# Patient Record
Sex: MALE | Race: WHITE | NOT HISPANIC OR LATINO | Employment: OTHER | URBAN - METROPOLITAN AREA
[De-identification: names, ages, dates, MRNs, and addresses within clinical notes are randomized per-mention and may not be internally consistent; named-entity substitution may affect disease eponyms.]

---

## 2018-07-17 RX ORDER — WARFARIN SODIUM 3 MG/1
3 TABLET ORAL DAILY
Refills: 5 | COMMUNITY
Start: 2018-05-29 | End: 2018-08-02 | Stop reason: CLARIF

## 2018-07-17 RX ORDER — WARFARIN SODIUM 5 MG/1
5 TABLET ORAL DAILY
Refills: 1 | COMMUNITY
Start: 2018-06-27 | End: 2018-10-24 | Stop reason: SDUPTHER

## 2018-07-19 ENCOUNTER — OFFICE VISIT (OUTPATIENT)
Dept: FAMILY MEDICINE CLINIC | Facility: CLINIC | Age: 68
End: 2018-07-19
Payer: MEDICARE

## 2018-07-19 VITALS
WEIGHT: 191.6 LBS | SYSTOLIC BLOOD PRESSURE: 126 MMHG | TEMPERATURE: 97.3 F | OXYGEN SATURATION: 98 % | HEIGHT: 68 IN | HEART RATE: 76 BPM | DIASTOLIC BLOOD PRESSURE: 82 MMHG | RESPIRATION RATE: 16 BRPM | BODY MASS INDEX: 29.04 KG/M2

## 2018-07-19 DIAGNOSIS — R01.1 CARDIAC MURMUR: ICD-10-CM

## 2018-07-19 DIAGNOSIS — I73.9 PERIPHERAL ARTERY DISEASE (HCC): Primary | ICD-10-CM

## 2018-07-19 DIAGNOSIS — F17.200 TOBACCO DEPENDENCE: ICD-10-CM

## 2018-07-19 DIAGNOSIS — I73.9 INTERMITTENT CLAUDICATION (HCC): ICD-10-CM

## 2018-07-19 PROCEDURE — 99204 OFFICE O/P NEW MOD 45 MIN: CPT | Performed by: FAMILY MEDICINE

## 2018-07-19 RX ORDER — VARENICLINE TARTRATE 25 MG
KIT ORAL
Qty: 53 TABLET | Refills: 0 | Status: SHIPPED | OUTPATIENT
Start: 2018-07-19 | End: 2018-08-02 | Stop reason: CLARIF

## 2018-07-19 NOTE — PROGRESS NOTES
Assessment/Plan:         Diagnoses and all orders for this visit:    Peripheral artery disease ([address] )  Comments:  f/u with vascualr, previously known to Dr Yaya Burdick, obtain labs from Dr Shirley Gill  Orders:  -     Ambulatory referral to Vascular Surgery; Future  -     VAS lower limb arterial duplex, limited/unilateral; Future    Tobacco dependence  Comments:  trial of chantix, stressed importance of cessation for PAD  Orders:  -     varenicline (CHANTIX AMMON) 0 5 MG X 11 & 1 MG X 42 tablet; Take one 0 5mg tab by mouth 1x daily for 3 days, then increase to one 0 5mg tab 2x daily for 3 days, then increase to one 1mg tab 2x daily    Intermittent claudication (HCC)  -     Ambulatory referral to Vascular Surgery; Future  -     VAS lower limb arterial duplex, limited/unilateral; Future    Cardiac murmur  Comments:  check echo  Orders:  -     Echo complete with contrast if indicated; Future    Other orders  -     warfarin (COUMADIN) 5 mg tablet; Take 5 mg by mouth daily  -     warfarin (COUMADIN) 3 mg tablet; Take 3 mg by mouth daily          Subjective:      Patient ID: Yesi Heck is a 80 y o  male  Pt c/o pain in the L leg while mowing the grass 2 weeks ago  Pain improved when he stopped walking  Push mower, 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  H/o DVT and PE  On coumadin currently  H/o fem bypass at [facility] in 2011  Not currently seeing a vascular specialist  Trying to quit smoking, cut down to 2-3 cigs per day  Has been off the cigs while in hospital without the patch or any aids  Interested in [address]  Known to Dr Shirley Gill for the blood clots, he follows the coumadin          The following portions of the patient's history were reviewed and updated as appropriate: allergies, current medications, past family history, past medical history, past social history, past surgical history and problem list     Review of Systems      Objective:      /82 (BP Location: Left arm, Patient Position: Sitting, Cuff Size: Standard)   Pulse 76   Temp (!) 97 3 °F (36 3 °C)   Resp 16   Ht 5' 8" (1 727 m)   Wt 86 9 kg (191 lb 9 6 oz)   SpO2 98%   BMI 29 13 kg/m²          Physical Exam   Constitutional: He is oriented to person, place, and time  He appears well-developed and well-nourished  Cardiovascular: Normal rate and regular rhythm  Murmur (BLAISE 2/6) heard  Pulmonary/Chest: Effort normal and breath sounds normal    Neurological: He is alert and oriented to person, place, and time  Skin: Skin is warm  There is erythema  No pulse appreicated L foot, R pedal pulse intact, chronic color changes in both legs, poor nail growth   Psychiatric: He has a normal mood and affect   His behavior is normal  Judgment and thought content normal

## 2018-07-27 ENCOUNTER — HOSPITAL ENCOUNTER (OUTPATIENT)
Dept: NON INVASIVE DIAGNOSTICS | Facility: CLINIC | Age: 68
Discharge: HOME | End: 2018-07-27
Payer: MEDICARE

## 2018-07-27 ENCOUNTER — TRANSCRIBE ORDERS (OUTPATIENT)
Dept: ADMINISTRATIVE | Facility: HOSPITAL | Age: 68
End: 2018-07-27

## 2018-07-27 ENCOUNTER — APPOINTMENT (OUTPATIENT)
Dept: LAB | Facility: MEDICAL CENTER | Age: 68
End: 2018-07-27
Payer: MEDICARE

## 2018-07-27 DIAGNOSIS — R53.83 OTHER FATIGUE: ICD-10-CM

## 2018-07-27 DIAGNOSIS — T81.72XD: ICD-10-CM

## 2018-07-27 DIAGNOSIS — R63.4 ABNORMAL WEIGHT LOSS: ICD-10-CM

## 2018-07-27 DIAGNOSIS — D68.59 PRIMARY HYPERCOAGULABLE STATE (HCC): Primary | ICD-10-CM

## 2018-07-27 DIAGNOSIS — I73.9 PERIPHERAL ARTERY DISEASE (HCC): ICD-10-CM

## 2018-07-27 DIAGNOSIS — I82.409: ICD-10-CM

## 2018-07-27 DIAGNOSIS — I73.9 INTERMITTENT CLAUDICATION (HCC): ICD-10-CM

## 2018-07-27 DIAGNOSIS — M81.0 AGE RELATED OSTEOPOROSIS, UNSPECIFIED PATHOLOGICAL FRACTURE PRESENCE: ICD-10-CM

## 2018-07-27 PROCEDURE — 93923 UPR/LXTR ART STDY 3+ LVLS: CPT

## 2018-07-27 PROCEDURE — 85610 PROTHROMBIN TIME: CPT | Performed by: INTERNAL MEDICINE

## 2018-07-27 PROCEDURE — 93922 UPR/L XTREMITY ART 2 LEVELS: CPT | Performed by: SURGERY

## 2018-07-27 PROCEDURE — 93925 LOWER EXTREMITY STUDY: CPT

## 2018-07-27 PROCEDURE — 36415 COLL VENOUS BLD VENIPUNCTURE: CPT | Performed by: INTERNAL MEDICINE

## 2018-07-27 PROCEDURE — 93925 LOWER EXTREMITY STUDY: CPT | Performed by: SURGERY

## 2018-07-28 LAB
INR PPP: 2 (ref 0.86–1.17)
PROTHROMBIN TIME: 22.8 SECONDS (ref 11.8–14.2)

## 2018-08-02 ENCOUNTER — OFFICE VISIT (OUTPATIENT)
Dept: VASCULAR SURGERY | Facility: CLINIC | Age: 68
End: 2018-08-02
Payer: MEDICARE

## 2018-08-02 VITALS
SYSTOLIC BLOOD PRESSURE: 156 MMHG | DIASTOLIC BLOOD PRESSURE: 86 MMHG | TEMPERATURE: 97.8 F | BODY MASS INDEX: 29.1 KG/M2 | HEIGHT: 68 IN | WEIGHT: 192 LBS

## 2018-08-02 DIAGNOSIS — I73.9 INTERMITTENT CLAUDICATION (HCC): ICD-10-CM

## 2018-08-02 DIAGNOSIS — F17.200 TOBACCO DEPENDENCE: Primary | ICD-10-CM

## 2018-08-02 DIAGNOSIS — I73.9 PERIPHERAL ARTERY DISEASE (HCC): ICD-10-CM

## 2018-08-02 PROCEDURE — 99204 OFFICE O/P NEW MOD 45 MIN: CPT | Performed by: PHYSICIAN ASSISTANT

## 2018-08-02 RX ORDER — ATORVASTATIN CALCIUM 40 MG/1
40 TABLET, FILM COATED ORAL DAILY
Qty: 30 TABLET | Refills: 0 | Status: SHIPPED | OUTPATIENT
Start: 2018-08-02 | End: 2018-09-20 | Stop reason: SDUPTHER

## 2018-08-02 NOTE — ASSESSMENT & PLAN NOTE
Peripheral arterial disease  Claudication with moderate work load  R fem-distal PT bypass (vein graft) 9/15/2011 ([facility])  Hx PE/multiple, bilat DVT/protein C deficiency  on warfarin  Smoker     Two episodes of LEFT leg claudication after 30-45 minutes of mowing the lawn at an activity of higher work load than usual  No calf pain during activities of daily living, wounds or rest pain  He had a subtherapeutic INR in May  No increased leg swelling  No cords or phlebitis  I had a detailed discussion with patient and wife regarding his complex history, symptoms and plan of care  He discussed findings of recent arterial duplex  At this juncture, his symptoms are not Lifestyle limiting  We would favor medical therapy with vascular follow up and surveillance  We discussed that we should consider angiography should he develop lifestyle limiting symptoms or wounds (since he probably won't heal foot wounds ) We had a long discussion regarding potential concerns such as if he has increased pain, swelling, rest pain or development of wounds  We will add statin but have PCP check lipids/ LFTs  Patient tells me that his "old" cardiologist had him on statin and metoprolol but it got lost over the years  Recommendations:    - progressive walking program - walk every day and increase activity  - continue with aspirin, warfarin  - add statin therapy which he was on in the past  (No obvious contraindication)  - maintain a healthy lifestyle with good low fat, low cholesterol diet  - work on smoking cessation  - must call right away for increasing calf / leg pain or development of wounds on feet    - follow up with PCP for cholesterol panel/management, blood pressure  - we will consider angiogram, if symptoms start to affect life-style or develops wounds  - follow up in 2 months or sooner, if needed  - routine surveillance  - patient and understand instructions

## 2018-08-02 NOTE — PROGRESS NOTES
Assessment/Plan:    Claudication of left lower extremity ([address] )  Peripheral arterial disease  Claudication with moderate work load  R fem-distal PT bypass (vein graft) 9/15/2011 ([facility])  Hx PE/multiple, bilat DVT/protein C deficiency  on warfarin  Smoker     Two episodes of LEFT leg claudication after 30-45 minutes of mowing the lawn at an activity of higher work load than usual  No calf pain during activities of daily living, wounds or rest pain  He had a subtherapeutic INR in May  No increased leg swelling  No cords or phlebitis  I had a detailed discussion with patient and wife regarding his complex history, symptoms and plan of care  He discussed findings of recent arterial duplex  At this juncture, his symptoms are not Lifestyle limiting  We would favor medical therapy with vascular follow up and surveillance  We discussed that we should consider angiography should he develop lifestyle limiting symptoms or wounds (since he probably won't heal foot wounds ) We had a long discussion regarding potential concerns such as if he has increased pain, swelling, rest pain or development of wounds  We will add statin but have PCP check lipids/ LFTs  Patient tells me that his "old" cardiologist had him on statin and metoprolol but it got lost over the years  Recommendations:    - progressive walking program - walk every day and increase activity  - continue with aspirin, warfarin  - add statin therapy which he was on in the past  (No obvious contraindication)  - maintain a healthy lifestyle with good low fat, low cholesterol diet  - work on smoking cessation  - must call right away for increasing calf / leg pain or development of wounds on feet    - follow up with PCP for cholesterol panel/management, blood pressure  - we will consider angiogram, if symptoms start to affect life-style or develops wounds  - follow up in 2 months or sooner, if needed  - routine surveillance  - patient and understand instructions            Patient ID: Marilee Bamberger is a 80 y o  male  Chief complaint: Pt is new to our practice here to discuss PAD and intermitted claudication  Pt had LOYD done 7/27/18  Pt is on coumadin  HPI     Marilee Bamberger 80year old male evaluation of peripheral arterial disease with claudication  David Perla has a Hx CAD, PAD, ongoing tobacco and PE/multiple, bilat DVT/protein C deficiency  on warfarin  He suffered from claudication and wounds for which he underwent R Fem-Pop bypass reverse SVG 06/02/2011 ([facility])  The bypass apparently quickly occluded for which he underwent R fem-distal PT bypass (L SVG) 9/15/2011 ([facility])  He continues to smoke cigarettes  He is currently being worked up for heart murmur on PE  David Perla has an extensive vascular history with arterial and venous disease  He has not been seen by vascular surgery since his bypass in 2011  He suddenly developed episodes of calf pain while mowing the grass  He describes an episode four weeks ago where after 50 minutes of mowing the grass he had to stop due to left calf pain  His wife states that he walked / mod the entire yard when he usually he would just do half  He sat down after 10 minutes the symptoms resolved  2 weeks ago had another similar episode where he had a   after 30 minutes of mowing the grass he had to stop for 10 minutes, the L calf pain resolved and he was able to walk again  He had some transient numbness in the L foot  He describes that the symptoms are  Id he had had no progressive history of claudication  It certainly sounds that this level of activity as much higher than usual   On a daily basis he does not get life-limiting claudication  He is completing his activities daily living without any calf pain  His primary care sent him for an arterial duplex which we reviewed in the office  Fortunately, the right lower extremity bypass looks good    The MARLENY on the left where he has symptoms is low at 0 5  At this juncture, we discussed that his symptoms are not Lifestyle limiting and we would favor medical therapy with vascular follow up and surveillance  We discussed that we should consider angiography should he developed lifestyle limiting symptoms or wounds since he probably won't heal foot wounds  We had a long discussion regarding potential concerns such as if he has increased pain, swelling, rest pain or development of wounds  No has no cords or evidence of phlebitis  He sees a podiatrist on a regular basis to check his feet which have been stable  Unfortunately, he continues smokes cigarettes  He was given Chantix Rx by PCP but could not afford it  He does tell me that he subtherapeutic INR 1 6 in May, but I see no high suspicion to send for venous duplex       Recent LEADS shows patent R LE bypass and good pressures  L  LE MARLENY 0 5 with flat-lined pressures  Fem-pop disease and short segment popliteal occlusion  Patent PT/AT  Medications: warfarin, aspirin  Add atorvastatin 40  The following portions of the patient's history were reviewed and updated as appropriate: allergies, current medications, past family history, past medical history, past social history, past surgical history and problem list       Heart murmur for which she is scheduled for an echo  No chest pain, pressure or shortness of breath  No dizziness lightheadedness  No syncope  No history of heart attack or stroke  No increased lower extremity edema  No problems with bleeding on warfarin  Review of Systems   Constitutional: Negative  HENT: Positive for hearing loss  Eyes: Negative  Respiratory: Negative  Cardiovascular: Negative  Gastrointestinal: Negative  Endocrine: Negative  Genitourinary: Negative  Musculoskeletal: Negative  Leg pain   Leg cramping when walking     Skin: Negative  Neurological: Positive for numbness  Hematological: Negative  Psychiatric/Behavioral: Negative  Objective:    /86 (BP Location: Right arm, Patient Position: Sitting, Cuff Size: Adult)   Temp 97 8 °F (36 6 °C) (Tympanic)   Ht 5' 8" (1 727 m)   Wt 87 1 kg (192 lb)   BMI 29 19 kg/m²        Physical Exam   Constitutional: He is oriented to person, place, and time  He appears well-developed and well-nourished  He is cooperative  HENT:   Head: Normocephalic and atraumatic  Eyes: EOM are normal  Pupils are equal, round, and reactive to light  Neck: Trachea normal  Neck supple  No JVD present  No thyromegaly present  Cardiovascular: Normal rate, regular rhythm, S1 normal and S2 normal   Exam reveals no gallop and no friction rub  Murmur heard  Systolic murmur is present with a grade of 3/6   Pulses:       Carotid pulses are 2+ on the right side, and 2+ on the left side  Radial pulses are 2+ on the right side, and 2+ on the left side  Femoral pulses are 2+ on the right side, and 2+ on the left side  Dorsalis pedis pulses are 2+ on the right side  Posterior tibial pulses are 1+ on the right side  Discolored toes - redness with blanching  The R/L toes are equally discolored and similar in temperature (toes a bit cool c/w foot and leg)    Mild LE edema    Bilateral venous varicosities L > R     R/L calf soft  R Good, biphasic -triphasic AT/DP signals; biphasic PT    L good, pop signal; biphasic AT signal    Decreased sensation R foot (chronic 2011); motor intact  L Grossly motor -sensory intact    Chronic bilateral skin changes     Pulmonary/Chest: Effort normal and breath sounds normal  No accessory muscle usage  No respiratory distress  He has no wheezes  He has no rales  Abdominal: Soft  Bowel sounds are normal  He exhibits no distension  There is no hepatosplenomegaly  There is no tenderness  Musculoskeletal: Normal range of motion  He exhibits no edema or deformity     Neurological: He is alert and oriented to person, place, and time  Grossly normal    Skin: Skin is warm and dry  No lesion and no rash noted  No cyanosis  Nails show no clubbing  Psychiatric: He has a normal mood and affect  Nursing note and vitals reviewed  Vitals:    08/02/18 0947   BP: 156/86   BP Location: Right arm   Patient Position: Sitting   Cuff Size: Adult   Temp: 97 8 °F (36 6 °C)   TempSrc: Tympanic   Weight: 87 1 kg (192 lb)   Height: 5' 8" (1 727 m)       Patient Active Problem List   Diagnosis    Tobacco dependence    Peripheral artery disease (HCC)    Claudication of left lower extremity (HCC)       Past Surgical History:   Procedure Laterality Date    VEIN LIGATION         Family History   Problem Relation Age of Onset    Cancer Mother 50    Heart attack Father 46    Prostate cancer Brother        Social History     Social History    Marital status: /Civil Union     Spouse name: N/A    Number of children: N/A    Years of education: N/A     Occupational History    Not on file  Social History Main Topics    Smoking status: Current Every Day Smoker    Smokeless tobacco: Never Used    Alcohol use No    Drug use: Unknown    Sexual activity: Not on file     Other Topics Concern    Not on file     Social History Narrative    No narrative on file       No Known Allergies      Current Outpatient Prescriptions:     atorvastatin (LIPITOR) 40 mg tablet, Take 1 tablet (40 mg total) by mouth daily, Disp: 30 tablet, Rfl: 0    warfarin (COUMADIN) 5 mg tablet, Take 5 mg by mouth daily, Disp: , Rfl: 1      VAS lower extremity arterial duplex 7/27/2018:  FINDINGS:  Right Waveform PSV EDV   Post  Tibial Triphasic   Ant   Tibial Biphasic   Inflow Anastomosis 87   High Thigh (Graft) 198   Mid Thigh (Graft) 56   Near Knee (Graft) 65   Mid Calf (Graft) 93   High Calf (Graft) 89   Outflow Anastomosis 108   Common Femoral Artery 196 0   Prox Profunda 156 0   Prox Post Tibial 170 0   Dist Post Tibial 40 4   Dist  Ant  Tibial 44 4   Left Impression Waveform PSV EDV   Post  Tibial Monophasic   Ant  Tibial Monophasic   Common Femoral Artery 117 13   Prox Profunda 232 21   Prox SFA 82 9   Mid SFA Diffuse Disease 80 10   Dist SFA 50-75% 177 25   Proximal Pop 36 0   Pop Mid Occluded 0   Distal Pop 15 6   Dist Post Tibial 17 5   Dist  Ant  Tibial 24 7   CONCLUSION:  Impression:  RIGHT LOWER LIMB  Widely patent femoral artery to posterior tibial artery bypass graft  Ankle Pressure: 196 mm Hg , MARLENY: 1 11  Right Metatarsal Pressure: 215 mm Hg  Right Great Toe Pressure 116 mm Hg , within the healing range  PVR/ PPG tracings are normal   LEFT LOWER LIMB  Diffuse disease in the femoral and popliteal arteries with a 50-75% stenosis of  the distal superficial femoral artery and a short segment occlusion of the mid  popliteal artery with reconstitution in the distal popliteal artery  The posterior tibial and anterior tibial arteries are patent  Ankle Pressure 89 mm Hg , MARLENY: 0 5  Pulse volume recordings at the ankle and PPG waveforms at the great toe are  flatline, suggesting significantly diminished digital perfusion

## 2018-08-02 NOTE — PATIENT INSTRUCTIONS
Peripheral arterial disease  Claudication with moderate work load  R fem-distal PT bypass (vein graft) 9/15/2011 ([facility])  Hx PE/multiple, bilat DVT/protein C deficiency  on warfarin  Smoker     Two episodes of LEFT leg claudication after 30-45 minutes of mowing the lawn at an activity of higher work load than usual  No calf pain during activities of daily living, wounds or rest pain  He had a subtherapeutic INR in May  No increased leg swelling  No cords, palpable lesions  No chest pain or shortness of breath  Recommendations:    - progressive walking program - walk every day and increase activity  - continue with aspirin, warfarin  - add statin therapy which he was on in the past  (No obvious contraindication)  - maintain a healthy lifestyle with good low fat, low cholesterol diet  - work on smoking cessation  - must call right away for increasing calf / leg pain or development of wounds on feet  - follow up with PCP for cholesterol panel/management, blood pressure  - we will consider angiogram, if symptoms start to affect life-style or develops wounds  - follow up in 2 months or sooner, if needed  - routine surveillance  - patient and understand instructions        Peripheral Artery Disease   AMBULATORY CARE:   Peripheral artery disease (PAD)  is narrow, weak, or blocked arteries  It may affect any arteries outside of your heart and brain  PAD is usually the result of a buildup of fat and cholesterol, also called plaque, along your artery walls  Inflammation, a blood clot, or abnormal cell growth could also block your arteries  PAD prevents normal blood flow to your legs and arms  You are at risk of an amputation if poor blood flow keeps wounds from healing or causes gangrene (tissue death)  Without treatment, PAD can also cause a heart attack or stroke  Common symptoms include:  Mild PAD usually does not cause symptoms   As the disease worsens over time, you may have the following:  · Pain or cramps in your leg or hip while you walk     · A numb, weak, or heavy feeling in your legs     · Dry, scaly, red, or pale skin on your legs     · Thick or brittle nails, or hair loss on your arms and legs     · Foot sores that will not heal     · Burning or aching in your feet and toes while resting (this may be worse when you lie down)  Call 911 for the following:   · You have any of the following signs of a heart attack:      ¨ Squeezing, pressure, or pain in your chest that lasts longer than 5 minutes or returns    ¨ Discomfort or pain in your back, neck, jaw, stomach, or arm     ¨ Trouble breathing    ¨ Nausea or vomiting    ¨ Lightheadedness or a sudden cold sweat, especially with chest pain or trouble breathing    · You have any of the following signs of a stroke:      ¨ Numbness or drooping on one side of your face     ¨ Weakness in an arm or leg    ¨ Confusion or difficulty speaking    ¨ Dizziness, a severe headache, or vision loss  Seek care immediately if:   · You have sores or wounds that will not heal      · You notice black or discolored skin on your arm or leg  · Your skin is cool to the touch  Contact your healthcare provider if:   · You have leg pain when you walk 1/8 mile (200 meters) or less, even with treatment  · Your legs are red, dry, or pale, even with treatment  · You have questions or concerns about your condition or care  Treatment for PAD  can help reduce your risk of a heart attack, stroke, or amputation  You may need more than one of the following:  · Medicines  may be given to prevent blood clots and reduce the risk of a heart attack or stroke  You may be given medicine to help prevent your PAD from getting worse  · A supervised exercise program  helps you stay active in normal daily activities and may prevent disability  Healthcare providers will help you safely walk or do strength training exercises 3 times a week for 30 to 60 minutes   You will do this for several months, then transition to walking on your own  · Angioplasty  is a procedure to open your artery so blood can flow through normally  A thin tube called a catheter is used to insert a small balloon into your artery  The balloon is inflated to open your blocked artery, and then removed  A tube called a stent may be placed in your artery to hold it open  · Bypass surgery  is used to make a new connection to your artery with a vein from another part of your body, or an artificial graft  The vein or graft is attached to your artery above and below your blockage  This allows blood to flow around the blocked portion of your artery  Manage and prevent PAD:   · Walk for 30 to 60 minutes at least 4 times a week  Your healthcare provider may also refer you to an supervised exercise program  The program helps increase how far you can walk without pain  It also helps you stay active in normal daily activities and may prevent disability caused by PAD  · Do not smoke  Nicotine and other chemicals in cigarettes and cigars can worsen PAD  They can also increase your risk for a heart attack or stroke  Ask your healthcare provider for information if you currently smoke and need help to quit  E-cigarettes or smokeless tobacco still contain nicotine  Talk to your healthcare provider before you use these products  · Manage other health conditions  Take your medicines as directed and follow your healthcare provider's instructions if you have high blood pressure or high cholesterol  Perform foot care and check your blood sugar levels as directed if you have diabetes  · Eat heart healthy foods  Eat whole grains, fruits, and vegetables every day  Limit salt and high-fat foods  Ask your healthcare provider for more information on a heart healthy diet  Ask if you need to lose weight  Your healthcare provider can help you create a healthy weight-loss plan    Follow up with your healthcare provider as directed:  Write down your questions so you remember to ask them during your visits  © 2017 [employer] Information is for End User's use only and may not be sold, redistributed or otherwise used for commercial purposes  All illustrations and images included in CareNotes® are the copyrighted property of [employer]  or [employer]  The above information is an  only  It is not intended as medical advice for individual conditions or treatments  Talk to your doctor, nurse or pharmacist before following any medical regimen to see if it is safe and effective for you

## 2018-08-02 NOTE — LETTER
August 2, 2018     Luz Woodward MD  [address]    Patient: Genet Vega   YOB: 1950   Date of Visit: 8/2/2018     Dear Dr Suleiman Cook      Thank you for kindly referring Josy Reyes to me for evaluation  Below are the relevant portions of my assessment and plan of care  If you have questions, please do not hesitate to call me  I look forward to following Yris Leary along with you  Sincerely,        Floridalma Matias PA-C        CC: No Recipients    Progress Notes:      Peripheral arterial disease  Claudication with moderate work load  R fem-distal PT bypass (vein graft) 9/15/2011 ([facility])  Hx PE/multiple, bilat DVT/protein C deficiency  on warfarin  Smoker     Two episodes of LEFT leg claudication after 30-45 minutes of mowing the lawn at an activity of higher work load than usual  No calf pain during activities of daily living, wounds or rest pain  He had a subtherapeutic INR in May  No increased leg swelling  No cords or phlebitis  I had a detailed discussion with patient and wife regarding his complex history, symptoms and plan of care  He discussed findings of recent arterial duplex  At this juncture, his symptoms are not Lifestyle limiting  We would favor medical therapy with vascular follow up and surveillance  We discussed that we should consider angiography should he develop lifestyle limiting symptoms or wounds (since he probably won't heal foot wounds ) We had a long discussion regarding potential concerns such as if he has increased pain, swelling, rest pain or development of wounds  We will add statin but have PCP check lipids/ LFTs  Patient tells me that his "old" cardiologist had him on statin and metoprolol but it got lost over the years         Recommendations:    - progressive walking program - walk every day and increase activity  - continue with aspirin, warfarin  - add statin therapy which he was on in the past  (No obvious contraindication)  - maintain a healthy lifestyle with good low fat, low cholesterol diet  - work on smoking cessation  - must call right away for increasing calf / leg pain or development of wounds on feet    - follow up with PCP for cholesterol panel/management, blood pressure  - we will consider angiogram, if symptoms start to affect life-style or develops wounds  - follow up in 2 months or sooner, if needed  - routine surveillance  - patient and understand instructions

## 2018-08-03 ENCOUNTER — TELEPHONE (OUTPATIENT)
Dept: FAMILY MEDICINE CLINIC | Facility: CLINIC | Age: 68
End: 2018-08-03

## 2018-08-03 NOTE — TELEPHONE ENCOUNTER
Vascular put him on lipator and now he was to know if he can get blood work to get his chloresterol levels checked and if you want him to come in for a follow up

## 2018-08-05 NOTE — TELEPHONE ENCOUNTER
I will put BW orders in for him  We usually check the lipids 4-6 weeks after starting the medication, I will check other routine labs as well  He can come in after that to review them  I thought he mentioned at his last visit he had BW done recently, or at least if we could get those results to compare with these new ones  Vascular likely put him on lipitor to reduce blockages in the vessels more so than just get the numbers down in the labs

## 2018-08-09 DIAGNOSIS — E78.2 MIXED HYPERLIPIDEMIA: Primary | ICD-10-CM

## 2018-08-30 ENCOUNTER — HOSPITAL ENCOUNTER (OUTPATIENT)
Dept: NON INVASIVE DIAGNOSTICS | Facility: MEDICAL CENTER | Age: 68
Discharge: HOME | End: 2018-08-30
Payer: MEDICARE

## 2018-08-30 DIAGNOSIS — R01.1 CARDIAC MURMUR: ICD-10-CM

## 2018-08-30 PROCEDURE — 93306 TTE W/DOPPLER COMPLETE: CPT

## 2018-08-30 PROCEDURE — 93306 TTE W/DOPPLER COMPLETE: CPT | Performed by: INTERNAL MEDICINE

## 2018-09-13 ENCOUNTER — APPOINTMENT (OUTPATIENT)
Dept: LAB | Facility: MEDICAL CENTER | Age: 68
End: 2018-09-13
Payer: MEDICARE

## 2018-09-13 DIAGNOSIS — E78.2 MIXED HYPERLIPIDEMIA: ICD-10-CM

## 2018-09-13 LAB
ALBUMIN SERPL BCP-MCNC: 3.6 G/DL (ref 3.5–5)
ALP SERPL-CCNC: 151 U/L (ref 46–116)
ALT SERPL W P-5'-P-CCNC: 38 U/L (ref 12–78)
ANION GAP SERPL CALCULATED.3IONS-SCNC: 8 MMOL/L (ref 4–13)
AST SERPL W P-5'-P-CCNC: 30 U/L (ref 5–45)
BASOPHILS # BLD AUTO: 0.05 THOUSANDS/ΜL (ref 0–0.1)
BASOPHILS NFR BLD AUTO: 0 % (ref 0–1)
BILIRUB SERPL-MCNC: 0.53 MG/DL (ref 0.2–1)
BUN SERPL-MCNC: 17 MG/DL (ref 5–25)
CALCIUM SERPL-MCNC: 9.2 MG/DL (ref 8.3–10.1)
CHLORIDE SERPL-SCNC: 105 MMOL/L (ref 100–108)
CHOLEST SERPL-MCNC: 121 MG/DL (ref 50–200)
CO2 SERPL-SCNC: 29 MMOL/L (ref 21–32)
CREAT SERPL-MCNC: 1.48 MG/DL (ref 0.6–1.3)
EOSINOPHIL # BLD AUTO: 0.17 THOUSAND/ΜL (ref 0–0.61)
EOSINOPHIL NFR BLD AUTO: 1 % (ref 0–6)
ERYTHROCYTE [DISTWIDTH] IN BLOOD BY AUTOMATED COUNT: 12.8 % (ref 11.6–15.1)
GFR SERPL CREATININE-BSD FRML MDRD: 48 ML/MIN/1.73SQ M
GLUCOSE P FAST SERPL-MCNC: 95 MG/DL (ref 65–99)
HCT VFR BLD AUTO: 52.2 % (ref 36.5–49.3)
HDLC SERPL-MCNC: 35 MG/DL (ref 40–60)
HGB BLD-MCNC: 17.1 G/DL (ref 12–17)
IMM GRANULOCYTES # BLD AUTO: 0.04 THOUSAND/UL (ref 0–0.2)
IMM GRANULOCYTES NFR BLD AUTO: 0 % (ref 0–2)
LDLC SERPL CALC-MCNC: 54 MG/DL (ref 0–100)
LYMPHOCYTES # BLD AUTO: 2.58 THOUSANDS/ΜL (ref 0.6–4.47)
LYMPHOCYTES NFR BLD AUTO: 21 % (ref 14–44)
MCH RBC QN AUTO: 31.3 PG (ref 26.8–34.3)
MCHC RBC AUTO-ENTMCNC: 32.8 G/DL (ref 31.4–37.4)
MCV RBC AUTO: 96 FL (ref 82–98)
MONOCYTES # BLD AUTO: 1.11 THOUSAND/ΜL (ref 0.17–1.22)
MONOCYTES NFR BLD AUTO: 9 % (ref 4–12)
NEUTROPHILS # BLD AUTO: 8.14 THOUSANDS/ΜL (ref 1.85–7.62)
NEUTS SEG NFR BLD AUTO: 69 % (ref 43–75)
NONHDLC SERPL-MCNC: 86 MG/DL
NRBC BLD AUTO-RTO: 0 /100 WBCS
PLATELET # BLD AUTO: 218 THOUSANDS/UL (ref 149–390)
PMV BLD AUTO: 9.6 FL (ref 8.9–12.7)
POTASSIUM SERPL-SCNC: 4.8 MMOL/L (ref 3.5–5.3)
PROT SERPL-MCNC: 7.1 G/DL (ref 6.4–8.2)
RBC # BLD AUTO: 5.46 MILLION/UL (ref 3.88–5.62)
SODIUM SERPL-SCNC: 142 MMOL/L (ref 136–145)
TRIGL SERPL-MCNC: 159 MG/DL
WBC # BLD AUTO: 12.09 THOUSAND/UL (ref 4.31–10.16)

## 2018-09-13 PROCEDURE — 80053 COMPREHEN METABOLIC PANEL: CPT

## 2018-09-13 PROCEDURE — 36415 COLL VENOUS BLD VENIPUNCTURE: CPT

## 2018-09-13 PROCEDURE — 80061 LIPID PANEL: CPT

## 2018-09-13 PROCEDURE — 85025 COMPLETE CBC W/AUTO DIFF WBC: CPT

## 2018-09-20 ENCOUNTER — TELEPHONE (OUTPATIENT)
Dept: FAMILY MEDICINE CLINIC | Facility: CLINIC | Age: 68
End: 2018-09-20

## 2018-09-20 DIAGNOSIS — I73.9 PERIPHERAL ARTERY DISEASE (HCC): ICD-10-CM

## 2018-09-20 DIAGNOSIS — I73.9 INTERMITTENT CLAUDICATION (HCC): ICD-10-CM

## 2018-09-20 RX ORDER — ATORVASTATIN CALCIUM 40 MG/1
40 TABLET, FILM COATED ORAL DAILY
Qty: 90 TABLET | Refills: 0 | Status: SHIPPED | OUTPATIENT
Start: 2018-09-20 | End: 2018-12-18 | Stop reason: SDUPTHER

## 2018-09-20 RX ORDER — ATORVASTATIN CALCIUM 40 MG/1
40 TABLET, FILM COATED ORAL DAILY
Qty: 30 TABLET | Refills: 0 | Status: CANCELLED | OUTPATIENT
Start: 2018-09-20

## 2018-09-20 NOTE — TELEPHONE ENCOUNTER
Patient looking for results of bloodwork  Patients vascular doctor has him on atorvastatin and is in need of refills and wants you to be in charge of his refills  [facility] office is working on the medical release for his records

## 2018-09-20 NOTE — TELEPHONE ENCOUNTER
The BW looks ok but I am waiting for the BW to compare at Dr Felicitas Ho office   I can renew his lipitor to the pharmacy

## 2018-09-26 ENCOUNTER — OFFICE VISIT (OUTPATIENT)
Dept: VASCULAR SURGERY | Facility: CLINIC | Age: 68
End: 2018-09-26
Payer: MEDICARE

## 2018-09-26 VITALS
HEART RATE: 72 BPM | HEIGHT: 68 IN | SYSTOLIC BLOOD PRESSURE: 126 MMHG | RESPIRATION RATE: 18 BRPM | DIASTOLIC BLOOD PRESSURE: 74 MMHG | BODY MASS INDEX: 29.25 KG/M2 | WEIGHT: 193 LBS

## 2018-09-26 DIAGNOSIS — F17.200 TOBACCO DEPENDENCE: ICD-10-CM

## 2018-09-26 DIAGNOSIS — I73.9 CLAUDICATION OF LEFT LOWER EXTREMITY (HCC): Primary | ICD-10-CM

## 2018-09-26 DIAGNOSIS — Z98.890 S/P FEMORAL-TIBIAL BYPASS: ICD-10-CM

## 2018-09-26 DIAGNOSIS — I73.9 PERIPHERAL ARTERY DISEASE (HCC): ICD-10-CM

## 2018-09-26 PROCEDURE — 99215 OFFICE O/P EST HI 40 MIN: CPT | Performed by: PHYSICIAN ASSISTANT

## 2018-09-26 RX ORDER — ASPIRIN 81 MG/1
81 TABLET ORAL DAILY
COMMUNITY
End: 2019-11-04 | Stop reason: ALTCHOICE

## 2018-09-26 NOTE — PATIENT INSTRUCTIONS
Peripheral Arterial Disease/ claudication  Overall doing well  Still claudication after 30" of exercise but walking a little further      -Keep up walking, everyday! Regular exercise / walking program  -Follow good, heart healthy diet which is low in fat and cholesterol    -Maintain healthy weight   -Stop smoking!!!  -Follow up office visit 3 months    -Routine arterial duplex 6 months  -Call or return sooner for increased pain in your legs; constant numbness, tingling or coldness in the legs; or if you develop wounds on your toes/feet that do not heal      ** Will consider left lower extremity angiogram for diagnostic / therapeutic purposes  Would need to consider renal function, as well as warfarin which he takes for HX DVT's/PE - 1988- 2011  CKD  -Scr 1 48/eGFR 48    Chronic anticoagulation   - Warfarin per INR  - stable    Smoking  - Must stop smoking  - Smoking cessation discussed          Peripheral Artery Disease   AMBULATORY CARE:   Peripheral artery disease (PAD)  is narrow, weak, or blocked arteries  It may affect any arteries outside of your heart and brain  PAD is usually the result of a buildup of fat and cholesterol, also called plaque, along your artery walls  Inflammation, a blood clot, or abnormal cell growth could also block your arteries  PAD prevents normal blood flow to your legs and arms  You are at risk of an amputation if poor blood flow keeps wounds from healing or causes gangrene (tissue death)  Without treatment, PAD can also cause a heart attack or stroke  Common symptoms include:  Mild PAD usually does not cause symptoms   As the disease worsens over time, you may have the following:  · Pain or cramps in your leg or hip while you walk     · A numb, weak, or heavy feeling in your legs     · Dry, scaly, red, or pale skin on your legs     · Thick or brittle nails, or hair loss on your arms and legs     · Foot sores that will not heal     · Burning or aching in your feet and toes while resting (this may be worse when you lie down)  Call 911 for the following:   · You have any of the following signs of a heart attack:      ¨ Squeezing, pressure, or pain in your chest that lasts longer than 5 minutes or returns    ¨ Discomfort or pain in your back, neck, jaw, stomach, or arm     ¨ Trouble breathing    ¨ Nausea or vomiting    ¨ Lightheadedness or a sudden cold sweat, especially with chest pain or trouble breathing    · You have any of the following signs of a stroke:      ¨ Numbness or drooping on one side of your face     ¨ Weakness in an arm or leg    ¨ Confusion or difficulty speaking    ¨ Dizziness, a severe headache, or vision loss  Seek care immediately if:   · You have sores or wounds that will not heal      · You notice black or discolored skin on your arm or leg  · Your skin is cool to the touch  Contact your healthcare provider if:   · You have leg pain when you walk 1/8 mile (200 meters) or less, even with treatment  · Your legs are red, dry, or pale, even with treatment  · You have questions or concerns about your condition or care  Treatment for PAD  can help reduce your risk of a heart attack, stroke, or amputation  You may need more than one of the following:  · Medicines  may be given to prevent blood clots and reduce the risk of a heart attack or stroke  You may be given medicine to help prevent your PAD from getting worse  · A supervised exercise program  helps you stay active in normal daily activities and may prevent disability  Healthcare providers will help you safely walk or do strength training exercises 3 times a week for 30 to 60 minutes  You will do this for several months, then transition to walking on your own  · Angioplasty  is a procedure to open your artery so blood can flow through normally  A thin tube called a catheter is used to insert a small balloon into your artery  The balloon is inflated to open your blocked artery, and then removed  A tube called a stent may be placed in your artery to hold it open  · Bypass surgery  is used to make a new connection to your artery with a vein from another part of your body, or an artificial graft  The vein or graft is attached to your artery above and below your blockage  This allows blood to flow around the blocked portion of your artery  Manage and prevent PAD:   · Walk for 30 to 60 minutes at least 4 times a week  Your healthcare provider may also refer you to an supervised exercise program  The program helps increase how far you can walk without pain  It also helps you stay active in normal daily activities and may prevent disability caused by PAD  · Do not smoke  Nicotine and other chemicals in cigarettes and cigars can worsen PAD  They can also increase your risk for a heart attack or stroke  Ask your healthcare provider for information if you currently smoke and need help to quit  E-cigarettes or smokeless tobacco still contain nicotine  Talk to your healthcare provider before you use these products  · Manage other health conditions  Take your medicines as directed and follow your healthcare provider's instructions if you have high blood pressure or high cholesterol  Perform foot care and check your blood sugar levels as directed if you have diabetes  · Eat heart healthy foods  Eat whole grains, fruits, and vegetables every day  Limit salt and high-fat foods  Ask your healthcare provider for more information on a heart healthy diet  Ask if you need to lose weight  Your healthcare provider can help you create a healthy weight-loss plan  Follow up with your healthcare provider as directed:  Write down your questions so you remember to ask them during your visits  © 2017 [employer] Information is for End User's use only and may not be sold, redistributed or otherwise used for commercial purposes   All illustrations and images included in CareNotes® are the copyrighted property of [employer]  or [employer]  The above information is an  only  It is not intended as medical advice for individual conditions or treatments  Talk to your doctor, nurse or pharmacist before following any medical regimen to see if it is safe and effective for you

## 2018-09-26 NOTE — ASSESSMENT & PLAN NOTE
Peripheral Arterial Disease  with LEFT calf claudication at moderate activity level    Verna Wills 79 y/o M PMH extensive history of mixed arterial venous disease, multiple PE/DVT due to hypercoagulable state, CAD who is followed for PAD/claudication  Unfortunately, he continues smoke cigarettes  He has PAD with calf cramping with moderate activity relieved by rest  Humble David reports that he is doing ok and following regular walking program  I do not feel that he is very forthcoming with symptoms  His wis wife then describes significant change in their activities as Humble David is unable to keep up with walking due to calf pain  It seems he is taking IB for pain and I am concerned for renal insufficiency  Exam:   2+ Fem bilaterally 1-2+ RDP; Doppler signals in both feet  Feet warm; toes on both feet are red and a bit cool; temp of feet is equal bilaterally  No foot or toe wounds  Grossly N/V intact (chronic R toe neuropathy )    + Varicose Veins L> R     Recommendations:  - I offered Humble aDvid a L LE angiogram +/- intervention to assess and potentially treat LEFT leg claudication symptoms based on wife's description sound to be lifestyle limiting  We had a detailed discussion about angiography  However, Humble David tells me that he prefers to continue conservative measures because he has already been through a lot and he is not ready for an angiogram  I explained that as his symptoms are stable, this is a reach reasonable approach  However, if he develops a wound or worsening symptoms we should know about it and we should see him sooner as we may be more compelled to recommend angiogram     -If Humble David decides on an L LE angiogram, we would need to consider his renal function, as well as warfarin which he takes for HX DVT's/PE - 1988- 2011  - We discussed that he needs to stop smoking  I think he will look at starting the patch  -CKD? Noted, Scr 1 48/eGFR 48       We discussed renal function which he should talk to PCP about and hold off on using IB      - Patient Education for PAD / claudication provided  Chapo/ wife know to let us know right away if symptoms worsen, he develops numbness, increased pain or a wound       - Repeat LOYD and F/U in 3-6 months     - As a high risk patient, I want to talk to him about carotid du in the future

## 2018-09-26 NOTE — LETTER
September 26, 2018     Luisa Sprague MD  [address] [facility] [address]    Patient: Hung Villagomez   YOB: 1950   Date of Visit: 9/26/2018     Dear Dr Markus Cervantes      Thank you for referring Gail Gudinodarellsuleman to me for evaluation  Below are the relevant portions of my assessment and plan of care  If you have questions, please do not hesitate to call me  I look forward to following Dimpleedgar Wiseman along with you  Sincerely,        Srinivas Smallwood PA-C        CC: No Recipients    Progress Notes:    Assessment/Plan:    Claudication of left lower extremity ([address] )  Peripheral Arterial Disease  with LEFT calf claudication at moderate activity level    Hung Villagomez 81 y/o M PMH extensive history of mixed arterial venous disease, multiple PE/DVT due to hypercoagulable state, CAD who is followed for PAD/claudication  Unfortunately, he continues smoke cigarettes  He has PAD with calf cramping with moderate activity relieved by rest  Dimple Wiseman reports that he is doing ok and following regular walking program  I do not feel that he is very forthcoming with symptoms  His wis wife then describes significant change in their activities as Dimple Wiseman is unable to keep up with walking due to calf pain  It seems he is taking IB for pain and I am concerned for renal insufficiency  Exam:   2+ Fem bilaterally 1-2+ RDP; Doppler signals in both feet  Feet warm; toes on both feet are red and a bit cool; temp of feet is equal bilaterally  No foot or toe wounds  Grossly N/V intact (chronic R toe neuropathy )    + Varicose Veins L> R     Recommendations:  - I offered Dimple Wiseman a L LE angiogram +/- intervention to assess and potentially treat LEFT leg claudication symptoms based on wife's description sound to be lifestyle limiting  We had a detailed discussion about angiography   However, Dimple Wiseman tells me that he prefers to continue conservative measures because he has already been through a lot and he is not ready for an angiogram  I explained that as his symptoms are stable, this is a reach reasonable approach  However, if he develops a wound or worsening symptoms we should know about it and we should see him sooner as we may be more compelled to recommend angiogram     -If Sis Sung decides on an L LE angiogram, we would need to consider his renal function, as well as warfarin which he takes for HX DVT's/PE - 1988- 2011  - We discussed that he needs to stop smoking  I think he will look at starting the patch  -CKD? Noted, Scr 1 48/eGFR 48  We discussed renal function which he should talk to PCP about and hold off on using IB      - Patient Education for PAD / claudication provided  Chapo/ wife know to let us know right away if symptoms worsen, he develops numbness, increased pain or a wound       - Repeat LOYD and F/U in 3-6 months     - As a high risk patient, I want to talk to him about carotid du in the future    S/P RIGHT Femoral- PT bypass 2011  -LOYD 7/2018: showed patent R LE bypass; R MARLENY 1 11/ 215/ 116  -Routine surveillance  -Patient education on PAD  - Chronic anticoagulation on warfarin therapy

## 2018-09-26 NOTE — PROGRESS NOTES
Assessment/Plan:    Claudication of left lower extremity ([address] )  Peripheral Arterial Disease  with LEFT calf claudication at moderate activity level    Oral Dross 81 y/o M PMH extensive history of mixed arterial venous disease, multiple PE/DVT due to hypercoagulable state, CAD who is followed for PAD/claudication  Unfortunately, he continues smoke cigarettes  He has PAD with calf cramping with moderate activity relieved by rest  Zaire Meza reports that he is doing ok and following regular walking program  I do not feel that he is very forthcoming with symptoms  His wis wife then describes significant change in their activities as Zaire Meza is unable to keep up with walking due to calf pain  It seems he is taking IB for pain and I am concerned for renal insufficiency  Exam:   2+ Fem bilaterally 1-2+ RDP; Doppler signals in both feet  Feet warm; toes on both feet are red and a bit cool; temp of feet is equal bilaterally  No foot or toe wounds  Grossly N/V intact (chronic R toe neuropathy )    + Varicose Veins L> R     Recommendations:  - I offered Zaire Meza a L LE angiogram +/- intervention to assess and potentially treat LEFT leg claudication symptoms based on wife's description sound to be lifestyle limiting  We had a detailed discussion about angiography  However, Zaire Meza tells me that he prefers to continue conservative measures because he has already been through a lot and he is not ready for an angiogram  I explained that as his symptoms are stable, this is a reach reasonable approach  However, if he develops a wound or worsening symptoms we should know about it and we should see him sooner as we may be more compelled to recommend angiogram     -If Zaire Meza decides on an L LE angiogram, we would need to consider his renal function, as well as warfarin which he takes for HX DVT's/PE - 1988- 2011  - We discussed that he needs to stop smoking  I think he will look at starting the patch  -CKD? Noted, Scr 1 48/eGFR 48  We discussed renal function which he should talk to PCP about and hold off on using IB      - Patient Education for PAD / claudication provided  Chapo/ wife know to let us know right away if symptoms worsen, he develops numbness, increased pain or a wound  - Repeat LOYD and F/U in 3-6 months     - As a high risk patient, I want to talk to him about carotid du in the future    S/P RIGHT Femoral- PT bypass 2011  -LOYD 7/2018: showed patent R LE bypass; R MARLENY 1 11/ 215/ 116  -Routine surveillance  -Patient education on PAD  - Chronic anticoagulation on warfarin therapy     Tobacco dependence  Smoking  -Must stop smoking  -Smoking cessation discussed  -He could not afford Chantix  I recommended he try the patch         Subjective:      Patient ID: Janene Sigala is a 80 y o  male  Patient had LOYD 7/27/2018  PAD with claudication after 30" of moderate activity - mowing the lawn  Patient has been trying to walk regularly around complex and with the dog  He feels his distance is improving, but still experiencing leg cramps  No open wounds  He currently takes Coumadin and Lipitor  Still smokes cigarettes daily  RE:  PAD with LEFT leg claudication    HPI     Janene Sigala 79 y/o M PMH s/f extensive history of mixed arterial and venous disease  CAD, PE/multiple, PAD, S/P R Fem-PT bypass, CKD, Moderate AS, bilat DVT/protein C deficiency on warfarin  He continues to smoke cigarettes  Mendel Shackleton comes in for follow up with peripheral arterial disease with claudication  Mendel Shackleton has been walking regularly around the complex where he lives  He is also walking the dog  Mendel Shackleton reports that he is walking daily and can go a bit further before L calf cramping  Mendel Shackleton finds after 30" mowing the grass, he needs to take a break due to L calf pain  He tells me that he doesn't feel the pain is significantly limiting his activities  However, wife states that he has slowed down - particularly in the last year   She tells me that she is avoiding trips because Patience Alvarez can not keep up with the walking  She has even picked up on helping him with activities such as mowing the grass  Wife also states that he is taking  MG at  least a couple times per week and occasionally 2 x per day  Patience Alvarez is not very forthcoming with symptoms and I cannot pain in down on exactly how much ibuprofen he takes  No night cramps? ? (sometimes takes IB at night due to discomfort )  No wounds  Chronic neuropathy R toes since bypass surgery, but no change - had been on gabapentin in past  No R foot or calf pain  Still smoking  No DM  No HTN  VAS LEADS 7/18/2018:   R MARLENY 1 11/ 215/ 116; patent R LE bypass  L  MARLENY 0 5 with flat-lined pressures  Fem-pop disease and short segment popliteal occlusion  Patent PT/AT   Tg 159 H35 L 54;  SCr 1 48, eGFR 48    Current medical therapy: asa 81, warfarin, atorvastatin 40    The following portions of the patient's history were reviewed and updated as appropriate: allergies, current medications, past family history, past medical history, past social history, past surgical history and problem list     - He is S/P R Fem-Pop bypass reverse SVG ([facility] [place] 06/02/2011)  The bypass apparently quickly occluded for which he underwent R fem-distal PT bypass (, 9/15/2011, L SVG graft, Muriel Bee)  - Recent work up for heart murmur with echo shows Mod AS/Mild MI  Patience Alvarez to follow up with cardiology next month    Review of Systems   Constitutional: Positive for unexpected weight change (weight loss)  HENT: Positive for hearing loss  Eyes: Negative  Respiratory: Negative  Cardiovascular: Negative  Gastrointestinal: Negative  Endocrine: Negative  Genitourinary: Negative  Musculoskeletal: Positive for gait problem and myalgias  Leg pain  Leg cramping with exertion   Skin: Negative  Allergic/Immunologic: Negative  Hematological: Negative  Psychiatric/Behavioral: Negative  Objective:    /74 (BP Location: Right arm, Patient Position: Sitting)   Pulse 72   Resp 18   Ht 5' 8" (1 727 m)   Wt 87 5 kg (193 lb)   BMI 29 35 kg/m²      Physical Exam   Constitutional: He is oriented to person, place, and time  He appears well-developed and well-nourished  He is cooperative  HENT:   Head: Normocephalic and atraumatic  Eyes: EOM are normal  Pupils are equal, round, and reactive to light  Neck: Trachea normal  Neck supple  No JVD present  No thyromegaly present  Cardiovascular: Normal rate, regular rhythm, S1 normal and S2 normal   Exam reveals no gallop and no friction rub  Murmur (2/6 martha, preserved 2nd sound) heard  Pulses:       Radial pulses are 2+ on the right side, and 2+ on the left side  Femoral pulses are 2+ on the right side, and 2+ on the left side  Dorsalis pedis pulses are 1+ on the right side  Unable to palp abd ao      2+ Fem bilaterally     1-2+ R DP    Good signals R LE; weak, mono-biphasic L DP signal       + significant VV L> R     He does have neuropathy in the R toes, but overall grossly N/V intact    Feet warm; toes a bit cool with temp the same bilaterally  Pulmonary/Chest: Effort normal and breath sounds normal  No accessory muscle usage  No respiratory distress  He has no wheezes  He has no rales  Abdominal: Soft  Bowel sounds are normal  He exhibits no distension  There is no hepatosplenomegaly  There is no tenderness  Musculoskeletal: Normal range of motion  He exhibits no edema or deformity  Neurological: He is alert and oriented to person, place, and time  Grossly normal    Skin: Skin is warm and dry  No lesion and no rash noted  No cyanosis  Nails show no clubbing  Psychiatric: He has a normal mood and affect  Nursing note and vitals reviewed                      Vitals:    09/26/18 0905   BP: 126/74   BP Location: Right arm   Patient Position: Sitting   Pulse: 72   Resp: 18   Weight: 87 5 kg (193 lb) Height: 5' 8" (1 727 m)       Patient Active Problem List   Diagnosis    Tobacco dependence    Peripheral artery disease (HCC)    Claudication of left lower extremity (HCC)    S/P RIGHT Femoral- PT bypass 2011       Past Surgical History:   Procedure Laterality Date    VEIN LIGATION         Family History   Problem Relation Age of Onset    Cancer Mother 50    Heart attack Father 46    Prostate cancer Brother        Social History     Social History    Marital status: /Civil Union     Spouse name: N/A    Number of children: N/A    Years of education: N/A     Occupational History    Not on file       Social History Main Topics    Smoking status: Current Every Day Smoker    Smokeless tobacco: Never Used    Alcohol use No    Drug use: Unknown    Sexual activity: Not on file     Other Topics Concern    Not on file     Social History Narrative    No narrative on file       No Known Allergies      Current Outpatient Prescriptions:     aspirin (ECOTRIN LOW STRENGTH) 81 mg EC tablet, Take 81 mg by mouth daily, Disp: , Rfl:     atorvastatin (LIPITOR) 40 mg tablet, Take 1 tablet (40 mg total) by mouth daily, Disp: 90 tablet, Rfl: 0    warfarin (COUMADIN) 5 mg tablet, Take 5 mg by mouth daily, Disp: , Rfl: 1

## 2018-09-26 NOTE — ASSESSMENT & PLAN NOTE
-LOYD 7/2018: showed patent R LE bypass; R MARLENY 1 11/ 215/ 116  -Routine surveillance  -Patient education on PAD  - Chronic anticoagulation on warfarin therapy

## 2018-09-26 NOTE — ASSESSMENT & PLAN NOTE
Smoking  -Must stop smoking  -Smoking cessation discussed  -He could not afford Chantix    I recommended he try the patch

## 2018-10-08 ENCOUNTER — TELEPHONE (OUTPATIENT)
Dept: CARDIOLOGY CLINIC | Facility: MEDICAL CENTER | Age: 68
End: 2018-10-08

## 2018-10-12 ENCOUNTER — TELEPHONE (OUTPATIENT)
Dept: FAMILY MEDICINE CLINIC | Facility: CLINIC | Age: 68
End: 2018-10-12

## 2018-10-12 RX ORDER — WARFARIN SODIUM 3 MG/1
3 TABLET ORAL DAILY
Refills: 0 | COMMUNITY
Start: 2018-09-12 | End: 2019-01-23 | Stop reason: DRUGHIGH

## 2018-10-15 ENCOUNTER — TELEPHONE (OUTPATIENT)
Dept: CARDIOLOGY CLINIC | Facility: MEDICAL CENTER | Age: 68
End: 2018-10-15

## 2018-10-15 NOTE — TELEPHONE ENCOUNTER
Called and spoke with behzad he  Came in to the office to sign a medical release for medical records fax it over to [facility] twice

## 2018-10-17 ENCOUNTER — TELEPHONE (OUTPATIENT)
Dept: CARDIOLOGY CLINIC | Facility: MEDICAL CENTER | Age: 68
End: 2018-10-17

## 2018-10-17 ENCOUNTER — TELEPHONE (OUTPATIENT)
Dept: FAMILY MEDICINE CLINIC | Facility: CLINIC | Age: 68
End: 2018-10-17

## 2018-10-17 DIAGNOSIS — I73.9 PERIPHERAL VASCULAR DISEASE WITH CLAUDICATION (HCC): Primary | ICD-10-CM

## 2018-10-19 ENCOUNTER — APPOINTMENT (OUTPATIENT)
Dept: LAB | Facility: MEDICAL CENTER | Age: 68
End: 2018-10-19
Payer: MEDICARE

## 2018-10-19 LAB
INR PPP: 1.88 (ref 0.86–1.17)
PROTHROMBIN TIME: 21.7 SECONDS (ref 11.8–14.2)

## 2018-10-19 PROCEDURE — 36415 COLL VENOUS BLD VENIPUNCTURE: CPT | Performed by: FAMILY MEDICINE

## 2018-10-19 PROCEDURE — 85610 PROTHROMBIN TIME: CPT | Performed by: FAMILY MEDICINE

## 2018-10-23 ENCOUNTER — OFFICE VISIT (OUTPATIENT)
Dept: CARDIOLOGY CLINIC | Facility: MEDICAL CENTER | Age: 68
End: 2018-10-23
Payer: MEDICARE

## 2018-10-23 ENCOUNTER — TELEPHONE (OUTPATIENT)
Dept: FAMILY MEDICINE CLINIC | Facility: CLINIC | Age: 68
End: 2018-10-23

## 2018-10-23 VITALS
HEART RATE: 70 BPM | DIASTOLIC BLOOD PRESSURE: 84 MMHG | WEIGHT: 194.3 LBS | HEIGHT: 68 IN | BODY MASS INDEX: 29.45 KG/M2 | SYSTOLIC BLOOD PRESSURE: 152 MMHG | OXYGEN SATURATION: 96 %

## 2018-10-23 DIAGNOSIS — I35.0 NONRHEUMATIC AORTIC VALVE STENOSIS: ICD-10-CM

## 2018-10-23 DIAGNOSIS — F17.200 TOBACCO DEPENDENCE: ICD-10-CM

## 2018-10-23 DIAGNOSIS — N18.30 CKD (CHRONIC KIDNEY DISEASE), STAGE III (HCC): ICD-10-CM

## 2018-10-23 DIAGNOSIS — I25.10 ASCVD (ARTERIOSCLEROTIC CARDIOVASCULAR DISEASE): ICD-10-CM

## 2018-10-23 DIAGNOSIS — E78.5 DYSLIPIDEMIA: ICD-10-CM

## 2018-10-23 DIAGNOSIS — I10 ESSENTIAL HYPERTENSION: ICD-10-CM

## 2018-10-23 DIAGNOSIS — I73.9 PERIPHERAL ARTERY DISEASE (HCC): Primary | ICD-10-CM

## 2018-10-23 PROCEDURE — 99204 OFFICE O/P NEW MOD 45 MIN: CPT | Performed by: INTERNAL MEDICINE

## 2018-10-23 PROCEDURE — 93000 ELECTROCARDIOGRAM COMPLETE: CPT | Performed by: INTERNAL MEDICINE

## 2018-10-23 RX ORDER — AMLODIPINE BESYLATE 5 MG/1
5 TABLET ORAL DAILY
Qty: 30 TABLET | Refills: 11 | Status: SHIPPED | OUTPATIENT
Start: 2018-10-23 | End: 2019-11-04 | Stop reason: SDUPTHER

## 2018-10-23 RX ORDER — NICOTINE 21 MG/24HR
1 PATCH, TRANSDERMAL 24 HOURS TRANSDERMAL EVERY 24 HOURS
Qty: 28 PATCH | Refills: 11 | Status: SHIPPED | OUTPATIENT
Start: 2018-10-23 | End: 2019-01-23 | Stop reason: ALTCHOICE

## 2018-10-23 NOTE — PROGRESS NOTES
Consultation - Cardiology     Alisson Walton 80 y o  male MRN: [id number]    Assessment/Plan:    1  ASCVD  Nonobstructive CAD by cath 5/11 at [facility]  On aspirin, statin  No current exertional cardiac symptoms  2  HL  Lipid panel 9/18 showed total cholesterol 121, , HDL 35, LDL 54  On Atorvastatin 40      3  CKD III  Creatinine 9/18 was 1 48  No recent prior baseline  4  Tobacco use  Currently smoking 1/2 ppd  Chantix was too expensive for him  He would like to quit  Has never used anything to help him quit  Will try nicotine patches  5  PAD  On aspirin, statin  6  Moderate AS  Echo 2018 showed moderate AS  Not having any current symptoms with exertion  Explained need to monitor for exertional symptoms, and importance of smoking cessation in order to avoid worsening  7  HTN  In the past was taking Metoprolol, but stopped taking it after he stopped following with Dr Cedric Streeter around 2012  BP has been more elevated in last several months  Will try taking Amlodipine 5 mg daily, monitor for LE edema  Could consider ACE I or diuretic if Amlodipine not effective or causes significant side effects  1  Peripheral artery disease ([address] )  POCT ECG   2  Tobacco dependence     3  ASCVD (arteriosclerotic cardiovascular disease)  POCT ECG   4  Dyslipidemia     5  CKD (chronic kidney disease), stage III ([address] )     6  Nonrheumatic aortic valve stenosis         HPI: 80 y o  male with a history of HL, PAD s/p RLE femoral to PT bypass 6/11, nonobstructive CAD, tobacco use, prior DVT/PE in 1998 with protein C deficiency on Coumadin, who is referred by Dr Gennaro Kelly for evaluation of aortic stenosis  Echo 8/18 showed overall LV function, EF 60%, mildly increased LV thickness  Grade I diastolic dysfunction  Moderate AS with mild AI (mean gradient 25 mm Hg, Vmax 3 2 m/s, calculated TACHO 1 1 cm squared)  Mild to moderate MAC with mild MR  Mild TR  Normal RV size and function        He was previously following with Dr Paz Haas of [facility], but stopped seeing him around 2012 when his insurance was no longer accepted by his office  Pharmacologic nuclear stress test 5/11 was reported as having a moderate sized perfusion defect of apex/anteroapical wall with mild reperfusion suggesting of myocardial ischemia  LVEF was preserved at 66%  This was done preoperatively prior to his RLE bypass  Cardiac catheterization 5/31/11 was done for abnormal stress test, and showed 10-20% stenosis of left main, 50-60% mid LAD stenosis, dominant RCA with 40-50% stenosis of mid RCA  Echo 5/11 showed normal LV size and function, EF 60-65%, RVSP 16 mm Hg, no significant valvular heart disease  Echo 3/12 showed normal LV function, possible mild LVH, EF 50-55%, mild MR, aortic sclerosis, mild TR  He is walking for exercise/to help with claudication for about 30 minutes, has to stop due to pain in left leg, and then rests and keeps going  No chest pain, no SOB  No LE edema  No dizziness or lightheadedness  No syncope or presyncope  Review of Systems:    Review of Systems   Constitution: Positive for malaise/fatigue and weight loss  Negative for chills, decreased appetite, diaphoresis, fever, weakness, night sweats and weight gain  HENT: Positive for hearing loss  Negative for ear pain, hoarse voice, nosebleeds, sore throat and tinnitus  Eyes: Negative for blurred vision and pain  Cardiovascular: Negative  Negative for chest pain, claudication, cyanosis, dyspnea on exertion, irregular heartbeat, leg swelling, near-syncope, orthopnea, palpitations, paroxysmal nocturnal dyspnea and syncope  Respiratory: Positive for snoring  Negative for cough, hemoptysis, shortness of breath, sleep disturbances due to breathing, sputum production and wheezing  Hematologic/Lymphatic: Negative for adenopathy and bleeding problem  Does not bruise/bleed easily     Skin: Negative for color change, dry skin, flushing, itching, poor wound healing and rash  Musculoskeletal: Positive for arthritis and back pain  Negative for falls, joint pain, muscle cramps, muscle weakness, myalgias and neck pain  Gastrointestinal: Positive for heartburn  Negative for abdominal pain, constipation, diarrhea, dysphagia, hematemesis, hematochezia, melena, nausea and vomiting  Genitourinary: Positive for nocturia  Negative for dysuria, frequency, hematuria, hesitancy, non-menstrual bleeding and urgency  Neurological: Positive for numbness and paresthesias  Negative for excessive daytime sleepiness, dizziness, focal weakness, headaches, light-headedness, loss of balance, tremors and vertigo  Psychiatric/Behavioral: Negative for altered mental status, depression and memory loss  The patient does not have insomnia and is not nervous/anxious  Allergic/Immunologic: Negative for environmental allergies and persistent infections       Active Problems:     Patient Active Problem List   Diagnosis    Tobacco dependence    Peripheral artery disease [address] [address] )    Claudication of left lower extremity (HCC)    S/P RIGHT Femoral- PT bypass 2011    Dyslipidemia    ASCVD (arteriosclerotic cardiovascular disease)    CKD (chronic kidney disease), stage III (HCC)    Nonrheumatic aortic valve stenosis       Historical Information   Past Medical History:   Diagnosis Date    DVT (deep venous thrombosis) (HCC)     Protein C deficiency (HCC)     Pulmonary embolus (HCC)      Past Surgical History:   Procedure Laterality Date    BYPASS FEMORAL-TIBIAL Right 2011    VEIN LIGATION       History   Alcohol Use No     History   Drug Use No     History   Smoking Status    Current Some Day Smoker   Smokeless Tobacco    Never Used     Comment: 1 pk every 2days        Family History:   Family History   Problem Relation Age of Onset    Cancer Mother 50    Heart attack Father 46    Prostate cancer Brother     Arrhythmia Neg Hx     Clotting disorder Neg Hx     Fainting Neg Hx     Heart disease Neg Hx     Anuerysm Neg Hx     Stroke Neg Hx        No Known Allergies      Current Outpatient Prescriptions:     aspirin (ECOTRIN LOW STRENGTH) 81 mg EC tablet, Take 81 mg by mouth daily, Disp: , Rfl:     atorvastatin (LIPITOR) 40 mg tablet, Take 1 tablet (40 mg total) by mouth daily, Disp: 90 tablet, Rfl: 0    warfarin (COUMADIN) 5 mg tablet, Take 5 mg by mouth daily, Disp: , Rfl: 1    warfarin (COUMADIN) 3 mg tablet, Take 3 mg by mouth daily, Disp: , Rfl: 0    Objective   Vitals:   Vitals:    10/23/18 1359   BP: 152/84   BP Location: Left arm   Patient Position: Sitting   Cuff Size: Adult   Pulse: 70   SpO2: 96%   Weight: 88 1 kg (194 lb 4 8 oz)   Height: 5' 8" (1 727 m)          Physical Exam:     GEN: Alert and oriented x 3, in no acute distress  Well appearing and well nourished  HEENT: Sclera anicteric, conjunctivae pink, mucous membranes moist  Oropharynx clear  NECK: Supple, no carotid bruits, no significant JVD  Trachea midline, no thyromegaly  HEART: Regular rhythm, II-III/VI systolic murmur, no clicks, gallops or rubs  PMI nondisplaced, no thrills  LUNGS: Clear to auscultation bilaterally; no wheezes, rales, or rhonchi  No increased work of breathing or signs of respiratory distress  ABDOMEN: Soft, nontender, nondistended, normoactive bowel sounds  EXTREMITIES: Skin warm and well perfused, no clubbing, cyanosis, or edema  NEURO: No focal findings  Normal gait  Normal speech  Mood and affect normal    SKIN: Normal without suspicious lesions on exposed skin      Lab Results:     No results found for: HGBA1C  No results found for: CHOL  Lab Results   Component Value Date    HDL 35 (L) 09/13/2018     Lab Results   Component Value Date    LDLCALC 54 09/13/2018     Lab Results   Component Value Date    TRIG 159 (H) 09/13/2018     No components found for: CHOLHDL

## 2018-10-23 NOTE — TELEPHONE ENCOUNTER
Needs refill on warfarin, but he said that the other doctor put him on 5mg but wasn't sure what hes suppose to take

## 2018-10-24 DIAGNOSIS — I73.9 PERIPHERAL VASCULAR DISEASE (HCC): Primary | ICD-10-CM

## 2018-10-24 RX ORDER — WARFARIN SODIUM 5 MG/1
5 TABLET ORAL DAILY
Qty: 30 TABLET | Refills: 1 | Status: SHIPPED | OUTPATIENT
Start: 2018-10-24 | End: 2018-12-17 | Stop reason: SDUPTHER

## 2018-10-24 NOTE — TELEPHONE ENCOUNTER
Have him continue the 5mg if that's what he was taking   Have him make a f/u appt with me, much has happened since his last visit

## 2018-10-29 ENCOUNTER — OFFICE VISIT (OUTPATIENT)
Dept: FAMILY MEDICINE CLINIC | Facility: CLINIC | Age: 68
End: 2018-10-29
Payer: MEDICARE

## 2018-10-29 VITALS
OXYGEN SATURATION: 98 % | BODY MASS INDEX: 29.49 KG/M2 | RESPIRATION RATE: 16 BRPM | HEART RATE: 76 BPM | SYSTOLIC BLOOD PRESSURE: 128 MMHG | TEMPERATURE: 97.2 F | HEIGHT: 68 IN | DIASTOLIC BLOOD PRESSURE: 80 MMHG | WEIGHT: 194.6 LBS

## 2018-10-29 DIAGNOSIS — I10 ESSENTIAL HYPERTENSION: ICD-10-CM

## 2018-10-29 DIAGNOSIS — E78.5 DYSLIPIDEMIA: ICD-10-CM

## 2018-10-29 DIAGNOSIS — I82.593 CHRONIC DEEP VEIN THROMBOSIS (DVT) OF OTHER VEIN OF BOTH LOWER EXTREMITIES (HCC): ICD-10-CM

## 2018-10-29 DIAGNOSIS — I35.0 NONRHEUMATIC AORTIC VALVE STENOSIS: ICD-10-CM

## 2018-10-29 DIAGNOSIS — I73.9 CLAUDICATION OF LEFT LOWER EXTREMITY (HCC): ICD-10-CM

## 2018-10-29 DIAGNOSIS — N18.30 CKD (CHRONIC KIDNEY DISEASE), STAGE III (HCC): Primary | ICD-10-CM

## 2018-10-29 DIAGNOSIS — I73.9 PERIPHERAL ARTERY DISEASE (HCC): ICD-10-CM

## 2018-10-29 PROCEDURE — 99214 OFFICE O/P EST MOD 30 MIN: CPT | Performed by: FAMILY MEDICINE

## 2018-10-29 NOTE — PROGRESS NOTES
Assessment/Plan:         Diagnoses and all orders for this visit:    CKD (chronic kidney disease), stage III ([facility] [address] )  Comments:  recheck labs in December to watch creatinine  Orders:  -     Comprehensive metabolic panel; Future    Claudication of left lower extremity (HCC)    Dyslipidemia  Comments:  continue statin, keep as low as possible with ASCVD/PAD    Essential hypertension    Peripheral artery disease (HCC)    Nonrheumatic aortic valve stenosis    Chronic deep vein thrombosis (DVT) of other vein of both lower extremities (HCC)  Comments:  currently on warfarin 5mg          Subjective:      Patient ID: Lizeth Bocanegra is a 80 y o  male  Here for f/u  Some confusion about the coumadin dose from the previous doctor  Last 2 INR around 2  Wife notes his blood seems to be thicker in the heat(lower INR), thinner in the winter (higher INR)  Taking counamdin for chronic DVT  njo recent labs to compare, but elev creatinine and decreased GFR at48  H/o bypass grafts in b/l LE  H/o PE  Chol managed on lipitor 40mg  Has been taking the 5mg since the new rx was called in (last 2 weeks or so)  Sees podiatrist every few months, regular scaling away of callus, but now has a crack in the L heel that is struggling to heal  Using vaseline and bactine  The following portions of the patient's history were reviewed and updated as appropriate: allergies, current medications, past family history, past medical history, past social history, past surgical history and problem list     Review of Systems      Objective:      /80 (BP Location: Right arm, Patient Position: Sitting, Cuff Size: Standard)   Pulse 76   Temp (!) 97 2 °F (36 2 °C)   Resp 16   Ht 5' 8" (1 727 m)   Wt 88 3 kg (194 lb 9 6 oz)   SpO2 98%   BMI 29 59 kg/m²          Physical Exam   Constitutional: He is oriented to person, place, and time  He appears well-developed and well-nourished  Cardiovascular: Normal rate and regular rhythm      Murmur (2/6 BLAISE) heard  Pulmonary/Chest: Effort normal and breath sounds normal    Neurological: He is alert and oriented to person, place, and time  Skin: Skin is warm  Crack on medial edge of L heel, no surrounding induration   Psychiatric: He has a normal mood and affect  His behavior is normal  Judgment and thought content normal    Vitals reviewed

## 2018-11-08 ENCOUNTER — TRANSCRIBE ORDERS (OUTPATIENT)
Dept: ADMINISTRATIVE | Facility: HOSPITAL | Age: 68
End: 2018-11-08

## 2018-11-08 ENCOUNTER — APPOINTMENT (OUTPATIENT)
Dept: LAB | Facility: MEDICAL CENTER | Age: 68
End: 2018-11-08
Payer: MEDICARE

## 2018-11-08 DIAGNOSIS — I82.503 CHRONIC DEEP VEIN THROMBOSIS (DVT) OF BOTH LOWER EXTREMITIES, UNSPECIFIED VEIN (HCC): Primary | ICD-10-CM

## 2018-11-08 DIAGNOSIS — I73.9 PERIPHERAL VASCULAR DISEASE WITH CLAUDICATION (HCC): ICD-10-CM

## 2018-11-08 DIAGNOSIS — I73.9 PERIPHERAL VASCULAR DISEASE WITH CLAUDICATION (HCC): Primary | ICD-10-CM

## 2018-11-08 LAB
INR PPP: 2.66 (ref 0.86–1.17)
PROTHROMBIN TIME: 28.4 SECONDS (ref 11.8–14.2)

## 2018-11-08 PROCEDURE — 85610 PROTHROMBIN TIME: CPT

## 2018-11-08 PROCEDURE — 36415 COLL VENOUS BLD VENIPUNCTURE: CPT

## 2018-11-26 NOTE — PROGRESS NOTES
Office Progress Note - Cardiology     Angelica Waller 80 y o  male MRN: [id number]    Assessment/Plan:    1  ASCVD  Nonobstructive CAD by cath 5/11 at [facility]  On aspirin, statin  No current exertional cardiac symptoms  2  HL  Lipid panel 9/18 showed total cholesterol 121, , HDL 35, LDL 54  On Atorvastatin 40      3  CKD III  Creatinine 9/18 was 1 48  No recent prior baseline  Reports he will be having repeat BMP  4  Tobacco use  Was smoking 1/2 ppd, quit end of October 2018 using nicotine patches  5  PAD  On aspirin, statin  Following with Regi Roberto/Olga Lay  Still has claudication symptoms with walking, but not worsening  6  Moderate AS  Echo 8/2018 showed moderate AS  Not having any current symptoms with exertion  Explained need to monitor for exertional symptoms  If no change in symptoms, recommend repeat echo in 8/20     7  HTN  In the past was taking Metoprolol, but stopped taking it after he stopped following with Dr Spencer Keating around 2012  Started Amlodipine 5 mg daily 10/18 due to elevated BP  BP currently controlled with amlodipine  1  ASCVD (arteriosclerotic cardiovascular disease)     2  Nonrheumatic aortic valve stenosis     3  Essential hypertension     4  CKD (chronic kidney disease), stage III ([address] )     5  Tobacco dependence     6  Dyslipidemia         HPI: 80 y o  male with a history of HL, PAD s/p RLE femoral to PT bypass 6/11, nonobstructive CAD, tobacco use, prior DVT/PE in 1998 with protein C deficiency on Coumadin, who is referred by Dr Samara Dakin for evaluation of aortic stenosis  Echo 8/18 showed overall LV function, EF 60%, mildly increased LV thickness  Grade I diastolic dysfunction  Moderate AS with mild AI (mean gradient 25 mm Hg, Vmax 3 2 m/s, calculated TACHO 1 1 cm squared)  Mild to moderate MAC with mild MR  Mild TR  Normal RV size and function        He was previously following with Dr Spencer Keating of [facility], but stopped seeing him around 2012 when his insurance was no longer accepted by his office  Pharmacologic nuclear stress test 5/11 was reported as having a moderate sized perfusion defect of apex/anteroapical wall with mild reperfusion suggesting of myocardial ischemia  LVEF was preserved at 66%  This was done preoperatively prior to his RLE bypass  Cardiac catheterization 5/31/11 was done for abnormal stress test, and showed 10-20% stenosis of left main, 50-60% mid LAD stenosis, dominant RCA with 40-50% stenosis of mid RCA  Echo 5/11 showed normal LV size and function, EF 60-65%, RVSP 16 mm Hg, no significant valvular heart disease  Echo 3/12 showed normal LV function, possible mild LVH, EF 50-55%, mild MR, aortic sclerosis, mild TR  He is walking for exercise/to help with claudication for 30 minutes, still has pain in left leg with walking, but rests and keeps going, is about same, not worsening  No chest pain, no SOB  No LE edema  No dizziness or lightheadedness  No syncope or presyncope  Review of Systems:    Review of Systems   Constitution: Negative for chills, decreased appetite, diaphoresis, fever, weakness, malaise/fatigue, night sweats, weight gain and weight loss  HENT: Positive for hearing loss  Negative for ear pain, hoarse voice, nosebleeds, sore throat and tinnitus  Eyes: Negative for blurred vision and pain  Cardiovascular: Negative  Negative for chest pain, claudication, cyanosis, dyspnea on exertion, irregular heartbeat, leg swelling, near-syncope, orthopnea, palpitations, paroxysmal nocturnal dyspnea and syncope  Respiratory: Positive for snoring  Negative for cough, hemoptysis, shortness of breath, sleep disturbances due to breathing, sputum production and wheezing  Hematologic/Lymphatic: Negative for adenopathy and bleeding problem  Does not bruise/bleed easily  Skin: Negative for color change, dry skin, flushing, itching, poor wound healing and rash  Musculoskeletal: Positive for arthritis and back pain  Negative for falls, joint pain, muscle cramps, muscle weakness, myalgias and neck pain  Gastrointestinal: Positive for heartburn  Negative for abdominal pain, constipation, diarrhea, dysphagia, hematemesis, hematochezia, melena, nausea and vomiting  Genitourinary: Positive for nocturia  Negative for dysuria, frequency, hematuria, hesitancy, non-menstrual bleeding and urgency  Neurological: Positive for numbness and paresthesias  Negative for excessive daytime sleepiness, dizziness, focal weakness, headaches, light-headedness, loss of balance, tremors and vertigo  Psychiatric/Behavioral: Negative for altered mental status, depression and memory loss  The patient does not have insomnia and is not nervous/anxious  Allergic/Immunologic: Negative for environmental allergies and persistent infections  Active Problems:     Patient Active Problem List   Diagnosis    Tobacco dependence    Peripheral artery disease ([facility] [address] )    Claudication of left lower extremity (HCC)    S/P RIGHT Femoral- PT bypass 2011    Dyslipidemia    ASCVD (arteriosclerotic cardiovascular disease)    CKD (chronic kidney disease), stage III (HCC)    Nonrheumatic aortic valve stenosis    Essential hypertension       Historical Information   Past Medical History:   Diagnosis Date    DVT (deep venous thrombosis) (HCC)     Protein C deficiency (HCC)     Pulmonary embolus (HCC)      Past Surgical History:   Procedure Laterality Date    BACK SURGERY      BYPASS FEMORAL-TIBIAL Right 2011    VEIN LIGATION       History   Alcohol Use No     History   Drug Use No     History   Smoking Status    Current Some Day Smoker   Smokeless Tobacco    Never Used     Comment: 1 pk every 2days , currently on patches to quit          Family History:   Family History   Problem Relation Age of Onset    Cancer Mother 50    Heart attack Father 46    Hypertension Father     Prostate cancer Brother     Arrhythmia Neg Hx     Clotting disorder Neg Hx     Fainting Neg Hx     Heart disease Neg Hx     Anuerysm Neg Hx     Stroke Neg Hx        No Known Allergies      Current Outpatient Prescriptions:     amLODIPine (NORVASC) 5 mg tablet, Take 1 tablet (5 mg total) by mouth daily, Disp: 30 tablet, Rfl: 11    aspirin (ECOTRIN LOW STRENGTH) 81 mg EC tablet, Take 81 mg by mouth daily, Disp: , Rfl:     atorvastatin (LIPITOR) 40 mg tablet, Take 1 tablet (40 mg total) by mouth daily, Disp: 90 tablet, Rfl: 0    nicotine (NICODERM CQ) 21 mg/24 hr TD 24 hr patch, Place 1 patch on the skin every 24 hours, Disp: 28 patch, Rfl: 11    warfarin (COUMADIN) 3 mg tablet, Take 3 mg by mouth daily, Disp: , Rfl: 0    warfarin (COUMADIN) 5 mg tablet, Take 1 tablet (5 mg total) by mouth daily, Disp: 30 tablet, Rfl: 1    Objective   Vitals:   Vitals:    11/27/18 1325   BP: 134/70   BP Location: Left arm   Patient Position: Sitting   Cuff Size: Adult   Pulse: 65   SpO2: 99%   Weight: 91 4 kg (201 lb 9 6 oz)   Height: 5' 7" (1 702 m)          Physical Exam:     GEN: Alert and oriented x 3, in no acute distress  Well appearing and well nourished  HEENT: Sclera anicteric, conjunctivae pink, mucous membranes moist  Oropharynx clear  NECK: Supple, no carotid bruits, no significant JVD  Trachea midline, no thyromegaly  HEART: Regular rhythm, II-III/VI systolic murmur, no clicks, gallops or rubs  PMI nondisplaced, no thrills  LUNGS: Clear to auscultation bilaterally; no wheezes, rales, or rhonchi  No increased work of breathing or signs of respiratory distress  ABDOMEN: Soft, nontender, nondistended, normoactive bowel sounds  EXTREMITIES: Skin warm and well perfused, no clubbing, cyanosis, or edema  NEURO: No focal findings  Normal gait  Normal speech  Mood and affect normal    SKIN: Normal without suspicious lesions on exposed skin      Lab Results:     No results found for: HGBA1C  No results found for: CHOL  Lab Results   Component Value Date    HDL 35 (L) 09/13/2018     Lab Results   Component Value Date    LDLCALC 54 09/13/2018     Lab Results   Component Value Date    TRIG 159 (H) 09/13/2018     No results found for: CHOLHDL

## 2018-11-27 ENCOUNTER — OFFICE VISIT (OUTPATIENT)
Dept: CARDIOLOGY CLINIC | Facility: MEDICAL CENTER | Age: 68
End: 2018-11-27
Payer: MEDICARE

## 2018-11-27 VITALS
DIASTOLIC BLOOD PRESSURE: 70 MMHG | BODY MASS INDEX: 31.64 KG/M2 | SYSTOLIC BLOOD PRESSURE: 134 MMHG | HEIGHT: 67 IN | WEIGHT: 201.6 LBS | HEART RATE: 65 BPM | OXYGEN SATURATION: 99 %

## 2018-11-27 DIAGNOSIS — I35.0 NONRHEUMATIC AORTIC VALVE STENOSIS: ICD-10-CM

## 2018-11-27 DIAGNOSIS — I10 ESSENTIAL HYPERTENSION: ICD-10-CM

## 2018-11-27 DIAGNOSIS — N18.30 CKD (CHRONIC KIDNEY DISEASE), STAGE III (HCC): ICD-10-CM

## 2018-11-27 DIAGNOSIS — I25.10 ASCVD (ARTERIOSCLEROTIC CARDIOVASCULAR DISEASE): Primary | ICD-10-CM

## 2018-11-27 DIAGNOSIS — E78.5 DYSLIPIDEMIA: ICD-10-CM

## 2018-11-27 DIAGNOSIS — F17.200 TOBACCO DEPENDENCE: ICD-10-CM

## 2018-11-27 PROCEDURE — 99214 OFFICE O/P EST MOD 30 MIN: CPT | Performed by: INTERNAL MEDICINE

## 2018-11-29 ENCOUNTER — APPOINTMENT (OUTPATIENT)
Dept: LAB | Facility: MEDICAL CENTER | Age: 68
End: 2018-11-29
Payer: MEDICARE

## 2018-11-29 DIAGNOSIS — N18.30 CKD (CHRONIC KIDNEY DISEASE), STAGE III (HCC): ICD-10-CM

## 2018-11-29 LAB
ALBUMIN SERPL BCP-MCNC: 3.7 G/DL (ref 3.5–5)
ALP SERPL-CCNC: 133 U/L (ref 46–116)
ALT SERPL W P-5'-P-CCNC: 46 U/L (ref 12–78)
ANION GAP SERPL CALCULATED.3IONS-SCNC: 3 MMOL/L (ref 4–13)
AST SERPL W P-5'-P-CCNC: 31 U/L (ref 5–45)
BILIRUB SERPL-MCNC: 0.61 MG/DL (ref 0.2–1)
BUN SERPL-MCNC: 17 MG/DL (ref 5–25)
CALCIUM SERPL-MCNC: 9.1 MG/DL (ref 8.3–10.1)
CHLORIDE SERPL-SCNC: 106 MMOL/L (ref 100–108)
CO2 SERPL-SCNC: 31 MMOL/L (ref 21–32)
CREAT SERPL-MCNC: 1.43 MG/DL (ref 0.6–1.3)
GFR SERPL CREATININE-BSD FRML MDRD: 50 ML/MIN/1.73SQ M
GLUCOSE P FAST SERPL-MCNC: 103 MG/DL (ref 65–99)
INR PPP: 2.86 (ref 0.86–1.17)
POTASSIUM SERPL-SCNC: 4.8 MMOL/L (ref 3.5–5.3)
PROT SERPL-MCNC: 7.3 G/DL (ref 6.4–8.2)
PROTHROMBIN TIME: 30 SECONDS (ref 11.8–14.2)
SODIUM SERPL-SCNC: 140 MMOL/L (ref 136–145)

## 2018-11-29 PROCEDURE — 36415 COLL VENOUS BLD VENIPUNCTURE: CPT

## 2018-11-29 PROCEDURE — 80053 COMPREHEN METABOLIC PANEL: CPT

## 2018-11-29 PROCEDURE — 85610 PROTHROMBIN TIME: CPT | Performed by: FAMILY MEDICINE

## 2018-11-30 ENCOUNTER — TELEPHONE (OUTPATIENT)
Dept: CARDIOLOGY CLINIC | Facility: CLINIC | Age: 68
End: 2018-11-30

## 2018-11-30 DIAGNOSIS — I10 ESSENTIAL HYPERTENSION: Primary | ICD-10-CM

## 2018-11-30 RX ORDER — HYDROCHLOROTHIAZIDE 25 MG/1
25 TABLET ORAL DAILY
Qty: 30 TABLET | Refills: 11 | Status: SHIPPED | OUTPATIENT
Start: 2018-11-30 | End: 2019-11-04 | Stop reason: SDUPTHER

## 2018-11-30 NOTE — TELEPHONE ENCOUNTER
P/C states having ankle and leg edema since starting amlodipine 5mg daily (started 10 days ago)      Please advise

## 2018-11-30 NOTE — TELEPHONE ENCOUNTER
Lets try adding HCTZ (a water pill which is both good for swelling as well as BP) at dose of 25 mg daily in addition to amlodipine  I will send to his pharmacy

## 2018-12-17 DIAGNOSIS — I73.9 PERIPHERAL VASCULAR DISEASE (HCC): ICD-10-CM

## 2018-12-17 RX ORDER — WARFARIN SODIUM 5 MG/1
TABLET ORAL
Qty: 30 TABLET | Refills: 1 | Status: SHIPPED | OUTPATIENT
Start: 2018-12-17 | End: 2019-01-14 | Stop reason: SDUPTHER

## 2018-12-18 DIAGNOSIS — I73.9 INTERMITTENT CLAUDICATION (HCC): ICD-10-CM

## 2018-12-18 DIAGNOSIS — I73.9 PERIPHERAL ARTERY DISEASE (HCC): ICD-10-CM

## 2018-12-19 RX ORDER — ATORVASTATIN CALCIUM 40 MG/1
40 TABLET, FILM COATED ORAL DAILY
Qty: 90 TABLET | Refills: 0 | Status: SHIPPED | OUTPATIENT
Start: 2018-12-19 | End: 2019-01-23 | Stop reason: SDUPTHER

## 2018-12-26 ENCOUNTER — HOSPITAL ENCOUNTER (OUTPATIENT)
Dept: NON INVASIVE DIAGNOSTICS | Facility: CLINIC | Age: 68
Discharge: HOME | End: 2018-12-26
Payer: MEDICARE

## 2018-12-26 DIAGNOSIS — I73.9 CLAUDICATION OF LEFT LOWER EXTREMITY (HCC): ICD-10-CM

## 2018-12-26 DIAGNOSIS — I73.9 PERIPHERAL ARTERY DISEASE (HCC): ICD-10-CM

## 2018-12-26 PROCEDURE — 93923 UPR/LXTR ART STDY 3+ LVLS: CPT

## 2018-12-26 PROCEDURE — 93925 LOWER EXTREMITY STUDY: CPT

## 2018-12-27 PROCEDURE — 93925 LOWER EXTREMITY STUDY: CPT | Performed by: SURGERY

## 2018-12-27 PROCEDURE — 93922 UPR/L XTREMITY ART 2 LEVELS: CPT | Performed by: SURGERY

## 2018-12-31 ENCOUNTER — TELEPHONE (OUTPATIENT)
Dept: ADMINISTRATIVE | Facility: HOSPITAL | Age: 68
End: 2018-12-31

## 2018-12-31 NOTE — TELEPHONE ENCOUNTER
----- Message from Delores Heaton PA-C sent at 12/28/2018  4:39 PM EST -----  Emmy Chapin needs a follow up visit to be seen Feb/March  LOYD to be repeated in June Thanks      -RD      ----- Message -----  From: Roosevelt Ritchie MA  Sent: 12/28/2018   9:49 AM  To: Delores Heaton PA-C    Task study to Delores Heaton

## 2019-01-07 ENCOUNTER — APPOINTMENT (OUTPATIENT)
Dept: LAB | Facility: MEDICAL CENTER | Age: 69
End: 2019-01-07
Payer: MEDICARE

## 2019-01-14 DIAGNOSIS — I73.9 PERIPHERAL VASCULAR DISEASE (HCC): ICD-10-CM

## 2019-01-14 RX ORDER — WARFARIN SODIUM 5 MG/1
TABLET ORAL
Qty: 30 TABLET | Refills: 1 | Status: SHIPPED | OUTPATIENT
Start: 2019-01-14 | End: 2019-04-25 | Stop reason: SDUPTHER

## 2019-01-23 ENCOUNTER — OFFICE VISIT (OUTPATIENT)
Dept: FAMILY MEDICINE CLINIC | Facility: CLINIC | Age: 69
End: 2019-01-23
Payer: MEDICARE

## 2019-01-23 VITALS
OXYGEN SATURATION: 97 % | RESPIRATION RATE: 16 BRPM | WEIGHT: 200.6 LBS | HEIGHT: 67 IN | SYSTOLIC BLOOD PRESSURE: 118 MMHG | TEMPERATURE: 97.1 F | BODY MASS INDEX: 31.48 KG/M2 | HEART RATE: 60 BPM | DIASTOLIC BLOOD PRESSURE: 70 MMHG

## 2019-01-23 DIAGNOSIS — D68.59 PROTEIN C DEFICIENCY (HCC): ICD-10-CM

## 2019-01-23 DIAGNOSIS — I73.9 INTERMITTENT CLAUDICATION (HCC): ICD-10-CM

## 2019-01-23 DIAGNOSIS — N18.30 CKD (CHRONIC KIDNEY DISEASE), STAGE III (HCC): ICD-10-CM

## 2019-01-23 DIAGNOSIS — I10 ESSENTIAL HYPERTENSION: ICD-10-CM

## 2019-01-23 DIAGNOSIS — Z12.5 SCREENING FOR PROSTATE CANCER: ICD-10-CM

## 2019-01-23 DIAGNOSIS — Z00.00 MEDICARE ANNUAL WELLNESS VISIT, SUBSEQUENT: Primary | ICD-10-CM

## 2019-01-23 DIAGNOSIS — I73.9 CLAUDICATION OF LEFT LOWER EXTREMITY (HCC): ICD-10-CM

## 2019-01-23 DIAGNOSIS — I73.9 PERIPHERAL ARTERY DISEASE (HCC): ICD-10-CM

## 2019-01-23 DIAGNOSIS — F17.200 TOBACCO DEPENDENCE: ICD-10-CM

## 2019-01-23 PROCEDURE — G0439 PPPS, SUBSEQ VISIT: HCPCS | Performed by: FAMILY MEDICINE

## 2019-01-23 PROCEDURE — 99214 OFFICE O/P EST MOD 30 MIN: CPT | Performed by: FAMILY MEDICINE

## 2019-01-23 RX ORDER — ATORVASTATIN CALCIUM 40 MG/1
40 TABLET, FILM COATED ORAL DAILY
Qty: 90 TABLET | Refills: 0 | Status: SHIPPED | OUTPATIENT
Start: 2019-01-23 | End: 2019-04-25 | Stop reason: SDUPTHER

## 2019-01-23 NOTE — PROGRESS NOTES
Assessment/Plan:       Diagnosis ICD-10-CM Associated Orders   1  Medicare annual wellness visit, subsequent Z00 00    2  Peripheral artery disease (HCC) I73 9 atorvastatin (LIPITOR) 40 mg tablet     CBC and differential     Comprehensive metabolic panel     TSH, 3rd generation     Lipid panel    check labs next month   3  Intermittent claudication (HCC) I73 9 atorvastatin (LIPITOR) 40 mg tablet     CBC and differential     Comprehensive metabolic panel     TSH, 3rd generation     Lipid panel   4  Screening for prostate cancer Z12 5 PSA, Total Screen   5  Claudication of left lower extremity (HCC) I73 9    6  Protein C deficiency ([facility] [address] ) D68 59    7  CKD (chronic kidney disease), stage III (HCC) N18 3    8  Tobacco dependence F17 200     did the 21mg patch for 2 months, now on nothing, no longer smoking, feeling better   9  Essential hypertension I10 CBC and differential     Comprehensive metabolic panel     TSH, 3rd generation     Lipid panel        Diagnoses and all orders for this visit:    Medicare annual wellness visit, subsequent    Peripheral artery disease ([facility] )  Comments:  check labs next month  Orders:  -     atorvastatin (LIPITOR) 40 mg tablet; Take 1 tablet (40 mg total) by mouth daily  -     CBC and differential; Future  -     Comprehensive metabolic panel; Future  -     TSH, 3rd generation; Future  -     Lipid panel; Future    Intermittent claudication (HCC)  -     atorvastatin (LIPITOR) 40 mg tablet; Take 1 tablet (40 mg total) by mouth daily  -     CBC and differential; Future  -     Comprehensive metabolic panel; Future  -     TSH, 3rd generation; Future  -     Lipid panel;  Future    Screening for prostate cancer  -     PSA, Total Screen    Claudication of left lower extremity (HCC)    Protein C deficiency (HCC)    CKD (chronic kidney disease), stage III (HCC)    Tobacco dependence  Comments:  did the 21mg patch for 2 months, now on nothing, no longer smoking, feeling better    Essential hypertension  -     CBC and differential; Future  -     Comprehensive metabolic panel; Future  -     TSH, 3rd generation; Future  -     Lipid panel; Future          Subjective:      Patient ID: Kiley Howard is a 80 y o  male  Follow up creatinine was similar in November, baseline at 1 4  Seeing Dr Miguel Ley 2/13  Has been following with Vel Cameron PA-C  Pt still having pain in L leg, pain in the back of the leg after walking a couple feet  Quit smoking several months ago  Protein C defic, risk of DVT  BP well controlled  Lipids controlled with statin  The following portions of the patient's history were reviewed and updated as appropriate: allergies, current medications, past family history, past medical history, past social history, past surgical history and problem list     Review of Systems      Objective:      /70 (BP Location: Right arm, Patient Position: Sitting, Cuff Size: Standard)   Pulse 60   Temp (!) 97 1 °F (36 2 °C)   Resp 16   Ht 5' 7" (1 702 m)   Wt 91 kg (200 lb 9 6 oz)   SpO2 97%   BMI 31 42 kg/m²          Physical Exam   Constitutional: He is oriented to person, place, and time  He appears well-developed and well-nourished  Cardiovascular: Normal rate, regular rhythm and normal heart sounds  Pulmonary/Chest: Effort normal and breath sounds normal    Neurological: He is alert and oriented to person, place, and time  Skin: Skin is warm  Psychiatric: He has a normal mood and affect  His behavior is normal  Judgment and thought content normal    Vitals reviewed

## 2019-01-23 NOTE — PROGRESS NOTES
Assessment and Plan:    Problem List Items Addressed This Visit        Cardiovascular and Mediastinum    Peripheral artery disease (HCC)    Relevant Medications    atorvastatin (LIPITOR) 40 mg tablet      Other Visit Diagnoses     Intermittent claudication ([address] )        Relevant Medications    atorvastatin (LIPITOR) 40 mg tablet    Medicare annual wellness visit, subsequent        Screening for prostate cancer        Relevant Orders    PSA, Total Screen        Health Maintenance Due   Topic Date Due    Hepatitis C Screening  1950    Medicare Annual Wellness Visit (AWV)  1950    CRC Screening: Colonoscopy  1950    DTaP,Tdap,and Td Vaccines (1 - Tdap) 06/07/1972    Pneumococcal PPSV23/PCV13 65+ Years / Low and Medium Risk (1 of 2 - PCV13) 06/07/2016    INFLUENZA VACCINE  07/01/2018         HPI:  Cari Schmidt is a 80 y o  male here for his Subsequent Wellness Visit      Patient Active Problem List   Diagnosis    Tobacco dependence    Peripheral artery disease (HCC)    Claudication of left lower extremity (HCC)    S/P RIGHT Femoral- PT bypass 2011    Dyslipidemia    ASCVD (arteriosclerotic cardiovascular disease)    CKD (chronic kidney disease), stage III (HCC)    Nonrheumatic aortic valve stenosis    Essential hypertension     Past Medical History:   Diagnosis Date    DVT (deep venous thrombosis) (HCC)     Protein C deficiency (HCC)     Pulmonary embolus (HCC)      Past Surgical History:   Procedure Laterality Date    BACK SURGERY      BYPASS FEMORAL-TIBIAL Right 2011    VEIN LIGATION       Family History   Problem Relation Age of Onset    Cancer Mother 50    Heart attack Father 46    Hypertension Father     Prostate cancer Brother     Arrhythmia Neg Hx     Clotting disorder Neg Hx     Fainting Neg Hx     Heart disease Neg Hx     Anuerysm Neg Hx     Stroke Neg Hx      History   Smoking Status    Current Some Day Smoker   Smokeless Tobacco    Never Used     Comment: 1 pk every 2days , currently on patches to quit  History   Alcohol Use No      History   Drug Use No       Current Outpatient Prescriptions   Medication Sig Dispense Refill    amLODIPine (NORVASC) 5 mg tablet Take 1 tablet (5 mg total) by mouth daily 30 tablet 11    aspirin (ECOTRIN LOW STRENGTH) 81 mg EC tablet Take 81 mg by mouth daily      atorvastatin (LIPITOR) 40 mg tablet Take 1 tablet (40 mg total) by mouth daily 90 tablet 0    hydrochlorothiazide (HYDRODIURIL) 25 mg tablet Take 1 tablet (25 mg total) by mouth daily 30 tablet 11    nicotine (NICODERM CQ) 21 mg/24 hr TD 24 hr patch Place 1 patch on the skin every 24 hours 28 patch 11    warfarin (COUMADIN) 3 mg tablet Take 3 mg by mouth daily  0    warfarin (COUMADIN) 5 mg tablet TAKE 1 TABLET BY MOUTH EVERY DAY 30 tablet 1     No current facility-administered medications for this visit  No Known Allergies    There is no immunization history on file for this patient  Patient Care Team:  Lex Sinclair MD as PCP - General (Family Medicine)    Medicare Screening Tests and Risk Assessments:  Chidi Barnhart is here for his Subsequent Wellness visit  Last Medicare Wellness visit information reviewed, patient interviewed and updates made to the record today  Health Risk Assessment:  Patient rates overall health as good  Patient feels that their physical health rating is Same  Eyesight was rated as Same  Hearing was rated as Same  Patient feels that their emotional and mental health rating is Same  Pain experienced by patient in the last 7 days has been Some  Patient states that he has experienced no weight loss or gain in last 6 months  Emotional/Mental Health:  Patient has been feeling nervous/anxious  PHQ-9 Depression Screening:    Frequency of the following problems over the past two weeks:      1  Little interest or pleasure in doing things: 0 - not at all      2   Feeling down, depressed, or hopeless: 0 - not at all  PHQ-2 Score: 0          Broken Bones/Falls: Fall Risk Assessment:    In the past year, patient has experienced: No history of falling in past year          Bladder/Bowel:  Patient has not leaked urine accidently in the last six months  Patient reports no loss of bowel control  Immunizations:  Patient has not had a flu vaccination within the last year  Patient has not received a pneumonia shot  Patient has not received a shingles shot  Patient has not received tetanus/diphtheria shot  Home Safety:  Patient does not have trouble with stairs inside or outside of their home  Patient currently reports that there are no safety hazards present in home, working smoke alarms, working carbon monoxide detectors  Preventative Screenings:   prostate cancer screen performed, colon cancer screen completed, cholesterol screen completed, glaucoma eye exam completed, (Additional Comments: Colon 2015  Eyes Dr [place])    Nutrition:  Current diet: Regular with servings of the following:    Medications:  Patient is currently taking over-the-counter supplements  Patient is able to manage medications  Lifestyle Choices:  Patient reports no tobacco use  Patient has smoked or used tobacco in the past   Patient has stopped his tobacco use  Patient reports no alcohol use  Patient drives a vehicle  Patient wears seat belt  Activities of Daily Living:  Can get out of bed by his or her self, able to dress self, able to make own meals, able to do own shopping, able to bathe self, can do own laundry/housekeeping, can manage own money, pay bills and track expenses    Previous Hospitalizations:  No hospitalization or ED visit in past 12 months        Advanced Directives:  Patient has not decided on power of   Patient has not completed advanced directive          Preventative Screening/Counseling:      Cardiovascular:      General: Screening Current          Diabetes:      General: Screening Current          Colorectal Cancer:      General: Screening Current          Prostate Cancer:      Due for labs: PSA          Osteoporosis:      General: Screening Not Indicated          AAA:      General: Screening Current          Glaucoma:      General: Screening Current      Comments: Dr Sheila Garcia        HIV:      General: Screening Not Indicated          Hepatitis C:      General: Screening Not Indicated        Advanced Directives:   Patient has no living will for healthcare, does not have durable POA for healthcare, patient does not have an advanced directive  Immunizations:      Influenza: Patient Declines      Pneumococcal: Patient Declines      Shingrix: Patient Declines            Assessment and Plan:    Problem List Items Addressed This Visit        Cardiovascular and Mediastinum    Peripheral artery disease ([facility] [address] )    Relevant Medications    atorvastatin (LIPITOR) 40 mg tablet      Other Visit Diagnoses     [facility]        Relevant Medications    atorvastatin (LIPITOR) 40 mg tablet    Medicare annual wellness visit, subsequent        Screening for prostate cancer        Relevant Orders    PSA, Total Screen        Health Maintenance Due   Topic Date Due    Hepatitis C Screening  1950    Medicare Annual Wellness Visit (AWV)  1950    CRC Screening: Colonoscopy  1950    DTaP,Tdap,and Td Vaccines (1 - Tdap) 06/07/1972    Pneumococcal PPSV23/PCV13 65+ Years / Low and Medium Risk (1 of 2 - PCV13) 06/07/2016    INFLUENZA VACCINE  07/01/2018         HPI:  Ramona Shin is a 80 y o  male here for his Subsequent Wellness Visit      Patient Active Problem List   Diagnosis    Tobacco dependence    Peripheral artery disease ([facility] [address] )    Claudication of left lower extremity ([facility] [address] )    S/P RIGHT Femoral- PT bypass 2011    Dyslipidemia    ASCVD (arteriosclerotic cardiovascular disease)    CKD (chronic kidney disease), stage III ([facility] [address] )    Nonrheumatic aortic valve stenosis    Essential hypertension Past Medical History:   Diagnosis Date    DVT (deep venous thrombosis) (HCC)     Protein C deficiency (HCC)     Pulmonary embolus (HCC)      Past Surgical History:   Procedure Laterality Date    BACK SURGERY      BYPASS FEMORAL-TIBIAL Right 2011    VEIN LIGATION       Family History   Problem Relation Age of Onset    Cancer Mother 50    Heart attack Father 46    Hypertension Father     Prostate cancer Brother     Arrhythmia Neg Hx     Clotting disorder Neg Hx     Fainting Neg Hx     Heart disease Neg Hx     Anuerysm Neg Hx     Stroke Neg Hx      History   Smoking Status    Current Some Day Smoker   Smokeless Tobacco    Never Used     Comment: 1 pk every 2days , currently on patches to quit  History   Alcohol Use No      History   Drug Use No       Current Outpatient Prescriptions   Medication Sig Dispense Refill    amLODIPine (NORVASC) 5 mg tablet Take 1 tablet (5 mg total) by mouth daily 30 tablet 11    aspirin (ECOTRIN LOW STRENGTH) 81 mg EC tablet Take 81 mg by mouth daily      atorvastatin (LIPITOR) 40 mg tablet Take 1 tablet (40 mg total) by mouth daily 90 tablet 0    hydrochlorothiazide (HYDRODIURIL) 25 mg tablet Take 1 tablet (25 mg total) by mouth daily 30 tablet 11    nicotine (NICODERM CQ) 21 mg/24 hr TD 24 hr patch Place 1 patch on the skin every 24 hours 28 patch 11    warfarin (COUMADIN) 3 mg tablet Take 3 mg by mouth daily  0    warfarin (COUMADIN) 5 mg tablet TAKE 1 TABLET BY MOUTH EVERY DAY 30 tablet 1     No current facility-administered medications for this visit  No Known Allergies    There is no immunization history on file for this patient      Patient Care Team:  Deshawn Rosa MD as PCP - General (Family Medicine)    Medicare Screening Tests and Risk Assessments:  Medicare Annual Wellness Visit

## 2019-02-07 ENCOUNTER — OFFICE VISIT (OUTPATIENT)
Dept: VASCULAR SURGERY | Facility: CLINIC | Age: 69
End: 2019-02-07
Payer: MEDICARE

## 2019-02-07 VITALS
RESPIRATION RATE: 16 BRPM | WEIGHT: 200 LBS | SYSTOLIC BLOOD PRESSURE: 136 MMHG | HEIGHT: 67 IN | BODY MASS INDEX: 31.39 KG/M2 | HEART RATE: 64 BPM | DIASTOLIC BLOOD PRESSURE: 74 MMHG | TEMPERATURE: 98.4 F

## 2019-02-07 DIAGNOSIS — I73.9 CLAUDICATION OF LEFT LOWER EXTREMITY (HCC): Primary | ICD-10-CM

## 2019-02-07 PROCEDURE — 99213 OFFICE O/P EST LOW 20 MIN: CPT | Performed by: SURGERY

## 2019-02-07 NOTE — PATIENT INSTRUCTIONS
Complaining of disabling claudication left lower extremity with lower extremity duplex showing evidence of SFA and popliteal peripheral arterial disease  He is not complaining of ischemic rest pain or tissue loss  He also has had mild chronic kidney disease in the past and a diagnosis of protein C deficiency      Plan:  Repeat blood work, possibly to plan a CT angiogram of the aorta and bilateral runoff anticipation of an endovascular procedure to treat his peripheral arterial disease left lower extremity

## 2019-02-07 NOTE — PROGRESS NOTES
Assessment/Plan:    Claudication of left lower extremity (HCC)  Complaining of disabling claudication left lower extremity with lower extremity duplex showing evidence of SFA and popliteal peripheral arterial disease  He is not complaining of ischemic rest pain or tissue loss  He also has had mild chronic kidney disease in the past and a diagnosis of protein C deficiency  Plan:  Repeat blood work, possibly to plan a CT angiogram of the aorta and bilateral runoff anticipation of an endovascular procedure to treat his peripheral arterial disease left lower extremity         Diagnoses and all orders for this visit:    Claudication of left lower extremity ([address] )        Subjective:      Patient ID: Jose G Schultz is a 80 y o  male  HPI disabling claudication approximately 1 block left lower extremity no ischemic rest pain or tissue loss  His history of protein C deficiency and is on chronic lifelong Coumadin  Right lower extremity revascularization with multiple procedures is currently bypass is functioning well  The following portions of the patient's history were reviewed and updated as appropriate: allergies, current medications, past family history, past medical history, past social history, past surgical history and problem list     Review of Systems  right lower extremity disabling claudication, all other systems negative    Objective:      /74 (BP Location: Left arm, Patient Position: Sitting)   Pulse 64   Temp 98 4 °F (36 9 °C)   Resp 16   Ht 5' 7" (1 702 m)   Wt 90 7 kg (200 lb)   BMI 31 32 kg/m²          Physical Exam        Oriented x3 no evidence of clinical depression  Neck is supple carotid pulses equal bilaterally no bruits heard    Chest lungs clear, heart regular rhythm  Abdomen soft nontender no evidence of pulsatile masses      Pulses are palpable bilateral femoral , popliteal and the dorsalis pedis on the right nothing below the left inguinal ligament    Neurological exam intact cranial nerves 2-12 grossly intact no gross motor sensory deficits detected    Vitals:    02/07/19 1310   BP: 136/74   BP Location: Left arm   Patient Position: Sitting   Pulse: 64   Resp: 16   Temp: 98 4 °F (36 9 °C)   Weight: 90 7 kg (200 lb)   Height: 5' 7" (1 702 m)       Patient Active Problem List   Diagnosis    Tobacco dependence    Peripheral artery disease (HCC)    Claudication of left lower extremity (HCC)    S/P RIGHT Femoral- PT bypass 2011    Dyslipidemia    ASCVD (arteriosclerotic cardiovascular disease)    CKD (chronic kidney disease), stage III (HCC)    Nonrheumatic aortic valve stenosis    Essential hypertension       Past Surgical History:   Procedure Laterality Date    BACK SURGERY      BYPASS FEMORAL-TIBIAL Right 2011    VEIN LIGATION         Family History   Problem Relation Age of Onset    Cancer Mother 50    Heart attack Father 46    Hypertension Father     Prostate cancer Brother     Arrhythmia Neg Hx     Clotting disorder Neg Hx     Fainting Neg Hx     Heart disease Neg Hx     Anuerysm Neg Hx     Stroke Neg Hx        Social History     Social History    Marital status: /Civil Union     Spouse name: N/A    Number of children: N/A    Years of education: N/A     Occupational History    Not on file  Social History Main Topics    Smoking status: Current Some Day Smoker    Smokeless tobacco: Never Used      Comment: 1 pk every 2days , currently on patches to quit       Alcohol use No    Drug use: No    Sexual activity: Not on file     Other Topics Concern    Not on file     Social History Narrative    No narrative on file       No Known Allergies      Current Outpatient Prescriptions:     amLODIPine (NORVASC) 5 mg tablet, Take 1 tablet (5 mg total) by mouth daily, Disp: 30 tablet, Rfl: 11    aspirin (ECOTRIN LOW STRENGTH) 81 mg EC tablet, Take 81 mg by mouth daily, Disp: , Rfl:     atorvastatin (LIPITOR) 40 mg tablet, Take 1 tablet (40 mg total) by mouth daily, Disp: 90 tablet, Rfl: 0    hydrochlorothiazide (HYDRODIURIL) 25 mg tablet, Take 1 tablet (25 mg total) by mouth daily, Disp: 30 tablet, Rfl: 11    warfarin (COUMADIN) 5 mg tablet, TAKE 1 TABLET BY MOUTH EVERY DAY, Disp: 30 tablet, Rfl: 1

## 2019-02-08 ENCOUNTER — APPOINTMENT (OUTPATIENT)
Dept: LAB | Facility: MEDICAL CENTER | Age: 69
End: 2019-02-08
Payer: MEDICARE

## 2019-02-08 DIAGNOSIS — I73.9 CLAUDICATION OF LEFT LOWER EXTREMITY (HCC): ICD-10-CM

## 2019-02-08 DIAGNOSIS — I73.9 INTERMITTENT CLAUDICATION (HCC): ICD-10-CM

## 2019-02-08 DIAGNOSIS — I10 ESSENTIAL HYPERTENSION: ICD-10-CM

## 2019-02-08 DIAGNOSIS — I73.9 PERIPHERAL ARTERY DISEASE (HCC): ICD-10-CM

## 2019-02-08 LAB
ALBUMIN SERPL BCP-MCNC: 4 G/DL (ref 3.5–5)
ALP SERPL-CCNC: 142 U/L (ref 46–116)
ALT SERPL W P-5'-P-CCNC: 37 U/L (ref 12–78)
ANION GAP SERPL CALCULATED.3IONS-SCNC: 9 MMOL/L (ref 4–13)
AST SERPL W P-5'-P-CCNC: 30 U/L (ref 5–45)
BASOPHILS # BLD AUTO: 0.06 THOUSANDS/ΜL (ref 0–0.1)
BASOPHILS NFR BLD AUTO: 1 % (ref 0–1)
BILIRUB SERPL-MCNC: 0.64 MG/DL (ref 0.2–1)
BUN SERPL-MCNC: 21 MG/DL (ref 5–25)
CALCIUM SERPL-MCNC: 9.3 MG/DL (ref 8.3–10.1)
CHLORIDE SERPL-SCNC: 100 MMOL/L (ref 100–108)
CHOLEST SERPL-MCNC: 181 MG/DL (ref 50–200)
CO2 SERPL-SCNC: 29 MMOL/L (ref 21–32)
CREAT SERPL-MCNC: 1.41 MG/DL (ref 0.6–1.3)
EOSINOPHIL # BLD AUTO: 0.06 THOUSAND/ΜL (ref 0–0.61)
EOSINOPHIL NFR BLD AUTO: 1 % (ref 0–6)
ERYTHROCYTE [DISTWIDTH] IN BLOOD BY AUTOMATED COUNT: 12.5 % (ref 11.6–15.1)
GFR SERPL CREATININE-BSD FRML MDRD: 51 ML/MIN/1.73SQ M
GLUCOSE P FAST SERPL-MCNC: 99 MG/DL (ref 65–99)
HCT VFR BLD AUTO: 52.1 % (ref 36.5–49.3)
HDLC SERPL-MCNC: 49 MG/DL (ref 40–60)
HGB BLD-MCNC: 17.4 G/DL (ref 12–17)
IMM GRANULOCYTES # BLD AUTO: 0.06 THOUSAND/UL (ref 0–0.2)
IMM GRANULOCYTES NFR BLD AUTO: 1 % (ref 0–2)
LDLC SERPL CALC-MCNC: 98 MG/DL (ref 0–100)
LYMPHOCYTES # BLD AUTO: 1.92 THOUSANDS/ΜL (ref 0.6–4.47)
LYMPHOCYTES NFR BLD AUTO: 15 % (ref 14–44)
MCH RBC QN AUTO: 31.1 PG (ref 26.8–34.3)
MCHC RBC AUTO-ENTMCNC: 33.4 G/DL (ref 31.4–37.4)
MCV RBC AUTO: 93 FL (ref 82–98)
MONOCYTES # BLD AUTO: 0.97 THOUSAND/ΜL (ref 0.17–1.22)
MONOCYTES NFR BLD AUTO: 8 % (ref 4–12)
NEUTROPHILS # BLD AUTO: 9.86 THOUSANDS/ΜL (ref 1.85–7.62)
NEUTS SEG NFR BLD AUTO: 74 % (ref 43–75)
NONHDLC SERPL-MCNC: 132 MG/DL
NRBC BLD AUTO-RTO: 0 /100 WBCS
PLATELET # BLD AUTO: 266 THOUSANDS/UL (ref 149–390)
PMV BLD AUTO: 9.5 FL (ref 8.9–12.7)
POTASSIUM SERPL-SCNC: 3.7 MMOL/L (ref 3.5–5.3)
PROT SERPL-MCNC: 7.8 G/DL (ref 6.4–8.2)
PSA SERPL-MCNC: 13.9 NG/ML (ref 0–4)
RBC # BLD AUTO: 5.59 MILLION/UL (ref 3.88–5.62)
SODIUM SERPL-SCNC: 138 MMOL/L (ref 136–145)
TRIGL SERPL-MCNC: 168 MG/DL
TSH SERPL DL<=0.05 MIU/L-ACNC: 1.11 UIU/ML (ref 0.36–3.74)
WBC # BLD AUTO: 12.93 THOUSAND/UL (ref 4.31–10.16)

## 2019-02-08 PROCEDURE — 80061 LIPID PANEL: CPT

## 2019-02-08 PROCEDURE — 80053 COMPREHEN METABOLIC PANEL: CPT

## 2019-02-08 PROCEDURE — G0103 PSA SCREENING: HCPCS | Performed by: FAMILY MEDICINE

## 2019-02-08 PROCEDURE — 36415 COLL VENOUS BLD VENIPUNCTURE: CPT

## 2019-02-08 PROCEDURE — 85025 COMPLETE CBC W/AUTO DIFF WBC: CPT

## 2019-02-08 PROCEDURE — 84443 ASSAY THYROID STIM HORMONE: CPT

## 2019-02-10 DIAGNOSIS — R97.20 ELEVATED PSA, BETWEEN 10 AND LESS THAN 20 NG/ML: Primary | ICD-10-CM

## 2019-02-11 ENCOUNTER — TELEPHONE (OUTPATIENT)
Dept: UROLOGY | Facility: AMBULATORY SURGERY CENTER | Age: 69
End: 2019-02-11

## 2019-02-11 ENCOUNTER — OFFICE VISIT (OUTPATIENT)
Dept: UROLOGY | Facility: CLINIC | Age: 69
End: 2019-02-11
Payer: MEDICARE

## 2019-02-11 VITALS
BODY MASS INDEX: 31.71 KG/M2 | DIASTOLIC BLOOD PRESSURE: 74 MMHG | SYSTOLIC BLOOD PRESSURE: 112 MMHG | WEIGHT: 202 LBS | HEIGHT: 67 IN | HEART RATE: 65 BPM

## 2019-02-11 DIAGNOSIS — R97.20 ELEVATED PSA, LESS THAN 10 NG/ML: Primary | ICD-10-CM

## 2019-02-11 DIAGNOSIS — R97.20 ELEVATED PSA, BETWEEN 10 AND LESS THAN 20 NG/ML: ICD-10-CM

## 2019-02-11 PROCEDURE — 99204 OFFICE O/P NEW MOD 45 MIN: CPT | Performed by: UROLOGY

## 2019-02-11 NOTE — TELEPHONE ENCOUNTER
Patient's wife Anita Gutierrez called for an appointment in the [facility] office  [phone]     Reason for appointment/Complaint/Diagnosis : Elevated PSA    Insurance: Medicare/AAPR    History of Cancer? no                       If yes, what kind? Previous urologist?     None                  Records requested/where? In [facility] [address]    Outside testing/where? In [facility]    Location Preference for office visit?  [facility]

## 2019-02-11 NOTE — PATIENT INSTRUCTIONS
Prostate Biopsy   WHAT YOU NEED TO KNOW:   What do I need to know about a prostate biopsy? A prostate biopsy is a procedure to remove samples of tissue from your prostate gland  The prostate is a male sex gland that makes fluid found in semen  It is located just below the bladder  After the samples are removed, they are sent to a lab and tested for cancer  How do I prepare for a prostate biopsy? · Your healthcare provider will talk to you about how to prepare for this procedure  He may tell you not to eat or drink anything after midnight on the day of your surgery  You will need to stop taking blood thinners several days before your procedure  Examples of blood thinners include warfarin and NSAIDs  You may also need to stop taking herbal supplements before your procedure  Your healthcare provider will tell you what other medicines to take or not take on the day of your procedure  · You will be given an antibiotic to help prevent a bacterial infection  You may need to give yourself an enema (liquid medicine put in your rectum) to help empty your bowel before your procedure  What will happen during a prostate biopsy? · You may need to lie on your side with your knees pulled up toward your chest  Numbing cream or gel may be put into your rectum, or numbing medicine may be injected near your prostate  You may instead be given general anesthesia to keep you asleep and free from pain during the procedure  A biopsy sample may be taken through your rectum, urethra, or perineum  The perineum is the area between your scrotum and rectum  Most of the time, biopsy samples are taken through the rectum  · If your healthcare provider takes a sample through your rectum, he will insert a small ultrasound probe into your rectum  The ultrasound shows pictures of your prostate on a monitor, and is used to help guide the biopsy needle   Your healthcare provider will push the biopsy needle through the wall of your rectum and into your prostate gland  Your healthcare provider will remove between 6 to 12 samples of tissue from different areas of your prostate gland  The samples will be sent to a lab and tested for cancer  What will happen after a prostate biopsy? You may feel soreness at the biopsy site  You may need to take antibiotics for up to 2 days after your procedure to help prevent an infection  You may have some bleeding from your rectum  You may also have blood in your urine, bowel movements, or semen  What are the risks of a prostate biopsy? You may bleed more than expected or get an infection in your urinary tract or prostate gland  The infection may spread to your blood and the rest of your body  Your bladder may not empty completely when you urinate  You may need a catheter to help empty your bladder for a short period of time  Cancer cells may be missed during your biopsy procedure  You may need another prostate biopsy to check for cancer again  CARE AGREEMENT:   You have the right to help plan your care  Learn about your health condition and how it may be treated  Discuss treatment options with your caregivers to decide what care you want to receive  You always have the right to refuse treatment  The above information is an  only  It is not intended as medical advice for individual conditions or treatments  Talk to your doctor, nurse or pharmacist before following any medical regimen to see if it is safe and effective for you  © 2017 [employer] Information is for End User's use only and may not be sold, redistributed or otherwise used for commercial purposes  All illustrations and images included in CareNotes® are the copyrighted property of [employer]  or [employer]

## 2019-02-11 NOTE — TELEPHONE ENCOUNTER
Spoke with patient's wife and offered today with Dr Kevin Glaser @ 3:00PM [facility] office  Wife accepted appt  Location of office provided to wife

## 2019-02-11 NOTE — LETTER
February 11, 2019     Summer Taylor MD  [address] [facility] [address] [facility] [address]    Patient: Royal Jarrett   YOB: 1950   Date of Visit: 2/11/2019       Dear Dr Gloria Sanders: Thank you for referring Fartun Minor to me for evaluation  Below are my notes for this consultation  If you have questions, please do not hesitate to call me  I look forward to following your patient along with you  Sincerely,        Emily Thompson MD        CC: No Recipients  Emily Thompson MD  2/11/2019  3:27 PM  Sign at close encounter       Problem List Items Addressed This Visit        Other    Elevated PSA, between 10 and less than 20 ng/ml     I discussed with the patient the discovery of the PSA molecule and its original use in determining the return of prostate cancer after definitive therapy  I described the normal function of the PSA molecule in the reproductive process and also discussed the detection of PSA in the blood  We discussed the controversial history of PSA screening for prostate cancer in the [place] as well as the risk of over detection and over treatment of prostate cancer by way of PSA screening  The patient understands that PSA blood testing is an imperfect way to screen for prostate cancer and that elevated PSA levels in the blood may also be caused by infection, inflammation, prostatic trauma or manipulation, urological procedures, or by benign prostatic enlargement  The role of the digital rectal examination in prostate cancer screening was also discussed and I discussed with him that there is large interobserver variability in the findings of digital rectal examination  We discussed the continued workup of elevated PSA or abnormal digital rectal examination in the form of the performance of a prostate biopsy  The preparation for, and steps of, an office-based transrectal ultrasound of prostate biopsy were described to the patient   Benefits of obtaining tissue for pathologic analysis were discussed with him and the risks of prostate biopsy were also discussed at length  These risks include but are not limited to infection, bleeding, pain, sepsis with need for admission to hospital, risk of change in sexual function, and risk of diagnosis with prostate cancer  Alternatives to prostate biopsy in the form of continued PSA and GILMA surveillance were also offered to him  All of his questions and concerns were answered and addressed with regard to that detailed above  Plan:  The patient's prostate is 70-80 grams and smooth without nodules, he does have a positive family history of prostate cancer in both of his brothers  Unfortunately, he does have multiple medical problems, and need for long-term anticoagulation given 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  I believe that the best course of action in his case is to proceed with multiparametric prostate MRI given that this will increase the chance that we will find clinically significant prostate cancer in the event that it is abnormal, and in the event that it is a low chance for clinically significant prostate cancer it will help us to avoid biopsy in this gentleman with higher than normal risk for preprocedural post biopsy complication  He will come back to see me in a number of weeks after multiparametric prostate MRI  RESOLVED: Elevated PSA, less than 10 ng/ml - Primary    Relevant Orders    MRI prostate multiparametric wo w contrast    Basic metabolic panel          Discussion:  I am hopeful that the patient's MRI will be negative for high risk of clinically significant prostate cancer  If it is positive, however it will help us to perform a targeted biopsy and I will touch base with the patient's primary care provider at that time with regard to initiating Lovenox bridging therapy in preparation for biopsy          Assessment and plan:       Please see problem oriented charting for the assessment plan of today's urological complaints    Gigi Ratliff MD      Chief Complaint     Chief Complaint   Patient presents with    Elevated PSA    Family history of prostate cancer      History of Present Illness     Zarina Magallanes is a 80 y o  gentleman referred in consultation by [address] [place] [address] for elevated PSA  A copy of today's consultation has been sent to the referring provider in the name of continuity of care  The patient was seen to have an elevated PSA to over 14 in May, he states that he was given no information about the need for follow-up at that time  His PSA is most recently 13 9  He does have a family history of prostate cancer in 2 of his brothers  He has no LUTS at this time, in no systemic symptoms that might otherwise explain his elevation of PSA  He denies bone pain and changes in energy, as well as hematuria and flank pain  He identifies no aggravating or alleviating factors with regard to his current urinary status  I had a long talk with him today about proceeding to prostate biopsy versus obtaining a multiparametric MRI  I believe that an MRI is the best test to minimize unnecessary harm to him with regard to potentially having to stop his Coumadin which has previously led to recurrent DVT/PE episodes  The following portions of the patient's history were reviewed and updated as appropriate: allergies, current medications, past family history, past medical history, past social history, past surgical history and problem list     Detailed Urologic History     - please refer to HPI    Review of Systems     Review of Systems   Constitutional: Negative  HENT: Negative  Eyes: Negative  Respiratory: Negative  Cardiovascular: Negative  Gastrointestinal: Negative  Endocrine: Negative  Genitourinary:        As per HPI, essentially no LUTS   Musculoskeletal: Negative  Skin: Negative  Allergic/Immunologic: Negative  Neurological: Negative  Hematological: Negative  Psychiatric/Behavioral: Negative  Allergies     No Known Allergies    Physical Exam     Physical Exam   Constitutional: He is oriented to person, place, and time  He appears well-developed and well-nourished  No distress  Stigmata of heavy tobacco abuse disorder are present   HENT:   Head: Normocephalic and atraumatic  Right Ear: External ear normal    Left Ear: External ear normal    Nose: Nose normal    Eyes: Pupils are equal, round, and reactive to light  Conjunctivae and EOM are normal  Right eye exhibits no discharge  Left eye exhibits no discharge  No scleral icterus  Neck: Normal range of motion  Neck supple  No tracheal deviation present  No thyromegaly present  Cardiovascular: Regular rhythm and intact distal pulses  Pulmonary/Chest: Effort normal  No stridor  No respiratory distress  He has no wheezes  Abdominal: Soft  He exhibits no distension and no mass  There is no tenderness  There is no rebound and no guarding  No hernia  Genitourinary:   Genitourinary Comments: Penis is normal, circumcised, perineum is normal, scrotum is also normal   Prostate is 70-80 grams and smooth without nodules   Musculoskeletal: He exhibits no edema, tenderness or deformity  Neurological: He is alert and oriented to person, place, and time  No cranial nerve deficit or sensory deficit  He exhibits normal muscle tone  Coordination normal    Skin: Skin is warm and dry  No rash noted  He is not diaphoretic  No erythema  No pallor  Psychiatric: He has a normal mood and affect  His behavior is normal  Judgment and thought content normal    Nursing note and vitals reviewed            Vital Signs  Vitals:    02/11/19 1450   BP: 112/74   BP Location: Left arm   Patient Position: Sitting   Cuff Size: Standard   Pulse: 65   Weight: 91 6 kg (202 lb)   Height: 5' 7" (1 702 m)         Current Medications       Current Outpatient Medications:    amLODIPine (NORVASC) 5 mg tablet, Take 1 tablet (5 mg total) by mouth daily, Disp: 30 tablet, Rfl: 11    aspirin (ECOTRIN LOW STRENGTH) 81 mg EC tablet, Take 81 mg by mouth daily, Disp: , Rfl:     atorvastatin (LIPITOR) 40 mg tablet, Take 1 tablet (40 mg total) by mouth daily, Disp: 90 tablet, Rfl: 0    hydrochlorothiazide (HYDRODIURIL) 25 mg tablet, Take 1 tablet (25 mg total) by mouth daily, Disp: 30 tablet, Rfl: 11    warfarin (COUMADIN) 5 mg tablet, TAKE 1 TABLET BY MOUTH EVERY DAY, Disp: 30 tablet, Rfl: 1      Active Problems     Patient Active Problem List   Diagnosis    Tobacco dependence    Peripheral artery disease (HCC)    Claudication of left lower extremity (HCC)    S/P RIGHT Femoral- PT bypass 2011    Dyslipidemia    ASCVD (arteriosclerotic cardiovascular disease)    CKD (chronic kidney disease), stage III (HCC)    Nonrheumatic aortic valve stenosis    Essential hypertension    Elevated PSA, between 10 and less than 20 ng/ml         Past Medical History     Past Medical History:   Diagnosis Date    DVT (deep venous thrombosis) (HCC)     Protein C deficiency (HCC)     Pulmonary embolus (HCC)          Surgical History     Past Surgical History:   Procedure Laterality Date    BACK SURGERY      BYPASS FEMORAL-TIBIAL Right 2011    VEIN LIGATION           Family History     Family History   Problem Relation Age of Onset    Cancer Mother 50    Heart attack Father 46    Hypertension Father     Prostate cancer Brother     Arrhythmia Neg Hx     Clotting disorder Neg Hx     Fainting Neg Hx     Heart disease Neg Hx     Anuerysm Neg Hx     Stroke Neg Hx          Social History     Social History     Social History     Tobacco Use   Smoking Status Current Some Day Smoker   Smokeless Tobacco Never Used   Tobacco Comment    1 pk every 2days , currently on patches to quit            Pertinent Lab Values     Lab Results   Component Value Date    CREATININE 1 41 (H) 02/08/2019       Lab Results   Component Value Date    PSA 13 9 (H) 02/08/2019     Patient's PSA was 14 3 as of May 4, 2018 with a free percent PSA of 29%          Pertinent Imaging      There is no pertinent urological imaging for my review

## 2019-02-11 NOTE — PROGRESS NOTES
Problem List Items Addressed This Visit        Other    Elevated PSA, between 10 and less than 20 ng/ml     I discussed with the patient the discovery of the PSA molecule and its original use in determining the return of prostate cancer after definitive therapy  I described the normal function of the PSA molecule in the reproductive process and also discussed the detection of PSA in the blood  We discussed the controversial history of PSA screening for prostate cancer in the [place] as well as the risk of over detection and over treatment of prostate cancer by way of PSA screening  The patient understands that PSA blood testing is an imperfect way to screen for prostate cancer and that elevated PSA levels in the blood may also be caused by infection, inflammation, prostatic trauma or manipulation, urological procedures, or by benign prostatic enlargement  The role of the digital rectal examination in prostate cancer screening was also discussed and I discussed with him that there is large interobserver variability in the findings of digital rectal examination  We discussed the continued workup of elevated PSA or abnormal digital rectal examination in the form of the performance of a prostate biopsy  The preparation for, and steps of, an office-based transrectal ultrasound of prostate biopsy were described to the patient  Benefits of obtaining tissue for pathologic analysis were discussed with him and the risks of prostate biopsy were also discussed at length  These risks include but are not limited to infection, bleeding, pain, sepsis with need for admission to hospital, risk of change in sexual function, and risk of diagnosis with prostate cancer  Alternatives to prostate biopsy in the form of continued PSA and GILMA surveillance were also offered to him  All of his questions and concerns were answered and addressed with regard to that detailed above      Plan:  The patient's prostate is 70-80 grams and smooth without nodules, he does have a positive family history of prostate cancer in both of his brothers  Unfortunately, he does have multiple medical problems, and need for long-term anticoagulation given 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  I believe that the best course of action in his case is to proceed with multiparametric prostate MRI given that this will increase the chance that we will find clinically significant prostate cancer in the event that it is abnormal, and in the event that it is a low chance for clinically significant prostate cancer it will help us to avoid biopsy in this gentleman with higher than normal risk for preprocedural post biopsy complication  He will come back to see me in a number of weeks after multiparametric prostate MRI  RESOLVED: Elevated PSA, less than 10 ng/ml - Primary    Relevant Orders    MRI prostate multiparametric wo w contrast    Basic metabolic panel          Discussion:  I am hopeful that the patient's MRI will be negative for high risk of clinically significant prostate cancer  If it is positive, however it will help us to perform a targeted biopsy and I will touch base with the patient's primary care provider at that time with regard to initiating Lovenox bridging therapy in preparation for biopsy  Assessment and plan:       Please see problem oriented charting for the assessment plan of today's urological complaints    Mundo Palacios MD      Chief Complaint     Chief Complaint   Patient presents with    Elevated PSA    Family history of prostate cancer      History of Present Illness     Jamil Andres is a 80 y o  gentleman referred in consultation by [address] [place] [address] for elevated PSA  A copy of today's consultation has been sent to the referring provider in the name of continuity of care      The patient was seen to have an elevated PSA to over 14 in May, he states that he was given no information about the need for follow-up at that time  His PSA is most recently 13 9  He does have a family history of prostate cancer in 2 of his brothers  He has no LUTS at this time, in no systemic symptoms that might otherwise explain his elevation of PSA  He denies bone pain and changes in energy, as well as hematuria and flank pain  He identifies no aggravating or alleviating factors with regard to his current urinary status  I had a long talk with him today about proceeding to prostate biopsy versus obtaining a multiparametric MRI  I believe that an MRI is the best test to minimize unnecessary harm to him with regard to potentially having to stop his Coumadin which has previously led to recurrent DVT/PE episodes  The following portions of the patient's history were reviewed and updated as appropriate: allergies, current medications, past family history, past medical history, past social history, past surgical history and problem list     Detailed Urologic History     - please refer to HPI    Review of Systems     Review of Systems   Constitutional: Negative  HENT: Negative  Eyes: Negative  Respiratory: Negative  Cardiovascular: Negative  Gastrointestinal: Negative  Endocrine: Negative  Genitourinary:        As per HPI, essentially no LUTS   Musculoskeletal: Negative  Skin: Negative  Allergic/Immunologic: Negative  Neurological: Negative  Hematological: Negative  Psychiatric/Behavioral: Negative  Allergies     No Known Allergies    Physical Exam     Physical Exam   Constitutional: He is oriented to person, place, and time  He appears well-developed and well-nourished  No distress  Stigmata of heavy tobacco abuse disorder are present   HENT:   Head: Normocephalic and atraumatic  Right Ear: External ear normal    Left Ear: External ear normal    Nose: Nose normal    Eyes: Pupils are equal, round, and reactive to light   Conjunctivae and EOM are normal  Right eye exhibits no discharge  Left eye exhibits no discharge  No scleral icterus  Neck: Normal range of motion  Neck supple  No tracheal deviation present  No thyromegaly present  Cardiovascular: Regular rhythm and intact distal pulses  Pulmonary/Chest: Effort normal  No stridor  No respiratory distress  He has no wheezes  Abdominal: Soft  He exhibits no distension and no mass  There is no tenderness  There is no rebound and no guarding  No hernia  Genitourinary:   Genitourinary Comments: Penis is normal, circumcised, perineum is normal, scrotum is also normal   Prostate is 70-80 grams and smooth without nodules   Musculoskeletal: He exhibits no edema, tenderness or deformity  Neurological: He is alert and oriented to person, place, and time  No cranial nerve deficit or sensory deficit  He exhibits normal muscle tone  Coordination normal    Skin: Skin is warm and dry  No rash noted  He is not diaphoretic  No erythema  No pallor  Psychiatric: He has a normal mood and affect  His behavior is normal  Judgment and thought content normal    Nursing note and vitals reviewed            Vital Signs  Vitals:    02/11/19 1450   BP: 112/74   BP Location: Left arm   Patient Position: Sitting   Cuff Size: Standard   Pulse: 65   Weight: 91 6 kg (202 lb)   Height: 5' 7" (1 702 m)         Current Medications       Current Outpatient Medications:     amLODIPine (NORVASC) 5 mg tablet, Take 1 tablet (5 mg total) by mouth daily, Disp: 30 tablet, Rfl: 11    aspirin (ECOTRIN LOW STRENGTH) 81 mg EC tablet, Take 81 mg by mouth daily, Disp: , Rfl:     atorvastatin (LIPITOR) 40 mg tablet, Take 1 tablet (40 mg total) by mouth daily, Disp: 90 tablet, Rfl: 0    hydrochlorothiazide (HYDRODIURIL) 25 mg tablet, Take 1 tablet (25 mg total) by mouth daily, Disp: 30 tablet, Rfl: 11    warfarin (COUMADIN) 5 mg tablet, TAKE 1 TABLET BY MOUTH EVERY DAY, Disp: 30 tablet, Rfl: 1      Active Problems     Patient Active Problem List   Diagnosis    Tobacco dependence    Peripheral artery disease (HCC)    Claudication of left lower extremity (HCC)    S/P RIGHT Femoral- PT bypass 2011    Dyslipidemia    ASCVD (arteriosclerotic cardiovascular disease)    CKD (chronic kidney disease), stage III (HCC)    Nonrheumatic aortic valve stenosis    Essential hypertension    Elevated PSA, between 10 and less than 20 ng/ml         Past Medical History     Past Medical History:   Diagnosis Date    DVT (deep venous thrombosis) (HCC)     Protein C deficiency (HCC)     Pulmonary embolus (HCC)          Surgical History     Past Surgical History:   Procedure Laterality Date    BACK SURGERY      BYPASS FEMORAL-TIBIAL Right 2011    VEIN LIGATION           Family History     Family History   Problem Relation Age of Onset    Cancer Mother 50    Heart attack Father 46    Hypertension Father     Prostate cancer Brother     Arrhythmia Neg Hx     Clotting disorder Neg Hx     Fainting Neg Hx     Heart disease Neg Hx     Anuerysm Neg Hx     Stroke Neg Hx          Social History     Social History     Social History     Tobacco Use   Smoking Status Current Some Day Smoker   Smokeless Tobacco Never Used   Tobacco Comment    1 pk every 2days , currently on patches to quit  Pertinent Lab Values     Lab Results   Component Value Date    CREATININE 1 41 (H) 02/08/2019       Lab Results   Component Value Date    PSA 13 9 (H) 02/08/2019     Patient's PSA was 14 3 as of May 4, 2018 with a free percent PSA of 29%          Pertinent Imaging      There is no pertinent urological imaging for my review

## 2019-02-18 ENCOUNTER — HOSPITAL ENCOUNTER (OUTPATIENT)
Dept: RADIOLOGY | Facility: MEDICAL CENTER | Age: 69
Discharge: HOME | End: 2019-02-18
Payer: MEDICARE

## 2019-02-18 DIAGNOSIS — I73.9 CLAUDICATION OF LEFT LOWER EXTREMITY (HCC): ICD-10-CM

## 2019-02-18 PROCEDURE — 75635 CT ANGIO ABDOMINAL ARTERIES: CPT

## 2019-02-18 RX ADMIN — IOHEXOL 120 ML: 350 INJECTION, SOLUTION INTRAVENOUS at 09:44

## 2019-02-21 ENCOUNTER — TELEPHONE (OUTPATIENT)
Dept: VASCULAR SURGERY | Facility: CLINIC | Age: 69
End: 2019-02-21

## 2019-02-21 NOTE — TELEPHONE ENCOUNTER
Magalys Shaikh from [facility] radiology called to report significant findings on CTA done 2/18/19  Dr Mónica Almodovar notified, he reviewed cta  Pt sched for f/u ov w/ him 2/28/19, nothing further needed at this time

## 2019-02-28 ENCOUNTER — OFFICE VISIT (OUTPATIENT)
Dept: VASCULAR SURGERY | Facility: CLINIC | Age: 69
End: 2019-02-28
Payer: MEDICARE

## 2019-02-28 VITALS
WEIGHT: 196 LBS | TEMPERATURE: 97.6 F | DIASTOLIC BLOOD PRESSURE: 64 MMHG | SYSTOLIC BLOOD PRESSURE: 110 MMHG | HEART RATE: 74 BPM | BODY MASS INDEX: 29.7 KG/M2 | HEIGHT: 68 IN

## 2019-02-28 DIAGNOSIS — I73.9 CLAUDICATION OF LEFT LOWER EXTREMITY (HCC): Primary | ICD-10-CM

## 2019-02-28 PROCEDURE — 99213 OFFICE O/P EST LOW 20 MIN: CPT | Performed by: SURGERY

## 2019-02-28 RX ORDER — CLOPIDOGREL BISULFATE 75 MG/1
75 TABLET ORAL DAILY
Qty: 90 TABLET | Refills: 3 | Status: SHIPPED | OUTPATIENT
Start: 2019-02-28 | End: 2020-03-26 | Stop reason: SDUPTHER

## 2019-02-28 NOTE — PATIENT INSTRUCTIONS
Continues with significant claudication left lower extremity with CT angiogram showing evidence of distal SFA and popliteal occlusion  His ambulatory habits are compromised but does not seem to be challenging his quality of life  In view of the fact that he is on Coumadin for history of pulmonary emboli as well as elevation of his creatinine the next step would be an arteriogram which would be somewhat risky for him  I am going to change his baby aspirin to Plavix once a day evaluate him in 3 or 4 months with regard to improvement in his ambulation

## 2019-02-28 NOTE — PROGRESS NOTES
Assessment/Plan:    Claudication of left lower extremity ([address] )  Continues with significant claudication left lower extremity with CT angiogram showing evidence of distal SFA and popliteal occlusion  His ambulatory habits are compromised but does not seem to be challenging his quality of life  In view of the fact that he is on Coumadin for history of pulmonary emboli as well as elevation of his creatinine the next step would be an arteriogram which would be somewhat risky for him  I am going to change his baby aspirin to Plavix once a day evaluate him in 3 or 4 months with regard to improvement in his ambulation  Diagnoses and all orders for this visit:    Claudication of left lower extremity ([address] )  -     clopidogrel (PLAVIX) 75 mg tablet; Take 1 tablet (75 mg total) by mouth daily        Subjective:      Patient ID: Grisel Lombardo is a 80 y o  male  HPI medication modest to severe left lower extremity  No ischemic rest pain or tissue loss  Multiple risk factors including mildly elevated chronic kidney disease as well as need for Coumadin bridge is eccentric should an angiogram the considered  Will continue with medical management and add Plavix to his regimen discontinue his baby aspirin so he will continue on Plavix and warfarin  The following portions of the patient's history were reviewed and updated as appropriate: allergies, current medications, past family history, past medical history, past social history, past surgical history and problem list     Review of Systems  claudication left lower extremity, mild CKD, all other systems negative  Objective:      /64 (BP Location: Right arm, Patient Position: Sitting)   Pulse 74   Temp 97 6 °F (36 4 °C) (Tympanic)   Ht 5' 8" (1 727 m)   Wt 88 9 kg (196 lb)   BMI 29 80 kg/m²          Physical Exam      I have reviewed and made appropriate changes to the review of systems input by the medical assistant      Vitals:    02/28/19 1406   BP: 110/64 BP Location: Right arm   Patient Position: Sitting   Pulse: 74   Temp: 97 6 °F (36 4 °C)   TempSrc: Tympanic   Weight: 88 9 kg (196 lb)   Height: 5' 8" (1 727 m)       Patient Active Problem List   Diagnosis    Tobacco dependence    Peripheral artery disease (HCC)    Claudication of left lower extremity (HCC)    S/P RIGHT Femoral- PT bypass     Dyslipidemia    ASCVD (arteriosclerotic cardiovascular disease)    CKD (chronic kidney disease), stage III (HCC)    Nonrheumatic aortic valve stenosis    Essential hypertension    Elevated PSA, between 10 and less than 20 ng/ml       Past Surgical History:   Procedure Laterality Date    BACK SURGERY      BYPASS FEMORAL-TIBIAL Right 2011    VEIN LIGATION         Family History   Problem Relation Age of Onset    Cancer Mother 50    Heart attack Father 46    Hypertension Father     Prostate cancer Brother     Arrhythmia Neg Hx     Clotting disorder Neg Hx     Fainting Neg Hx     Heart disease Neg Hx     Anuerysm Neg Hx     Stroke Neg Hx        Social History     Socioeconomic History    Marital status: /Civil Union     Spouse name: Not on file    Number of children: Not on file    Years of education: Not on file    Highest education level: Not on file   Occupational History    Not on file   Social Needs    Financial resource strain: Not on file    Food insecurity:     Worry: Not on file     Inability: Not on file    Transportation needs:     Medical: Not on file     Non-medical: Not on file   Tobacco Use    Smoking status: Former Smoker     Types: Cigarettes     Last attempt to quit: 2018     Years since quittin 4    Smokeless tobacco: Never Used    Tobacco comment: 1 pk every 2days , currently on patches to quit      Substance and Sexual Activity    Alcohol use: No    Drug use: No    Sexual activity: Not on file   Lifestyle    Physical activity:     Days per week: Not on file     Minutes per session: Not on file    Stress: Not on file   Relationships    Social connections:     Talks on phone: Not on file     Gets together: Not on file     Attends [employer] service: Not on file     Active member of club or organization: Not on file     Attends meetings of clubs or organizations: Not on file     Relationship status: Not on file    Intimate partner violence:     Fear of current or ex partner: Not on file     Emotionally abused: Not on file     Physically abused: Not on file     Forced sexual activity: Not on file   Other Topics Concern    Not on file   Social History Narrative    Not on file       No Known Allergies      Current Outpatient Medications:     amLODIPine (NORVASC) 5 mg tablet, Take 1 tablet (5 mg total) by mouth daily, Disp: 30 tablet, Rfl: 11    aspirin (ECOTRIN LOW STRENGTH) 81 mg EC tablet, Take 81 mg by mouth daily, Disp: , Rfl:     atorvastatin (LIPITOR) 40 mg tablet, Take 1 tablet (40 mg total) by mouth daily, Disp: 90 tablet, Rfl: 0    hydrochlorothiazide (HYDRODIURIL) 25 mg tablet, Take 1 tablet (25 mg total) by mouth daily, Disp: 30 tablet, Rfl: 11    warfarin (COUMADIN) 5 mg tablet, TAKE 1 TABLET BY MOUTH EVERY DAY, Disp: 30 tablet, Rfl: 1    clopidogrel (PLAVIX) 75 mg tablet, Take 1 tablet (75 mg total) by mouth daily, Disp: 90 tablet, Rfl: 3

## 2019-03-04 ENCOUNTER — TELEPHONE (OUTPATIENT)
Dept: UROLOGY | Facility: AMBULATORY SURGERY CENTER | Age: 69
End: 2019-03-04

## 2019-03-04 NOTE — TELEPHONE ENCOUNTER
Patient managed by Dr Myah Arellano at the [facility] office  H/o elevated PSA due for MRI of the prostate for further evaluation  MRI rescheduled to 3/27/19  Called and spoke with Wife    Rescheduled to see Dr Myah Arellano 4/8/19 at 9 am

## 2019-03-04 NOTE — TELEPHONE ENCOUNTER
Patient's wife called had to r/s MRI due to weather and now needs follow up with Dr Matteo Nelson r/s  MRI will be done 03/27  He would like the [facility]

## 2019-03-20 ENCOUNTER — APPOINTMENT (OUTPATIENT)
Dept: LAB | Facility: MEDICAL CENTER | Age: 69
End: 2019-03-20
Payer: MEDICARE

## 2019-03-20 DIAGNOSIS — R97.20 ELEVATED PSA, LESS THAN 10 NG/ML: ICD-10-CM

## 2019-03-20 LAB
ANION GAP SERPL CALCULATED.3IONS-SCNC: 5 MMOL/L (ref 4–13)
BUN SERPL-MCNC: 16 MG/DL (ref 5–25)
CALCIUM SERPL-MCNC: 9.1 MG/DL (ref 8.3–10.1)
CHLORIDE SERPL-SCNC: 100 MMOL/L (ref 100–108)
CO2 SERPL-SCNC: 32 MMOL/L (ref 21–32)
CREAT SERPL-MCNC: 1.43 MG/DL (ref 0.6–1.3)
GFR SERPL CREATININE-BSD FRML MDRD: 50 ML/MIN/1.73SQ M
GLUCOSE P FAST SERPL-MCNC: 99 MG/DL (ref 65–99)
POTASSIUM SERPL-SCNC: 3.3 MMOL/L (ref 3.5–5.3)
SODIUM SERPL-SCNC: 137 MMOL/L (ref 136–145)

## 2019-03-20 PROCEDURE — 80048 BASIC METABOLIC PNL TOTAL CA: CPT

## 2019-03-21 ENCOUNTER — TELEPHONE (OUTPATIENT)
Dept: FAMILY MEDICINE CLINIC | Facility: CLINIC | Age: 69
End: 2019-03-21

## 2019-03-21 NOTE — TELEPHONE ENCOUNTER
Patients wife called back just to make sure you were aware that the vascular doctor put the patient on 75mgs of Plavix and discontinued the 81mg baby aspirin  She thinks that may be why his INR is slightly up

## 2019-03-27 ENCOUNTER — HOSPITAL ENCOUNTER (OUTPATIENT)
Dept: MRI IMAGING | Facility: HOSPITAL | Age: 69
Discharge: HOME | End: 2019-03-27
Attending: UROLOGY
Payer: MEDICARE

## 2019-03-27 DIAGNOSIS — R97.20 ELEVATED PSA, LESS THAN 10 NG/ML: ICD-10-CM

## 2019-03-27 PROCEDURE — 76377 3D RENDER W/INTRP POSTPROCES: CPT

## 2019-03-27 PROCEDURE — A9585 GADOBUTROL INJECTION: HCPCS | Performed by: UROLOGY

## 2019-03-27 PROCEDURE — 72197 MRI PELVIS W/O & W/DYE: CPT

## 2019-03-27 RX ADMIN — GADOBUTROL 9 ML: 604.72 INJECTION INTRAVENOUS at 16:53

## 2019-03-29 ENCOUNTER — APPOINTMENT (OUTPATIENT)
Dept: LAB | Facility: MEDICAL CENTER | Age: 69
End: 2019-03-29
Payer: MEDICARE

## 2019-04-08 ENCOUNTER — OFFICE VISIT (OUTPATIENT)
Dept: UROLOGY | Facility: CLINIC | Age: 69
End: 2019-04-08
Payer: MEDICARE

## 2019-04-08 VITALS
HEART RATE: 74 BPM | HEIGHT: 68 IN | SYSTOLIC BLOOD PRESSURE: 112 MMHG | WEIGHT: 195.4 LBS | BODY MASS INDEX: 29.61 KG/M2 | DIASTOLIC BLOOD PRESSURE: 84 MMHG

## 2019-04-08 DIAGNOSIS — R97.20 ELEVATED PSA, BETWEEN 10 AND LESS THAN 20 NG/ML: Primary | ICD-10-CM

## 2019-04-08 PROCEDURE — 99214 OFFICE O/P EST MOD 30 MIN: CPT | Performed by: UROLOGY

## 2019-04-25 ENCOUNTER — OFFICE VISIT (OUTPATIENT)
Dept: FAMILY MEDICINE CLINIC | Facility: CLINIC | Age: 69
End: 2019-04-25
Payer: MEDICARE

## 2019-04-25 VITALS
HEIGHT: 68 IN | OXYGEN SATURATION: 96 % | TEMPERATURE: 97.3 F | SYSTOLIC BLOOD PRESSURE: 116 MMHG | RESPIRATION RATE: 16 BRPM | WEIGHT: 195.8 LBS | HEART RATE: 66 BPM | DIASTOLIC BLOOD PRESSURE: 70 MMHG | BODY MASS INDEX: 29.67 KG/M2

## 2019-04-25 DIAGNOSIS — I73.9 PERIPHERAL VASCULAR DISEASE (HCC): ICD-10-CM

## 2019-04-25 DIAGNOSIS — I73.9 PERIPHERAL ARTERY DISEASE (HCC): ICD-10-CM

## 2019-04-25 DIAGNOSIS — I73.9 INTERMITTENT CLAUDICATION (HCC): ICD-10-CM

## 2019-04-25 DIAGNOSIS — I10 ESSENTIAL HYPERTENSION: ICD-10-CM

## 2019-04-25 DIAGNOSIS — E78.5 DYSLIPIDEMIA: ICD-10-CM

## 2019-04-25 DIAGNOSIS — I35.0 NONRHEUMATIC AORTIC VALVE STENOSIS: ICD-10-CM

## 2019-04-25 DIAGNOSIS — N18.30 CKD (CHRONIC KIDNEY DISEASE), STAGE III (HCC): ICD-10-CM

## 2019-04-25 DIAGNOSIS — R97.20 ELEVATED PSA, BETWEEN 10 AND LESS THAN 20 NG/ML: Primary | ICD-10-CM

## 2019-04-25 PROBLEM — F17.200 TOBACCO DEPENDENCE: Status: RESOLVED | Noted: 2018-07-19 | Resolved: 2019-04-25

## 2019-04-25 PROCEDURE — 99214 OFFICE O/P EST MOD 30 MIN: CPT | Performed by: FAMILY MEDICINE

## 2019-04-25 RX ORDER — ATORVASTATIN CALCIUM 40 MG/1
40 TABLET, FILM COATED ORAL DAILY
Qty: 30 TABLET | Refills: 5 | Status: SHIPPED | OUTPATIENT
Start: 2019-04-25 | End: 2020-02-13

## 2019-04-25 RX ORDER — WARFARIN SODIUM 5 MG/1
5 TABLET ORAL DAILY
Qty: 30 TABLET | Refills: 5 | Status: SHIPPED | OUTPATIENT
Start: 2019-04-25 | End: 2019-11-12 | Stop reason: SDUPTHER

## 2019-05-10 ENCOUNTER — APPOINTMENT (OUTPATIENT)
Dept: LAB | Facility: MEDICAL CENTER | Age: 69
End: 2019-05-10
Payer: MEDICARE

## 2019-05-22 DIAGNOSIS — I73.9 PERIPHERAL VASCULAR DISEASE, UNSPECIFIED (HCC): Primary | ICD-10-CM

## 2019-06-20 ENCOUNTER — APPOINTMENT (OUTPATIENT)
Dept: LAB | Facility: MEDICAL CENTER | Age: 69
End: 2019-06-20
Payer: MEDICARE

## 2019-06-28 ENCOUNTER — HOSPITAL ENCOUNTER (OUTPATIENT)
Dept: NON INVASIVE DIAGNOSTICS | Facility: CLINIC | Age: 69
Discharge: HOME | End: 2019-06-28
Payer: MEDICARE

## 2019-06-28 DIAGNOSIS — I73.9 PERIPHERAL VASCULAR DISEASE, UNSPECIFIED (HCC): ICD-10-CM

## 2019-06-28 PROCEDURE — 93925 LOWER EXTREMITY STUDY: CPT

## 2019-06-28 PROCEDURE — 93923 UPR/LXTR ART STDY 3+ LVLS: CPT

## 2019-06-29 PROCEDURE — 93925 LOWER EXTREMITY STUDY: CPT | Performed by: SURGERY

## 2019-06-29 PROCEDURE — 93922 UPR/L XTREMITY ART 2 LEVELS: CPT | Performed by: SURGERY

## 2019-07-25 ENCOUNTER — APPOINTMENT (OUTPATIENT)
Dept: LAB | Facility: MEDICAL CENTER | Age: 69
End: 2019-07-25
Payer: MEDICARE

## 2019-07-25 ENCOUNTER — OFFICE VISIT (OUTPATIENT)
Dept: VASCULAR SURGERY | Facility: CLINIC | Age: 69
End: 2019-07-25
Payer: MEDICARE

## 2019-07-25 VITALS
TEMPERATURE: 98.1 F | DIASTOLIC BLOOD PRESSURE: 76 MMHG | HEIGHT: 68 IN | SYSTOLIC BLOOD PRESSURE: 138 MMHG | BODY MASS INDEX: 29.1 KG/M2 | HEART RATE: 86 BPM | WEIGHT: 192 LBS

## 2019-07-25 DIAGNOSIS — N18.30 CKD (CHRONIC KIDNEY DISEASE), STAGE III (HCC): Primary | ICD-10-CM

## 2019-07-25 DIAGNOSIS — I73.9 CLAUDICATION OF LEFT LOWER EXTREMITY (HCC): ICD-10-CM

## 2019-07-25 PROCEDURE — 99213 OFFICE O/P EST LOW 20 MIN: CPT | Performed by: SURGERY

## 2019-07-25 NOTE — LETTER
July 25, 2019     Jairo Oakley MD  [address] [place] [address] Close    Patient: Eveline Walker   YOB: 1950   Date of Visit: 7/25/2019       Dear Dr Sherron Mancini: Thank you for referring Soheila Vasquez to me for evaluation  Below are my notes for this consultation  If you have questions, please do not hesitate to call me  I look forward to following your patient along with you           Sincerely,        Ana Rosa Nieves MD        CC: No Recipients

## 2019-07-25 NOTE — PATIENT INSTRUCTIONS
Reports in Plavix is having a positive effect on the distances able walk  He is ambulating more and has been more active since of Plavix was started  No ischemic rest pain or tissue loss      Plan:  Continue current medication regimen, follow-up lower extremity arterial study in 6 months

## 2019-07-25 NOTE — PROGRESS NOTES
Assessment/Plan:    Claudication of left lower extremity ([facility] [facility] )  Reports in Plavix is having a positive effect on the distances able walk  He is ambulating more and has been more active since of Plavix was started  No ischemic rest pain or tissue loss  Plan:  Continue current medication regimen, follow-up lower extremity arterial study in 6 months       Diagnoses and all orders for this visit:    CKD (chronic kidney disease), stage III ([facility] )    Claudication of left lower extremity ([facility] [address] [facility] )        Subjective:      Patient ID: Justus Mcclain is a 77 y o  male  HPI short distance claudication which is gradually improving on anti-platelet medications  No ischemic rest pain or tissue loss  No symptoms in the right leg status post bypass graft    The following portions of the patient's history were reviewed and updated as appropriate: allergies, current medications, past family history, past medical history, past social history, past surgical history and problem list     Review of Systems  claudication left lower extremity, no  or GI symptoms, no constitutional symptoms no weight loss fatigue fever, all other systems negative    Objective:      /76 (BP Location: Right arm, Patient Position: Sitting, Cuff Size: Adult)   Pulse 86   Temp 98 1 °F (36 7 °C) (Tympanic)   Ht 5' 8" (1 727 m)   Wt 87 1 kg (192 lb)   BMI 29 19 kg/m²          Physical Exam          Oriented x3 no evidence of clinical depression  Eyes:  Sclera non-icteric    Skin: normal without evidence of inflammation    Neck is supple carotid pulses equal bilaterally no bruits heard    Chest lungs clear, heart regular rhythm  Abdomen soft nontender no evidence of pulsatile masses  Pulses are palpable right lower extremity femoral popliteal dorsalis pedis left lower extremity femoral    Neurological exam intact cranial nerves 2-12 grossly intact no gross motor sensory deficits detected        Imaging viewed and reviewed with Patient  chanda  I have reviewed and made appropriate changes to the review of systems input by the medical assistant  Vitals:    19 1419   BP: 138/76   BP Location: Right arm   Patient Position: Sitting   Cuff Size: Adult   Pulse: 86   Temp: 98 1 °F (36 7 °C)   TempSrc: Tympanic   Weight: 87 1 kg (192 lb)   Height: 5' 8" (1 727 m)       Patient Active Problem List   Diagnosis    Peripheral artery disease (HCC)    Claudication of left lower extremity (HCC)    S/P RIGHT Femoral- PT bypass     Dyslipidemia    ASCVD (arteriosclerotic cardiovascular disease)    CKD (chronic kidney disease), stage III (HCC)    Nonrheumatic aortic valve stenosis    Essential hypertension    Elevated PSA, between 10 and less than 20 ng/ml       Past Surgical History:   Procedure Laterality Date    BACK SURGERY      BYPASS FEMORAL-TIBIAL Right 2011    VEIN LIGATION         Family History   Problem Relation Age of Onset    Cancer Mother 50    Heart attack Father 46    Hypertension Father     Prostate cancer Brother     Arrhythmia Neg Hx     Clotting disorder Neg Hx     Fainting Neg Hx     Heart disease Neg Hx     Anuerysm Neg Hx     Stroke Neg Hx        Social History     Socioeconomic History    Marital status: /Civil Union     Spouse name: Not on file    Number of children: Not on file    Years of education: Not on file    Highest education level: Not on file   Occupational History    Not on file   Social Needs    Financial resource strain: Not on file    Food insecurity:     Worry: Not on file     Inability: Not on file    Transportation needs:     Medical: Not on file     Non-medical: Not on file   Tobacco Use    Smoking status: Former Smoker     Types: Cigarettes     Last attempt to quit: 2018     Years since quittin 8    Smokeless tobacco: Never Used    Tobacco comment: 1 pk every 2days , currently on patches to quit      Substance and Sexual Activity    Alcohol use: No    Drug use: No    Sexual activity: Not on file   Lifestyle    Physical activity:     Days per week: Not on file     Minutes per session: Not on file    Stress: Not on file   Relationships    Social connections:     Talks on phone: Not on file     Gets together: Not on file     Attends Pentecostal service: Not on file     Active member of club or organization: Not on file     Attends meetings of clubs or organizations: Not on file     Relationship status: Not on file    Intimate partner violence:     Fear of current or ex partner: Not on file     Emotionally abused: Not on file     Physically abused: Not on file     Forced sexual activity: Not on file   Other Topics Concern    Not on file   Social History Narrative    Not on file       No Known Allergies      Current Outpatient Medications:     amLODIPine (NORVASC) 5 mg tablet, Take 1 tablet (5 mg total) by mouth daily, Disp: 30 tablet, Rfl: 11    aspirin (ECOTRIN LOW STRENGTH) 81 mg EC tablet, Take 81 mg by mouth daily, Disp: , Rfl:     atorvastatin (LIPITOR) 40 mg tablet, Take 1 tablet (40 mg total) by mouth daily, Disp: 30 tablet, Rfl: 5    clopidogrel (PLAVIX) 75 mg tablet, Take 1 tablet (75 mg total) by mouth daily, Disp: 90 tablet, Rfl: 3    hydrochlorothiazide (HYDRODIURIL) 25 mg tablet, Take 1 tablet (25 mg total) by mouth daily, Disp: 30 tablet, Rfl: 11    warfarin (COUMADIN) 5 mg tablet, Take 1 tablet (5 mg total) by mouth daily, Disp: 30 tablet, Rfl: 5

## 2019-09-11 ENCOUNTER — APPOINTMENT (OUTPATIENT)
Dept: LAB | Facility: MEDICAL CENTER | Age: 69
End: 2019-09-11
Payer: MEDICARE

## 2019-09-11 DIAGNOSIS — I10 ESSENTIAL HYPERTENSION: ICD-10-CM

## 2019-09-11 DIAGNOSIS — E78.5 DYSLIPIDEMIA: ICD-10-CM

## 2019-09-11 DIAGNOSIS — I73.9 INTERMITTENT CLAUDICATION (HCC): ICD-10-CM

## 2019-09-11 DIAGNOSIS — N18.30 CKD (CHRONIC KIDNEY DISEASE), STAGE III (HCC): ICD-10-CM

## 2019-09-11 DIAGNOSIS — I73.9 PERIPHERAL VASCULAR DISEASE (HCC): ICD-10-CM

## 2019-09-11 LAB
ALBUMIN SERPL BCP-MCNC: 3.5 G/DL (ref 3.5–5)
ALP SERPL-CCNC: 162 U/L (ref 46–116)
ALT SERPL W P-5'-P-CCNC: 22 U/L (ref 12–78)
ANION GAP SERPL CALCULATED.3IONS-SCNC: 1 MMOL/L (ref 4–13)
AST SERPL W P-5'-P-CCNC: 19 U/L (ref 5–45)
BASOPHILS # BLD AUTO: 0.07 THOUSANDS/ΜL (ref 0–0.1)
BASOPHILS NFR BLD AUTO: 1 % (ref 0–1)
BILIRUB SERPL-MCNC: 0.61 MG/DL (ref 0.2–1)
BUN SERPL-MCNC: 18 MG/DL (ref 5–25)
CALCIUM SERPL-MCNC: 9.2 MG/DL (ref 8.3–10.1)
CHLORIDE SERPL-SCNC: 101 MMOL/L (ref 100–108)
CHOLEST SERPL-MCNC: 131 MG/DL (ref 50–200)
CO2 SERPL-SCNC: 33 MMOL/L (ref 21–32)
CREAT SERPL-MCNC: 1.39 MG/DL (ref 0.6–1.3)
EOSINOPHIL # BLD AUTO: 0.1 THOUSAND/ΜL (ref 0–0.61)
EOSINOPHIL NFR BLD AUTO: 1 % (ref 0–6)
ERYTHROCYTE [DISTWIDTH] IN BLOOD BY AUTOMATED COUNT: 12.8 % (ref 11.6–15.1)
GFR SERPL CREATININE-BSD FRML MDRD: 52 ML/MIN/1.73SQ M
GLUCOSE P FAST SERPL-MCNC: 88 MG/DL (ref 65–99)
HCT VFR BLD AUTO: 52.3 % (ref 36.5–49.3)
HDLC SERPL-MCNC: 38 MG/DL (ref 40–60)
HGB BLD-MCNC: 17.3 G/DL (ref 12–17)
IMM GRANULOCYTES # BLD AUTO: 0.05 THOUSAND/UL (ref 0–0.2)
IMM GRANULOCYTES NFR BLD AUTO: 0 % (ref 0–2)
LDLC SERPL CALC-MCNC: 55 MG/DL (ref 0–100)
LYMPHOCYTES # BLD AUTO: 2.24 THOUSANDS/ΜL (ref 0.6–4.47)
LYMPHOCYTES NFR BLD AUTO: 17 % (ref 14–44)
MCH RBC QN AUTO: 30.9 PG (ref 26.8–34.3)
MCHC RBC AUTO-ENTMCNC: 33.1 G/DL (ref 31.4–37.4)
MCV RBC AUTO: 94 FL (ref 82–98)
MONOCYTES # BLD AUTO: 1.01 THOUSAND/ΜL (ref 0.17–1.22)
MONOCYTES NFR BLD AUTO: 8 % (ref 4–12)
NEUTROPHILS # BLD AUTO: 9.5 THOUSANDS/ΜL (ref 1.85–7.62)
NEUTS SEG NFR BLD AUTO: 73 % (ref 43–75)
NONHDLC SERPL-MCNC: 93 MG/DL
NRBC BLD AUTO-RTO: 0 /100 WBCS
PLATELET # BLD AUTO: 223 THOUSANDS/UL (ref 149–390)
PMV BLD AUTO: 9.8 FL (ref 8.9–12.7)
POTASSIUM SERPL-SCNC: 3.8 MMOL/L (ref 3.5–5.3)
PROT SERPL-MCNC: 7.1 G/DL (ref 6.4–8.2)
RBC # BLD AUTO: 5.59 MILLION/UL (ref 3.88–5.62)
SODIUM SERPL-SCNC: 135 MMOL/L (ref 136–145)
TRIGL SERPL-MCNC: 191 MG/DL
WBC # BLD AUTO: 12.97 THOUSAND/UL (ref 4.31–10.16)

## 2019-09-11 PROCEDURE — 80053 COMPREHEN METABOLIC PANEL: CPT

## 2019-09-11 PROCEDURE — 85025 COMPLETE CBC W/AUTO DIFF WBC: CPT

## 2019-09-11 PROCEDURE — 80061 LIPID PANEL: CPT

## 2019-11-04 ENCOUNTER — OFFICE VISIT (OUTPATIENT)
Dept: CARDIOLOGY CLINIC | Facility: MEDICAL CENTER | Age: 69
End: 2019-11-04
Payer: MEDICARE

## 2019-11-04 VITALS
OXYGEN SATURATION: 98 % | SYSTOLIC BLOOD PRESSURE: 110 MMHG | DIASTOLIC BLOOD PRESSURE: 60 MMHG | BODY MASS INDEX: 28.6 KG/M2 | WEIGHT: 188.7 LBS | HEIGHT: 68 IN | HEART RATE: 74 BPM

## 2019-11-04 DIAGNOSIS — E78.5 DYSLIPIDEMIA: ICD-10-CM

## 2019-11-04 DIAGNOSIS — I25.10 ASCVD (ARTERIOSCLEROTIC CARDIOVASCULAR DISEASE): Primary | ICD-10-CM

## 2019-11-04 DIAGNOSIS — I10 ESSENTIAL HYPERTENSION: ICD-10-CM

## 2019-11-04 DIAGNOSIS — I73.9 PERIPHERAL ARTERY DISEASE (HCC): ICD-10-CM

## 2019-11-04 DIAGNOSIS — I35.0 NONRHEUMATIC AORTIC VALVE STENOSIS: ICD-10-CM

## 2019-11-04 DIAGNOSIS — N18.30 CKD (CHRONIC KIDNEY DISEASE), STAGE III (HCC): ICD-10-CM

## 2019-11-04 PROCEDURE — 93000 ELECTROCARDIOGRAM COMPLETE: CPT | Performed by: INTERNAL MEDICINE

## 2019-11-04 PROCEDURE — 99214 OFFICE O/P EST MOD 30 MIN: CPT | Performed by: INTERNAL MEDICINE

## 2019-11-04 RX ORDER — HYDROCHLOROTHIAZIDE 25 MG/1
25 TABLET ORAL DAILY
Qty: 30 TABLET | Refills: 11 | Status: SHIPPED | OUTPATIENT
Start: 2019-11-04 | End: 2019-12-12 | Stop reason: ALTCHOICE

## 2019-11-04 RX ORDER — AMLODIPINE BESYLATE 5 MG/1
5 TABLET ORAL DAILY
Qty: 30 TABLET | Refills: 11 | Status: SHIPPED | OUTPATIENT
Start: 2019-11-04 | End: 2019-12-12 | Stop reason: ALTCHOICE

## 2019-11-04 NOTE — PROGRESS NOTES
Office Progress Note - Cardiology     Maryann Pereira 77 y o  male MRN: [id number]    Assessment/Plan:    1  ASCVD  Nonobstructive CAD by cath 5/11 at [facility]  On aspirin, statin  No current exertional cardiac symptoms  2  HL  Lipid panel 9/18 showed total cholesterol 121, , HDL 35, LDL 54  On Atorvastatin 40  Lipid panel 9/19 showed total cholesterol 131, , HDL 38, LDL 55  Advised him to try taking some fish oil to help lower TG  3  CKD III  Creatinine 9/18 was 1 48  Creatinine 1 39 9/19       4  Tobacco use  Was smoking 1/2 ppd, quit end of October 2018 using nicotine patches  5  PAD  On Plavix, statin  Following with Regi Roberto/Olga Lay/Dr Burlene Gilford  Still has claudication symptoms with walking, but stable to improved  6  Moderate AS  Echo 8/2018 showed moderate AS  Not having any current symptoms with exertion  Explained need to monitor for exertional symptoms  If no change in symptoms, recommend repeat echo in 8/20     7  HTN  In the past was taking Metoprolol, but stopped taking it after he stopped following with Dr Jah Meyer around 2012  Started Amlodipine 5 mg daily 10/18 due to elevated BP, as well as HCTZ 25 mg daily  BP controlled in office  8  Prior DVT  On Coumadin, monitored by PCP  1  ASCVD (arteriosclerotic cardiovascular disease)  POCT ECG   2  Peripheral artery disease (HCC)  POCT ECG   3  Nonrheumatic aortic valve stenosis  POCT ECG    Echo complete with contrast if indicated   4  Essential hypertension  POCT ECG    hydrochlorothiazide (HYDRODIURIL) 25 mg tablet    amLODIPine (NORVASC) 5 mg tablet   5  CKD (chronic kidney disease), stage III ([address] )     6  Dyslipidemia         HPI: 77 y o  male with a history of HL, PAD s/p RLE femoral to PT bypass 6/11, nonobstructive CAD, tobacco use, prior DVT/PE in 1998 with protein C deficiency on Coumadin, who is here for follow up of aortic stenosis       Echo 8/18 showed overall LV function, EF 60%, mildly increased LV thickness  Grade I diastolic dysfunction  Moderate AS with mild AI (mean gradient 25 mm Hg, Vmax 3 2 m/s, calculated TACHO 1 1 cm squared)  Mild to moderate MAC with mild MR  Mild TR  Normal RV size and function  He was previously following with Dr Jess Palomino of [address], but stopped seeing him around 2012 when his insurance was no longer accepted by his office  Pharmacologic nuclear stress test 5/11 was reported as having a moderate sized perfusion defect of apex/anteroapical wall with mild reperfusion suggesting of myocardial ischemia  LVEF was preserved at 66%  This was done preoperatively prior to his RLE bypass  Cardiac catheterization 5/31/11 was done for abnormal stress test, and showed 10-20% stenosis of left main, 50-60% mid LAD stenosis, dominant RCA with 40-50% stenosis of mid RCA  Echo 5/11 showed normal LV size and function, EF 60-65%, RVSP 16 mm Hg, no significant valvular heart disease  Echo 3/12 showed normal LV function, possible mild LVH, EF 50-55%, mild MR, aortic sclerosis, mild TR  He is walking for exercise/to help with claudication for at least 30 minutes, reports pain in left leg with walking has improved  No chest pain, no SOB  No LE edema  No dizziness or lightheadedness  No syncope or presyncope  Review of Systems:    Review of Systems   Constitution: Negative for chills, decreased appetite, diaphoresis, fever, malaise/fatigue, night sweats, weight gain and weight loss  HENT: Positive for hearing loss  Negative for ear pain, hoarse voice, nosebleeds, sore throat and tinnitus  Eyes: Negative for blurred vision and pain  Cardiovascular: Negative  Negative for chest pain, claudication, cyanosis, dyspnea on exertion, irregular heartbeat, leg swelling, near-syncope, orthopnea, palpitations, paroxysmal nocturnal dyspnea and syncope  Respiratory: Positive for snoring   Negative for cough, hemoptysis, shortness of breath, sleep disturbances due to breathing, sputum production and wheezing  Hematologic/Lymphatic: Negative for adenopathy and bleeding problem  Does not bruise/bleed easily  Skin: Negative for color change, dry skin, flushing, itching, poor wound healing and rash  Musculoskeletal: Positive for arthritis and back pain  Negative for falls, joint pain, muscle cramps, muscle weakness, myalgias and neck pain  Gastrointestinal: Positive for heartburn  Negative for abdominal pain, constipation, diarrhea, dysphagia, hematemesis, hematochezia, melena, nausea and vomiting  Genitourinary: Positive for nocturia  Negative for dysuria, frequency, hematuria, hesitancy, non-menstrual bleeding and urgency  Neurological: Positive for numbness and paresthesias  Negative for excessive daytime sleepiness, dizziness, focal weakness, headaches, light-headedness, loss of balance, tremors, vertigo and weakness  Psychiatric/Behavioral: Negative for altered mental status, depression and memory loss  The patient does not have insomnia and is not nervous/anxious  Allergic/Immunologic: Negative for environmental allergies and persistent infections       Active Problems:     Patient Active Problem List   Diagnosis    Peripheral artery disease ([facility] )    Claudication of left lower extremity ([facility] )    S/P RIGHT Femoral- PT bypass 2011    Dyslipidemia    ASCVD (arteriosclerotic cardiovascular disease)    CKD (chronic kidney disease), stage III ([facility] )    Nonrheumatic aortic valve stenosis    Essential hypertension    Elevated PSA, between 10 and less than 20 ng/ml       Historical Information   Past Medical History:   Diagnosis Date    [facility]     Protein C deficiency ([facility] )     Pulmonary embolus ([facility] )      Past Surgical History:   Procedure Laterality Date    BACK SURGERY      BYPASS FEMORAL-TIBIAL Right 2011    VEIN LIGATION       Social History     Substance and Sexual Activity   Alcohol Use No Social History     Substance and Sexual Activity   Drug Use No     Social History     Tobacco Use   Smoking Status Former Smoker    Types: Cigarettes    Last attempt to quit: 2018    Years since quittin 1   Smokeless Tobacco Never Used   Tobacco Comment    1 pk every 2days , currently on patches to quit  Family History:   Family History   Problem Relation Age of Onset    Cancer Mother 50    Heart attack Father 46    Hypertension Father     Prostate cancer Brother     Arrhythmia Neg Hx     Clotting disorder Neg Hx     Fainting Neg Hx     Heart disease Neg Hx     Anuerysm Neg Hx     Stroke Neg Hx        No Known Allergies      Current Outpatient Medications:     amLODIPine (NORVASC) 5 mg tablet, Take 1 tablet (5 mg total) by mouth daily, Disp: 30 tablet, Rfl: 11    atorvastatin (LIPITOR) 40 mg tablet, Take 1 tablet (40 mg total) by mouth daily, Disp: 30 tablet, Rfl: 5    clopidogrel (PLAVIX) 75 mg tablet, Take 1 tablet (75 mg total) by mouth daily, Disp: 90 tablet, Rfl: 3    hydrochlorothiazide (HYDRODIURIL) 25 mg tablet, Take 1 tablet (25 mg total) by mouth daily, Disp: 30 tablet, Rfl: 11    warfarin (COUMADIN) 5 mg tablet, Take 1 tablet (5 mg total) by mouth daily, Disp: 30 tablet, Rfl: 5    Objective   Vitals:   Vitals:    19 1540   BP: 110/60   BP Location: Left arm   Patient Position: Sitting   Cuff Size: Adult   Pulse: 74   SpO2: 98%   Weight: 85 6 kg (188 lb 11 2 oz)   Height: 5' 8" (1 727 m)          Physical Exam:     GEN: Alert and oriented x 3, in no acute distress  Well appearing and well nourished  HEENT: Sclera anicteric, conjunctivae pink, mucous membranes moist  Oropharynx clear  NECK: Supple, no carotid bruits, no significant JVD  Trachea midline, no thyromegaly  HEART: Regular rhythm, II-III/VI systolic murmur, no clicks, gallops or rubs  PMI nondisplaced, no thrills  LUNGS: Clear to auscultation bilaterally; no wheezes, rales, or rhonchi   No increased work of breathing or signs of respiratory distress  ABDOMEN: Soft, nontender, nondistended, normoactive bowel sounds  EXTREMITIES: Skin warm and well perfused, no clubbing, cyanosis, or edema  Scar from prior RLE bypass  NEURO: No focal findings  Normal gait  Normal speech  Mood and affect normal    SKIN: Normal without suspicious lesions on exposed skin      Lab Results:     No results found for: HGBA1C  No results found for: CHOL  Lab Results   Component Value Date    HDL 38 (L) 09/11/2019    HDL 49 02/08/2019    HDL 35 (L) 09/13/2018     Lab Results   Component Value Date    LDLCALC 55 09/11/2019    LDLCALC 98 02/08/2019    LDLCALC 54 09/13/2018     Lab Results   Component Value Date    TRIG 191 (H) 09/11/2019    TRIG 168 (H) 02/08/2019    TRIG 159 (H) 09/13/2018     No results found for: CHOLHDL

## 2019-11-12 ENCOUNTER — TELEPHONE (OUTPATIENT)
Dept: CARDIOLOGY CLINIC | Facility: CLINIC | Age: 69
End: 2019-11-12

## 2019-11-12 DIAGNOSIS — I73.9 PERIPHERAL VASCULAR DISEASE (HCC): ICD-10-CM

## 2019-11-12 NOTE — TELEPHONE ENCOUNTER
Spouse called stating pt has been feeling fatigued, but has been moving a lot of stuff out of storage recently     Pt has been monitoring pressures since BP was low in office-110/60  Reading from the past few days:  126/75  110/60  112/58  115/63  115/66  109/66    Taking:  Norvasc 5mg  HCTZ 25mg  Lipitor 40mg  Coumadin  Plavix

## 2019-11-13 RX ORDER — WARFARIN SODIUM 5 MG/1
TABLET ORAL
Qty: 30 TABLET | Refills: 5 | Status: SHIPPED | OUTPATIENT
Start: 2019-11-13 | End: 2020-05-24

## 2019-11-26 ENCOUNTER — APPOINTMENT (OUTPATIENT)
Dept: LAB | Facility: MEDICAL CENTER | Age: 69
End: 2019-11-26
Payer: MEDICARE

## 2019-11-26 DIAGNOSIS — R97.20 ELEVATED PSA, BETWEEN 10 AND LESS THAN 20 NG/ML: ICD-10-CM

## 2019-11-26 DIAGNOSIS — I73.9 PERIPHERAL ARTERY DISEASE (HCC): Primary | ICD-10-CM

## 2019-11-26 LAB — PSA SERPL-MCNC: 11.6 NG/ML (ref 0–4)

## 2019-11-26 PROCEDURE — 84153 ASSAY OF PSA TOTAL: CPT

## 2019-12-05 ENCOUNTER — APPOINTMENT (OUTPATIENT)
Dept: LAB | Facility: MEDICAL CENTER | Age: 69
End: 2019-12-05
Payer: MEDICARE

## 2019-12-05 DIAGNOSIS — I73.9 PERIPHERAL ARTERY DISEASE (HCC): ICD-10-CM

## 2019-12-05 LAB
INR PPP: 1.97 (ref 0.84–1.19)
PROTHROMBIN TIME: 21.9 SECONDS (ref 11.6–14.5)

## 2019-12-05 PROCEDURE — 36415 COLL VENOUS BLD VENIPUNCTURE: CPT

## 2019-12-05 PROCEDURE — 85610 PROTHROMBIN TIME: CPT

## 2019-12-10 ENCOUNTER — TELEPHONE (OUTPATIENT)
Dept: UROLOGY | Facility: MEDICAL CENTER | Age: 69
End: 2019-12-10

## 2019-12-10 NOTE — TELEPHONE ENCOUNTER
Patient's wife left a message on the Medication Refill voice mail line regarding a missed appointment they got a letter for  Her  sees Dr Williams Howell in the [address] [place] [address] location  No one seems to answer the phone  They have tried multiple time to get a hold of the office but can't seem to reach a person  She finally left a message on my voice mail  Urgent message forwarded to Call Center

## 2019-12-12 ENCOUNTER — OFFICE VISIT (OUTPATIENT)
Dept: FAMILY MEDICINE CLINIC | Facility: CLINIC | Age: 69
End: 2019-12-12
Payer: MEDICARE

## 2019-12-12 VITALS
WEIGHT: 196.2 LBS | TEMPERATURE: 97.6 F | HEIGHT: 68 IN | BODY MASS INDEX: 29.73 KG/M2 | SYSTOLIC BLOOD PRESSURE: 120 MMHG | OXYGEN SATURATION: 98 % | HEART RATE: 62 BPM | DIASTOLIC BLOOD PRESSURE: 78 MMHG

## 2019-12-12 DIAGNOSIS — I10 ESSENTIAL HYPERTENSION: ICD-10-CM

## 2019-12-12 DIAGNOSIS — Z79.01 LONG TERM CURRENT USE OF ANTICOAGULANT: ICD-10-CM

## 2019-12-12 DIAGNOSIS — N18.30 CKD (CHRONIC KIDNEY DISEASE), STAGE III (HCC): ICD-10-CM

## 2019-12-12 DIAGNOSIS — R97.20 ELEVATED PSA, BETWEEN 10 AND LESS THAN 20 NG/ML: ICD-10-CM

## 2019-12-12 DIAGNOSIS — I35.0 NONRHEUMATIC AORTIC VALVE STENOSIS: Primary | ICD-10-CM

## 2019-12-12 PROCEDURE — 99214 OFFICE O/P EST MOD 30 MIN: CPT | Performed by: FAMILY MEDICINE

## 2019-12-12 NOTE — PROGRESS NOTES
Assessment/Plan:    1  Nonrheumatic aortic valve stenosis    2  Essential hypertension  -     CBC and differential; Future; Expected date: 03/12/2020  -     Comprehensive metabolic panel; Future; Expected date: 03/12/2020  -     Lipid panel; Future; Expected date: 03/12/2020    3  Elevated PSA, between 10 and less than 20 ng/ml    4  CKD (chronic kidney disease), stage III (HCC)  -     CBC and differential; Future; Expected date: 03/12/2020  -     Comprehensive metabolic panel; Future; Expected date: 03/12/2020  -     Lipid panel; Future; Expected date: 03/12/2020    5  Long term current use of anticoagulant  Comments:  recheck in 2 weeks          There are no Patient Instructions on file for this visit  Return in about 6 months (around 6/12/2020)  Subjective:      Patient ID: Antoinette Ramos is a 77 y o  male  Chief Complaint   Patient presents with    Follow-up       Here for f/u, INR now more stable, pt had missed a few pills when the INR dropped to 1 45, now back up to 1 97  Following with urology, PSA down a bit to 11 5, from 13 9, appt next month with Dr José Antonio Guevara  MRI of the prostate was ok  No invasive measures at this point  No bleeding issues  Following with vascular yearly, has upcoming doppler  BP well controlled, follows with cards, advised to start fish oil OTC for trigs which were last read at 191 in 9/19  BP meds were stopped as BP was running too low  The following portions of the patient's history were reviewed and updated as appropriate:  past social history    Review of Systems   Constitutional: Negative for activity change and fatigue  HENT: Negative for congestion  Respiratory: Negative for chest tightness and shortness of breath  Musculoskeletal: Positive for myalgias (tightness in L calf, watched by vascular)  Neurological: Positive for light-headedness and headaches  Hematological: Bruises/bleeds easily           Current Outpatient Medications   Medication Sig Dispense Refill    atorvastatin (LIPITOR) 40 mg tablet Take 1 tablet (40 mg total) by mouth daily 30 tablet 5    clopidogrel (PLAVIX) 75 mg tablet Take 1 tablet (75 mg total) by mouth daily 90 tablet 3    warfarin (COUMADIN) 5 mg tablet TAKE 1 TABLET BY MOUTH EVERY DAY 30 tablet 5     No current facility-administered medications for this visit  Objective:    /78 (BP Location: Right arm, Patient Position: Sitting, Cuff Size: Standard)   Pulse 62   Temp 97 6 °F (36 4 °C)   Ht 5' 8" (1 727 m)   Wt 89 kg (196 lb 3 2 oz)   SpO2 98%   BMI 29 83 kg/m²      Physical Exam   Constitutional: He is oriented to person, place, and time  He appears well-developed and well-nourished  Cardiovascular: Normal rate and regular rhythm  Murmur (aortic area) heard  Pulmonary/Chest: Effort normal and breath sounds normal    Musculoskeletal: He exhibits no edema  Neurological: He is alert and oriented to person, place, and time  Skin: Skin is warm  Psychiatric: He has a normal mood and affect  His behavior is normal  Judgment and thought content normal    Vitals reviewed            Maciel Liang MD

## 2019-12-19 ENCOUNTER — APPOINTMENT (OUTPATIENT)
Dept: LAB | Facility: MEDICAL CENTER | Age: 69
End: 2019-12-19
Payer: MEDICARE

## 2019-12-19 DIAGNOSIS — I73.9 PERIPHERAL ARTERY DISEASE (HCC): ICD-10-CM

## 2019-12-19 LAB
INR PPP: 2.54 (ref 0.84–1.19)
PROTHROMBIN TIME: 26.8 SECONDS (ref 11.6–14.5)

## 2019-12-19 PROCEDURE — 36415 COLL VENOUS BLD VENIPUNCTURE: CPT

## 2019-12-19 PROCEDURE — 85610 PROTHROMBIN TIME: CPT

## 2020-01-27 NOTE — PROGRESS NOTES
Problem List Items Addressed This Visit        Other    Elevated PSA, between 10 and less than 20 ng/ml - Primary     Patient's PSA has been decreasing over time, most recently 11 6, his prostate is quite spherical in shape, and on digital palpation around 80-90 grams, there are no changes in his examination since the last time we saw him  His PSA density is relatively low, all things considered, given that on prostate MRI his prostate volume was 110 grams  Of note his prostate MRI is low risk for clinically significant prostate cancer  I recommend that he see us in 6 months with a PSA prior, should his PSA continue to decrease we will continue PSA surveillance every 6-12 months  This is his preference, I would have a higher threshold for biopsy him given his competing cardiovascular risk factors         Relevant Orders    PSA, total and free              Assessment and plan:       Please see problem oriented charting for the assessment plan of today's urological complaints    Nancy Veliz MD      Chief Complaint     Chief Complaint   Patient presents with    Elevated PSA         History of Present Illness     Katie Kendall is a 77 y o  gentleman that I have seen previously for elevated PSA  He also has a family history of prostate cancer  He has multiple other competing risk factors for cardiovascular disease and other medical comorbidities threatening to his ongoing survival relative to prostate cancer  His PSA has decreased to 11 6, his digital rectal examination has been previously benign, and his MRI is low risk for clinically significant prostate cancer  New urologic complaints today include none      The following portions of the patient's history were reviewed and updated as appropriate: allergies, current medications, past family history, past medical history, past social history, past surgical history and problem list     Detailed Urologic History     - please refer to HPI    Review of Systems     Review of Systems   Constitutional: Negative  HENT: Negative  Eyes: Negative  Respiratory: Negative  Cardiovascular: Negative  Gastrointestinal: Negative  Endocrine: Negative  Genitourinary: Negative  Musculoskeletal: Negative  Skin: Negative  Allergic/Immunologic: Negative  Neurological: Negative  Hematological: Negative  Psychiatric/Behavioral: Negative  Allergies     No Known Allergies    Physical Exam     Physical Exam   Constitutional: He is oriented to person, place, and time  He appears well-developed and well-nourished  No distress  HENT:   Head: Normocephalic and atraumatic  Eyes: Right eye exhibits no discharge  Left eye exhibits no discharge  Neck: No tracheal deviation present  Cardiovascular: Intact distal pulses  Pulmonary/Chest: Effort normal  No stridor  No respiratory distress  Abdominal: Soft  He exhibits no distension and no mass  There is no tenderness  There is no rebound and no guarding  No hernia  Genitourinary:   Genitourinary Comments: Small external hemorrhoids, normal sphincter tone, prostate is a 90 grams and smooth, no concerning consistency changes    Musculoskeletal: He exhibits no edema, tenderness or deformity  Neurological: He is alert and oriented to person, place, and time  No cranial nerve deficit  Coordination normal    Skin: Skin is warm and dry  No rash noted  He is not diaphoretic  No erythema  No pallor  Psychiatric: He has a normal mood and affect  His behavior is normal  Judgment and thought content normal    Nursing note and vitals reviewed            Vital Signs  Vitals:    01/28/20 1033   BP: 148/72   Pulse: 57   Weight: 89 8 kg (198 lb)   Height: 5' 8" (1 727 m)         Current Medications       Current Outpatient Medications:     atorvastatin (LIPITOR) 40 mg tablet, Take 1 tablet (40 mg total) by mouth daily, Disp: 30 tablet, Rfl: 5    clopidogrel (PLAVIX) 75 mg tablet, Take 1 tablet (75 mg total) by mouth daily, Disp: 90 tablet, Rfl: 3    warfarin (COUMADIN) 5 mg tablet, TAKE 1 TABLET BY MOUTH EVERY DAY, Disp: 30 tablet, Rfl: 5      Active Problems     Patient Active Problem List   Diagnosis    Peripheral artery disease (HCC)    Claudication of left lower extremity (HCC)    S/P RIGHT Femoral- PT bypass     Dyslipidemia    ASCVD (arteriosclerotic cardiovascular disease)    CKD (chronic kidney disease), stage III (HCC)    Nonrheumatic aortic valve stenosis    Essential hypertension    Elevated PSA, between 10 and less than 20 ng/ml         Past Medical History     Past Medical History:   Diagnosis Date    DVT (deep venous thrombosis) (HCC)     Protein C deficiency (HCC)     Pulmonary embolus (HCC)          Surgical History     Past Surgical History:   Procedure Laterality Date    BACK SURGERY      BYPASS FEMORAL-TIBIAL Right 2011    VEIN LIGATION           Family History     Family History   Problem Relation Age of Onset    Cancer Mother 50    Heart attack Father 46    Hypertension Father     Prostate cancer Brother     Arrhythmia Neg Hx     Clotting disorder Neg Hx     Fainting Neg Hx     Heart disease Neg Hx     Anuerysm Neg Hx     Stroke Neg Hx          Social History     Social History     Social History     Tobacco Use   Smoking Status Former Smoker    Types: Cigarettes    Last attempt to quit: 2018    Years since quittin 3   Smokeless Tobacco Never Used   Tobacco Comment    1 pk every 2days , currently on patches to quit            Pertinent Lab Values     Lab Results   Component Value Date    CREATININE 1 39 (H) 2019       Lab Results   Component Value Date    PSA 11 6 (H) 2019    PSA 13 9 (H) 2019             Pertinent Imaging      There is no pertinent urological imaging for my review

## 2020-01-27 NOTE — PATIENT INSTRUCTIONS
PROSTATE CANCER SCREENING OVERVIEW    Prostate cancer screening involves testing for prostate cancer in men who have no symptoms of the disease  This testing can find cancer at an early stage  However, medical experts agree that prostate cancer screening should not be routinely ordered for all men and that screening can lead to both benefits and harms  This article is designed to review the advantages and disadvantages of prostate cancer screening  You should talk with your health care provider to decide what is best in your individual situation  WHAT IS PROSTATE CANCER? Prostate cancer is a cancer of the prostate, a small gland in men that is located below the bladder and in front of the rectum (figure 1)  The prostate produces fluid that helps carry sperm during ejaculation  Although many men are diagnosed with prostate cancer, most of them do not die from their cancer  Prostate cancer often grows so slowly that many men die of other causes before they even develop symptoms of prostate cancer  PROSTATE CANCER RISK FACTORS  Age -- All men are at risk for prostate cancer, but the risk greatly increases with older age  Prostate cancer is rarely found in men younger than 48years old  Ethnic background --  men develop prostate cancer more often than white and  men   men also are more likely to die of prostate cancer than white or  men  Family medical history -- Men who have a first-degree relative (a father or brother) with prostate cancer are more likely to develop the disease  Men with female relatives with breast cancer related to the breast cancer gene (BRCA) may also be more likely to develop prostate cancer  Diet -- A diet high in animal fat or low in vegetables may increase a man's risk of prostate cancer      PROSTATE CANCER SCREENING TESTS  Prostate cancer screening involves blood test that measures prostate-specific antigen (PSA)  Prostate-specific antigen (PSA) -- PSA is a protein produced by the prostate  The PSA test measures the amount of PSA in a sample of blood  Although many men with prostate cancer have an elevated PSA concentration, a high level does not necessarily mean there is a cancer  The most common cause for an elevated PSA is benign prostatic hyperplasia (BPH), a noncancerous enlargement of the prostate  Other causes include prostate infection (prostatitis), trauma (bicycle riding), and sexual activity  You should avoid ejaculating or riding a bike for at least 48 hours before having a PSA test  (See "Patient education: Benign prostatic hyperplasia (BPH) (Beyond the Basics)"  )    Rectal examination -- A rectal examination is sometimes recommended, along with measurement of the PSA, to screen for prostate cancer  However, studies have not shown that rectal examination is an effective screening test for prostate cancer when used alone  If the PSA test is positive -- A positive PSA test is not a reason to panic; noncancerous conditions are the most common causes for an abnormal test, particularly for PSA tests  On the other hand, a positive test should not be ignored  The first step in evaluating an elevated PSA is usually to repeat the test   You should avoid ejaculating and riding a bike for at least 48 hours before repeating the test  If the PSA remains elevated, a prostate biopsy or other testing is usually recommended  Prostate biopsy -- A prostate biopsy involves having a rectal ultrasound and use of a needle to obtain tissue samples from the prostate gland  The biopsy is usually performed in the office by a urologist (a doctor who specializes in treatment of urinary, bladder, and prostate issues)  After the procedure, most men feel sore and you may see some blood in the urine or semen, this can last for up to 4-6 weeks  Biopsies can rarely cause serious infections   Sometimes biopsies are guided by magnetic resonance imaging (MRI)  PROS AND CONS OF PROSTATE CANCER SCREENING    There are a number of arguments for and against prostate cancer screening  Arguments for screening -- Experts in favor of prostate cancer screening cite the following arguments:    ?Results from a large  study of prostate cancer screening found that men who had prostate-specific antigen (PSA) testing had a 20 percent lower chance of dying from prostate cancer after 13 years compared with men who did not have prostate cancer screening [1]  Men who had PSA testing also had a 30 percent lower chance of developing metastatic disease (cancer that has spread to other parts of the body) [2]  In another  study of prostate cancer screening, the mortality benefit was even larger to prostate cancer screening  (the [place] trial)    ? A substantial number of men die from prostate cancer every year and many more suffer from the complications of advanced disease  ? For men with an aggressive prostate cancer, the best chance for curing it is by finding it at an early stage and then treating it with surgery or radiation  Studies have shown that men who have prostate cancer detected by PSA screening tend to have earlier-stage cancer than men who have a cancer detected by other means  (See "Patient education: Treatment for advanced prostate cancer (Beyond the Basics)" and "Patient education: Prostate cancer treatment; stage I to III cancer (Beyond the Basics)" )    ? The five-year survival for men who have prostate cancer confined to the prostate gland (early stage) is nearly 100 percent; this drops to 27 percent for men whose cancer has spread to other areas of the body  However, many early-stage cancers are not aggressive, and the five-year survival for those will be nearly 100 percent even without any treatment [3]  ?The available screening tests are not perfect, but they are easy to perform and have fair accuracy    Arguments against screening -- Other arguments have also been made against screening:    ?Even though the  study found a benefit of prostate cancer screening, PSA testing prevented only about one prostate cancer death for every 1000 screened men after 13 years [1]  Furthermore, 76 percent of men with an abnormal PSA who had a prostate biopsy did not have prostate cancer  However, in the [place] study, the number needed to screen and treat was much lower indicating that prostate cancer screening is ineffective screening measure which decreases the morbidity and mortality of prostate cancer  ?Many prostate cancers detected with screening are unlikely to cause death or disability  Thus, a number of men will be diagnosed with cancer and potentially suffer the side effects of cancer treatment for cancers that never would have been found without prostate cancer screening  In other words, even if screening finds a cancer early, it is not clear in all cases that the cancer must be treated  IS PROSTATE CANCER SCREENING RIGHT FOR ME? The answer to this question is not the same for everyone  The table includes some questions you can ask yourself when weighing the potential risk and benefits of screening (table 1)  Professional organizations -- Major medical associations and societies, including the [employer] Task Force [4], American Cancer Society [5], American Urological Association [6], and many  cancer societies, agree that men should discuss screening with their health care providers  Men should be informed about the benefits and risks of prostate cancer screening and treatment and make decisions that best reflect their personal values and preferences  Age to first consider screening -- Screening discussions should begin at age 48 years for men at average risk for developing prostate cancer   Men with risk factors for prostate cancer (such as black men or men with a father or brother who had prostate cancer) may want to begin screening discussions at age 36 to 39 years  How often to perform screening -- Once screening begins, it should occur every two to four years (if continued testing is desired) and should include a PSA blood test   Age to stop screening -- Guidelines suggest stopping screening after age 71, though some experts would continue offering screening to very healthy men beyond that age  Screening not recommended -- Screening is generally not recommended for men whose life expectancy, or ability to undergo curative treatment, is limited by serious health problems  In these situations, the potential benefits of screening are outweighed by the likely harms  WHERE TO GET MORE INFORMATION  Your healthcare provider is the best source of information for questions and concerns related to your medical problem  This article will be updated as needed on our web site (www [facility]/patients)  Related topics for patients, as well as selected articles written for healthcare professionals, are also available  Some of the most relevant are listed below  Patient level information -- [employer] offers two types of patient education materials  The Basics -- The Basics patient education pieces answer the four or five key questions a patient might have about a given condition  These articles are best for patients who want a general overview and who prefer short, easy-to-read materials  Patient education: Prostate cancer screening (PSA tests) (The Basics)  Patient education: Prostate cancer (The Basics)  Patient education: Cancer screening (The Basics)  Patient education: Choosing treatment for low-risk localized prostate cancer (The Basics)  Beyond the Basics -- Beyond the Basics patient education pieces are longer, more sophisticated, and more detailed  These articles are best for patients who want in-depth information and are comfortable with some medical jargon    Patient education: Treatment for advanced prostate cancer (Beyond the Basics)  Patient education: Prostate cancer treatment; stage I to III cancer (Beyond the Basics)  Patient education: Benign prostatic hyperplasia (BPH) (Beyond the Basics)  Professional level information -- Professional level articles are designed to keep doctors and other health professionals up-to-date on the latest medical findings  These articles are thorough, long, and complex, and they contain multiple references to the research on which they are based  Professional level articles are best for people who are comfortable with a lot of medical terminology and who want to read the same materials their doctors are reading  Measurement of prostate-specific antigen  Screening for prostate cancer  The following organizations also provide reliable health information  ? BEATRIZ Rodriguez, Reginaldo  6-723-9-CANCER  (www cancer gov/types/prostate)  ? American Society for Clinical Oncology (patient information website)  (www cancer  net)  ? [facility] (patient and caregiver information website)  (www nccn org/patients)  ? [address]  2-634-DGI-2345  ([employer]  org)  ? [employer]  (www [employer] gov/healthtopics  html)  ? [employer] Prostate Cancer Education and Support  (www ustoo  org/Detection-PSA-And-GILMA)

## 2020-01-28 ENCOUNTER — OFFICE VISIT (OUTPATIENT)
Dept: UROLOGY | Facility: CLINIC | Age: 70
End: 2020-01-28
Payer: MEDICARE

## 2020-01-28 VITALS
DIASTOLIC BLOOD PRESSURE: 72 MMHG | BODY MASS INDEX: 30.01 KG/M2 | SYSTOLIC BLOOD PRESSURE: 148 MMHG | HEART RATE: 57 BPM | WEIGHT: 198 LBS | HEIGHT: 68 IN

## 2020-01-28 DIAGNOSIS — R97.20 ELEVATED PSA, BETWEEN 10 AND LESS THAN 20 NG/ML: Primary | ICD-10-CM

## 2020-01-28 PROCEDURE — 99213 OFFICE O/P EST LOW 20 MIN: CPT | Performed by: UROLOGY

## 2020-01-28 NOTE — LETTER
January 28, 2020     Johan Lutz MD  [address] [place] [address] Close    Patient: Alexandr Vargas   YOB: 1950   Date of Visit: 1/28/2020       Dear Dr Temitope Fernandez: Thank you for referring Mitch Rubio to me for evaluation  Below are my notes for this consultation  If you have questions, please do not hesitate to call me  I look forward to following your patient along with you  Sincerely,        Rubén Hernandez MD        CC: No Recipients  Rubén Hernandez MD  1/28/2020 10:47 AM  Sign at close encounter       Problem List Items Addressed This Visit        Other    Elevated PSA, between 10 and less than 20 ng/ml - Primary     Patient's PSA has been decreasing over time, most recently 11 6, his prostate is quite spherical in shape, and on digital palpation around 80-90 grams, there are no changes in his examination since the last time we saw him  His PSA density is relatively low, all things considered, given that on prostate MRI his prostate volume was 110 grams  Of note his prostate MRI is low risk for clinically significant prostate cancer  I recommend that he see us in 6 months with a PSA prior, should his PSA continue to decrease we will continue PSA surveillance every 6-12 months  This is his preference, I would have a higher threshold for biopsy him given his competing cardiovascular risk factors         Relevant Orders    PSA, total and free              Assessment and plan:       Please see problem oriented charting for the assessment plan of today's urological complaints    Rubén Hernandez MD      Chief Complaint     Chief Complaint   Patient presents with    Elevated PSA         History of Present Illness     Alexandr Vargas is a 77 y o  gentleman that I have seen previously for elevated PSA  He also has a family history of prostate cancer      He has multiple other competing risk factors for cardiovascular disease and other medical comorbidities threatening to his ongoing survival relative to prostate cancer  His PSA has decreased to 11 6, his digital rectal examination has been previously benign, and his MRI is low risk for clinically significant prostate cancer  New urologic complaints today include none  The following portions of the patient's history were reviewed and updated as appropriate: allergies, current medications, past family history, past medical history, past social history, past surgical history and problem list     Detailed Urologic History     - please refer to HPI    Review of Systems     Review of Systems   Constitutional: Negative  HENT: Negative  Eyes: Negative  Respiratory: Negative  Cardiovascular: Negative  Gastrointestinal: Negative  Endocrine: Negative  Genitourinary: Negative  Musculoskeletal: Negative  Skin: Negative  Allergic/Immunologic: Negative  Neurological: Negative  Hematological: Negative  Psychiatric/Behavioral: Negative  Allergies     No Known Allergies    Physical Exam     Physical Exam   Constitutional: He is oriented to person, place, and time  He appears well-developed and well-nourished  No distress  HENT:   Head: Normocephalic and atraumatic  Eyes: Right eye exhibits no discharge  Left eye exhibits no discharge  Neck: No tracheal deviation present  Cardiovascular: Intact distal pulses  Pulmonary/Chest: Effort normal  No stridor  No respiratory distress  Abdominal: Soft  He exhibits no distension and no mass  There is no tenderness  There is no rebound and no guarding  No hernia  Genitourinary:   Genitourinary Comments: Small external hemorrhoids, normal sphincter tone, prostate is a 90 grams and smooth, no concerning consistency changes    Musculoskeletal: He exhibits no edema, tenderness or deformity  Neurological: He is alert and oriented to person, place, and time  No cranial nerve deficit   Coordination normal    Skin: Skin is warm and dry  No rash noted  He is not diaphoretic  No erythema  No pallor  Psychiatric: He has a normal mood and affect  His behavior is normal  Judgment and thought content normal    Nursing note and vitals reviewed            Vital Signs  Vitals:    01/28/20 1033   BP: 148/72   Pulse: 57   Weight: 89 8 kg (198 lb)   Height: 5' 8" (1 727 m)         Current Medications       Current Outpatient Medications:     atorvastatin (LIPITOR) 40 mg tablet, Take 1 tablet (40 mg total) by mouth daily, Disp: 30 tablet, Rfl: 5    clopidogrel (PLAVIX) 75 mg tablet, Take 1 tablet (75 mg total) by mouth daily, Disp: 90 tablet, Rfl: 3    warfarin (COUMADIN) 5 mg tablet, TAKE 1 TABLET BY MOUTH EVERY DAY, Disp: 30 tablet, Rfl: 5      Active Problems     Patient Active Problem List   Diagnosis    Peripheral artery disease (HCC)    Claudication of left lower extremity (HCC)    S/P RIGHT Femoral- PT bypass 2011    Dyslipidemia    ASCVD (arteriosclerotic cardiovascular disease)    CKD (chronic kidney disease), stage III (HCC)    Nonrheumatic aortic valve stenosis    Essential hypertension    Elevated PSA, between 10 and less than 20 ng/ml         Past Medical History     Past Medical History:   Diagnosis Date    DVT (deep venous thrombosis) (HCC)     Protein C deficiency (HCC)     Pulmonary embolus (HCC)          Surgical History     Past Surgical History:   Procedure Laterality Date    BACK SURGERY      BYPASS FEMORAL-TIBIAL Right 2011    VEIN LIGATION           Family History     Family History   Problem Relation Age of Onset    Cancer Mother 50    Heart attack Father 46    Hypertension Father     Prostate cancer Brother     Arrhythmia Neg Hx     Clotting disorder Neg Hx     Fainting Neg Hx     Heart disease Neg Hx     Anuerysm Neg Hx     Stroke Neg Hx          Social History     Social History     Social History     Tobacco Use   Smoking Status Former Smoker    Types: Cigarettes    Last attempt to quit: 2018    Years since quittin 3   Smokeless Tobacco Never Used   Tobacco Comment    1 pk every 2days , currently on patches to quit            Pertinent Lab Values     Lab Results   Component Value Date    CREATININE 1 39 (H) 2019       Lab Results   Component Value Date    PSA 11 6 (H) 2019    PSA 13 9 (H) 2019             Pertinent Imaging      There is no pertinent urological imaging for my review

## 2020-01-28 NOTE — ASSESSMENT & PLAN NOTE
Patient's PSA has been decreasing over time, most recently 11 6, his prostate is quite spherical in shape, and on digital palpation around 80-90 grams, there are no changes in his examination since the last time we saw him  His PSA density is relatively low, all things considered, given that on prostate MRI his prostate volume was 110 grams  Of note his prostate MRI is low risk for clinically significant prostate cancer  I recommend that he see us in 6 months with a PSA prior, should his PSA continue to decrease we will continue PSA surveillance every 6-12 months    This is his preference, I would have a higher threshold for biopsy him given his competing cardiovascular risk factors

## 2020-01-29 ENCOUNTER — TRANSCRIBE ORDERS (OUTPATIENT)
Dept: LAB | Facility: HOSPITAL | Age: 70
End: 2020-01-29

## 2020-01-29 ENCOUNTER — APPOINTMENT (OUTPATIENT)
Dept: LAB | Facility: MEDICAL CENTER | Age: 70
End: 2020-01-29
Payer: MEDICARE

## 2020-01-29 DIAGNOSIS — L73.9 FOLLICULITIS AND PERIFOLLICULITIS: Primary | ICD-10-CM

## 2020-01-29 DIAGNOSIS — L01.02 FOLLICULITIS AND PERIFOLLICULITIS: Primary | ICD-10-CM

## 2020-01-29 DIAGNOSIS — I73.9 PERIPHERAL ARTERY DISEASE (HCC): ICD-10-CM

## 2020-01-29 PROCEDURE — 36415 COLL VENOUS BLD VENIPUNCTURE: CPT

## 2020-01-30 ENCOUNTER — APPOINTMENT (OUTPATIENT)
Dept: LAB | Facility: MEDICAL CENTER | Age: 70
End: 2020-01-30
Payer: MEDICARE

## 2020-01-30 ENCOUNTER — HOSPITAL ENCOUNTER (OUTPATIENT)
Dept: NON INVASIVE DIAGNOSTICS | Facility: CLINIC | Age: 70
Discharge: HOME | End: 2020-01-30
Payer: MEDICARE

## 2020-01-30 DIAGNOSIS — L73.9 FOLLICULITIS AND PERIFOLLICULITIS: ICD-10-CM

## 2020-01-30 DIAGNOSIS — L01.02 FOLLICULITIS AND PERIFOLLICULITIS: ICD-10-CM

## 2020-01-30 DIAGNOSIS — I73.9 CLAUDICATION OF LEFT LOWER EXTREMITY (HCC): ICD-10-CM

## 2020-01-30 LAB
INR PPP: 2.75 (ref 0.84–1.19)
PROTHROMBIN TIME: 28.6 SECONDS (ref 11.6–14.5)

## 2020-01-30 PROCEDURE — 36415 COLL VENOUS BLD VENIPUNCTURE: CPT

## 2020-01-30 PROCEDURE — 93925 LOWER EXTREMITY STUDY: CPT

## 2020-01-30 PROCEDURE — 93922 UPR/L XTREMITY ART 2 LEVELS: CPT | Performed by: SURGERY

## 2020-01-30 PROCEDURE — 93925 LOWER EXTREMITY STUDY: CPT | Performed by: SURGERY

## 2020-01-30 PROCEDURE — 85610 PROTHROMBIN TIME: CPT

## 2020-01-30 PROCEDURE — 93923 UPR/LXTR ART STDY 3+ LVLS: CPT

## 2020-02-13 DIAGNOSIS — I73.9 INTERMITTENT CLAUDICATION (HCC): ICD-10-CM

## 2020-02-13 DIAGNOSIS — I73.9 PERIPHERAL ARTERY DISEASE (HCC): ICD-10-CM

## 2020-02-13 RX ORDER — ATORVASTATIN CALCIUM 40 MG/1
TABLET, FILM COATED ORAL
Qty: 90 TABLET | Refills: 1 | Status: SHIPPED | OUTPATIENT
Start: 2020-02-13 | End: 2020-09-09

## 2020-03-26 DIAGNOSIS — I73.9 CLAUDICATION OF LEFT LOWER EXTREMITY (HCC): ICD-10-CM

## 2020-03-26 RX ORDER — CLOPIDOGREL BISULFATE 75 MG/1
75 TABLET ORAL DAILY
Qty: 30 TABLET | Refills: 4 | Status: SHIPPED | OUTPATIENT
Start: 2020-03-26 | End: 2020-09-30

## 2020-05-22 DIAGNOSIS — I73.9 PERIPHERAL VASCULAR DISEASE (HCC): ICD-10-CM

## 2020-05-24 RX ORDER — WARFARIN SODIUM 5 MG/1
TABLET ORAL
Qty: 30 TABLET | Refills: 5 | Status: SHIPPED | OUTPATIENT
Start: 2020-05-24 | End: 2021-02-24

## 2020-08-20 DIAGNOSIS — I73.9 PERIPHERAL ARTERY DISEASE (HCC): Primary | ICD-10-CM

## 2020-09-07 DIAGNOSIS — I73.9 INTERMITTENT CLAUDICATION (HCC): ICD-10-CM

## 2020-09-07 DIAGNOSIS — I73.9 PERIPHERAL ARTERY DISEASE (HCC): ICD-10-CM

## 2020-09-09 RX ORDER — ATORVASTATIN CALCIUM 40 MG/1
TABLET, FILM COATED ORAL
Qty: 90 TABLET | Refills: 1 | Status: SHIPPED | OUTPATIENT
Start: 2020-09-09 | End: 2021-08-02

## 2020-09-17 ENCOUNTER — OFFICE VISIT (OUTPATIENT)
Dept: FAMILY MEDICINE CLINIC | Facility: CLINIC | Age: 70
End: 2020-09-17
Payer: MEDICARE

## 2020-09-17 VITALS
BODY MASS INDEX: 28.82 KG/M2 | OXYGEN SATURATION: 98 % | SYSTOLIC BLOOD PRESSURE: 126 MMHG | HEART RATE: 60 BPM | WEIGHT: 190.2 LBS | HEIGHT: 68 IN | TEMPERATURE: 97.1 F | DIASTOLIC BLOOD PRESSURE: 74 MMHG

## 2020-09-17 DIAGNOSIS — Z98.890 S/P FEMORAL-TIBIAL BYPASS: ICD-10-CM

## 2020-09-17 DIAGNOSIS — R97.20 ELEVATED PSA, BETWEEN 10 AND LESS THAN 20 NG/ML: ICD-10-CM

## 2020-09-17 DIAGNOSIS — E78.5 DYSLIPIDEMIA: ICD-10-CM

## 2020-09-17 DIAGNOSIS — Z00.00 MEDICARE ANNUAL WELLNESS VISIT, SUBSEQUENT: ICD-10-CM

## 2020-09-17 DIAGNOSIS — I73.9 PERIPHERAL ARTERY DISEASE (HCC): ICD-10-CM

## 2020-09-17 DIAGNOSIS — I35.0 NONRHEUMATIC AORTIC VALVE STENOSIS: ICD-10-CM

## 2020-09-17 DIAGNOSIS — Z23 ENCOUNTER FOR IMMUNIZATION: ICD-10-CM

## 2020-09-17 DIAGNOSIS — I25.10 ASCVD (ARTERIOSCLEROTIC CARDIOVASCULAR DISEASE): Primary | ICD-10-CM

## 2020-09-17 PROCEDURE — G0008 ADMIN INFLUENZA VIRUS VAC: HCPCS

## 2020-09-17 PROCEDURE — 90732 PPSV23 VACC 2 YRS+ SUBQ/IM: CPT | Performed by: FAMILY MEDICINE

## 2020-09-17 PROCEDURE — 90662 IIV NO PRSV INCREASED AG IM: CPT | Performed by: FAMILY MEDICINE

## 2020-09-17 PROCEDURE — G0009 ADMIN PNEUMOCOCCAL VACCINE: HCPCS | Performed by: FAMILY MEDICINE

## 2020-09-17 PROCEDURE — G0438 PPPS, INITIAL VISIT: HCPCS | Performed by: FAMILY MEDICINE

## 2020-09-17 PROCEDURE — 99214 OFFICE O/P EST MOD 30 MIN: CPT | Performed by: FAMILY MEDICINE

## 2020-09-17 NOTE — PATIENT INSTRUCTIONS

## 2020-09-17 NOTE — PROGRESS NOTES
Assessment/Plan:    1  ASCVD (arteriosclerotic cardiovascular disease)  -     Ambulatory referral to Cardiology; Future  -     CBC and differential; Future  -     Comprehensive metabolic panel; Future  -     Lipid panel; Future    2  Nonrheumatic aortic valve stenosis  -     Ambulatory referral to Cardiology; Future  -     CBC and differential; Future  -     Comprehensive metabolic panel; Future  -     Lipid panel; Future    3  S/P RIGHT Femoral- PT bypass 2011  -     Ambulatory referral to Cardiology; Future    4  Medicare annual wellness visit, subsequent    5  Peripheral artery disease (HCC)  -     CBC and differential; Future  -     Comprehensive metabolic panel; Future  -     Lipid panel; Future    6  Elevated PSA, between 10 and less than 20 ng/ml  -     CBC and differential; Future  -     Comprehensive metabolic panel; Future    7  Dyslipidemia  -     Lipid panel; Future        Patient Instructions       Medicare Preventive Visit Patient Instructions  Thank you for completing your Welcome to Medicare Visit or Medicare Annual Wellness Visit today  Your next wellness visit will be due in one year (9/17/2021)  The screening/preventive services that you may require over the next 5-10 years are detailed below  Some tests may not apply to you based off risk factors and/or age  Screening tests ordered at today's visit but not completed yet may show as past due  Also, please note that scanned in results may not display below    Preventive Screenings:  Service Recommendations Previous Testing/Comments   Colorectal Cancer Screening  · Colonoscopy    · Fecal Occult Blood Test (FOBT)/Fecal Immunochemical Test (FIT)  · Fecal DNA/Cologuard Test  · Flexible Sigmoidoscopy Age: 54-65 years old   Colonoscopy: every 10 years (May be performed more frequently if at higher risk)  OR  FOBT/FIT: every 1 year  OR  Cologuard: every 3 years  OR  Sigmoidoscopy: every 5 years  Screening may be recommended earlier than age 48 if at higher risk for colorectal cancer  Also, an individualized decision between you and your healthcare provider will decide whether screening between the ages of 74-80 would be appropriate  Colonoscopy: Not on file  FOBT/FIT: Not on file  Cologuard: Not on file  Sigmoidoscopy: Not on file         Prostate Cancer Screening Individualized decision between patient and health care provider in men between ages of 53-78   Medicare will cover every 12 months beginning on the day after your 50th birthday PSA: 11 6 ng/mL     Screening Current     Hepatitis C Screening Once for adults born between [facility]  More frequently in patients at high risk for Hepatitis C Hep C Antibody: Not on file       Diabetes Screening 1-2 times per year if you're at risk for diabetes or have pre-diabetes Fasting glucose: 88 mg/dL   A1C: No results in last 5 years       Cholesterol Screening Once every 5 years if you don't have a lipid disorder  May order more often based on risk factors  Lipid panel: 09/11/2019    Screening Current      Other Preventive Screenings Covered by Medicare:  1  Abdominal Aortic Aneurysm (AAA) Screening: covered once if your at risk  You're considered to be at risk if you have a family history of AAA or a male between the age of 73-68 who smoking at least 100 cigarettes in your lifetime  2  Lung Cancer Screening: covers low dose CT scan once per year if you meet all of the following conditions: (1) Age 50-69; (2) No signs or symptoms of lung cancer; (3) Current smoker or have quit smoking within the last 15 years; (4) You have a tobacco smoking history of at least 30 pack years (packs per day x number of years you smoked); (5) You get a written order from a healthcare provider  3  Glaucoma Screening: covered annually if you're considered high risk: (1) You have diabetes OR (2) Family history of glaucoma OR (3)  aged 48 and older OR (3)  American aged 72 and older  3   Osteoporosis Screening: covered every 2 years if you meet one of the following conditions: (1) Have a vertebral abnormality; (2) On glucocorticoid therapy for more than 3 months; (3) Have primary hyperparathyroidism; (4) On osteoporosis medications and need to assess response to drug therapy  5  HIV Screening: covered annually if you're between the age of 12-76  Also covered annually if you are younger than 13 and older than 72 with risk factors for HIV infection  For pregnant patients, it is covered up to 3 times per pregnancy  Immunizations:  Immunization Recommendations   Influenza Vaccine Annual influenza vaccination during flu season is recommended for all persons aged >= 6 months who do not have contraindications   Pneumococcal Vaccine (Prevnar and Pneumovax)  * Prevnar = PCV13  * Pneumovax = PPSV23 Adults 25-60 years old: 1-3 doses may be recommended based on certain risk factors  Adults 72 years old: Prevnar (PCV13) vaccine recommended followed by Pneumovax (PPSV23) vaccine  If already received PPSV23 since turning 65, then PCV13 recommended at least one year after PPSV23 dose  Hepatitis B Vaccine 3 dose series if at intermediate or high risk (ex: diabetes, end stage renal disease, liver disease)   Tetanus (Td) Vaccine - COST NOT COVERED BY MEDICARE PART B Following completion of primary series, a booster dose should be given every 10 years to maintain immunity against tetanus  Td may also be given as tetanus wound prophylaxis  Tdap Vaccine - COST NOT COVERED BY MEDICARE PART B Recommended at least once for all adults  For pregnant patients, recommended with each pregnancy     Shingles Vaccine (Shingrix) - COST NOT COVERED BY MEDICARE PART B  2 shot series recommended in those aged 48 and above     Health Maintenance Due:      Topic Date Due    Hepatitis C Screening  1950     Immunizations Due:      Topic Date Due    DTaP,Tdap,and Td Vaccines (1 - Tdap) 06/07/1972    Pneumococcal Vaccine: 65+ Years (1 of 1 - PPSV23) 06/07/2016    Influenza Vaccine  07/01/2020     Advance Directives   What are advance directives? Advance directives are legal documents that state your wishes and plans for medical care  These plans are made ahead of time in case you lose your ability to make decisions for yourself  Advance directives can apply to any medical decision, such as the treatments you want, and if you want to donate organs  What are the types of advance directives? There are many types of advance directives, and each state has rules about how to use them  You may choose a combination of any of the following:  · Living will: This is a written record of the treatment you want  You can also choose which treatments you do not want, which to limit, and which to stop at a certain time  This includes surgery, medicine, IV fluid, and tube feedings  · Durable power of  for healthcare [facility]): This is a written record that states who you want to make healthcare choices for you when you are unable to make them for yourself  This person, called a proxy, is usually a family member or a friend  You may choose more than 1 proxy  · Do not resuscitate (DNR) order:  A DNR order is used in case your heart stops beating or you stop breathing  It is a request not to have certain forms of treatment, such as CPR  A DNR order may be included in other types of advance directives  · Medical directive: This covers the care that you want if you are in a coma, near death, or unable to make decisions for yourself  You can list the treatments you want for each condition  Treatment may include pain medicine, surgery, blood transfusions, dialysis, IV or tube feedings, and a ventilator (breathing machine)  · Values history: This document has questions about your views, beliefs, and how you feel and think about life  This information can help others choose the care that you would choose  Why are advance directives important?   An advance directive helps you control your care  Although spoken wishes may be used, it is better to have your wishes written down  Spoken wishes can be misunderstood, or not followed  Treatments may be given even if you do not want them  An advance directive may make it easier for your family to make difficult choices about your care  Weight Management   Why it is important to manage your weight:  Being overweight increases your risk of health conditions such as heart disease, high blood pressure, type 2 diabetes, and certain types of cancer  It can also increase your risk for osteoarthritis, sleep apnea, and other respiratory problems  Aim for a slow, steady weight loss  Even a small amount of weight loss can lower your risk of health problems  How to lose weight safely:  A safe and healthy way to lose weight is to eat fewer calories and get regular exercise  You can lose up about 1 pound a week by decreasing the number of calories you eat by 500 calories each day  Healthy meal plan for weight management:  A healthy meal plan includes a variety of foods, contains fewer calories, and helps you stay healthy  A healthy meal plan includes the following:  · Eat whole-grain foods more often  A healthy meal plan should contain fiber  Fiber is the part of grains, fruits, and vegetables that is not broken down by your body  Whole-grain foods are healthy and provide extra fiber in your diet  Some examples of whole-grain foods are whole-wheat breads and pastas, oatmeal, brown rice, and bulgur  · Eat a variety of vegetables every day  Include dark, leafy greens such as spinach, kale, aishwarya greens, and mustard greens  Eat yellow and orange vegetables such as carrots, sweet potatoes, and winter squash  · Eat a variety of fruits every day  Choose fresh or canned fruit (canned in its own juice or light syrup) instead of juice  Fruit juice has very little or no fiber  · Eat low-fat dairy foods    Drink fat-free (skim) milk or 1% milk  Eat fat-free yogurt and low-fat cottage cheese  Try low-fat cheeses such as mozzarella and other reduced-fat cheeses  · Choose meat and other protein foods that are low in fat  Choose beans or other legumes such as split peas or lentils  Choose fish, skinless poultry (chicken or turkey), or lean cuts of red meat (beef or pork)  Before you cook meat or poultry, cut off any visible fat  · Use less fat and oil  Try baking foods instead of frying them  Add less fat, such as margarine, sour cream, regular salad dressing and mayonnaise to foods  Eat fewer high-fat foods  Some examples of high-fat foods include french fries, doughnuts, ice cream, and cakes  · Eat fewer sweets  Limit foods and drinks that are high in sugar  This includes candy, cookies, regular soda, and sweetened drinks  Exercise:  Exercise at least 30 minutes per day on most days of the week  Some examples of exercise include walking, biking, dancing, and swimming  You can also fit in more physical activity by taking the stairs instead of the elevator or parking farther away from stores  Ask your healthcare provider about the best exercise plan for you  © Copyright [employer] 2018 Information is for End User's use only and may not be sold, redistributed or otherwise used for commercial purposes  All illustrations and images included in CareNotes® are the copyrighted property of A D [employer] M [employer]  or [employer]       No follow-ups on file  Subjective:      Patient ID: Royal Jarrett is a 72 y o  male  Chief Complaint   Patient presents with    Follow-up   [facility] Wellness Visit       Here for f/u, looking for new cardiologist, follows with vascular for PAD and cardiology for elev PSA, declines colorectal  And lung CA screenings  BP controlled  Due for labs        The following portions of the patient's history were reviewed and updated as appropriate: allergies, current medications, past family history, past medical history, past social history, past surgical history and problem list     Review of Systems   Constitutional: Negative for fatigue and fever  HENT: Negative for congestion  Eyes: Negative for visual disturbance  Respiratory: Negative for chest tightness and shortness of breath  Cardiovascular: Negative for chest pain and palpitations  Gastrointestinal: Negative for abdominal pain  Genitourinary: Negative for difficulty urinating  Musculoskeletal: Negative for arthralgias  Neurological: Negative for headaches  Hematological: Does not bruise/bleed easily  Current Outpatient Medications   Medication Sig Dispense Refill    atorvastatin (LIPITOR) 40 mg tablet TAKE 1 TABLET BY MOUTH EVERY DAY 90 tablet 1    clopidogrel (PLAVIX) 75 mg tablet Take 1 tablet (75 mg total) by mouth daily 30 tablet 4    warfarin (COUMADIN) 5 mg tablet TAKE 1 TABLET BY MOUTH EVERY DAY 30 tablet 5     No current facility-administered medications for this visit  Objective:    /74 (BP Location: Right arm, Patient Position: Sitting, Cuff Size: Standard)   Pulse 60   Temp (!) 97 1 °F (36 2 °C)   Ht 5' 8" (1 727 m)   Wt 86 3 kg (190 lb 3 2 oz)   SpO2 98%   BMI 28 92 kg/m²        Physical Exam  Vitals signs reviewed  Constitutional:       Appearance: He is well-developed  Cardiovascular:      Rate and Rhythm: Normal rate and regular rhythm  Heart sounds: Normal heart sounds  Pulmonary:      Effort: Pulmonary effort is normal       Breath sounds: Normal breath sounds  Skin:     General: Skin is warm  Neurological:      Mental Status: He is alert and oriented to person, place, and time  Psychiatric:         Behavior: Behavior normal          Thought Content:  Thought content normal          Judgment: Judgment normal                 Leanne Tomas MD

## 2020-09-17 NOTE — PROGRESS NOTES
Assessment and Plan:     Problem List Items Addressed This Visit        Cardiovascular and Mediastinum    ASCVD (arteriosclerotic cardiovascular disease) - Primary    Relevant Orders    Ambulatory referral to Cardiology    Nonrheumatic aortic valve stenosis    Relevant Orders    Ambulatory referral to Cardiology       Other    S/P RIGHT Femoral- PT bypass 2011    Relevant Orders    Ambulatory referral to Cardiology      Other Visit Diagnoses     Medicare annual wellness visit, subsequent            BMI Counseling: Body mass index is 28 92 kg/m²  The BMI is above normal  Nutrition recommendations include decreasing portion sizes, decreasing fast food intake, consuming healthier snacks, limiting drinks that contain sugar and moderation in carbohydrate intake  Exercise recommendations include moderate physical activity 150 minutes/week, exercising 3-5 times per week and strength training exercises  No pharmacotherapy was ordered  Patient referred to PCP due to patient being overweight  Preventive health issues were discussed with patient, and age appropriate screening tests were ordered as noted in patient's After Visit Summary  Personalized health advice and appropriate referrals for health education or preventive services given if needed, as noted in patient's After Visit Summary       History of Present Illness:     Patient presents for Medicare Annual Wellness visit    Patient Care Team:  Becky Oleary MD as PCP - General (Family Medicine)  Leo Luna MD (Cardiology)  Mary Jones DPM (Podiatry)  Virgil Hong MD (Urology)     Problem List:     Patient Active Problem List   Diagnosis    Peripheral artery disease [facility])    Claudication of left lower extremity ([facility] [address] )    S/P RIGHT Femoral- PT bypass 2011    Dyslipidemia    ASCVD (arteriosclerotic cardiovascular disease)    CKD (chronic kidney disease), stage III ([facility] [address] )    Nonrheumatic aortic valve stenosis    Essential hypertension    Elevated PSA, between 10 and less than 20 ng/ml      Past Medical and Surgical History:     Past Medical History:   Diagnosis Date    DVT (deep venous thrombosis) (HCC)     Protein C deficiency (HCC)     Pulmonary embolus (HCC)      Past Surgical History:   Procedure Laterality Date    BACK SURGERY      BYPASS FEMORAL-TIBIAL Right 2011    VEIN LIGATION        Family History:     Family History   Problem Relation Age of Onset    Cancer Mother 50    Heart attack Father 46    Hypertension Father     Prostate cancer Brother     Arrhythmia Neg Hx     Clotting disorder Neg Hx     Fainting Neg Hx     Heart disease Neg Hx     Anuerysm Neg Hx     Stroke Neg Hx       Social History:        Social History     Socioeconomic History    Marital status: /Civil Union     Spouse name: Not on file    Number of children: Not on file    Years of education: Not on file    Highest education level: Not on file   Occupational History    Not on file   Social Needs    Financial resource strain: Not on file    Food insecurity     Worry: Not on file     Inability: Not on file   [employer] needs     Medical: Not on file     Non-medical: Not on file   Tobacco Use    Smoking status: Former Smoker     Types: Cigarettes     Last attempt to quit: 2018     Years since quittin 9    Smokeless tobacco: Never Used    Tobacco comment: 1 pk every 2days , currently on patches to quit      Substance and Sexual Activity    Alcohol use: No    Drug use: No    Sexual activity: Not on file   Lifestyle    Physical activity     Days per week: Not on file     Minutes per session: Not on file    Stress: Not on file   Relationships    Social connections     Talks on phone: Not on file     Gets together: Not on file     Attends Synagogue service: Not on file     Active member of club or organization: Not on file     Attends meetings of clubs or organizations: Not on file     Relationship status: Not on file   Renetta Cook Intimate partner violence     Fear of current or ex partner: Not on file     Emotionally abused: Not on file     Physically abused: Not on file     Forced sexual activity: Not on file   Other Topics Concern    Not on file   Social History Narrative    Not on file      Medications and Allergies:     Current Outpatient Medications   Medication Sig Dispense Refill    atorvastatin (LIPITOR) 40 mg tablet TAKE 1 TABLET BY MOUTH EVERY DAY 90 tablet 1    clopidogrel (PLAVIX) 75 mg tablet Take 1 tablet (75 mg total) by mouth daily 30 tablet 4    warfarin (COUMADIN) 5 mg tablet TAKE 1 TABLET BY MOUTH EVERY DAY 30 tablet 5     No current facility-administered medications for this visit  No Known Allergies   Immunizations: There is no immunization history on file for this patient  Health Maintenance:         Topic Date Due    Hepatitis C Screening  1950         Topic Date Due    DTaP,Tdap,and Td Vaccines (1 - Tdap) 06/07/1972    Pneumococcal Vaccine: 65+ Years (1 of 1 - PPSV23) 06/07/2016    Influenza Vaccine  07/01/2020      Medicare Health Risk Assessment:     /74 (BP Location: Right arm, Patient Position: Sitting, Cuff Size: Standard)   Pulse 60   Temp (!) 97 1 °F (36 2 °C)   Ht 5' 8" (1 727 m)   Wt 86 3 kg (190 lb 3 2 oz)   SpO2 98%   BMI 28 92 kg/m²      Chelsey Adams is here for his Subsequent Wellness visit  Last Medicare Wellness visit information reviewed, patient interviewed and updates made to the record today  Health Risk Assessment:   Patient rates overall health as good  Patient feels that their physical health rating is same  Eyesight was rated as same  Hearing was rated as same  Patient feels that their emotional and mental health rating is same  Pain experienced in the last 7 days has been none  Patient states that he has experienced no weight loss or gain in last 6 months  Depression Screening:   PHQ-2 Score: 0      Fall Risk Screening:    In the past year, patient has experienced: no history of falling in past year      Home Safety:  Patient does not have trouble with stairs inside or outside of their home  Patient has working smoke alarms and has working carbon monoxide detector  Home safety hazards include: none  Nutrition:   Current diet is Regular  Medications:   Patient is currently taking over-the-counter supplements  OTC medications include: see medication list  Patient is able to manage medications  Activities of Daily Living (ADLs)/Instrumental Activities of Daily Living (IADLs):   Walk and transfer into and out of bed and chair?: Yes  Dress and groom yourself?: Yes    Bathe or shower yourself?: Yes    Feed yourself? Yes  Do your laundry/housekeeping?: Yes  Manage your money, pay your bills and track your expenses?: Yes  Make your own meals?: Yes    Do your own shopping?: Yes    Previous Hospitalizations:   Any hospitalizations or ED visits within the last 12 months?: No      Advance Care Planning:   Living will: Yes    Advanced directive: Yes      Cognitive Screening:   Provider or family/friend/caregiver concerned regarding cognition?: No    PREVENTIVE SCREENINGS      Cardiovascular Screening:    General: History Lipid Disorder    Due for: Lipid Panel      Diabetes Screening:       Due for: Blood Glucose      Colorectal Cancer Screening:     General: Risks and Benefits Discussed and Patient Declines      Prostate Cancer Screening:    General: Screening Current      Osteoporosis Screening:    General: Screening Not Indicated      Abdominal Aortic Aneurysm (AAA) Screening:    Risk factors include: age between 73-67 yo and tobacco use        General: Patient Declines      Lung Cancer Screening:     General: Screening Not Indicated and Patient Declines      Preventive Screening Comments:  Following with urology for elev PSA and Dr Jovanny Segundo for vascular disease, needs new cardiologist      Marsha Weeks MD

## 2020-09-23 ENCOUNTER — HOSPITAL ENCOUNTER (OUTPATIENT)
Dept: NON INVASIVE DIAGNOSTICS | Facility: CLINIC | Age: 70
Discharge: HOME | End: 2020-09-23
Payer: MEDICARE

## 2020-09-23 DIAGNOSIS — I73.9 PERIPHERAL ARTERY DISEASE (HCC): ICD-10-CM

## 2020-09-23 PROCEDURE — 93925 LOWER EXTREMITY STUDY: CPT | Performed by: SURGERY

## 2020-09-23 PROCEDURE — 93925 LOWER EXTREMITY STUDY: CPT

## 2020-09-23 PROCEDURE — 93922 UPR/L XTREMITY ART 2 LEVELS: CPT | Performed by: SURGERY

## 2020-09-23 PROCEDURE — 93923 UPR/LXTR ART STDY 3+ LVLS: CPT

## 2020-09-29 DIAGNOSIS — I73.9 CLAUDICATION OF LEFT LOWER EXTREMITY (HCC): ICD-10-CM

## 2020-09-30 RX ORDER — CLOPIDOGREL BISULFATE 75 MG/1
TABLET ORAL
Qty: 30 TABLET | Refills: 4 | Status: SHIPPED | OUTPATIENT
Start: 2020-09-30 | End: 2021-07-06 | Stop reason: SDUPTHER

## 2020-10-17 DIAGNOSIS — I73.9 CLAUDICATION OF LEFT LOWER EXTREMITY (HCC): ICD-10-CM

## 2020-10-17 RX ORDER — CLOPIDOGREL BISULFATE 75 MG/1
75 TABLET ORAL DAILY
Qty: 30 TABLET | Refills: 0 | Status: CANCELLED | OUTPATIENT
Start: 2020-10-17

## 2020-12-21 ENCOUNTER — OFFICE VISIT (OUTPATIENT)
Dept: CARDIOLOGY CLINIC | Facility: MEDICAL CENTER | Age: 70
End: 2020-12-21
Payer: MEDICARE

## 2020-12-21 VITALS
DIASTOLIC BLOOD PRESSURE: 72 MMHG | TEMPERATURE: 96.6 F | HEIGHT: 68 IN | BODY MASS INDEX: 27.89 KG/M2 | HEART RATE: 59 BPM | OXYGEN SATURATION: 98 % | SYSTOLIC BLOOD PRESSURE: 124 MMHG | WEIGHT: 184 LBS

## 2020-12-21 DIAGNOSIS — E78.5 DYSLIPIDEMIA: ICD-10-CM

## 2020-12-21 DIAGNOSIS — I73.9 PERIPHERAL ARTERY DISEASE (HCC): ICD-10-CM

## 2020-12-21 DIAGNOSIS — I10 ESSENTIAL HYPERTENSION: Primary | ICD-10-CM

## 2020-12-21 DIAGNOSIS — I25.10 ASCVD (ARTERIOSCLEROTIC CARDIOVASCULAR DISEASE): ICD-10-CM

## 2020-12-21 DIAGNOSIS — Z98.890 S/P FEMORAL-TIBIAL BYPASS: ICD-10-CM

## 2020-12-21 DIAGNOSIS — I35.0 NONRHEUMATIC AORTIC VALVE STENOSIS: ICD-10-CM

## 2020-12-21 PROCEDURE — 93000 ELECTROCARDIOGRAM COMPLETE: CPT | Performed by: INTERNAL MEDICINE

## 2020-12-21 PROCEDURE — 99214 OFFICE O/P EST MOD 30 MIN: CPT | Performed by: INTERNAL MEDICINE

## 2021-02-24 DIAGNOSIS — I73.9 PERIPHERAL VASCULAR DISEASE (HCC): ICD-10-CM

## 2021-02-24 RX ORDER — WARFARIN SODIUM 5 MG/1
TABLET ORAL
Qty: 30 TABLET | Refills: 5 | Status: SHIPPED | OUTPATIENT
Start: 2021-02-24 | End: 2021-03-23 | Stop reason: SDUPTHER

## 2021-03-23 DIAGNOSIS — I73.9 PERIPHERAL VASCULAR DISEASE (HCC): ICD-10-CM

## 2021-03-23 RX ORDER — WARFARIN SODIUM 5 MG/1
5 TABLET ORAL DAILY
Qty: 30 TABLET | Refills: 1 | Status: SHIPPED | OUTPATIENT
Start: 2021-03-23 | End: 2022-02-28

## 2021-04-01 ENCOUNTER — APPOINTMENT (OUTPATIENT)
Dept: LAB | Facility: MEDICAL CENTER | Age: 71
End: 2021-04-01
Payer: MEDICARE

## 2021-04-01 DIAGNOSIS — E78.5 DYSLIPIDEMIA: ICD-10-CM

## 2021-04-01 DIAGNOSIS — I73.9 PERIPHERAL ARTERY DISEASE (HCC): Primary | ICD-10-CM

## 2021-04-01 DIAGNOSIS — I35.0 NONRHEUMATIC AORTIC VALVE STENOSIS: ICD-10-CM

## 2021-04-01 DIAGNOSIS — I25.10 ASCVD (ARTERIOSCLEROTIC CARDIOVASCULAR DISEASE): ICD-10-CM

## 2021-04-01 DIAGNOSIS — R97.20 ELEVATED PSA, BETWEEN 10 AND LESS THAN 20 NG/ML: ICD-10-CM

## 2021-04-01 DIAGNOSIS — I73.9 PERIPHERAL ARTERY DISEASE (HCC): ICD-10-CM

## 2021-04-01 LAB
ALBUMIN SERPL BCP-MCNC: 3.9 G/DL (ref 3.5–5)
ALP SERPL-CCNC: 187 U/L (ref 46–116)
ALT SERPL W P-5'-P-CCNC: 21 U/L (ref 12–78)
ANION GAP SERPL CALCULATED.3IONS-SCNC: 4 MMOL/L (ref 4–13)
AST SERPL W P-5'-P-CCNC: 17 U/L (ref 5–45)
BASOPHILS # BLD AUTO: 0.06 THOUSANDS/ΜL (ref 0–0.1)
BASOPHILS NFR BLD AUTO: 1 % (ref 0–1)
BILIRUB SERPL-MCNC: 0.55 MG/DL (ref 0.2–1)
BUN SERPL-MCNC: 14 MG/DL (ref 5–25)
CALCIUM SERPL-MCNC: 9.1 MG/DL (ref 8.3–10.1)
CHLORIDE SERPL-SCNC: 105 MMOL/L (ref 100–108)
CHOLEST SERPL-MCNC: 219 MG/DL (ref 50–200)
CO2 SERPL-SCNC: 29 MMOL/L (ref 21–32)
CREAT SERPL-MCNC: 1.21 MG/DL (ref 0.6–1.3)
EOSINOPHIL # BLD AUTO: 0.13 THOUSAND/ΜL (ref 0–0.61)
EOSINOPHIL NFR BLD AUTO: 1 % (ref 0–6)
ERYTHROCYTE [DISTWIDTH] IN BLOOD BY AUTOMATED COUNT: 12.8 % (ref 11.6–15.1)
GFR SERPL CREATININE-BSD FRML MDRD: 61 ML/MIN/1.73SQ M
GLUCOSE P FAST SERPL-MCNC: 87 MG/DL (ref 65–99)
HCT VFR BLD AUTO: 53 % (ref 36.5–49.3)
HDLC SERPL-MCNC: 47 MG/DL
HGB BLD-MCNC: 17.2 G/DL (ref 12–17)
IMM GRANULOCYTES # BLD AUTO: 0.03 THOUSAND/UL (ref 0–0.2)
IMM GRANULOCYTES NFR BLD AUTO: 0 % (ref 0–2)
LDLC SERPL CALC-MCNC: 143 MG/DL (ref 0–100)
LYMPHOCYTES # BLD AUTO: 1.92 THOUSANDS/ΜL (ref 0.6–4.47)
LYMPHOCYTES NFR BLD AUTO: 20 % (ref 14–44)
MCH RBC QN AUTO: 31 PG (ref 26.8–34.3)
MCHC RBC AUTO-ENTMCNC: 32.5 G/DL (ref 31.4–37.4)
MCV RBC AUTO: 96 FL (ref 82–98)
MONOCYTES # BLD AUTO: 0.83 THOUSAND/ΜL (ref 0.17–1.22)
MONOCYTES NFR BLD AUTO: 8 % (ref 4–12)
NEUTROPHILS # BLD AUTO: 6.9 THOUSANDS/ΜL (ref 1.85–7.62)
NEUTS SEG NFR BLD AUTO: 70 % (ref 43–75)
NONHDLC SERPL-MCNC: 172 MG/DL
NRBC BLD AUTO-RTO: 0 /100 WBCS
PLATELET # BLD AUTO: 203 THOUSANDS/UL (ref 149–390)
PMV BLD AUTO: 9.6 FL (ref 8.9–12.7)
POTASSIUM SERPL-SCNC: 4.3 MMOL/L (ref 3.5–5.3)
PROT SERPL-MCNC: 7.4 G/DL (ref 6.4–8.2)
RBC # BLD AUTO: 5.55 MILLION/UL (ref 3.88–5.62)
SODIUM SERPL-SCNC: 138 MMOL/L (ref 136–145)
TRIGL SERPL-MCNC: 145 MG/DL
WBC # BLD AUTO: 9.87 THOUSAND/UL (ref 4.31–10.16)

## 2021-04-01 PROCEDURE — 85025 COMPLETE CBC W/AUTO DIFF WBC: CPT

## 2021-04-01 PROCEDURE — 80053 COMPREHEN METABOLIC PANEL: CPT

## 2021-04-01 PROCEDURE — 80061 LIPID PANEL: CPT

## 2021-04-07 ENCOUNTER — OFFICE VISIT (OUTPATIENT)
Dept: FAMILY MEDICINE CLINIC | Facility: CLINIC | Age: 71
End: 2021-04-07
Payer: MEDICARE

## 2021-04-07 VITALS
HEIGHT: 68 IN | BODY MASS INDEX: 27.8 KG/M2 | DIASTOLIC BLOOD PRESSURE: 74 MMHG | HEART RATE: 66 BPM | TEMPERATURE: 97.3 F | OXYGEN SATURATION: 97 % | SYSTOLIC BLOOD PRESSURE: 118 MMHG | WEIGHT: 183.4 LBS

## 2021-04-07 DIAGNOSIS — D68.59 PROTEIN C DEFICIENCY (HCC): ICD-10-CM

## 2021-04-07 DIAGNOSIS — I73.9 CLAUDICATION OF LEFT LOWER EXTREMITY (HCC): ICD-10-CM

## 2021-04-07 DIAGNOSIS — I10 ESSENTIAL HYPERTENSION: ICD-10-CM

## 2021-04-07 DIAGNOSIS — I74.3 EMBOLISM AND THROMBOSIS OF ARTERIES OF THE LOWER EXTREMITIES (HCC): ICD-10-CM

## 2021-04-07 DIAGNOSIS — R97.20 ELEVATED PSA, BETWEEN 10 AND LESS THAN 20 NG/ML: Primary | ICD-10-CM

## 2021-04-07 DIAGNOSIS — N18.30 STAGE 3 CHRONIC KIDNEY DISEASE, UNSPECIFIED WHETHER STAGE 3A OR 3B CKD (HCC): ICD-10-CM

## 2021-04-07 DIAGNOSIS — I35.0 NONRHEUMATIC AORTIC VALVE STENOSIS: ICD-10-CM

## 2021-04-07 PROCEDURE — 99214 OFFICE O/P EST MOD 30 MIN: CPT | Performed by: FAMILY MEDICINE

## 2021-04-07 NOTE — PROGRESS NOTES
BMI Counseling: Body mass index is 27 89 kg/m²  The BMI is above normal  Nutrition recommendations include decreasing portion sizes, encouraging healthy choices of fruits and vegetables, consuming healthier snacks, limiting drinks that contain sugar, moderation in carbohydrate intake and increasing intake of lean protein  Exercise recommendations include moderate physical activity 150 minutes/week, exercising 3-5 times per week and strength training exercises  No pharmacotherapy was ordered  Patient referred to PCP due to patient being overweight  Good exercise tolerance with shoveling snow this winter, diet steady        Assessment/Plan:    1  Elevated PSA, between 10 and less than 20 ng/ml  -     PSA Total, Diagnostic; Future    2  Embolism and thrombosis of arteries of the lower extremities (HCC)    3  Protein C deficiency ([address] )    4  Stage 3 chronic kidney disease, unspecified whether stage 3a or 3b CKD    5  Nonrheumatic aortic valve stenosis  -     CBC and differential; Future; Expected date: 10/07/2021  -     Comprehensive metabolic panel; Future; Expected date: 10/07/2021  -     Lipid panel; Future; Expected date: 10/07/2021    6  Essential hypertension  -     CBC and differential; Future; Expected date: 10/07/2021  -     Comprehensive metabolic panel; Future; Expected date: 10/07/2021  -     Lipid panel; Future; Expected date: 10/07/2021    7  Claudication of left lower extremity ([facility] [address] )        There are no Patient Instructions on file for this visit  Return in about 6 months (around 10/7/2021)  Subjective:      Patient ID: Kiley Howard is a 72 y o  male  Chief Complaint   Patient presents with    Follow-up       BP controlled  Follows with Dr Miguel Ley, vascular  Due for doppler q 6 months  Lipids controlled on statin  No bleeding issues, INR stable  No urinary issues, has not seen urology since COVID, PSA last decreased from 13 6-11  6  low risk for prostate CA per urology, no biopsy was indicated per urology  The following portions of the patient's history were reviewed and updated as appropriate: allergies, current medications, past family history, past medical history, past social history, past surgical history and problem list     Review of Systems   Constitutional: Negative for fatigue and fever  HENT: Negative for congestion  Eyes: Negative for visual disturbance  Respiratory: Negative for chest tightness and shortness of breath  Cardiovascular: Negative for chest pain and palpitations  Gastrointestinal: Negative for abdominal pain  Genitourinary: Negative for difficulty urinating  Musculoskeletal: Negative for arthralgias  Neurological: Negative for headaches  Hematological: Does not bruise/bleed easily  Current Outpatient Medications   Medication Sig Dispense Refill    atorvastatin (LIPITOR) 40 mg tablet TAKE 1 TABLET BY MOUTH EVERY DAY 90 tablet 1    clopidogrel (PLAVIX) 75 mg tablet TAKE 1 TABLET BY MOUTH EVERY DAY 30 tablet 4    warfarin (COUMADIN) 5 mg tablet Take 1 tablet (5 mg total) by mouth daily 30 tablet 1     No current facility-administered medications for this visit  Objective:    /74 (BP Location: Right arm, Patient Position: Sitting, Cuff Size: Standard)   Pulse 66   Temp (!) 97 3 °F (36 3 °C)   Ht 5' 8" (1 727 m)   Wt 83 2 kg (183 lb 6 4 oz)   SpO2 97%   BMI 27 89 kg/m²        Physical Exam  Vitals signs reviewed  Constitutional:       Appearance: Normal appearance  He is well-developed  Cardiovascular:      Rate and Rhythm: Normal rate and regular rhythm  Heart sounds: Normal heart sounds  Pulmonary:      Effort: Pulmonary effort is normal       Breath sounds: Normal breath sounds  Skin:     General: Skin is warm  Neurological:      Mental Status: He is alert and oriented to person, place, and time  Psychiatric:         Behavior: Behavior normal          Thought Content:  Thought content normal          Judgment: Judgment normal                 Norma Lee MD

## 2021-06-10 ENCOUNTER — HOSPITAL ENCOUNTER (OUTPATIENT)
Dept: NON INVASIVE DIAGNOSTICS | Facility: MEDICAL CENTER | Age: 71
Discharge: HOME | End: 2021-06-10
Payer: MEDICARE

## 2021-06-10 ENCOUNTER — TRANSCRIBE ORDERS (OUTPATIENT)
Dept: VASCULAR SURGERY | Facility: CLINIC | Age: 71
End: 2021-06-10

## 2021-06-10 DIAGNOSIS — I35.0 NONRHEUMATIC AORTIC VALVE STENOSIS: ICD-10-CM

## 2021-06-10 DIAGNOSIS — I73.9 PERIPHERAL ARTERY DISEASE (HCC): Primary | ICD-10-CM

## 2021-06-10 PROCEDURE — 93306 TTE W/DOPPLER COMPLETE: CPT | Performed by: INTERNAL MEDICINE

## 2021-06-10 PROCEDURE — 93306 TTE W/DOPPLER COMPLETE: CPT

## 2021-06-22 ENCOUNTER — HOSPITAL ENCOUNTER (OUTPATIENT)
Dept: NON INVASIVE DIAGNOSTICS | Facility: CLINIC | Age: 71
Discharge: HOME | End: 2021-06-22
Payer: MEDICARE

## 2021-06-22 DIAGNOSIS — I73.9 PERIPHERAL ARTERY DISEASE (HCC): ICD-10-CM

## 2021-06-22 PROCEDURE — 93925 LOWER EXTREMITY STUDY: CPT

## 2021-06-22 PROCEDURE — 93923 UPR/LXTR ART STDY 3+ LVLS: CPT

## 2021-06-23 PROCEDURE — 93922 UPR/L XTREMITY ART 2 LEVELS: CPT | Performed by: SURGERY

## 2021-06-23 PROCEDURE — 93925 LOWER EXTREMITY STUDY: CPT | Performed by: SURGERY

## 2021-07-06 DIAGNOSIS — I73.9 CLAUDICATION OF LEFT LOWER EXTREMITY (HCC): ICD-10-CM

## 2021-07-06 RX ORDER — CLOPIDOGREL BISULFATE 75 MG/1
75 TABLET ORAL DAILY
Qty: 30 TABLET | Refills: 4 | Status: SHIPPED | OUTPATIENT
Start: 2021-07-06 | End: 2021-12-27

## 2021-08-02 ENCOUNTER — OFFICE VISIT (OUTPATIENT)
Dept: CARDIOLOGY CLINIC | Facility: MEDICAL CENTER | Age: 71
End: 2021-08-02
Payer: MEDICARE

## 2021-08-02 VITALS
BODY MASS INDEX: 28.98 KG/M2 | HEART RATE: 60 BPM | HEIGHT: 68 IN | WEIGHT: 191.2 LBS | DIASTOLIC BLOOD PRESSURE: 60 MMHG | OXYGEN SATURATION: 97 % | SYSTOLIC BLOOD PRESSURE: 130 MMHG

## 2021-08-02 DIAGNOSIS — I10 ESSENTIAL HYPERTENSION: ICD-10-CM

## 2021-08-02 DIAGNOSIS — E78.5 DYSLIPIDEMIA: ICD-10-CM

## 2021-08-02 DIAGNOSIS — Z98.890 S/P FEMORAL-TIBIAL BYPASS: ICD-10-CM

## 2021-08-02 DIAGNOSIS — I73.9 INTERMITTENT CLAUDICATION (HCC): ICD-10-CM

## 2021-08-02 DIAGNOSIS — I35.0 NONRHEUMATIC AORTIC VALVE STENOSIS: ICD-10-CM

## 2021-08-02 DIAGNOSIS — I73.9 PERIPHERAL ARTERY DISEASE (HCC): ICD-10-CM

## 2021-08-02 DIAGNOSIS — I25.10 ASCVD (ARTERIOSCLEROTIC CARDIOVASCULAR DISEASE): Primary | ICD-10-CM

## 2021-08-02 PROCEDURE — 99214 OFFICE O/P EST MOD 30 MIN: CPT | Performed by: INTERNAL MEDICINE

## 2021-08-02 RX ORDER — ATORVASTATIN CALCIUM 80 MG/1
80 TABLET, FILM COATED ORAL DAILY
Qty: 30 TABLET | Refills: 3 | Status: SHIPPED | OUTPATIENT
Start: 2021-08-02 | End: 2021-10-07 | Stop reason: SDUPTHER

## 2021-08-02 NOTE — PATIENT INSTRUCTIONS
Aortic Stenosis   WHAT YOU NEED TO KNOW:   What is aortic stenosis? Aortic stenosis is a condition that makes your aortic valve become narrow and stiff  The narrow, stiff valve causes your heart to work harder to pump blood into the aorta  What causes aortic stenosis? · Calcium buildup  can happen as you age  Calcium builds up on the aortic valve walls  The buildup stiffens and thickens the valve  · Congenital heart defect  means you were born with a heart problem  Over time, the problem may lead to narrowing or blocking of your aortic valve  · Rheumatic fever  can develop after you have a strep throat infection  Rheumatic fever causes your heart valves to thicken with scars  What are the signs and symptoms of aortic stenosis? · Chest pain or tightness    · Fast, jumpy, or fluttery heartbeat    · Shortness of breath during activity or when you lie down    · Severe tiredness    · Dizziness or feeling faint    How is aortic stenosis diagnosed? Your healthcare provider will ask about your signs and symptoms and listen to your heart  He or she will ask if you have had strep throat or rheumatic fever  Tell your provider if you have a family history of heart disease  You may also need any of the following tests:  · Blood tests  may be used to check for an infection or other cause of your aortic stenosis  · An echocardiogram  is a type of ultrasound  It is used to show problems with your aortic valve and how blood flows through your heart  It may also show how well your heart is pumping  You may need a transthoracic or transesophageal echocardiogram  Ask your healthcare provider about these types of echocardiogram     · A chest x-ray  shows the size of your heart  It may also show if fluid is around your heart and lungs  · An EKG  test records the electrical activity of your heart  It is used to check for abnormal heart rhythm caused by aortic stenosis      · A stress test  helps healthcare providers see how well your aortic valve works under stress  Healthcare providers may place stress on your aortic valve with exercise or medicine  · Cardiac catheterization  is a procedure to check how well your heart is pumping blood  It is also used to measure pressure in different parts of your heart  A catheter (long thin tube) is inserted into your arm, neck, or groin and moved into your heart  An x-ray may be used to guide the tube to the right place  Contrast liquid may be used to help your heart show up better in the pictures  Tell the healthcare provider if you have ever had an allergic reaction to contrast liquid  How is aortic stenosis treated? · Medicines  may help decrease your cholesterol levels and your blood pressure  You may also be given medicine to help lower swelling  · Valve replacement  is the main treatment for aortic stenosis  It is a surgery to remove part or all of your aortic valve  A new valve is then secured in place  The new valve may be from a donor (another person or animal), or may be an artificial valve  · Balloon valvuloplasty  helps widen your aortic valve and allow blood to flow through easier  It is also called a closed valvotomy  How can I manage aortic stenosis? · Limit activities  Your healthcare provider may have you limit strenuous activity  Strenuous activity will make your heart work too hard  Ask your healthcare provider what activities are safe for you to do  · Take your medicines as directed  You may need medicines to lower your blood pressure  You may also need medicine to help your heart's rhythm  Your healthcare provider will prescribe the medicine that is right for you  · Eat heart-healthy foods  Heart-healthy foods include salmon, tuna, walnuts, whole-grain breads, low-fat dairy products, beans, and oils such as olive or canola oil   A dietitian or your provider can give you more information on meal plans such as the DASH (Dietary Approaches to Stop Hypertension) eating plan  The DASH plan is low in sodium, processed sugar, unhealthy fats, and total fat  It is high in potassium, calcium, and fiber  These can be found in vegetables, fruit, and whole-grain foods  · Limit sodium (salt) as directed  Too much sodium can affect your fluid balance  Check labels to find low-sodium or no-salt-added foods  You can also make small changes to get less salt  For example, if you add salt while you cook, do not add more salt at the table  Ask your healthcare provider or dietitian for more ways to cut down on salt  · Limit or do not drink alcohol  Ask your healthcare provider if it is okay for you to drink alcohol  Alcohol can increase your risk for high blood pressure and coronary artery disease  Your provider can tell you how many drinks are okay to have within 24 hours or within 1 week  A drink of alcohol is 12 ounces of beer, 5 ounces of wine, or 1½ ounces of liquor  · Maintain a healthy weight  Being overweight can increase your risk for high blood pressure and coronary artery disease  These conditions can make your symptoms worse  Ask your healthcare provider what a healthy weight is for you  Ask him or her to help you create a weight loss plan if you are overweight  · Talk to your healthcare provider about pregnancy  If you are a woman and want to get pregnant, talk to your healthcare provider  You and your baby may need to be monitored by specialists during your pregnancy  · Ask about vaccines you may need  Certain diseases are dangerous for a person who has aortic stenosis  Vaccines help prevent infections that can cause some diseases  Get a flu vaccine as soon as recommended each year, usually in September or October  Your healthcare provider can tell you if you also need other vaccines, and when to get them  How can I prevent aortic stenosis? · Manage other health conditions    High blood pressure and high cholesterol levels increase your risk for aortic stenosis  Ask your healthcare provider for more information on managing these conditions  · Get treatment for strep throat  If strep throat is not treated, it can cause rheumatic fever  · Take care of your teeth and gums  Gingivitis, a gum disease, increases your risk for aortic stenosis  See your dental provider regularly to treat problems early  Call your local emergency number [phone] in the [address] [facility] [facility] or have someone call if:   · You have any of the following signs of a heart attack:      ? Squeezing, pressure, or pain in your chest    ? You may  also have any of the following:     § Discomfort or pain in your back, neck, jaw, stomach, or arm    § Shortness of breath    § Nausea or vomiting    § Lightheadedness or a sudden cold sweat    · You have any of the following signs of a stroke:      ? Numbness or drooping on one side of your face     ? Weakness in an arm or leg    ? Confusion or difficulty speaking    ? Dizziness, a severe headache, or vision loss    When should I seek immediate care? · You have chest pain when you move around  It goes away when you are still  · You have increasing shortness of breath  · You faint  When should I call my doctor? · The veins in your neck look swollen or are bulging  · You have increased swelling in your legs or ankles  · Your heart beats faster than usual     · You feel your heart flutter often  · You have questions or concerns about your condition or care  CARE AGREEMENT:   You have the right to help plan your care  Learn about your health condition and how it may be treated  Discuss treatment options with your healthcare providers to decide what care you want to receive  You always have the right to refuse treatment  The above information is an  only  It is not intended as medical advice for individual conditions or treatments   Talk to your doctor, nurse or pharmacist before following any medical regimen to see if it is safe and effective for you  © Copyright [employer] 2021 Information is for End User's use only and may not be sold, redistributed or otherwise used for commercial purposes   All illustrations and images included in CareNotes® are the copyrighted property of A D A M , Inc  or [facility] Preeti Beck

## 2021-08-02 NOTE — PROGRESS NOTES
Cardiology Follow Up    Brock Jese  1950  [id number]  [facility] FARRAH ADAM [address] [address]  [phone]    1  ASCVD (arteriosclerotic cardiovascular disease)     2  Dyslipidemia  Lipid panel   3  S/P RIGHT Femoral- PT bypass 2011     4  Peripheral artery disease (HCC)  atorvastatin (LIPITOR) 80 mg tablet   5  Nonrheumatic aortic valve stenosis  Ambulatory referral to Cardiovascular Surgery   6  Essential hypertension     7  Peripheral artery disease (HCC)  atorvastatin (LIPITOR) 80 mg tablet    check labs next month   8  Intermittent claudication (HCC)  atorvastatin (LIPITOR) 80 mg tablet     Chief Complaint   Patient presents with    Follow-up     had echo done- 6/10/21  NO cardiac symptoms  Interval History: Patient feels well, without complaints  No reported chest pain, shortness of breath, palpitations, lightheadedness, syncope, LE edema, orthopnea, PND, or significant weight changes  Patient remains active without any increased fatigue out of the ordinary          Patient Active Problem List   Diagnosis    Peripheral artery disease ([address] )    Claudication of left lower extremity (HCC)    S/P RIGHT Femoral- PT bypass 2011    Dyslipidemia    ASCVD (arteriosclerotic cardiovascular disease)    Stage 3 chronic kidney disease    Nonrheumatic aortic valve stenosis    Essential hypertension    Elevated PSA, between 10 and less than 20 ng/ml    Embolism and thrombosis of arteries of the lower extremities ([address] )    Protein C deficiency ([address] )     Past Medical History:   Diagnosis Date    DVT (deep venous thrombosis) (HCC)     Protein C deficiency (HCC)     Pulmonary embolus (HCC)      Social History     Socioeconomic History    Marital status: /Civil Union     Spouse name: Not on file    Number of children: Not on file    Years of education: Not on file   [employer] education level: Not on file   Occupational History    Not on file   Tobacco Use    Smoking status: Former Smoker     Types: Cigarettes     Quit date: 2018     Years since quittin 8    Smokeless tobacco: Never Used    Tobacco comment: 1 pk every 2days , currently on patches to quit  Vaping Use    Vaping Use: Never used   Substance and Sexual Activity    Alcohol use: No    Drug use: No    Sexual activity: Not on file   Other Topics Concern    Not on file   Social History Narrative    Not on file     Social Determinants of Health     Financial Resource Strain:     Difficulty of Paying Living Expenses:    Food Insecurity:     Worried About Running Out of Food in the Last Year:     [address] in the Last Year:    Transportation Needs:     Lack of Transportation (Medical):      Lack of Transportation (Non-Medical):    Physical Activity:     Days of Exercise per Week:     Minutes of Exercise per Session:    Stress:     Feeling of Stress :    Social Connections:     Frequency of Communication with Friends and Family:     Frequency of Social Gatherings with Friends and Family:     Attends Judaism Services:     Active Member of Clubs or Organizations:     Attends Club or Organization Meetings:     Marital Status:    Intimate Partner Violence:     Fear of Current or Ex-Partner:     Emotionally Abused:     Physically Abused:     Sexually Abused:       Family History   Problem Relation Age of Onset    Cancer Mother 50    Heart attack Father 46    Hypertension Father     Prostate cancer Brother     Arrhythmia Neg Hx     Clotting disorder Neg Hx     Fainting Neg Hx     Heart disease Neg Hx     Anuerysm Neg Hx     Stroke Neg Hx      Past Surgical History:   Procedure Laterality Date    BACK SURGERY      BYPASS FEMORAL-TIBIAL Right 2011    VEIN LIGATION         Current Outpatient Medications:     atorvastatin (LIPITOR) 40 mg tablet, TAKE 1 TABLET BY MOUTH EVERY DAY, Disp: 90 tablet, Rfl: 1    clopidogrel (PLAVIX) 75 mg tablet, Take 1 tablet (75 mg total) by mouth daily, Disp: 30 tablet, Rfl: 4    warfarin (COUMADIN) 5 mg tablet, Take 1 tablet (5 mg total) by mouth daily, Disp: 30 tablet, Rfl: 1  No Known Allergies    Labs:  Appointment on 04/01/2021   Component Date Value    WBC 04/01/2021 9 87     RBC 04/01/2021 5 55     Hemoglobin 04/01/2021 17 2*    Hematocrit 04/01/2021 53 0*    MCV 04/01/2021 96     MCH 04/01/2021 31 0     MCHC 04/01/2021 32 5     RDW 04/01/2021 12 8     MPV 04/01/2021 9 6     Platelets 23/62/2715 203     nRBC 04/01/2021 0     Neutrophils Relative 04/01/2021 70     Immat GRANS % 04/01/2021 0     Lymphocytes Relative 04/01/2021 20     Monocytes Relative 04/01/2021 8     Eosinophils Relative 04/01/2021 1     Basophils Relative 04/01/2021 1     Neutrophils Absolute 04/01/2021 6 90     Immature Grans Absolute 04/01/2021 0 03     Lymphocytes Absolute 04/01/2021 1 92     Monocytes Absolute 04/01/2021 0 83     Eosinophils Absolute 04/01/2021 0 13     Basophils Absolute 04/01/2021 0 06     Sodium 04/01/2021 138     Potassium 04/01/2021 4 3     Chloride 04/01/2021 105     CO2 04/01/2021 29     ANION GAP 04/01/2021 4     BUN 04/01/2021 14     Creatinine 04/01/2021 1 21     Glucose, Fasting 04/01/2021 87     Calcium 04/01/2021 9 1     AST 04/01/2021 17     ALT 04/01/2021 21     Alkaline Phosphatase 04/01/2021 187*    Total Protein 04/01/2021 7 4     Albumin 04/01/2021 3 9     Total Bilirubin 04/01/2021 0 55     eGFR 04/01/2021 61     Cholesterol 04/01/2021 219*    Triglycerides 04/01/2021 145     HDL, Direct 04/01/2021 47     LDL Calculated 04/01/2021 143*    Non-HDL-Chol (CHOL-HDL) 04/01/2021 172      Lab Results   Component Value Date    TRIG 145 04/01/2021    HDL 47 04/01/2021     Imaging: No results found  Review of Systems:  Review of Systems   Constitutional: Negative for activity change, appetite change, fatigue and fever  HENT: Negative for nosebleeds and sore throat  Eyes: Negative for photophobia and visual disturbance  Respiratory: Negative for cough, chest tightness, shortness of breath and wheezing  Cardiovascular: Negative for chest pain, palpitations and leg swelling  Gastrointestinal: Negative for abdominal pain, diarrhea, nausea and vomiting  Endocrine: Negative for polyuria  Genitourinary: Negative for dysuria, frequency and hematuria  Musculoskeletal: Negative for arthralgias, back pain and gait problem  Skin: Negative for pallor and rash  Neurological: Negative for dizziness, syncope, speech difficulty and light-headedness  Hematological: Does not bruise/bleed easily  Psychiatric/Behavioral: Negative for agitation, behavioral problems and confusion  Physical Exam:  Physical Exam  Vitals reviewed  Constitutional:       Appearance: He is well-developed  He is not diaphoretic  HENT:      Head: Normocephalic and atraumatic  Nose: Nose normal    Eyes:      General: No scleral icterus  Pupils: Pupils are equal, round, and reactive to light  Neck:      Vascular: No JVD  Cardiovascular:      Rate and Rhythm: Normal rate and regular rhythm  Heart sounds: Murmur heard  Systolic murmur is present with a grade of 2/6  No friction rub  No gallop  Pulmonary:      Effort: Pulmonary effort is normal  No respiratory distress  Breath sounds: Normal breath sounds  No wheezing or rales  Abdominal:      General: Bowel sounds are normal  There is no distension  Palpations: Abdomen is soft  Tenderness: There is no abdominal tenderness  Musculoskeletal:         General: No deformity  Normal range of motion  Cervical back: Normal range of motion and neck supple  Skin:     General: Skin is warm and dry  Findings: No rash  Neurological:      Mental Status: He is alert and oriented to person, place, and time  Cranial Nerves: No cranial nerve deficit

## 2021-08-13 ENCOUNTER — OFFICE VISIT (OUTPATIENT)
Dept: CARDIAC SURGERY | Facility: CLINIC | Age: 71
End: 2021-08-13
Payer: MEDICARE

## 2021-08-13 VITALS
SYSTOLIC BLOOD PRESSURE: 180 MMHG | DIASTOLIC BLOOD PRESSURE: 96 MMHG | WEIGHT: 192.7 LBS | TEMPERATURE: 97.8 F | RESPIRATION RATE: 18 BRPM | OXYGEN SATURATION: 99 % | HEIGHT: 68 IN | BODY MASS INDEX: 29.21 KG/M2 | HEART RATE: 62 BPM

## 2021-08-13 DIAGNOSIS — Z01.818 PRE-OP TESTING: ICD-10-CM

## 2021-08-13 DIAGNOSIS — I74.3 EMBOLISM AND THROMBOSIS OF ARTERIES OF THE LOWER EXTREMITIES (HCC): ICD-10-CM

## 2021-08-13 DIAGNOSIS — Z87.891 FORMER HEAVY TOBACCO SMOKER: ICD-10-CM

## 2021-08-13 DIAGNOSIS — Z98.890 S/P FEMORAL-TIBIAL BYPASS: ICD-10-CM

## 2021-08-13 DIAGNOSIS — I35.0 NONRHEUMATIC AORTIC VALVE STENOSIS: ICD-10-CM

## 2021-08-13 DIAGNOSIS — Z01.89 ENCOUNTER FOR IMAGING OF BILATERAL GREATER SAPHENOUS VEINS: ICD-10-CM

## 2021-08-13 DIAGNOSIS — I73.9 PERIPHERAL ARTERY DISEASE (HCC): Primary | ICD-10-CM

## 2021-08-13 DIAGNOSIS — Z13.6 ENCOUNTER FOR SCREENING FOR STENOSIS OF CAROTID ARTERY: ICD-10-CM

## 2021-08-13 DIAGNOSIS — I73.9 CLAUDICATION OF LEFT LOWER EXTREMITY (HCC): ICD-10-CM

## 2021-08-13 DIAGNOSIS — D68.59 PROTEIN C DEFICIENCY (HCC): ICD-10-CM

## 2021-08-13 PROCEDURE — 99205 OFFICE O/P NEW HI 60 MIN: CPT | Performed by: NURSE PRACTITIONER

## 2021-08-13 NOTE — LETTER
August 13, 2021     Juana Guthrie, [address] [facility] [address]    Patient: Eveline Walker   YOB: 1950   Date of Visit: 8/13/2021       Dear Dr Aretha Krabbe: Thank you for referring Soheila Vasquez to me for evaluation  Below are my notes for this consultation  If you have questions, please do not hesitate to call me  I look forward to following your patient along with you  Sincerely,        Glendy Vaughn MD        CC: MD Tereso Beatty CRNP  8/13/2021 10:42 AM  Attested  Consultation - Cardiothoracic Surgery   Eveline Walker 80 y o  male MRN: [id number]    Physician Requesting Consult: Dr Aretha Krabbe    Reason for Consult / Principal Problem: Aortic stenosis, Non-Rheumatic    History of Present Illness: Eveline Walker is a 80y o  year old male who presents for initial outpatient surgical consultation for  severe aortic stenosis  Patient's PMHx is notable for CAD (non-obst 5/2011-[place]), HTN, HLD, CKD3, 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, PAD s/p R Fem-PT bypass (5/2011), h/o frost bite- feet (2011) and hearing loss  Patient with h/o heart murmur for few years  Echo in 2018 with moderate AS  Most recent echo with progression to severe AS, TACHO 0 8-0 9 cm2, MG 39/PG 65 mm Hg, Vmax 403 cm/s  Upon interview today patient reports he is asymptomatic from a cardiac standpoint  He has significant LE PAD with claudication in his left leg  He denies chest pain, SOB, RAMOS, lightheadedness, palpitations, change in activity tolerance, weight gain or edema  He is able to mow is 1/2 acre lawn (walk behind ) without cardiac symptoms  He does stop to rest due to left calf claudication, however can continue for up to 20-30 min before stopping  Patient quit smoking in 2018  He does not consume alcohol         Past Medical History:  Past Medical History:   Diagnosis Date    DVT [facility]deep venous thrombosis) ([facility])     [facility] deficiency [facility]     Pulmonary embolus ([facility] ) Past Surgical History:   Past Surgical History:   Procedure Laterality Date    BACK SURGERY      BYPASS FEMORAL-TIBIAL Right 2011    VEIN LIGATION           Family History:  Family History   Problem Relation Age of Onset    Cancer Mother 50    Heart attack Father 46    Hypertension Father     Prostate cancer Brother     Arrhythmia Neg Hx     Clotting disorder Neg Hx     Fainting Neg Hx     Heart disease Neg Hx     Anuerysm Neg Hx     Stroke Neg Hx          Social History:    Social History     Substance and Sexual Activity   Alcohol Use No     Social History     Substance and Sexual Activity   Drug Use No     Social History     Tobacco Use   Smoking Status Former Smoker    Types: Cigarettes    Quit date: 2018    Years since quittin 8   Smokeless Tobacco Never Used   Tobacco Comment    1 pk every 2days , currently on patches to quit  Home Medications:   Prior to Admission medications    Medication Sig Start Date End Date Taking?  Authorizing Provider   atorvastatin (LIPITOR) 80 mg tablet Take 1 tablet (80 mg total) by mouth daily 21  Yes Rm Aparicio MD   clopidogrel (PLAVIX) 75 mg tablet Take 1 tablet (75 mg total) by mouth daily 21  Yes Milton Savage PA-C   warfarin (COUMADIN) 5 mg tablet Take 1 tablet (5 mg total) by mouth daily 3/23/21  Yes Penelope Kocher, MD       Allergies:  No Known Allergies    Review of Systems:  Review of Systems - History obtained from chart review and the patient  General ROS: negative  Psychological ROS: negative  Ophthalmic ROS: negative  ENT ROS: edentulous except for few lower teeth bottom front  Allergy and Immunology ROS: negative  Hematological and Lymphatic ROS: positive for - blood clots  negative for - bleeding problems, blood transfusions, bruising or jaundice  Endocrine ROS: negative  Respiratory ROS: no cough, shortness of breath, or wheezing  Cardiovascular ROS: positive for - murmur and left calf claudication negative for - chest pain, dyspnea on exertion, edema, irregular heartbeat, loss of consciousness, orthopnea, palpitations, paroxysmal nocturnal dyspnea or rapid heart rate  Gastrointestinal ROS: no abdominal pain, change in bowel habits, or black or bloody stools  Genito-Urinary ROS: no dysuria, trouble voiding, or hematuria  Musculoskeletal ROS: negative  Neurological ROS: no TIA or stroke symptoms  Dermatological ROS: negative    Vital Signs:     Vitals:    08/13/21 0845 08/13/21 0849   BP: (!) 176/90 (!) 180/96   BP Location: Left arm Right arm   Patient Position: Sitting    Cuff Size: Standard    Pulse: 62    Resp: 18    Temp: 97 8 °F (36 6 °C)    TempSrc: Tympanic    SpO2: 99%    Weight: 87 4 kg (192 lb 11 2 oz)    Height: 5' 8" (1 727 m)        Physical Exam:    General: Alert, oriented, well developed , no acute distress  HEENT/NECK:  PERRLA  No jugular venous distention  [place]   Cardiac:Regular rate and rhythm, III/VI harsh systolic murmur RUSB   Carotid arteries: 1+ pulses, delayed upstrokes, no bruits  Pulmonary:  Breath sounds clear bilaterally  Abdomen:  Non-tender, Non-distended  Positive bowel sounds  Upper extremities: 2+ radial pulses; ) 0 Ulnar pulses  RIGHT handed; Abnormal Chintan's test left hand  Lower extremities: Extremities warm/dry; PT/DP pulses absent bilaterally  No edema B/L; rubor b/l feet/lower legs; significant spider varicosities   Neuro: Alert and oriented X 3  Sensation is grossly intact  No focal deficits  Musculoskeletal: MAEE, no deficits, stable gait  Skin: Warm/Dry, without rashes or lesions  Lab Results:     Imaging Studies:     Echocardiogram: 6/10/21  LEFT VENTRICLE:  Systolic function was normal  Ejection fraction was estimated to be 60 %  There were no regional wall motion abnormalities  Doppler parameters were consistent with abnormal left ventricular relaxation (grade 1 diastolic dysfunction)      AORTIC VALVE:  The valve was trileaflet   Leaflets exhibited marked calcification and markedly reduced cuspal separation  Transaortic velocity was increased due to valvular stenosis  There was severe stenosis  There was moderate regurgitation  Valve mean gradient was 39 mmHg      I have personally reviewed pertinent films in PACS    TAVR evaluation Assessment:     5 Meter Walk: 5 sec, 5 sec, 6 sec    KCCQ-12 completed    Assessment:  Patient Active Problem List    Diagnosis Date Noted    Former heavy tobacco smoker 08/13/2021    Embolism and thrombosis of arteries of the lower extremities ([facility] ) 04/07/2021    [facility] 04/07/2021    Elevated PSA, between 10 and less than 20 ng/ml 02/11/2019    Dyslipidemia 10/23/2018    ASCVD (arteriosclerotic cardiovascular disease) 10/23/2018    Stage 3 chronic kidney disease 10/23/2018    Nonrheumatic aortic valve stenosis 10/23/2018    Essential hypertension 10/23/2018    S/P RIGHT Femoral- PT bypass 2011 09/26/2018    Claud[facility] of left lower [facility] 08/02/2018    Peripheral artery disease ([facility] ) 07/19/2018     Severe aortic stenosis; Ongoing SAVR vs TAVR workup    Plan:    Nicole Mitchell has  severe aortic stenosis  They will undergo the following testing for  aortic valve replacement: Gated CTA of the chest/abdomen/pelvis, 3D RADHA, cardiac catheterization, dental clearance, pulmonary function tests with ABG, and carotid artery ultrasound  Once these studies have been completed, Nicole Mitchell will follow up in our office to review the results and to be evaluated to determine surgical treatment plan: open surgical AVR +/- CABG vs  transcatheter aortic valve replacement +/- PCI     Nicole Mitchell was comfortable with our recommendations, and their questions were answered to their satisfaction  Thank you for allowing us to participate in the care of this patient         SIGNATURE: FILIPPO Sheffield  DATE: August 13, 2021  TIME: 10:14 AM  Attestation signed by Moisés Sanchez MD at 8/13/2021 10:48 AM:  Pt seen and examined with MEÑONP  I agree with the above assessment and plan  It was my pleasure to evaluate Yesi Heck today for Severe Aortic Stenosis  He is referred for consideration of aortic valve replacement  I reviewed all of the available testing and I had a lengthy discussion with the patient and wife and have recommended transcatheter aortic valve replacement  Routine preoperative testing has been ordered to include carotid duplex, vein mapping, CT scan, pulmonary function testing, and cardiac catheterization  He meets criteria for valve replacement, but is probably going to be best with TAVR rather than SAVR due to his comorbidities  This is probably even true if he has CAD, since he's likely not going to have any usable venous conduit  We'll get full preoperative testing and then he'll follow up to make a decision        Elana Villareal MD  DATE: August 13, 2021  TIME: 10:46 AM

## 2021-08-13 NOTE — PROGRESS NOTES
Consultation - Cardiothoracic Surgery   Desirae Gallegos 80 y o  male MRN: [id number]    Physician Requesting Consult: Dr Cliff Cervantes    Reason for Consult / Principal Problem: Aortic stenosis, Non-Rheumatic    History of Present Illness: Desirae Gallegos is a 80y o  year old male who presents for initial outpatient surgical consultation for  severe aortic stenosis  Patient's PMHx is notable for CAD (non-obst 5/2011-[place]), HTN, HLD, CKD3, 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, PAD s/p R Fem-PT bypass (5/2011), h/o frost bite- feet (2011) and hearing loss  Patient with h/o heart murmur for few years  Echo in 2018 with moderate AS  Most recent echo with progression to severe AS, TACHO 0 8-0 9 cm2, MG 39/PG 65 mm Hg, Vmax 403 cm/s  Upon interview today patient reports he is asymptomatic from a cardiac standpoint  He has significant LE PAD with claudication in his left leg  He denies chest pain, SOB, RAMOS, lightheadedness, palpitations, change in activity tolerance, weight gain or edema  He is able to mow is 1/2 acre lawn (walk behind ) without cardiac symptoms  He does stop to rest due to left calf claudication, however can continue for up to 20-30 min before stopping  Patient quit smoking in 2018  He does not consume alcohol         Past Medical History:  Past Medical History:   Diagnosis Date    DVT (deep venous thrombosis) (HCC)     Protein C deficiency (HCC)     Pulmonary embolus (HCC)          Past Surgical History:   Past Surgical History:   Procedure Laterality Date    BACK SURGERY      BYPASS FEMORAL-TIBIAL Right 2011    VEIN LIGATION           Family History:  Family History   Problem Relation Age of Onset    Cancer Mother 50    Heart attack Father 46    Hypertension Father     Prostate cancer Brother     Arrhythmia Neg Hx     Clotting disorder Neg Hx     Fainting Neg Hx     Heart disease Neg Hx     Anuerysm Neg Hx     Stroke Neg Hx          Social History:    Social History Substance and Sexual Activity   Alcohol Use No     Social History     Substance and Sexual Activity   Drug Use No     Social History     Tobacco Use   Smoking Status Former Smoker    Types: Cigarettes    Quit date: 2018    Years since quittin 8   Smokeless Tobacco Never Used   Tobacco Comment    1 pk every 2days , currently on patches to quit  Home Medications:   Prior to Admission medications    Medication Sig Start Date End Date Taking?  Authorizing Provider   atorvastatin (LIPITOR) 80 mg tablet Take 1 tablet (80 mg total) by mouth daily 21  Yes Arcelia Gustafson MD   clopidogrel (PLAVIX) 75 mg tablet Take 1 tablet (75 mg total) by mouth daily 21  Yes Benson Shepherd PA-C   warfarin (COUMADIN) 5 mg tablet Take 1 tablet (5 mg total) by mouth daily 3/23/21  Yes Eryn Yusuf MD       Allergies:  No Known Allergies    Review of Systems:  Review of Systems - History obtained from chart review and the patient  General ROS: negative  Psychological ROS: negative  Ophthalmic ROS: negative  ENT ROS: edentulous except for few lower teeth bottom front  Allergy and Immunology ROS: negative  Hematological and Lymphatic ROS: positive for - blood clots  negative for - bleeding problems, blood transfusions, bruising or jaundice  Endocrine ROS: negative  Respiratory ROS: no cough, shortness of breath, or wheezing  Cardiovascular ROS: positive for - murmur and left calf claudication   negative for - chest pain, dyspnea on exertion, edema, irregular heartbeat, loss of consciousness, orthopnea, palpitations, paroxysmal nocturnal dyspnea or rapid heart rate  Gastrointestinal ROS: no abdominal pain, change in bowel habits, or black or bloody stools  Genito-Urinary ROS: no dysuria, trouble voiding, or hematuria  Musculoskeletal ROS: negative  Neurological ROS: no TIA or stroke symptoms  Dermatological ROS: negative    Vital Signs:     Vitals:    21 0845 21 0849   BP: (!) 176/90 (!) 180/96 BP Location: Left arm Right arm   Patient Position: Sitting    Cuff Size: Standard    Pulse: 62    Resp: 18    Temp: 97 8 °F (36 6 °C)    TempSrc: Tympanic    SpO2: 99%    Weight: 87 4 kg (192 lb 11 2 oz)    Height: 5' 8" (1 727 m)        Physical Exam:    General: Alert, oriented, well developed , no acute distress  HEENT/NECK:  PERRLA  No jugular venous distention  [place]   Cardiac:Regular rate and rhythm, III/VI harsh systolic murmur RUSB   Carotid arteries: 1+ pulses, delayed upstrokes, no bruits  Pulmonary:  Breath sounds clear bilaterally  Abdomen:  Non-tender, Non-distended  Positive bowel sounds  Upper extremities: 2+ radial pulses; ) 0 Ulnar pulses  RIGHT handed; Abnormal Chintan's test left hand  Lower extremities: Extremities warm/dry; PT/DP pulses absent bilaterally  No edema B/L; rubor b/l feet/lower legs; significant spider varicosities   Neuro: Alert and oriented X 3  Sensation is grossly intact  No focal deficits  Musculoskeletal: MAEE, no deficits, stable gait  Skin: Warm/Dry, without rashes or lesions  Lab Results:     Imaging Studies:     Echocardiogram: 6/10/21  LEFT VENTRICLE:  Systolic function was normal  Ejection fraction was estimated to be 60 %  There were no regional wall motion abnormalities  Doppler parameters were consistent with abnormal left ventricular relaxation (grade 1 diastolic dysfunction)      AORTIC VALVE:  The valve was trileaflet  Leaflets exhibited marked calcification and markedly reduced cuspal separation  Transaortic velocity was increased due to valvular stenosis  There was severe stenosis  There was moderate regurgitation    Valve mean gradient was 39 mmHg      I have personally reviewed pertinent films in PACS    TAVR evaluation Assessment:     5 Meter Walk: 5 sec, 5 sec, 6 sec    KCCQ-12 completed    Assessment:  Patient Active Problem List    Diagnosis Date Noted    Former heavy tobacco smoker 08/13/2021    [facility] of the lower [facility] 04/07/2021    [facility] C deficiency ([facility] ) 04/07/2021    Elevated PSA, between 10 and less than 20 ng/ml 02/11/2019    Dyslipidemia 10/23/2018    ASCVD (arteriosclerotic cardiovascular disease) 10/23/2018    Stage 3 chronic kidney disease 10/23/2018    Nonrheumatic aortic valve stenosis 10/23/2018    Essential hypertension 10/23/2018    S/P RIGHT Femoral- PT bypass 2011 09/26/2018    Claudication of left lower extremity ([facility] ) 08/02/2018    Peripheral artery disease ([facility] ) 07/19/2018     Severe aortic stenosis; Ongoing SAVR vs TAVR workup    Plan:    Becka Stanley has  severe aortic stenosis  They will undergo the following testing for  aortic valve replacement: Gated CTA of the chest/abdomen/pelvis, 3D RADHA, cardiac catheterization, dental clearance, pulmonary function tests with ABG, and carotid artery ultrasound  Once these studies have been completed, Becka Stanley will follow up in our office to review the results and to be evaluated to determine surgical treatment plan: open surgical AVR +/- CABG vs  transcatheter aortic valve replacement +/- PCI     Becka Stanley was comfortable with our recommendations, and their questions were answered to their satisfaction  Thank you for allowing us to participate in the care of this patient         SIGNATURE: FILIPPO Ng  DATE: August 13, 2021  TIME: 10:14 AM

## 2021-08-13 NOTE — H&P (VIEW-ONLY)
Consultation - Cardiothoracic Surgery   Rita Krause 80 y o  male MRN: [id number]    Physician Requesting Consult: Dr Tressa Hernandez    Reason for Consult / Principal Problem: Aortic stenosis, Non-Rheumatic    History of Present Illness: Rita Krause is a 80y o  year old male who presents for initial outpatient surgical consultation for  severe aortic stenosis  Patient's PMHx is notable for CAD (non-obst 5/2011-[place]), HTN, HLD, CKD3, 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, PAD s/p R Fem-PT bypass (5/2011), h/o frost bite- feet (2011) and hearing loss  Patient with h/o heart murmur for few years  Echo in 2018 with moderate AS  Most recent echo with progression to severe AS, TACHO 0 8-0 9 cm2, MG 39/PG 65 mm Hg, Vmax 403 cm/s  Upon interview today patient reports he is asymptomatic from a cardiac standpoint  He has significant LE PAD with claudication in his left leg  He denies chest pain, SOB, RAMOS, lightheadedness, palpitations, change in activity tolerance, weight gain or edema  He is able to mow is 1/2 acre lawn (walk behind ) without cardiac symptoms  He does stop to rest due to left calf claudication, however can continue for up to 20-30 min before stopping  Patient quit smoking in 2018  He does not consume alcohol         Past Medical History:  Past Medical History:   Diagnosis Date    DVT (deep venous thrombosis) (HCC)     Protein C deficiency (HCC)     Pulmonary embolus (HCC)          Past Surgical History:   Past Surgical History:   Procedure Laterality Date    BACK SURGERY      BYPASS FEMORAL-TIBIAL Right 2011    VEIN LIGATION           Family History:  Family History   Problem Relation Age of Onset    Cancer Mother 50    Heart attack Father 46    Hypertension Father     Prostate cancer Brother     Arrhythmia Neg Hx     Clotting disorder Neg Hx     Fainting Neg Hx     Heart disease Neg Hx     Anuerysm Neg Hx     Stroke Neg Hx          Social History:    Social History Substance and Sexual Activity   Alcohol Use No     Social History     Substance and Sexual Activity   Drug Use No     Social History     Tobacco Use   Smoking Status Former Smoker    Types: Cigarettes    Quit date: 2018    Years since quittin 8   Smokeless Tobacco Never Used   Tobacco Comment    1 pk every 2days , currently on patches to quit  Home Medications:   Prior to Admission medications    Medication Sig Start Date End Date Taking?  Authorizing Provider   atorvastatin (LIPITOR) 80 mg tablet Take 1 tablet (80 mg total) by mouth daily 21  Yes Sigifredo Brown MD   clopidogrel (PLAVIX) 75 mg tablet Take 1 tablet (75 mg total) by mouth daily 21  Yes Zane Perez PA-C   warfarin (COUMADIN) 5 mg tablet Take 1 tablet (5 mg total) by mouth daily 3/23/21  Yes Ayleen Castro MD       Allergies:  No Known Allergies    Review of Systems:  Review of Systems - History obtained from chart review and the patient  General ROS: negative  Psychological ROS: negative  Ophthalmic ROS: negative  ENT ROS: edentulous except for few lower teeth bottom front  Allergy and Immunology ROS: negative  Hematological and Lymphatic ROS: positive for - blood clots  negative for - bleeding problems, blood transfusions, bruising or jaundice  Endocrine ROS: negative  Respiratory ROS: no cough, shortness of breath, or wheezing  Cardiovascular ROS: positive for - murmur and left calf claudication   negative for - chest pain, dyspnea on exertion, edema, irregular heartbeat, loss of consciousness, orthopnea, palpitations, paroxysmal nocturnal dyspnea or rapid heart rate  Gastrointestinal ROS: no abdominal pain, change in bowel habits, or black or bloody stools  Genito-Urinary ROS: no dysuria, trouble voiding, or hematuria  Musculoskeletal ROS: negative  Neurological ROS: no TIA or stroke symptoms  Dermatological ROS: negative    Vital Signs:     Vitals:    21 0845 21 0849   BP: (!) 176/90 (!) 180/96 BP Location: Left arm Right arm   Patient Position: Sitting    Cuff Size: Standard    Pulse: 62    Resp: 18    Temp: 97 8 °F (36 6 °C)    TempSrc: Tympanic    SpO2: 99%    Weight: 87 4 kg (192 lb 11 2 oz)    Height: 5' 8" (1 727 m)        Physical Exam:    General: Alert, oriented, well developed , no acute distress  HEENT/NECK:  PERRLA  No jugular venous distention  [place]   Cardiac:Regular rate and rhythm, III/VI harsh systolic murmur RUSB   Carotid arteries: 1+ pulses, delayed upstrokes, no bruits  Pulmonary:  Breath sounds clear bilaterally  Abdomen:  Non-tender, Non-distended  Positive bowel sounds  Upper extremities: 2+ radial pulses; ) 0 Ulnar pulses  RIGHT handed; Abnormal Chintan's test left hand  Lower extremities: Extremities warm/dry; PT/DP pulses absent bilaterally  No edema B/L; rubor b/l feet/lower legs; significant spider varicosities   Neuro: Alert and oriented X 3  Sensation is grossly intact  No focal deficits  Musculoskeletal: MAEE, no deficits, stable gait  Skin: Warm/Dry, without rashes or lesions  Lab Results:     Imaging Studies:     Echocardiogram: 6/10/21  LEFT VENTRICLE:  Systolic function was normal  Ejection fraction was estimated to be 60 %  There were no regional wall motion abnormalities  Doppler parameters were consistent with abnormal left ventricular relaxation (grade 1 diastolic dysfunction)      AORTIC VALVE:  The valve was trileaflet  Leaflets exhibited marked calcification and markedly reduced cuspal separation  Transaortic velocity was increased due to valvular stenosis  There was severe stenosis  There was moderate regurgitation    Valve mean gradient was 39 mmHg      I have personally reviewed pertinent films in PACS    TAVR evaluation Assessment:     5 Meter Walk: 5 sec, 5 sec, 6 sec    KCCQ-12 completed    Assessment:  Patient Active Problem List    Diagnosis Date Noted    Former heavy tobacco smoker 08/13/2021    [facility] of the [facility] 04/07/2021    Protein C deficiency ([facility] ) 04/07/2021    Elevated PSA, between 10 and less than 20 ng/ml 02/11/2019    Dyslipidemia 10/23/2018    ASCVD (arteriosclerotic cardiovascular disease) 10/23/2018    Stage 3 chronic kidney disease 10/23/2018    Nonrheumatic aortic valve stenosis 10/23/2018    Essential hypertension 10/23/2018    S/P RIGHT Femoral- PT bypass 2011 09/26/2018    Claud[facility] of left lower [facility] 08/02/2018    Peripheral artery disease ([place] [address] ) 07/19/2018     Severe aortic stenosis; Ongoing SAVR vs TAVR workup    Plan:    Genet Vega has  severe aortic stenosis  They will undergo the following testing for  aortic valve replacement: Gated CTA of the chest/abdomen/pelvis, 3D RADHA, cardiac catheterization, dental clearance, pulmonary function tests with ABG, and carotid artery ultrasound  Once these studies have been completed, Genet Vega will follow up in our office to review the results and to be evaluated to determine surgical treatment plan: open surgical AVR +/- CABG vs  transcatheter aortic valve replacement +/- PCI     Genet Vega was comfortable with our recommendations, and their questions were answered to their satisfaction  Thank you for allowing us to participate in the care of this patient         SIGNATURE: FILIPPO Ayala  DATE: August 13, 2021  TIME: 10:14 AM

## 2021-08-16 ENCOUNTER — APPOINTMENT (OUTPATIENT)
Dept: LAB | Facility: MEDICAL CENTER | Age: 71
End: 2021-08-16
Payer: MEDICARE

## 2021-08-16 ENCOUNTER — TELEPHONE (OUTPATIENT)
Dept: CARDIOLOGY CLINIC | Facility: CLINIC | Age: 71
End: 2021-08-16

## 2021-08-16 ENCOUNTER — TELEPHONE (OUTPATIENT)
Dept: RADIOLOGY | Facility: HOSPITAL | Age: 71
End: 2021-08-16

## 2021-08-16 DIAGNOSIS — I35.0 NONRHEUMATIC AORTIC VALVE STENOSIS: ICD-10-CM

## 2021-08-16 DIAGNOSIS — I73.9 PERIPHERAL ARTERY DISEASE (HCC): Primary | ICD-10-CM

## 2021-08-16 DIAGNOSIS — Z01.818 PRE-OP TESTING: ICD-10-CM

## 2021-08-16 LAB
ALBUMIN SERPL BCP-MCNC: 3.8 G/DL (ref 3.5–5)
ALP SERPL-CCNC: 183 U/L (ref 46–116)
ALT SERPL W P-5'-P-CCNC: 32 U/L (ref 12–78)
ANION GAP SERPL CALCULATED.3IONS-SCNC: 3 MMOL/L (ref 4–13)
AST SERPL W P-5'-P-CCNC: 27 U/L (ref 5–45)
BILIRUB SERPL-MCNC: 0.87 MG/DL (ref 0.2–1)
BUN SERPL-MCNC: 21 MG/DL (ref 5–25)
CALCIUM SERPL-MCNC: 9.5 MG/DL (ref 8.3–10.1)
CHLORIDE SERPL-SCNC: 105 MMOL/L (ref 100–108)
CO2 SERPL-SCNC: 31 MMOL/L (ref 21–32)
CREAT SERPL-MCNC: 1.4 MG/DL (ref 0.6–1.3)
GFR SERPL CREATININE-BSD FRML MDRD: 51 ML/MIN/1.73SQ M
GLUCOSE P FAST SERPL-MCNC: 98 MG/DL (ref 65–99)
INR PPP: 3.66 (ref 0.84–1.19)
POTASSIUM SERPL-SCNC: 4.5 MMOL/L (ref 3.5–5.3)
PROT SERPL-MCNC: 7.6 G/DL (ref 6.4–8.2)
PROTHROMBIN TIME: 36.1 SECONDS (ref 11.6–14.5)
SODIUM SERPL-SCNC: 139 MMOL/L (ref 136–145)

## 2021-08-16 PROCEDURE — 80053 COMPREHEN METABOLIC PANEL: CPT

## 2021-08-16 PROCEDURE — 36415 COLL VENOUS BLD VENIPUNCTURE: CPT

## 2021-08-16 PROCEDURE — 85610 PROTHROMBIN TIME: CPT

## 2021-08-17 ENCOUNTER — HOSPITAL ENCOUNTER (OUTPATIENT)
Dept: NON INVASIVE DIAGNOSTICS | Facility: HOSPITAL | Age: 71
Discharge: HOME | End: 2021-08-17
Payer: MEDICARE

## 2021-08-17 ENCOUNTER — HOSPITAL ENCOUNTER (OUTPATIENT)
Dept: RADIOLOGY | Facility: HOSPITAL | Age: 71
Discharge: HOME | End: 2021-08-17
Payer: MEDICARE

## 2021-08-17 DIAGNOSIS — Z98.890 S/P FEMORAL-TIBIAL BYPASS: ICD-10-CM

## 2021-08-17 DIAGNOSIS — Z01.818 PRE-OP TESTING: ICD-10-CM

## 2021-08-17 DIAGNOSIS — I35.0 NONRHEUMATIC AORTIC VALVE STENOSIS: ICD-10-CM

## 2021-08-17 DIAGNOSIS — I74.3 EMBOLISM AND THROMBOSIS OF ARTERIES OF THE LOWER EXTREMITIES (HCC): ICD-10-CM

## 2021-08-17 DIAGNOSIS — I73.9 CLAUDICATION OF LEFT LOWER EXTREMITY (HCC): ICD-10-CM

## 2021-08-17 DIAGNOSIS — I73.9 PERIPHERAL ARTERY DISEASE (HCC): ICD-10-CM

## 2021-08-17 DIAGNOSIS — Z01.89 ENCOUNTER FOR IMAGING OF BILATERAL GREATER SAPHENOUS VEINS: ICD-10-CM

## 2021-08-17 DIAGNOSIS — Z13.6 ENCOUNTER FOR SCREENING FOR STENOSIS OF CAROTID ARTERY: ICD-10-CM

## 2021-08-17 DIAGNOSIS — Z87.891 FORMER HEAVY TOBACCO SMOKER: ICD-10-CM

## 2021-08-17 DIAGNOSIS — D68.59 PROTEIN C DEFICIENCY (HCC): ICD-10-CM

## 2021-08-17 PROCEDURE — G1004 CDSM NDSC: HCPCS

## 2021-08-17 PROCEDURE — 93971 EXTREMITY STUDY: CPT | Performed by: SURGERY

## 2021-08-17 PROCEDURE — 74174 CTA ABD&PLVS W/CONTRAST: CPT

## 2021-08-17 PROCEDURE — 75572 CT HRT W/3D IMAGE: CPT

## 2021-08-17 PROCEDURE — 93880 EXTRACRANIAL BILAT STUDY: CPT

## 2021-08-17 PROCEDURE — 93880 EXTRACRANIAL BILAT STUDY: CPT | Performed by: SURGERY

## 2021-08-17 RX ADMIN — IODIXANOL 120 ML: 320 INJECTION, SOLUTION INTRAVASCULAR at 11:55

## 2021-08-18 DIAGNOSIS — I35.0 NONRHEUMATIC AORTIC VALVE STENOSIS: Primary | ICD-10-CM

## 2021-08-18 NOTE — TELEPHONE ENCOUNTER
Spoke with wife, pt is scheduled for a R+LHC on 8/27/2021 at [facility] with Dr Morgan  Pt's wife aware, pt is to hold Coumadin for 3 days  INR is to be done 2 days prior to procedure date  Lab work and pt instructions will be mailed  Pt has Medicare as primary insurance

## 2021-08-23 ENCOUNTER — HOSPITAL ENCOUNTER (OUTPATIENT)
Dept: PULMONOLOGY | Facility: HOSPITAL | Age: 71
Discharge: HOME | End: 2021-08-23
Payer: MEDICARE

## 2021-08-23 DIAGNOSIS — Z01.818 PRE-OP TESTING: ICD-10-CM

## 2021-08-23 DIAGNOSIS — I35.0 NONRHEUMATIC AORTIC VALVE STENOSIS: ICD-10-CM

## 2021-08-23 PROCEDURE — 94729 DIFFUSING CAPACITY: CPT

## 2021-08-23 PROCEDURE — 94726 PLETHYSMOGRAPHY LUNG VOLUMES: CPT

## 2021-08-23 PROCEDURE — 94760 N-INVAS EAR/PLS OXIMETRY 1: CPT

## 2021-08-23 PROCEDURE — 94729 DIFFUSING CAPACITY: CPT | Performed by: INTERNAL MEDICINE

## 2021-08-23 PROCEDURE — 94726 PLETHYSMOGRAPHY LUNG VOLUMES: CPT | Performed by: INTERNAL MEDICINE

## 2021-08-23 PROCEDURE — 94060 EVALUATION OF WHEEZING: CPT | Performed by: INTERNAL MEDICINE

## 2021-08-23 PROCEDURE — 94060 EVALUATION OF WHEEZING: CPT

## 2021-08-23 RX ORDER — ALBUTEROL SULFATE 2.5 MG/3ML
2.5 SOLUTION RESPIRATORY (INHALATION) ONCE
Status: DISCONTINUED | OUTPATIENT
Start: 2021-08-23 | End: 2021-08-27 | Stop reason: HOSPADM

## 2021-08-25 ENCOUNTER — APPOINTMENT (OUTPATIENT)
Dept: LAB | Facility: MEDICAL CENTER | Age: 71
End: 2021-08-25
Payer: MEDICARE

## 2021-08-25 LAB
ALBUMIN SERPL BCP-MCNC: 3.4 G/DL (ref 3.5–5)
ALP SERPL-CCNC: 174 U/L (ref 46–116)
ALT SERPL W P-5'-P-CCNC: 30 U/L (ref 12–78)
ANION GAP SERPL CALCULATED.3IONS-SCNC: 1 MMOL/L (ref 4–13)
AST SERPL W P-5'-P-CCNC: 32 U/L (ref 5–45)
BASOPHILS # BLD AUTO: 0.04 THOUSANDS/ΜL (ref 0–0.1)
BASOPHILS NFR BLD AUTO: 0 % (ref 0–1)
BILIRUB SERPL-MCNC: 0.84 MG/DL (ref 0.2–1)
BUN SERPL-MCNC: 18 MG/DL (ref 5–25)
CALCIUM ALBUM COR SERPL-MCNC: 9.5 MG/DL (ref 8.3–10.1)
CALCIUM SERPL-MCNC: 9 MG/DL (ref 8.3–10.1)
CHLORIDE SERPL-SCNC: 108 MMOL/L (ref 100–108)
CO2 SERPL-SCNC: 30 MMOL/L (ref 21–32)
CREAT SERPL-MCNC: 1.36 MG/DL (ref 0.6–1.3)
EOSINOPHIL # BLD AUTO: 0.17 THOUSAND/ΜL (ref 0–0.61)
EOSINOPHIL NFR BLD AUTO: 2 % (ref 0–6)
ERYTHROCYTE [DISTWIDTH] IN BLOOD BY AUTOMATED COUNT: 12.7 % (ref 11.6–15.1)
GFR SERPL CREATININE-BSD FRML MDRD: 52 ML/MIN/1.73SQ M
GLUCOSE P FAST SERPL-MCNC: 78 MG/DL (ref 65–99)
HCT VFR BLD AUTO: 48.7 % (ref 36.5–49.3)
HGB BLD-MCNC: 15.8 G/DL (ref 12–17)
IMM GRANULOCYTES # BLD AUTO: 0.03 THOUSAND/UL (ref 0–0.2)
IMM GRANULOCYTES NFR BLD AUTO: 0 % (ref 0–2)
INR PPP: 3.3 (ref 0.84–1.19)
LYMPHOCYTES # BLD AUTO: 2.02 THOUSANDS/ΜL (ref 0.6–4.47)
LYMPHOCYTES NFR BLD AUTO: 22 % (ref 14–44)
MCH RBC QN AUTO: 30.9 PG (ref 26.8–34.3)
MCHC RBC AUTO-ENTMCNC: 32.4 G/DL (ref 31.4–37.4)
MCV RBC AUTO: 95 FL (ref 82–98)
MONOCYTES # BLD AUTO: 0.84 THOUSAND/ΜL (ref 0.17–1.22)
MONOCYTES NFR BLD AUTO: 9 % (ref 4–12)
NEUTROPHILS # BLD AUTO: 5.94 THOUSANDS/ΜL (ref 1.85–7.62)
NEUTS SEG NFR BLD AUTO: 67 % (ref 43–75)
NRBC BLD AUTO-RTO: 0 /100 WBCS
PLATELET # BLD AUTO: 199 THOUSANDS/UL (ref 149–390)
PMV BLD AUTO: 10 FL (ref 8.9–12.7)
POTASSIUM SERPL-SCNC: 4.7 MMOL/L (ref 3.5–5.3)
PROT SERPL-MCNC: 7.1 G/DL (ref 6.4–8.2)
PROTHROMBIN TIME: 33.3 SECONDS (ref 11.6–14.5)
RBC # BLD AUTO: 5.12 MILLION/UL (ref 3.88–5.62)
SODIUM SERPL-SCNC: 139 MMOL/L (ref 136–145)
WBC # BLD AUTO: 9.04 THOUSAND/UL (ref 4.31–10.16)

## 2021-08-25 PROCEDURE — 85610 PROTHROMBIN TIME: CPT

## 2021-08-25 PROCEDURE — 85025 COMPLETE CBC W/AUTO DIFF WBC: CPT

## 2021-08-25 PROCEDURE — 80053 COMPREHEN METABOLIC PANEL: CPT

## 2021-08-25 PROCEDURE — 36415 COLL VENOUS BLD VENIPUNCTURE: CPT

## 2021-08-27 ENCOUNTER — HOSPITAL ENCOUNTER (OUTPATIENT)
Dept: NON INVASIVE DIAGNOSTICS | Facility: HOSPITAL | Age: 71
Discharge: HOME | End: 2021-08-27
Attending: INTERNAL MEDICINE | Admitting: INTERNAL MEDICINE
Payer: MEDICARE

## 2021-08-27 VITALS
BODY MASS INDEX: 28.79 KG/M2 | SYSTOLIC BLOOD PRESSURE: 130 MMHG | OXYGEN SATURATION: 94 % | DIASTOLIC BLOOD PRESSURE: 64 MMHG | RESPIRATION RATE: 16 BRPM | HEIGHT: 68 IN | WEIGHT: 190 LBS | TEMPERATURE: 97.5 F | HEART RATE: 65 BPM

## 2021-08-27 DIAGNOSIS — Z01.818 PRE-OP TESTING: ICD-10-CM

## 2021-08-27 DIAGNOSIS — I73.9 PERIPHERAL VASCULAR DISEASE (HCC): ICD-10-CM

## 2021-08-27 DIAGNOSIS — D68.59 PROTEIN C DEFICIENCY (HCC): ICD-10-CM

## 2021-08-27 DIAGNOSIS — I35.0 NONRHEUMATIC AORTIC VALVE STENOSIS: ICD-10-CM

## 2021-08-27 DIAGNOSIS — N18.31 STAGE 3A CHRONIC KIDNEY DISEASE (HCC): Primary | ICD-10-CM

## 2021-08-27 LAB
ANION GAP SERPL CALCULATED.3IONS-SCNC: 6 MMOL/L (ref 4–13)
ATRIAL RATE: 66 BPM
BUN SERPL-MCNC: 19 MG/DL (ref 5–25)
CALCIUM SERPL-MCNC: 9 MG/DL (ref 8.3–10.1)
CHLORIDE SERPL-SCNC: 109 MMOL/L (ref 100–108)
CO2 SERPL-SCNC: 26 MMOL/L (ref 21–32)
CREAT SERPL-MCNC: 1.29 MG/DL (ref 0.6–1.3)
GFR SERPL CREATININE-BSD FRML MDRD: 56 ML/MIN/1.73SQ M
GLUCOSE P FAST SERPL-MCNC: 88 MG/DL (ref 65–99)
GLUCOSE SERPL-MCNC: 88 MG/DL (ref 65–140)
INR PPP: 2.05 (ref 0.84–1.19)
P AXIS: 56 DEGREES
POTASSIUM SERPL-SCNC: 3.8 MMOL/L (ref 3.5–5.3)
PR INTERVAL: 134 MS
PROTHROMBIN TIME: 23 SECONDS (ref 11.6–14.5)
QRS AXIS: -15 DEGREES
QRSD INTERVAL: 88 MS
QT INTERVAL: 416 MS
QTC INTERVAL: 436 MS
SODIUM SERPL-SCNC: 141 MMOL/L (ref 136–145)
T WAVE AXIS: 4 DEGREES
VENTRICULAR RATE: 66 BPM

## 2021-08-27 PROCEDURE — C1769 GUIDE WIRE: HCPCS | Performed by: NURSE PRACTITIONER

## 2021-08-27 PROCEDURE — 93005 ELECTROCARDIOGRAM TRACING: CPT

## 2021-08-27 PROCEDURE — 99152 MOD SED SAME PHYS/QHP 5/>YRS: CPT | Performed by: NURSE PRACTITIONER

## 2021-08-27 PROCEDURE — 99152 MOD SED SAME PHYS/QHP 5/>YRS: CPT | Performed by: INTERNAL MEDICINE

## 2021-08-27 PROCEDURE — 93454 CORONARY ARTERY ANGIO S&I: CPT | Performed by: INTERNAL MEDICINE

## 2021-08-27 PROCEDURE — 80048 BASIC METABOLIC PNL TOTAL CA: CPT | Performed by: INTERNAL MEDICINE

## 2021-08-27 PROCEDURE — 93454 CORONARY ARTERY ANGIO S&I: CPT | Performed by: NURSE PRACTITIONER

## 2021-08-27 PROCEDURE — 93010 ELECTROCARDIOGRAM REPORT: CPT | Performed by: INTERNAL MEDICINE

## 2021-08-27 PROCEDURE — C1894 INTRO/SHEATH, NON-LASER: HCPCS | Performed by: NURSE PRACTITIONER

## 2021-08-27 PROCEDURE — 99153 MOD SED SAME PHYS/QHP EA: CPT | Performed by: NURSE PRACTITIONER

## 2021-08-27 PROCEDURE — 85610 PROTHROMBIN TIME: CPT | Performed by: INTERNAL MEDICINE

## 2021-08-27 RX ORDER — VERAPAMIL HYDROCHLORIDE 2.5 MG/ML
INJECTION, SOLUTION INTRAVENOUS CODE/TRAUMA/SEDATION MEDICATION
Status: COMPLETED | OUTPATIENT
Start: 2021-08-27 | End: 2021-08-27

## 2021-08-27 RX ORDER — AMLODIPINE BESYLATE 5 MG/1
5 TABLET ORAL AS NEEDED
COMMUNITY
End: 2021-09-20 | Stop reason: SDUPTHER

## 2021-08-27 RX ORDER — FENTANYL CITRATE 50 UG/ML
INJECTION, SOLUTION INTRAMUSCULAR; INTRAVENOUS CODE/TRAUMA/SEDATION MEDICATION
Status: COMPLETED | OUTPATIENT
Start: 2021-08-27 | End: 2021-08-27

## 2021-08-27 RX ORDER — HEPARIN SODIUM 1000 [USP'U]/ML
INJECTION, SOLUTION INTRAVENOUS; SUBCUTANEOUS CODE/TRAUMA/SEDATION MEDICATION
Status: COMPLETED | OUTPATIENT
Start: 2021-08-27 | End: 2021-08-27

## 2021-08-27 RX ORDER — ASPIRIN 81 MG/1
81 TABLET, CHEWABLE ORAL DAILY
Status: DISCONTINUED | OUTPATIENT
Start: 2021-08-28 | End: 2021-08-27 | Stop reason: HOSPADM

## 2021-08-27 RX ORDER — LIDOCAINE HYDROCHLORIDE 10 MG/ML
INJECTION, SOLUTION EPIDURAL; INFILTRATION; INTRACAUDAL; PERINEURAL CODE/TRAUMA/SEDATION MEDICATION
Status: COMPLETED | OUTPATIENT
Start: 2021-08-27 | End: 2021-08-27

## 2021-08-27 RX ORDER — SODIUM CHLORIDE 9 MG/ML
125 INJECTION, SOLUTION INTRAVENOUS CONTINUOUS
Status: DISPENSED | OUTPATIENT
Start: 2021-08-27 | End: 2021-08-27

## 2021-08-27 RX ORDER — ASPIRIN 81 MG/1
324 TABLET, CHEWABLE ORAL ONCE
Status: COMPLETED | OUTPATIENT
Start: 2021-08-27 | End: 2021-08-27

## 2021-08-27 RX ORDER — ACETAMINOPHEN 325 MG/1
650 TABLET ORAL EVERY 4 HOURS PRN
Status: DISCONTINUED | OUTPATIENT
Start: 2021-08-27 | End: 2021-08-27 | Stop reason: HOSPADM

## 2021-08-27 RX ORDER — NITROGLYCERIN 20 MG/100ML
INJECTION INTRAVENOUS CODE/TRAUMA/SEDATION MEDICATION
Status: COMPLETED | OUTPATIENT
Start: 2021-08-27 | End: 2021-08-27

## 2021-08-27 RX ORDER — ONDANSETRON 2 MG/ML
4 INJECTION INTRAMUSCULAR; INTRAVENOUS EVERY 6 HOURS PRN
Status: DISCONTINUED | OUTPATIENT
Start: 2021-08-27 | End: 2021-08-27 | Stop reason: HOSPADM

## 2021-08-27 RX ORDER — MIDAZOLAM HYDROCHLORIDE 2 MG/2ML
INJECTION, SOLUTION INTRAMUSCULAR; INTRAVENOUS CODE/TRAUMA/SEDATION MEDICATION
Status: COMPLETED | OUTPATIENT
Start: 2021-08-27 | End: 2021-08-27

## 2021-08-27 RX ORDER — SODIUM CHLORIDE 9 MG/ML
50 INJECTION, SOLUTION INTRAVENOUS CONTINUOUS
Status: DISCONTINUED | OUTPATIENT
Start: 2021-08-27 | End: 2021-08-27

## 2021-08-27 RX ORDER — NITROGLYCERIN 0.4 MG/1
0.4 TABLET SUBLINGUAL
Status: DISCONTINUED | OUTPATIENT
Start: 2021-08-27 | End: 2021-08-27 | Stop reason: HOSPADM

## 2021-08-27 RX ORDER — HYDROCHLOROTHIAZIDE 25 MG/1
25 TABLET ORAL AS NEEDED
COMMUNITY
End: 2021-09-20 | Stop reason: SDUPTHER

## 2021-08-27 RX ADMIN — LIDOCAINE HYDROCHLORIDE 1 ML: 10 INJECTION, SOLUTION EPIDURAL; INFILTRATION; INTRACAUDAL; PERINEURAL at 11:52

## 2021-08-27 RX ADMIN — ASPIRIN 81 MG 324 MG: 81 TABLET ORAL at 08:46

## 2021-08-27 RX ADMIN — SODIUM CHLORIDE 262.2 ML: 9 INJECTION, SOLUTION INTRAVENOUS at 08:41

## 2021-08-27 RX ADMIN — FENTANYL CITRATE 50 MCG: 50 INJECTION INTRAMUSCULAR; INTRAVENOUS at 11:38

## 2021-08-27 RX ADMIN — HEPARIN SODIUM 4000 UNITS: 1000 INJECTION INTRAVENOUS; SUBCUTANEOUS at 11:56

## 2021-08-27 RX ADMIN — VERAPAMIL HYDROCHLORIDE 2.5 MG: 2.5 INJECTION, SOLUTION INTRAVENOUS at 11:56

## 2021-08-27 RX ADMIN — MIDAZOLAM 2 MG: 1 INJECTION INTRAMUSCULAR; INTRAVENOUS at 11:38

## 2021-08-27 RX ADMIN — Medication 200 MCG: at 11:56

## 2021-08-27 NOTE — INTERVAL H&P NOTE
Prior H&P reviewed  Patient seen and examined  BP (!) 171/80 (BP Location: Right arm)   Pulse 67   Temp 97 5 °F (36 4 °C) (Oral)   Resp 16   Ht 5' 8" (1 727 m)   Wt 86 2 kg (190 lb)   SpO2 96%   BMI 28 89 kg/m²      After examining the patient, I find no changed to the H&P since it has been written       Accept for today's history and physical

## 2021-08-27 NOTE — DISCHARGE INSTRUCTIONS
1  Please see the post cardiac catheterization dishcarge instructions  No heavy lifting, greater than 10 lbs  or strenuous  activity for 48 hrs  2 Remove band aid tomorrow  Shower and wash area- wrist gently with soap and water- beginning tomorrow  Rinse and pat dry  Apply new water seal band aid  Repeat this process for 5 days  No powders, creams lotions or antibiotic ointments  for 5 days  No tub baths, hot tubs or swimming for 5 days  3  Please call our office ([phone]) if you have any fever, redness, swelling, discharge from your wrist access site  4 No driving for 2 days   Left Heart Catheterization   WHAT YOU NEED TO KNOW:   A left heart catheterization is a procedure to look at your heart and its arteries  You may need this procedure if you have chest pain, heart disease, or your heart is not working as it should  WHILE YOU ARE HERE:   Before your procedure:   · Informed consent  is a legal document that explains the tests, treatments, or procedures that you may need  Informed consent means you understand what will be done and can make decisions about what you want  You give your permission when you sign the consent form  You can have someone sign this form for you if you are not able to sign it  You have the right to understand your medical care in words you know  Before you sign the consent form, understand the risks and benefits of what will be done  Make sure all your questions are answered  · An IV  is a small tube placed in your vein that is used to give you medicine or liquids  · Medicines:      ? Antihistamines  help prevent a reaction to the dye used during your heart catheterization  ? A sedative  is given to help you stay calm and relaxed  ? Steroids  decrease inflammation and help prevent a reaction to the contrast dye  · Local anesthesia  is a shot of medicine put into your arm or leg  It is used to numb the area and dull the pain   You may still feel pressure or pushing during the procedure  During your procedure:   · Your healthcare provider will insert a catheter into an artery in your arm, wrist, or leg  An x-ray will be used to carefully guide the catheter to your heart  He will inject a dye so he can see the blood vessels, muscle, or valves of your heart more clearly  You may get a warm feeling or slight nausea right after the dye is injected  This is normal, and will pass quickly  Your healthcare provider may remove a small sample of heart tissue and send it to a lab for testing  He may also open a narrow or blocked heart valve or artery  A stent (small tube) may be left inside your artery to hold it open  · The catheter may be left in place to monitor pressure in your heart  When the catheter is removed, a healthcare provider will apply pressure to the site for at least 30 minutes to help decrease the risk of bleeding  A collagen plug or other closure devices may be used to close the site  If the site is in your wrist, your healthcare provider will place a compression device around your wrist  Healthcare providers will cover the site with a pressure bandage to decrease further bleeding  After your procedure: You will be taken to a room to rest until you are fully awake  If insertion was in your wrist, the pressure device will be around your wrist  Healthcare providers will slowly decrease pressure in the device  If insertion was in your groin, a pressure bandage will be in place  Keep your arm or leg straight  Do not  get out of bed until your healthcare provider says it is okay  Healthcare providers will frequently monitor your vital signs and pulses  They will also frequently check your wound for bleeding  Healthcare providers may ask you to drink more liquids to help flush the dye out of your body  If the catheter was in your groin and you need to cough, apply pressure over the area with your hands as directed    RISKS:   During the procedure, the catheter may tear an artery and cause bleeding  An air bubble may enter your lung, or your lung may collapse  You may have a heart attack  After the procedure, you may have bleeding or an infection  You may have damage to a heart valve, or a fistula (abnormal opening) may form between an artery and vein  You may have irregular heartbeats, which may cause dizziness or fainting  You may get a blood clot in your leg or arm  Without this procedure, your condition may get worse  These problems may become life-threatening  CARE AGREEMENT:   You have the right to help plan your care  Learn about your health condition and how it may be treated  Discuss treatment options with your healthcare providers to decide what care you want to receive  You always have the right to refuse treatment  © Copyright [employer] 2018 Information is for End User's use only and may not be sold, redistributed or otherwise used for commercial purposes  All illustrations and images included in CareNotes® are the copyrighted property of A D A M [employer] Inc  or [facility] Preeti Beck   The above information is an  only  It is not intended as medical advice for individual conditions or treatments  Talk to your doctor, nurse or pharmacist before following any medical regimen to see if it is safe and effective for you

## 2021-08-31 ENCOUNTER — APPOINTMENT (OUTPATIENT)
Dept: LAB | Facility: MEDICAL CENTER | Age: 71
End: 2021-08-31
Payer: MEDICARE

## 2021-08-31 DIAGNOSIS — N18.31 STAGE 3A CHRONIC KIDNEY DISEASE (HCC): ICD-10-CM

## 2021-08-31 DIAGNOSIS — D68.59 PROTEIN C DEFICIENCY (HCC): ICD-10-CM

## 2021-08-31 DIAGNOSIS — I73.9 PERIPHERAL ARTERY DISEASE (HCC): ICD-10-CM

## 2021-08-31 LAB
ANION GAP SERPL CALCULATED.3IONS-SCNC: 5 MMOL/L (ref 4–13)
BUN SERPL-MCNC: 21 MG/DL (ref 5–25)
CALCIUM SERPL-MCNC: 8.8 MG/DL (ref 8.3–10.1)
CHLORIDE SERPL-SCNC: 109 MMOL/L (ref 100–108)
CO2 SERPL-SCNC: 29 MMOL/L (ref 21–32)
CREAT SERPL-MCNC: 1.24 MG/DL (ref 0.6–1.3)
GFR SERPL CREATININE-BSD FRML MDRD: 59 ML/MIN/1.73SQ M
GLUCOSE P FAST SERPL-MCNC: 87 MG/DL (ref 65–99)
INR PPP: 1.91 (ref 0.84–1.19)
POTASSIUM SERPL-SCNC: 4.2 MMOL/L (ref 3.5–5.3)
PROTHROMBIN TIME: 21.8 SECONDS (ref 11.6–14.5)
SODIUM SERPL-SCNC: 143 MMOL/L (ref 136–145)

## 2021-08-31 PROCEDURE — 85610 PROTHROMBIN TIME: CPT

## 2021-08-31 PROCEDURE — 36415 COLL VENOUS BLD VENIPUNCTURE: CPT

## 2021-08-31 PROCEDURE — 80048 BASIC METABOLIC PNL TOTAL CA: CPT

## 2021-09-03 ENCOUNTER — OFFICE VISIT (OUTPATIENT)
Dept: CARDIAC SURGERY | Facility: CLINIC | Age: 71
End: 2021-09-03
Payer: MEDICARE

## 2021-09-03 ENCOUNTER — PREP FOR PROCEDURE (OUTPATIENT)
Dept: CARDIAC SURGERY | Facility: CLINIC | Age: 71
End: 2021-09-03

## 2021-09-03 VITALS
DIASTOLIC BLOOD PRESSURE: 82 MMHG | OXYGEN SATURATION: 98 % | BODY MASS INDEX: 29.34 KG/M2 | SYSTOLIC BLOOD PRESSURE: 156 MMHG | HEART RATE: 69 BPM | RESPIRATION RATE: 18 BRPM | HEIGHT: 68 IN | WEIGHT: 193.6 LBS

## 2021-09-03 DIAGNOSIS — I35.0 NONRHEUMATIC AORTIC VALVE STENOSIS: Primary | ICD-10-CM

## 2021-09-03 DIAGNOSIS — D68.59 PROTEIN C DEFICIENCY (HCC): ICD-10-CM

## 2021-09-03 DIAGNOSIS — I73.9 PERIPHERAL ARTERY DISEASE (HCC): ICD-10-CM

## 2021-09-03 PROCEDURE — 99215 OFFICE O/P EST HI 40 MIN: CPT | Performed by: NURSE PRACTITIONER

## 2021-09-03 NOTE — PROGRESS NOTES
Consultation - Cardiothoracic Surgery   Aubrie Mcclendon 80 y o  male MRN: [id number]    Physician Requesting Consult: Dr Ericka Marroquin    Reason for Consult / Principal Problem: Aortic stenosis, Non-Rheumatic    History of Present Illness: Aubrie Mcclendon is a 80y o  year old male who was previously evaluated in our office by HIMA Louis  for transcatheter aortic valve replacement  During this initial consultation, arrangements were made for the following studies to be completed: Gated CTA of the chest/abdomen/pelvis, cardiac catheterization, dental clearance, pulmonary function tests with ABG, and carotid artery ultrasound  Aubrie Mcclendon now presents to review the results of these tests and obtain a second surgeon to confirm the suitability of proceeding with transcatheter aortic valve replacment  In review, Aubrie Mcclendon is a 80y o  year old male with PMHx notable for AS, CAD (non-obst), HTN, HLD, CKD3, 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, PAD s/p R Fem-PT bypass (5/2011), h/o frost bite - feet (2011) and hearing loss  Patient with h/o heart murmur for few years  Echo in 2018 with moderate AS  Most recent echo with progression to severe AS, TACHO 0 8-0 9 cm2, MG 39/PG 65 mm Hg, Vmax 403 cm/s      Upon interview today patient reports he is asymptomatic from a cardiac standpoint  He has significant LE PAD with claudication in his left leg  He denies chest pain, SOB, RAMOS, lightheadedness, palpitations, change in activity tolerance, weight gain or edema  He is able to mow is 1/2 acre lawn (walk behind ) without cardiac symptoms  He does stop to rest due to left calf claudication, however can continue for up to 20-30 min before stopping       Patient quit smoking in 2018  He does not consume alcohol  Covid immunizations on 3/17/21 & 4/14/21      Past Medical History:  Past Medical History:   Diagnosis Date    DVT (deep venous thrombosis) (HCC)     Protein C deficiency (HCC)     Pulmonary embolus [facility])          Past Surgical History:   Past Surgical History:   Procedure Laterality Date    BACK SURGERY      BYPASS FEMORAL-TIBIAL Right 2011    VEIN LIGATION           Family History:  Family History   Problem Relation Age of Onset    Cancer Mother 50    Heart attack Father 46    Hypertension Father     Prostate cancer Brother     Arrhythmia Neg Hx     Clotting disorder Neg Hx     Fainting Neg Hx     Heart disease Neg Hx     Anuerysm Neg Hx     Stroke Neg Hx          Social History:    Social History     Substance and Sexual Activity   Alcohol Use No     Social History     Substance and Sexual Activity   Drug Use No     Social History     Tobacco Use   Smoking Status Former Smoker    Types: Cigarettes    Quit date: 2018    Years since quittin 9   Smokeless Tobacco Never Used   Tobacco Comment    1 pk every 2days , currently on patches to quit  Home Medications:   Prior to Admission medications    Medication Sig Start Date End Date Taking?  Authorizing Provider   amLODIPine (NORVASC) 5 mg tablet Take 5 mg by mouth as needed (only takes when B/P elevated)   Yes Historical Provider, MD   atorvastatin (LIPITOR) 80 mg tablet Take 1 tablet (80 mg total) by mouth daily 21  Yes Taylor Peralta MD   clopidogrel (PLAVIX) 75 mg tablet Take 1 tablet (75 mg total) by mouth daily 21  Yes Rosario Estrada PA-C   hydrochlorothiazide (HYDRODIURIL) 25 mg tablet Take 25 mg by mouth as needed (takes with norvasc when b?p elevated)   Yes Historical Provider, MD   warfarin (COUMADIN) 5 mg tablet Take 1 tablet (5 mg total) by mouth daily 3/23/21  Yes Michael Burk MD       Allergies:  No Known Allergies    Review of Systems:  Review of Systems - History obtained from chart review and the patient  General ROS: negative  Psychological ROS: negative  Ophthalmic ROS: negative  ENT ROS: negative  Allergy and Immunology ROS: negative  Hematological and Lymphatic ROS: positive for - blood clots  negative for - bleeding problems, bruising or jaundice  Endocrine ROS: negative  Respiratory ROS: no cough, shortness of breath, or wheezing  Cardiovascular ROS: positive for - murmur and left calf claudication  negative for - chest pain, dyspnea on exertion, edema, irregular heartbeat, loss of consciousness, orthopnea, palpitations or paroxysmal nocturnal dyspnea  Gastrointestinal ROS: no abdominal pain, change in bowel habits, or black or bloody stools  Genito-Urinary ROS: no dysuria, trouble voiding, or hematuria  Musculoskeletal ROS: negative  Neurological ROS: no TIA or stroke symptoms  Dermatological ROS: negative    Vital Signs:     Vitals:    09/03/21 1301 09/03/21 1304   BP: 150/80 156/82   BP Location: Left arm Left arm   Patient Position: Sitting    Cuff Size: Standard    Pulse: 69    Resp: 18    SpO2: 98%    Weight: 87 8 kg (193 lb 9 6 oz)    Height: 5' 8" (1 727 m)        Physical Exam:    General: Alert, oriented, well developed, no acute distress  HEENT/NECK:  PERRLA  No jugular venous distention  Cardiac:Regular rate and rhythm, III/VI harsh systolic murmur RUSB  Carotids: ) pulse on right, 1 + left with bruit vs radiation of cardiac murmur  Pulmonary:  Breath sounds clear bilaterally  Abdomen:  Non-tender, Non-distended  Positive bowel sounds  Upper extremities: 2+ radial pulses, o ulnar pulses; brisk capillary refill  Lower extremities: Extremities warm/dry; PT/DP pulses absent bilaterally  No edema B/L; rubor b/l feet/lower legs; significant spider veins  Neuro: Alert and oriented X 3  Sensation is grossly intact  No focal deficits  Musculoskeletal: MAEE, stable gait  Skin: Warm/Dry, without rashes or lesions      Lab Results:         Results from last 7 days   Lab Units 08/31/21  1021   POTASSIUM mmol/L 4 2   CHLORIDE mmol/L 109*   CO2 mmol/L 29   BUN mg/dL 21   CREATININE mg/dL 1 24   CALCIUM mg/dL 8 8     Results from last 7 days   Lab Units 08/31/21  1021   INR  1 91* Imaging Studies:     Echocardiogram: 6/10/21  LEFT VENTRICLE:  Systolic function was normal  Ejection fraction was estimated to be 60 %  There were no regional wall motion abnormalities  Doppler parameters were consistent with abnormal left ventricular relaxation (grade 1 diastolic dysfunction)      AORTIC VALVE:  The valve was trileaflet  Leaflets exhibited marked calcification and markedly reduced cuspal separation  Transaortic velocity was increased due to valvular stenosis  There was severe stenosis  There was moderate regurgitation  Valve mean gradient was 39 mmHg  Gated CTA of the chest/abdomen/pelvis: 8/17/21  VASCULAR STRUCTURES:       Annulus: diameter 30 x 23 mm      area: 546 sq mm    Annulus to LCA: 22 mm    Annulus to RCA: 17 mm    Minimal diameter right iliofemoral segment: 5 mm    Minimal diameter left iliofemoral segment: 5 mm     The ascending aorta is nonaneurysmal measuring 32 mm with mild atherosclerosis  There is a type 1 aortic arch with classic branching anatomy and no stenosis in the visualized great vessels  The aortic arch is nonaneurysmal with mild  atherosclerosis  The   descending thoracic aorta is nonaneurysmal with mild atherosclerosis  There is mural thrombus extending along the distal left lateral vessel wall      The abdominal aorta is diffusely atheromatous sclerotic  There is a fusiform infrarenal abdominal aortic aneurysm containing extensive mural thrombus measuring a maximal 4 1 x 3 9 cm  The aneurysm terminates prior to the aortic bifurcation  The celiac   artery has a 90 % or greater ostial stenosis  The superior mesenteric artery is widely patent  The inferior mesenteric artery is occluded at its origin with collateral filling  The left renal artery is patent with a critical ostial stenosis due to   noncalcified and calcified plaque  The right renal artery is atherosclerotic without a significant stenosis   The right and left iliofemoral segments are diffusely atherosclerotic  The right internal iliac artery is occluded and the left internal iliac   artery is patent        Cardiac findings: There is moderate calcification of the aortic leaflets  The left ventricle is normal   The ventricular septum is normal   No prior valvular surgery  No prior bypass surgery  No pericardial effusion  No cardiac mass or thrombus      OTHER FINDINGS:    4 1 x 3 9 cm fusiform infrarenal abdominal aortic aneurysm     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      High-grade left renal artery stenosis      Cholelithiasis      Diverticulosis  Cardiac catheterization: 21  CORONARY CIRCULATION:  Mid LAD: There was a 50 % stenosis  1st obtuse marginal: There was a 30 % stenosis  Proximal RCA: There was a diffuse 20 % stenosis      Pulmonary function tests: 21  Results:  FEV1/FVC Ratio: 61 %  Forced Vital Capacity: 3 53 L    89 % predicted  FEV1: 2 17 L     71 % predicted     Lung volumes by body plethysmography:   Total Lung Capacity 97 % predicted   Residual volume 118 % predicted     DLCO corrected for patients hemoglobin level: 46 %     EC21  Normal sinus rhythm    Carotid artery ultrasound: 21  RIGHT:  Evaluation shows a high grade stenosis vs probable occlusion of the internal  carotid artery  Plaque is heterogenous and irregular  Vertebral artery flow is antegrade  There is no significant subclavian artery  disease  LEFT:  There is <50% stenosis noted in the internal carotid artery  Plaque is  heterogenous and irregular  Vertebral artery flow is antegrade  There is no significant subclavian artery  disease         I have personally reviewed pertinent films in PACS    TAVR evaluation Assessment:     5 Meter Walk Test:      Attempt 1: 5 sec   Attempt 2: 5 sec   Attempt 3: 6 sec    STS Risk Score: 1 4%    NYHC: III    KCCQ-12 completed    Assessment:  Patient Active Problem List Diagnosis Date Noted    Former heavy tobacco smoker 08/13/2021    [facility] arteries of the [facility] 04/07/2021    Protein C deficiency ([facility] ) 04/07/2021    Elevated PSA, between 10 and less than 20 ng/ml 02/11/2019    Dyslipidemia 10/23/2018    ASCVD (arteriosclerotic cardiovascular disease) 10/23/2018    Stage 3 chronic kidney disease 10/23/2018    Nonrheumatic aortic valve stenosis 10/23/2018    Essential hypertension 10/23/2018    S/P RIGHT Femoral- PT bypass 2011 09/26/2018    Claudication of left lower extremity ([facility] ) 08/02/2018    Peripheral artery disease ([facility] ) 07/19/2018     Severe aortic stenosis;  TAVR workup completed    Plan:    Nino Martinez has symptomatic severe aortic stenosis  They have undergone testing for transcatheter aortic valve replacement  The results of these studies have been interpreted by the multidisciplinary TAVR team who have determined the patient to be Intermediate risk for open aortic valve replacement based on other pre-existing comobidities not reflected in the STS risk score  The risks, benefits, and alternatives to TAVR were discussed in detail with the patient today  They understand and wish to proceed with transfemoral transcatheter aortic valve replacement  Informed consent was obtained and a date for the intervention has been set  Patient will take his last dose of Coumadin on Sunday 9/12/21 and continue his Plavix  He will be admitted on Tuesday 9/14/21 for [id number] [address] with planned TAVR procedure on Thursday 9/16/21  Nino Martinez was comfortable with our recommendations, and their questions were answered to their satisfaction           SIGNATURE: FILIPPO Menard  DATE: September 3, 2021  TIME: 1:22 PM

## 2021-09-03 NOTE — H&P
Pre-Op History & Physical - Cardiothoracic Surgery   Cathy Abernathy 80 y o  male MRN: [id number]    Physician Requesting Consult: Dr Fior Reddy    Reason for Consult / Principal Problem: Aortic stenosis, Non-Rheumatic    History of Present Illness: Cathy Abernathy is a 80y o  year old male who was previously evaluated in our office by HIMA Rios  for transcatheter aortic valve replacement  During this initial consultation, arrangements were made for the following studies to be completed: Gated CTA of the chest/abdomen/pelvis, cardiac catheterization, dental clearance, pulmonary function tests with ABG, and carotid artery ultrasound  Cathy Abernathy now presents to review the results of these tests and obtain a second surgeon to confirm the suitability of proceeding with transcatheter aortic valve replacment  In review, Cathy Abernathy is a 80y o  year old male with PMHx notable for AS, CAD (non-obst), HTN, HLD, CKD3, 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, PAD s/p R Fem-PT bypass (5/2011), h/o frost bite - feet (2011) and hearing loss  Patient with h/o heart murmur for few years  Echo in 2018 with moderate AS  Most recent echo with progression to severe AS, TACHO 0 8-0 9 cm2, MG 39/PG 65 mm Hg, Vmax 403 cm/s      Upon interview today patient reports he is asymptomatic from a cardiac standpoint  He has significant LE PAD with claudication in his left leg  He denies chest pain, SOB, RAMOS, lightheadedness, palpitations, change in activity tolerance, weight gain or edema  He is able to mow is 1/2 acre lawn (walk behind ) without cardiac symptoms  He does stop to rest due to left calf claudication, however can continue for up to 20-30 min before stopping       Patient quit smoking in 2018  He does not consume alcohol  Covid immunizations on 3/17/21 & 4/14/21      Past Medical History:  Past Medical History:   Diagnosis Date    DVT [facility] thrombosis) [facility] )     [facility] deficiency ([facility] )     Pulmonary embolus (HCC)          Past Surgical History:   Past Surgical History:   Procedure Laterality Date    BACK SURGERY      BYPASS FEMORAL-TIBIAL Right 2011    VEIN LIGATION           Family History:  Family History   Problem Relation Age of Onset    Cancer Mother 50    Heart attack Father 46    Hypertension Father     Prostate cancer Brother     Arrhythmia Neg Hx     Clotting disorder Neg Hx     Fainting Neg Hx     Heart disease Neg Hx     Anuerysm Neg Hx     Stroke Neg Hx          Social History:    Social History     Substance and Sexual Activity   Alcohol Use No     Social History     Substance and Sexual Activity   Drug Use No     Social History     Tobacco Use   Smoking Status Former Smoker    Types: Cigarettes    Quit date: 2018    Years since quittin 9   Smokeless Tobacco Never Used   Tobacco Comment    1 pk every 2days , currently on patches to quit  Home Medications:   Prior to Admission medications    Medication Sig Start Date End Date Taking?  Authorizing Provider   amLODIPine (NORVASC) 5 mg tablet Take 5 mg by mouth as needed (only takes when B/P elevated)   Yes Historical Provider, MD   atorvastatin (LIPITOR) 80 mg tablet Take 1 tablet (80 mg total) by mouth daily 21  Yes Taylor Perlata MD   clopidogrel (PLAVIX) 75 mg tablet Take 1 tablet (75 mg total) by mouth daily 21  Yes Rosario Estrada PA-C   hydrochlorothiazide (HYDRODIURIL) 25 mg tablet Take 25 mg by mouth as needed (takes with norvasc when b?p elevated)   Yes Historical Provider, MD   warfarin (COUMADIN) 5 mg tablet Take 1 tablet (5 mg total) by mouth daily 3/23/21  Yes Mcihael Burk MD       Allergies:  No Known Allergies    Review of Systems:  Review of Systems - History obtained from chart review and the patient  General ROS: negative  Psychological ROS: negative  Ophthalmic ROS: negative  ENT ROS: negative  Allergy and Immunology ROS: negative  Hematological and Lymphatic ROS: positive for - blood clots  negative for - bleeding problems, bruising or jaundice  Endocrine ROS: negative  Respiratory ROS: no cough, shortness of breath, or wheezing  Cardiovascular ROS: positive for - murmur and left calf claudication  negative for - chest pain, dyspnea on exertion, edema, irregular heartbeat, loss of consciousness, orthopnea, palpitations or paroxysmal nocturnal dyspnea  Gastrointestinal ROS: no abdominal pain, change in bowel habits, or black or bloody stools  Genito-Urinary ROS: no dysuria, trouble voiding, or hematuria  Musculoskeletal ROS: negative  Neurological ROS: no TIA or stroke symptoms  Dermatological ROS: negative    Vital Signs:     Vitals:    09/03/21 1301 09/03/21 1304   BP: 150/80 156/82   BP Location: Left arm Left arm   Patient Position: Sitting    Cuff Size: Standard    Pulse: 69    Resp: 18    SpO2: 98%    Weight: 87 8 kg (193 lb 9 6 oz)    Height: 5' 8" (1 727 m)        Physical Exam:    General: Alert, oriented, well developed, no acute distress  HEENT/NECK:  PERRLA  No jugular venous distention  Cardiac:Regular rate and rhythm, III/VI harsh systolic murmur RUSB  Carotids: ) pulse on right, 1 + left with bruit vs radiation of cardiac murmur  Pulmonary:  Breath sounds clear bilaterally  Abdomen:  Non-tender, Non-distended  Positive bowel sounds  Upper extremities: 2+ radial pulses, o ulnar pulses; brisk capillary refill  Lower extremities: Extremities warm/dry; PT/DP pulses absent bilaterally  No edema B/L; rubor b/l feet/lower legs; significant spider veins  Neuro: Alert and oriented X 3  Sensation is grossly intact  No focal deficits  Musculoskeletal: MAEE, stable gait  Skin: Warm/Dry, without rashes or lesions      Lab Results:         Results from last 7 days   Lab Units 08/31/21  1021   POTASSIUM mmol/L 4 2   CHLORIDE mmol/L 109*   CO2 mmol/L 29   BUN mg/dL 21   CREATININE mg/dL 1 24   CALCIUM mg/dL 8 8     Results from last 7 days   Lab Units 08/31/21  1021   INR  1 91* Imaging Studies:     Echocardiogram: 6/10/21  LEFT VENTRICLE:  Systolic function was normal  Ejection fraction was estimated to be 60 %  There were no regional wall motion abnormalities  Doppler parameters were consistent with abnormal left ventricular relaxation (grade 1 diastolic dysfunction)      AORTIC VALVE:  The valve was trileaflet  Leaflets exhibited marked calcification and markedly reduced cuspal separation  Transaortic velocity was increased due to valvular stenosis  There was severe stenosis  There was moderate regurgitation  Valve mean gradient was 39 mmHg  Gated CTA of the chest/abdomen/pelvis: 8/17/21  VASCULAR STRUCTURES:       Annulus: diameter 30 x 23 mm      area: 546 sq mm    Annulus to LCA: 22 mm    Annulus to RCA: 17 mm    Minimal diameter right iliofemoral segment: 5 mm    Minimal diameter left iliofemoral segment: 5 mm     The ascending aorta is nonaneurysmal measuring 32 mm with mild atherosclerosis  There is a type 1 aortic arch with classic branching anatomy and no stenosis in the visualized great vessels  The aortic arch is nonaneurysmal with mild  atherosclerosis  The   descending thoracic aorta is nonaneurysmal with mild atherosclerosis  There is mural thrombus extending along the distal left lateral vessel wall      The abdominal aorta is diffusely atheromatous sclerotic  There is a fusiform infrarenal abdominal aortic aneurysm containing extensive mural thrombus measuring a maximal 4 1 x 3 9 cm  The aneurysm terminates prior to the aortic bifurcation  The celiac   artery has a 90 % or greater ostial stenosis  The superior mesenteric artery is widely patent  The inferior mesenteric artery is occluded at its origin with collateral filling  The left renal artery is patent with a critical ostial stenosis due to   noncalcified and calcified plaque  The right renal artery is atherosclerotic without a significant stenosis   The right and left iliofemoral segments are diffusely atherosclerotic  The right internal iliac artery is occluded and the left internal iliac   artery is patent        Cardiac findings: There is moderate calcification of the aortic leaflets  The left ventricle is normal   The ventricular septum is normal   No prior valvular surgery  No prior bypass surgery  No pericardial effusion  No cardiac mass or thrombus      OTHER FINDINGS:    4 1 x 3 9 cm fusiform infrarenal abdominal aortic aneurysm     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      High-grade left renal artery stenosis      Cholelithiasis      Diverticulosis  Cardiac catheterization: 21  CORONARY CIRCULATION:  Mid LAD: There was a 50 % stenosis  1st obtuse marginal: There was a 30 % stenosis  Proximal RCA: There was a diffuse 20 % stenosis      Pulmonary function tests: 21  Results:  FEV1/FVC Ratio: 61 %  Forced Vital Capacity: 3 53 L    89 % predicted  FEV1: 2 17 L     71 % predicted     Lung volumes by body plethysmography:   Total Lung Capacity 97 % predicted   Residual volume 118 % predicted     DLCO corrected for patients hemoglobin level: 46 %     EC21  Normal sinus rhythm    Carotid artery ultrasound: 21  RIGHT:  Evaluation shows a high grade stenosis vs probable occlusion of the internal  carotid artery  Plaque is heterogenous and irregular  Vertebral artery flow is antegrade  There is no significant subclavian artery  disease  LEFT:  There is <50% stenosis noted in the internal carotid artery  Plaque is  heterogenous and irregular  Vertebral artery flow is antegrade  There is no significant subclavian artery  disease         I have personally reviewed pertinent films in PACS    TAVR evaluation Assessment:     5 Meter Walk Test:      Attempt 1: 5 sec   Attempt 2: 5 sec   Attempt 3: 6 sec    STS Risk Score: 1 4%    NYHC: III    KCCQ-12 completed    Assessment:  Patient Active Problem List Diagnosis Date Noted    Former heavy tobacco smoker 08/13/2021    Embolism and thrombosis of arteries of the lower extremities [facility] ) 04/07/2021    Protein C deficiency ([facility] ) 04/07/2021    Elevated PSA, between 10 and less than 20 ng/ml 02/11/2019    Dyslipidemia 10/23/2018    ASCVD (arteriosclerotic cardiovascular disease) 10/23/2018    Stage 3 chronic kidney disease 10/23/2018    Nonrheumatic aortic valve stenosis 10/23/2018    Essential hypertension 10/23/2018    S/P RIGHT Femoral- PT bypass 2011 09/26/2018    Claudication of left lower extremity ([facility] ) 08/02/2018    Peripheral artery disease ([facility] ) 07/19/2018     Severe aortic stenosis;  TAVR workup completed    Plan:    Chang Sterling has symptomatic severe aortic stenosis  They have undergone testing for transcatheter aortic valve replacement  The results of these studies have been interpreted by the multidisciplinary TAVR team who have determined the patient to be Intermediate risk for open aortic valve replacement based on other pre-existing comobidities not reflected in the STS risk score  The risks, benefits, and alternatives to TAVR were discussed in detail with the patient today  They understand and wish to proceed with transfemoral transcatheter aortic valve replacement  Informed consent was obtained and a date for the intervention has been set  Patient will take his last dose of Coumadin on Sunday 9/12/21 and continue his Plavix  He will be admitted on Tuesday 9/14/21 for [address] with planned TAVR procedure on Thursday 9/16/21  Chang Sterling was comfortable with our recommendations, and their questions were answered to their satisfaction           SIGNATURE: Erika Najjar, CRNP  DATE: September 3, 2021  TIME: 1:22 PM

## 2021-09-09 ENCOUNTER — OFFICE VISIT (OUTPATIENT)
Dept: VASCULAR SURGERY | Facility: CLINIC | Age: 71
End: 2021-09-09
Payer: MEDICARE

## 2021-09-09 VITALS
BODY MASS INDEX: 29.25 KG/M2 | TEMPERATURE: 97.6 F | SYSTOLIC BLOOD PRESSURE: 120 MMHG | HEART RATE: 60 BPM | HEIGHT: 68 IN | WEIGHT: 193 LBS | DIASTOLIC BLOOD PRESSURE: 74 MMHG

## 2021-09-09 DIAGNOSIS — I71.4 AAA (ABDOMINAL AORTIC ANEURYSM) WITHOUT RUPTURE (HCC): Primary | ICD-10-CM

## 2021-09-09 PROBLEM — I71.40 AAA (ABDOMINAL AORTIC ANEURYSM) WITHOUT RUPTURE: Status: ACTIVE | Noted: 2021-09-09

## 2021-09-09 PROCEDURE — 99214 OFFICE O/P EST MOD 30 MIN: CPT | Performed by: SURGERY

## 2021-09-09 NOTE — PROGRESS NOTES
Assessment/Plan:    Presents in anticipation of aortic valve replacement on September 16th, he has history of a right leg bypass done in the distant past, is now being worked up and plan left groin access to avoid scarring from the right femoral to below-knee popliteal bypass  He has a chronic right carotid occlusion and had a TIA at that time with amaurosis fugax however did not relate this event to his physicians  His left carotid is widely patent  Review of his CT angiogram left groin access there appears to be minimal posterior plaque in the common femoral artery and adequate iliofemoral and iliac systems with a 4 centimeter AAA with mural thrombus  He also has diagnosis of protein C deficiency will be stopping his Coumadin in anticipation of his surgery, wondering if he would be a candidate to switch off the Coumadin for a NOAC in the  postop period  Plan: Will follow up his AAA with an ultrasound in 6 months, we will be available if needed from an arterial standpoint in the postop period  Diagnoses and all orders for this visit:    AAA (abdominal aortic aneurysm) without rupture ([address] )  -     VAS abdominal aorta/iliacs; complete study; Future        Subjective:      Patient ID: Anton Roberson is a 80 y o  male  Patient reports recent findings of R ICA occlusuion and AAA, presents today per cardiology for further evaluation prior to upcoming heart valve replacement scheduled for 9/16  HPI    The following portions of the patient's history were reviewed and updated as appropriate: allergies, current medications, past family history, past medical history, past social history, past surgical history and problem list     Review of Systems   Constitutional: Negative  HENT: Positive for hearing loss  Eyes: Negative  Respiratory: Negative  Cardiovascular: Negative  Gastrointestinal: Negative  Endocrine: Negative  Genitourinary: Negative  Musculoskeletal: Negative      Skin: Negative  Allergic/Immunologic: Negative  Neurological: Negative  Hematological: Bruises/bleeds easily  Psychiatric/Behavioral: Negative  Objective: There were no vitals taken for this visit  Physical Exam      Oriented x3 no evidence of clinical depression  Eyes:  Sclera non-icteric    Skin: normal without evidence of inflammation    Neck is supple carotid pulses equal bilaterally no bruits heard    Chest lungs clear, heart regular rhythm  Abdomen soft nontender  Evidence of a AAA in the mid epigastrium    Pulses are palpable bilateral Femoral  and right popliteal    Neurological exam intact cranial nerves 2-12 grossly intact no gross motor sensory deficits detected  Imaging viewed and reviewed with Patient    I have reviewed and made appropriate changes to the review of systems input by the medical assistant      Vitals:    09/09/21 1329   BP: 120/74   BP Location: Right arm   Patient Position: Sitting   Pulse: 60   Temp: 97 6 °F (36 4 °C)   TempSrc: Tympanic   Weight: 87 5 kg (193 lb)   Height: 5' 8" (1 727 m)       Patient Active Problem List   Diagnosis    Peripheral artery disease (HCC)    Claudication of left lower extremity (HCC)    S/P RIGHT Femoral- PT bypass 2011    Dyslipidemia    ASCVD (arteriosclerotic cardiovascular disease)    Stage 3 chronic kidney disease    Nonrheumatic aortic valve stenosis    Essential hypertension    Elevated PSA, between 10 and less than 20 ng/ml    Embolism and thrombosis of arteries of the lower extremities ([address] )    Protein C deficiency ([address] )    Former heavy tobacco smoker    AAA (abdominal aortic aneurysm) without rupture (HCC)       Past Surgical History:   Procedure Laterality Date    BACK SURGERY      BYPASS FEMORAL-TIBIAL Right 2011    VEIN LIGATION         Family History   Problem Relation Age of Onset    Cancer Mother 50    Heart attack Father 46    Hypertension Father     Prostate cancer Brother     Arrhythmia Neg Hx     Clotting disorder Neg Hx     Fainting Neg Hx     Heart disease Neg Hx     Anuerysm Neg Hx     Stroke Neg Hx        Social History     Socioeconomic History    Marital status: /Civil Union     Spouse name: Not on file    Number of children: Not on file    Years of education: Not on file    Highest education level: Not on file   Occupational History    Not on file   Tobacco Use    Smoking status: Former Smoker     Types: Cigarettes     Quit date: 2018     Years since quittin 9    Smokeless tobacco: Never Used    Tobacco comment: 1 pk every 2days , currently on patches to quit  Vaping Use    Vaping Use: Never used   Substance and Sexual Activity    Alcohol use: No    Drug use: No    Sexual activity: Not on file   Other Topics Concern    Not on file   Social History Narrative    Not on file     Social Determinants of Health     Financial Resource Strain:     Difficulty of Paying Living Expenses:    Food Insecurity:     Worried About Running Out of Food in the Last Year:     [address] in the Last Year:    Transportation Needs:     Lack of Transportation (Medical):      Lack of Transportation (Non-Medical):    Physical Activity:     Days of Exercise per Week:     Minutes of Exercise per Session:    Stress:     Feeling of Stress :    Social Connections:     Frequency of Communication with Friends and Family:     Frequency of Social Gatherings with Friends and Family:     Attends [employer] Services:     Active Member of Clubs or Organizations:     Attends Club or Organization Meetings:     Marital Status:    Intimate Partner Violence:     Fear of Current or Ex-Partner:     Emotionally Abused:     Physically Abused:     Sexually Abused:        No Known Allergies      Current Outpatient Medications:     amLODIPine (NORVASC) 5 mg tablet, Take 5 mg by mouth as needed (only takes when B/P elevated), Disp: , Rfl:     atorvastatin (LIPITOR) 80 mg tablet, Take 1 tablet (80 mg total) by mouth daily, Disp: 30 tablet, Rfl: 3    clopidogrel (PLAVIX) 75 mg tablet, Take 1 tablet (75 mg total) by mouth daily, Disp: 30 tablet, Rfl: 4    hydrochlorothiazide (HYDRODIURIL) 25 mg tablet, Take 25 mg by mouth as needed (takes with norvasc when b?p elevated), Disp: , Rfl:     warfarin (COUMADIN) 5 mg tablet, Take 1 tablet (5 mg total) by mouth daily, Disp: 30 tablet, Rfl: 1

## 2021-09-09 NOTE — LETTER
September 9, 2021     Kaylah Joyce MD  [address] [place] [address] [address]    Patient: Kika Ford   YOB: 1950   Date of Visit: 9/9/2021       Dear Dr Rosa Black: Thank you for referring Sachin Daniel to me for evaluation  Below are my notes for this consultation  If you have questions, please do not hesitate to call me  I look forward to following your patient along with you           Sincerely,        Marii Keith MD        CC: Leonela Glynn MD

## 2021-09-09 NOTE — PATIENT INSTRUCTIONS
Presents in anticipation of aortic valve replacement on September 16th, he has history of a right leg bypass done in the distant past, is now being worked up and plan left groin access to avoid scarring from the right femoral to below-knee popliteal bypass  He has a chronic right carotid occlusion and had a TIA at that time with amaurosis fugax however did not relate this event to his physicians  His left carotid is widely patent  Review of his CT angiogram left groin access there appears to be minimal posterior plaque in the common femoral artery and adequate iliofemoral and iliac systems with a 4 centimeter AAA with mural thrombus  He also has diagnosis of protein C deficiency will be stopping his Coumadin in anticipation of his surgery, wondering if he would be a candidate to switch off the Coumadin for a NOAC in the  postop period  Plan: Will follow up his AAA with an ultrasound in 6 months, we will be available if needed from an arterial standpoint in the postop period

## 2021-09-14 ENCOUNTER — HOSPITAL ENCOUNTER (INPATIENT)
Facility: HOSPITAL | Age: 71
LOS: 4 days | Discharge: HOME | DRG: 267 | End: 2021-09-18
Attending: THORACIC SURGERY (CARDIOTHORACIC VASCULAR SURGERY) | Admitting: THORACIC SURGERY (CARDIOTHORACIC VASCULAR SURGERY)
Payer: MEDICARE

## 2021-09-14 DIAGNOSIS — Z95.2 S/P TAVR (TRANSCATHETER AORTIC VALVE REPLACEMENT): ICD-10-CM

## 2021-09-14 DIAGNOSIS — I35.0 NONRHEUMATIC AORTIC VALVE STENOSIS: Primary | ICD-10-CM

## 2021-09-14 DIAGNOSIS — R33.9 URINARY RETENTION: ICD-10-CM

## 2021-09-14 DIAGNOSIS — I25.10 CAD IN NATIVE ARTERY: ICD-10-CM

## 2021-09-14 DIAGNOSIS — I44.7 LEFT BUNDLE BRANCH BLOCK: ICD-10-CM

## 2021-09-14 DIAGNOSIS — I35.9 NONRHEUMATIC AORTIC VALVE DISORDER, UNSPECIFIED: ICD-10-CM

## 2021-09-14 LAB
ABO GROUP BLD: NORMAL
APTT PPP: 31 SECONDS (ref 23–37)
APTT PPP: 72 SECONDS (ref 23–37)
BLD GP AB SCN SERPL QL: NEGATIVE
ERYTHROCYTE [DISTWIDTH] IN BLOOD BY AUTOMATED COUNT: 12.9 % (ref 11.6–15.1)
HCT VFR BLD AUTO: 46.4 % (ref 36.5–49.3)
HGB BLD-MCNC: 15.5 G/DL (ref 12–17)
INR PPP: 1.89 (ref 0.84–1.19)
MCH RBC QN AUTO: 30.9 PG (ref 26.8–34.3)
MCHC RBC AUTO-ENTMCNC: 33.4 G/DL (ref 31.4–37.4)
MCV RBC AUTO: 92 FL (ref 82–98)
PLATELET # BLD AUTO: 193 THOUSANDS/UL (ref 149–390)
PMV BLD AUTO: 9.4 FL (ref 8.9–12.7)
PROTHROMBIN TIME: 20.8 SECONDS (ref 11.6–14.5)
RBC # BLD AUTO: 5.02 MILLION/UL (ref 3.88–5.62)
RH BLD: POSITIVE
SPECIMEN EXPIRATION DATE: NORMAL
WBC # BLD AUTO: 10.66 THOUSAND/UL (ref 4.31–10.16)

## 2021-09-14 PROCEDURE — 86900 BLOOD TYPING SEROLOGIC ABO: CPT | Performed by: THORACIC SURGERY (CARDIOTHORACIC VASCULAR SURGERY)

## 2021-09-14 PROCEDURE — 85610 PROTHROMBIN TIME: CPT | Performed by: NURSE PRACTITIONER

## 2021-09-14 PROCEDURE — 85730 THROMBOPLASTIN TIME PARTIAL: CPT | Performed by: NURSE PRACTITIONER

## 2021-09-14 PROCEDURE — 85027 COMPLETE CBC AUTOMATED: CPT | Performed by: NURSE PRACTITIONER

## 2021-09-14 PROCEDURE — 87081 CULTURE SCREEN ONLY: CPT | Performed by: PHYSICIAN ASSISTANT

## 2021-09-14 PROCEDURE — NC001 PR NO CHARGE: Performed by: PHYSICIAN ASSISTANT

## 2021-09-14 PROCEDURE — 86920 COMPATIBILITY TEST SPIN: CPT

## 2021-09-14 PROCEDURE — 86850 RBC ANTIBODY SCREEN: CPT | Performed by: THORACIC SURGERY (CARDIOTHORACIC VASCULAR SURGERY)

## 2021-09-14 PROCEDURE — 86901 BLOOD TYPING SEROLOGIC RH(D): CPT | Performed by: THORACIC SURGERY (CARDIOTHORACIC VASCULAR SURGERY)

## 2021-09-14 PROCEDURE — 85730 THROMBOPLASTIN TIME PARTIAL: CPT | Performed by: THORACIC SURGERY (CARDIOTHORACIC VASCULAR SURGERY)

## 2021-09-14 RX ORDER — ACETAMINOPHEN 325 MG/1
650 TABLET ORAL EVERY 6 HOURS PRN
Status: DISCONTINUED | OUTPATIENT
Start: 2021-09-14 | End: 2021-09-16 | Stop reason: HOSPADM

## 2021-09-14 RX ORDER — CHLORHEXIDINE GLUCONATE 0.12 MG/ML
15 RINSE ORAL EVERY 12 HOURS SCHEDULED
Status: DISCONTINUED | OUTPATIENT
Start: 2021-09-14 | End: 2021-09-16

## 2021-09-14 RX ORDER — HEPARIN SODIUM 10000 [USP'U]/100ML
3-20 INJECTION, SOLUTION INTRAVENOUS
Status: DISCONTINUED | OUTPATIENT
Start: 2021-09-14 | End: 2021-09-16 | Stop reason: HOSPADM

## 2021-09-14 RX ORDER — DOCUSATE SODIUM 100 MG/1
100 CAPSULE, LIQUID FILLED ORAL 2 TIMES DAILY
Status: DISCONTINUED | OUTPATIENT
Start: 2021-09-14 | End: 2021-09-16 | Stop reason: HOSPADM

## 2021-09-14 RX ORDER — PANTOPRAZOLE SODIUM 40 MG/1
40 TABLET, DELAYED RELEASE ORAL DAILY
Status: DISCONTINUED | OUTPATIENT
Start: 2021-09-14 | End: 2021-09-14

## 2021-09-14 RX ORDER — HYDROCHLOROTHIAZIDE 25 MG/1
25 TABLET ORAL DAILY
Status: DISCONTINUED | OUTPATIENT
Start: 2021-09-15 | End: 2021-09-18 | Stop reason: HOSPADM

## 2021-09-14 RX ORDER — AMLODIPINE BESYLATE 5 MG/1
5 TABLET ORAL DAILY
Status: DISCONTINUED | OUTPATIENT
Start: 2021-09-15 | End: 2021-09-18 | Stop reason: HOSPADM

## 2021-09-14 RX ORDER — PANTOPRAZOLE SODIUM 40 MG/1
40 TABLET, DELAYED RELEASE ORAL
Status: DISCONTINUED | OUTPATIENT
Start: 2021-09-15 | End: 2021-09-16 | Stop reason: HOSPADM

## 2021-09-14 RX ORDER — SENNOSIDES 8.6 MG
1 TABLET ORAL
Status: DISCONTINUED | OUTPATIENT
Start: 2021-09-14 | End: 2021-09-18 | Stop reason: HOSPADM

## 2021-09-14 RX ORDER — ATORVASTATIN CALCIUM 80 MG/1
80 TABLET, FILM COATED ORAL
Status: DISCONTINUED | OUTPATIENT
Start: 2021-09-14 | End: 2021-09-18 | Stop reason: HOSPADM

## 2021-09-14 RX ORDER — LANOLIN ALCOHOL/MO/W.PET/CERES
3 CREAM (GRAM) TOPICAL
Status: DISCONTINUED | OUTPATIENT
Start: 2021-09-14 | End: 2021-09-18 | Stop reason: HOSPADM

## 2021-09-14 RX ORDER — CLOPIDOGREL BISULFATE 75 MG/1
75 TABLET ORAL DAILY
Status: DISCONTINUED | OUTPATIENT
Start: 2021-09-15 | End: 2021-09-18 | Stop reason: HOSPADM

## 2021-09-14 RX ADMIN — CHLORHEXIDINE GLUCONATE 15 ML: 1.2 SOLUTION ORAL at 15:15

## 2021-09-14 RX ADMIN — MUPIROCIN 1 APPLICATION: 20 OINTMENT TOPICAL at 15:15

## 2021-09-14 RX ADMIN — DOCUSATE SODIUM 100 MG: 100 CAPSULE, LIQUID FILLED ORAL at 17:03

## 2021-09-14 RX ADMIN — CHLORHEXIDINE GLUCONATE 15 ML: 1.2 SOLUTION ORAL at 21:37

## 2021-09-14 RX ADMIN — HEPARIN SODIUM 11.1 UNITS/KG/HR: 10000 INJECTION, SOLUTION INTRAVENOUS at 12:10

## 2021-09-14 RX ADMIN — MUPIROCIN 1 APPLICATION: 20 OINTMENT TOPICAL at 21:37

## 2021-09-14 RX ADMIN — ATORVASTATIN CALCIUM 80 MG: 80 TABLET, FILM COATED ORAL at 17:03

## 2021-09-14 NOTE — H&P
History and Physical - Cardiothoracic Surgery   Rita Krause 80 y o  male MRN: [id number]  Unit/Bed#: -01 Encounter: [id number]    History of Present Illness   Principal Problem: severe aortic stenosis with history of Protein C deficiency/DVT/PE on Coumadin therapy     HPI: Rita Krause is a 80y o  year old male with a past medical history notable for severe AS, CAD nonobstructive, HTN, HLD, CKD 3, protein C deficiency with hx of DVT/PE on Coumadin, PAD s/p right femoral PT bypass, history of frost bite and hearing loss known to our office for undergoing TAVR evaluation for symptomatic severe aortic stenosis  The patient has completed the preoperative testing and deemed a good candidate for TAVR procedure  The patient is admitted now preoperatively for a coumadin to heparin bridge for planned TAVR on Thursday  Review of Systems   Constitutional: Negative  HENT: Negative  Eyes: Negative  Respiratory: Negative  Cardiovascular: Negative  Gastrointestinal: Negative  Musculoskeletal: Positive for gait problem  Claudication complaints   Skin: Negative  Allergic/Immunologic: Negative  Hematological: Negative  Clotting disorder hx of DVT PE   Psychiatric/Behavioral: Negative  Historical Information   Past Medical History:   Diagnosis Date    DVT (deep venous thrombosis) (HCC)     Protein C deficiency (HCC)     Pulmonary embolus (HCC)      Past Surgical History:   Procedure Laterality Date    BACK SURGERY      BYPASS FEMORAL-TIBIAL Right 2011    VEIN LIGATION       Social History     Substance and Sexual Activity   Alcohol Use No     Social History     Substance and Sexual Activity   Drug Use No     Social History     Tobacco Use   Smoking Status Former Smoker    Types: Cigarettes    Quit date: 2018    Years since quittin 9   Smokeless Tobacco Never Used   Tobacco Comment    1 pk every 2days , currently on patches to quit        Family History: Family History   Problem Relation Age of Onset    Cancer Mother 50    Heart attack Father 46    Hypertension Father     Prostate cancer Brother     Arrhythmia Neg Hx     Clotting disorder Neg Hx     Fainting Neg Hx     Heart disease Neg Hx     Anuerysm Neg Hx     Stroke Neg Hx        Meds/Allergies   all current active meds have been reviewed  No Known Allergies    Objective   Vitals: Blood pressure 145/95, resp  rate 17, weight 87 2 kg (192 lb 3 9 oz)  Invasive Devices     Peripheral Intravenous Line            Peripheral IV 09/14/21 Left;Ventral (anterior) Forearm <1 day                Physical Exam  Constitutional:       General: He is not in acute distress  Appearance: Normal appearance  He is normal weight  He is not toxic-appearing  HENT:      Head: Normocephalic and atraumatic  Right Ear: External ear normal       Left Ear: External ear normal       Nose: Nose normal       Mouth/Throat:      Mouth: Mucous membranes are moist       Pharynx: Oropharynx is clear  No oropharyngeal exudate  Eyes:      General:         Right eye: No discharge  Left eye: No discharge  Extraocular Movements: Extraocular movements intact  Conjunctiva/sclera: Conjunctivae normal       Pupils: Pupils are equal, round, and reactive to light  Neck:      Vascular: Carotid bruit present  Comments: Murmur radiation  Cardiovascular:      Rate and Rhythm: Normal rate  Pulses: Normal pulses  Heart sounds: Murmur heard  Comments: III/VI systolic murmur  Pulmonary:      Effort: Pulmonary effort is normal  No respiratory distress  Breath sounds: Normal breath sounds  No wheezing or rales  Abdominal:      General: Bowel sounds are normal  There is no distension  Palpations: Abdomen is soft  Tenderness: There is no abdominal tenderness  Musculoskeletal:         General: Normal range of motion  Cervical back: Normal range of motion     Skin:     General: Skin is warm and dry  Neurological:      General: No focal deficit present  Mental Status: He is alert and oriented to person, place, and time  Cranial Nerves: No cranial nerve deficit  Psychiatric:         Mood and Affect: Mood normal          Behavior: Behavior normal          Thought Content: Thought content normal          Judgment: Judgment normal          Lab Results:   Results from last 7 days   Lab Units 09/14/21  1206   WBC Thousand/uL 10 66*   HEMOGLOBIN g/dL 15 5   HEMATOCRIT % 46 4   PLATELETS Thousands/uL 193           Invalid input(s): LABGLOM  Results from last 7 days   Lab Units 09/14/21  1206   INR  1 89*   PTT seconds 31     No results found for: HGBA1C  No results found for: CKTOTAL, CKMB, CKMBINDEX, TROPONINI    Imaging Studies:   Echocardiogram: 6/10/21  LEFT VENTRICLE:  Systolic function was normal  Ejection fraction was estimated to be 60 %  There were no regional wall motion abnormalities  Doppler parameters were consistent with abnormal left ventricular relaxation (grade 1 diastolic dysfunction)      AORTIC VALVE:  The valve was trileaflet  Leaflets exhibited marked calcification and markedly reduced cuspal separation  Transaortic velocity was increased due to valvular stenosis  There was severe stenosis  There was moderate regurgitation  Valve mean gradient was 39 mmHg      Gated CTA of the chest/abdomen/pelvis: 8/17/21  VASCULAR STRUCTURES:       Annulus: diameter 30 x 23 mm      area: 546 sq mm    Annulus to LCA: 22 mm    Annulus to RCA: 17 mm    Minimal diameter right iliofemoral segment: 5 mm    Minimal diameter left iliofemoral segment: 5 mm     The ascending aorta is nonaneurysmal measuring 32 mm with mild atherosclerosis  There is a type 1 aortic arch with classic branching anatomy and no stenosis in the visualized great vessels  The aortic arch is nonaneurysmal with mild  atherosclerosis  The   descending thoracic aorta is nonaneurysmal with mild atherosclerosis  There is mural thrombus extending along the distal left lateral vessel wall      The abdominal aorta is diffusely atheromatous sclerotic  There is a fusiform infrarenal abdominal aortic aneurysm containing extensive mural thrombus measuring a maximal 4 1 x 3 9 cm  The aneurysm terminates prior to the aortic bifurcation  The celiac   artery has a 90 % or greater ostial stenosis  The superior mesenteric artery is widely patent  The inferior mesenteric artery is occluded at its origin with collateral filling  The left renal artery is patent with a critical ostial stenosis due to   noncalcified and calcified plaque  The right renal artery is atherosclerotic without a significant stenosis  The right and left iliofemoral segments are diffusely atherosclerotic  The right internal iliac artery is occluded and the left internal iliac   artery is patent        Cardiac findings: There is moderate calcification of the aortic leaflets  The left ventricle is normal   The ventricular septum is normal   No prior valvular surgery  No prior bypass surgery  No pericardial effusion  No cardiac mass or thrombus      OTHER FINDINGS:    4 1 x 3 9 cm fusiform infrarenal abdominal aortic aneurysm     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      High-grade left renal artery stenosis      Cholelithiasis      Diverticulosis        Cardiac catheterization: 21  CORONARY CIRCULATION:  Mid LAD: There was a 50 % stenosis  1st obtuse marginal: There was a 30 % stenosis    Proximal RCA: There was a diffuse 20 % stenosis      Pulmonary function tests: 21  Results:  FEV1/FVC Ratio: 61 %  Forced Vital Capacity: 3 53 L    89 % predicted  FEV1: 2 17 L     71 % predicted     Lung volumes by body plethysmography:   Total Lung Capacity 97 % predicted   Residual volume 118 % predicted     DLCO corrected for patients hemoglobin level: 46 %     EC21  Normal sinus rhythm     Carotid artery ultrasound: 8/17/21  RIGHT:  Evaluation shows a high grade stenosis vs probable occlusion of the internal  carotid artery  Plaque is heterogenous and irregular  Vertebral artery flow is antegrade  There is no significant subclavian artery  disease      LEFT:  There is <50% stenosis noted in the internal carotid artery  Plaque is  heterogenous and irregular  Vertebral artery flow is antegrade  There is no significant subclavian artery  disease         I have personally reviewed pertinent films in PACS     TAVR evaluation Assessment: on office note    Assessment:  Patient Active Problem List    Diagnosis Date Noted    AAA (abdominal aortic aneurysm) without rupture ([address] ) 09/09/2021    Former heavy tobacco smoker 08/13/2021    Embolism and thrombosis of arteries of the lower extremities ([facility] [address] ) 04/07/2021    Protein C deficiency ([facility] [address] ) 04/07/2021    Elevated PSA, between 10 and less than 20 ng/ml 02/11/2019    Dyslipidemia 10/23/2018    ASCVD (arteriosclerotic cardiovascular disease) 10/23/2018    Stage 3 chronic kidney disease 10/23/2018    Nonrheumatic aortic valve stenosis 10/23/2018    Essential hypertension 10/23/2018    S/P RIGHT Femoral- PT bypass 2011 09/26/2018    Claudication of left lower extremity ([facility] [address] ) 08/02/2018    Peripheral artery disease ([facility] [address] ) 07/19/2018       Plan:    Risks and benefits of transfemoral transcatheter aortic valve replacement were discussed in detail today with the patient  They understand and wish to proceed with further workup and ultimately surgical intervention  We have ordered routine preoperative laboratory and vascular studies  Pending the results of these tests, they will be scheduled for surgery Thursday with BEATRIZ Olguin  Consent obtained in the office  Coumadin to heparin bridge ordered for hx of protein c / hx of dvt and pe       The patient was comfortable with our recommendations, and their questions were answered to their satisfaction  We will continue to evaluate the patient daily with further recommendations as work up is completed  Thank you for allowing us to participate in the care of this patient           Kiarra Kaiser PA-C  2:21 PM  09/14/21

## 2021-09-15 LAB
ANION GAP SERPL CALCULATED.3IONS-SCNC: 5 MMOL/L (ref 4–13)
APTT PPP: 77 SECONDS (ref 23–37)
BUN SERPL-MCNC: 20 MG/DL (ref 5–25)
CALCIUM SERPL-MCNC: 8.6 MG/DL (ref 8.3–10.1)
CHLORIDE SERPL-SCNC: 108 MMOL/L (ref 100–108)
CO2 SERPL-SCNC: 26 MMOL/L (ref 21–32)
CREAT SERPL-MCNC: 1.12 MG/DL (ref 0.6–1.3)
ERYTHROCYTE [DISTWIDTH] IN BLOOD BY AUTOMATED COUNT: 12.8 % (ref 11.6–15.1)
GFR SERPL CREATININE-BSD FRML MDRD: 66 ML/MIN/1.73SQ M
GLUCOSE SERPL-MCNC: 95 MG/DL (ref 65–140)
HCT VFR BLD AUTO: 44.2 % (ref 36.5–49.3)
HGB BLD-MCNC: 14.8 G/DL (ref 12–17)
INR PPP: 1.5 (ref 0.84–1.19)
MCH RBC QN AUTO: 30.7 PG (ref 26.8–34.3)
MCHC RBC AUTO-ENTMCNC: 33.5 G/DL (ref 31.4–37.4)
MCV RBC AUTO: 92 FL (ref 82–98)
PLATELET # BLD AUTO: 172 THOUSANDS/UL (ref 149–390)
PMV BLD AUTO: 9.2 FL (ref 8.9–12.7)
POTASSIUM SERPL-SCNC: 3.5 MMOL/L (ref 3.5–5.3)
PROTHROMBIN TIME: 17.4 SECONDS (ref 11.6–14.5)
RBC # BLD AUTO: 4.82 MILLION/UL (ref 3.88–5.62)
SODIUM SERPL-SCNC: 139 MMOL/L (ref 136–145)
WBC # BLD AUTO: 10.32 THOUSAND/UL (ref 4.31–10.16)

## 2021-09-15 PROCEDURE — 85027 COMPLETE CBC AUTOMATED: CPT | Performed by: PHYSICIAN ASSISTANT

## 2021-09-15 PROCEDURE — 85730 THROMBOPLASTIN TIME PARTIAL: CPT | Performed by: THORACIC SURGERY (CARDIOTHORACIC VASCULAR SURGERY)

## 2021-09-15 PROCEDURE — NC001 PR NO CHARGE: Performed by: THORACIC SURGERY (CARDIOTHORACIC VASCULAR SURGERY)

## 2021-09-15 PROCEDURE — 85610 PROTHROMBIN TIME: CPT | Performed by: PHYSICIAN ASSISTANT

## 2021-09-15 PROCEDURE — 80048 BASIC METABOLIC PNL TOTAL CA: CPT | Performed by: PHYSICIAN ASSISTANT

## 2021-09-15 RX ORDER — HEPARIN SODIUM 1000 [USP'U]/ML
400 INJECTION, SOLUTION INTRAVENOUS; SUBCUTANEOUS ONCE
Status: CANCELLED | OUTPATIENT
Start: 2021-09-16

## 2021-09-15 RX ORDER — LABETALOL 20 MG/4 ML (5 MG/ML) INTRAVENOUS SYRINGE
10 EVERY 6 HOURS PRN
Status: DISCONTINUED | OUTPATIENT
Start: 2021-09-15 | End: 2021-09-16 | Stop reason: HOSPADM

## 2021-09-15 RX ORDER — POTASSIUM CHLORIDE 20 MEQ/1
40 TABLET, EXTENDED RELEASE ORAL ONCE
Status: COMPLETED | OUTPATIENT
Start: 2021-09-15 | End: 2021-09-15

## 2021-09-15 RX ORDER — CHLORHEXIDINE GLUCONATE 0.12 MG/ML
15 RINSE ORAL ONCE
Status: DISCONTINUED | OUTPATIENT
Start: 2021-09-15 | End: 2021-09-16

## 2021-09-15 RX ADMIN — CHLORHEXIDINE GLUCONATE 15 ML: 1.2 SOLUTION ORAL at 21:35

## 2021-09-15 RX ADMIN — DOCUSATE SODIUM 100 MG: 100 CAPSULE, LIQUID FILLED ORAL at 17:29

## 2021-09-15 RX ADMIN — HEPARIN SODIUM 11.1 UNITS/KG/HR: 10000 INJECTION, SOLUTION INTRAVENOUS at 11:35

## 2021-09-15 RX ADMIN — ATORVASTATIN CALCIUM 80 MG: 80 TABLET, FILM COATED ORAL at 17:29

## 2021-09-15 RX ADMIN — MUPIROCIN 1 APPLICATION: 20 OINTMENT TOPICAL at 09:26

## 2021-09-15 RX ADMIN — MELATONIN 3 MG: at 21:35

## 2021-09-15 RX ADMIN — AMLODIPINE BESYLATE 5 MG: 5 TABLET ORAL at 09:25

## 2021-09-15 RX ADMIN — DOCUSATE SODIUM 100 MG: 100 CAPSULE, LIQUID FILLED ORAL at 09:25

## 2021-09-15 RX ADMIN — CLOPIDOGREL BISULFATE 75 MG: 75 TABLET ORAL at 09:25

## 2021-09-15 RX ADMIN — CHLORHEXIDINE GLUCONATE 15 ML: 1.2 SOLUTION ORAL at 09:26

## 2021-09-15 RX ADMIN — HYDROCHLOROTHIAZIDE 25 MG: 25 TABLET ORAL at 09:25

## 2021-09-15 RX ADMIN — PANTOPRAZOLE SODIUM 40 MG: 40 TABLET, DELAYED RELEASE ORAL at 05:19

## 2021-09-15 RX ADMIN — MUPIROCIN 1 APPLICATION: 20 OINTMENT TOPICAL at 21:37

## 2021-09-15 RX ADMIN — LABETALOL HYDROCHLORIDE 10 MG: 5 INJECTION INTRAVENOUS at 19:31

## 2021-09-15 RX ADMIN — POTASSIUM CHLORIDE 40 MEQ: 1500 TABLET, EXTENDED RELEASE ORAL at 09:25

## 2021-09-15 NOTE — PROGRESS NOTES
Progress Note - Cardiothoracic Surgery   Graciela Like 80 y o  male MRN: [id number]  Unit/Bed#: -01 Encounter: [id number]    87 Hour Events: No events  No complaints  Denies major cardiac symptomatology  Still agreeable to sx, all questions addressed during encounter  Medications:   Scheduled Meds:  Current Facility-Administered Medications   Medication Dose Route Frequency Provider Last Rate    acetaminophen  650 mg Oral Q6H PRN Rozelle Ledger Eyad Katerine PA-C      amLODIPine  5 mg Oral Daily Rozelle Mango Romeros Katerine PA-C      atorvastatin  80 mg Oral Daily With [employer] FRANSICO Howard-C      chlorhexidine  15 mL Mouth/Throat Q12H [address] Rozelle Mango Garcias, [place]      clopidogrel  75 mg Oral Daily Romikaylale Mango Howard PA-PRISCILLA      docusate sodium  100 mg Oral BID Rozelle Mango Garcias PA-C      heparin (porcine)  3-20 Units/kg/hr (Order-Specific) Intravenous Titrated FILIPPO Mann 11 1 Units/kg/hr (09/14/21 1210)    hydrochlorothiazide  25 mg Oral Daily Rozelle Mango Romeros Katerine, PA-C      melatonin  3 mg Oral HS Romikaylale Mango Garcias PA-C      mupirocin  1 application Nasal F91E [address] Rozelle Ledger [place] Katerine, PA-C      pantoprazole  40 mg Oral Early Morning Rozelle Ledger Eyad Katerine, PA-C      senna  1 tablet Oral HS PRN Sharmaine Howard PA-C       Continuous Infusions:heparin (porcine), 3-20 Units/kg/hr (Order-Specific), Last Rate: 11 1 Units/kg/hr (09/14/21 1210)      PRN Meds:   acetaminophen    senna    Vitals:   Vitals:    09/14/21 2314 09/15/21 0500 09/15/21 0600 09/15/21 0707   BP: 143/94   (!) 152/102   BP Location: Right arm      Pulse: 80      Resp: 18      Temp: 97 8 °F (36 6 °C)   97 7 °F (36 5 °C)   TempSrc: Oral      SpO2: 99%      Weight:  85 7 kg (188 lb 15 oz) 85 7 kg (188 lb 15 oz)    Height:  5' 8" (1 727 m)         Telemetry: NSR; Heart Rate: 66; no events/24hrs    Respiratory:   SpO2: SpO2: 99 %, SpO2 Activity: SpO2 Activity: At Rest, SpO2 Device: O2 Device: None (Room air);  Room Air    Intake/Output: Intake/Output Summary (Last 24 hours) at 9/15/2021 0806  Last data filed at 9/15/2021 0521  Gross per 24 hour   Intake 274 5 ml   Output 1800 ml   Net -1525 5 ml      UOP - 650cc/8hrs; 1800cc/24hrs    Weights:   Weight (last 2 days)       Date/Time   Weight    09/15/21 0600   85 7 (188 93)    09/15/21 0500   85 7 (188 93)    09/14/21 1100   87 2 (192 24)                  Results:   Results from last 7 days   Lab Units 09/15/21  0448 09/14/21  1206   WBC Thousand/uL 10 32* 10 66*   HEMOGLOBIN g/dL 14 8 15 5   HEMATOCRIT % 44 2 46 4   PLATELETS Thousands/uL 172 193     Results from last 7 days   Lab Units 09/15/21  0448   SODIUM mmol/L 139   POTASSIUM mmol/L 3 5   CHLORIDE mmol/L 108   CO2 mmol/L 26   BUN mg/dL 20   CREATININE mg/dL 1 12   CALCIUM mg/dL 8 6     Results from last 7 days   Lab Units 09/15/21  0448 09/15/21  0130 09/14/21  1828 09/14/21  1206   INR  1 50*  --   --  1 89*   PTT seconds  --  77* 72* 31     Point of care glucose: not being taken    Studies:  Cardiac cath 9/14: 50% mid LAD, 30% OM1 20% prox RCA    I have personally reviewed pertinent reports  and I have personally reviewed pertinent films in PACS    Invasive Lines/Tubes:  Invasive Devices       Peripheral Intravenous Line              Peripheral IV 09/14/21 Left;Ventral (anterior) Forearm <1 day                    Physical Exam:    HEENT/NECK:  Normocephalic  Atraumatic  No jugular venous distention  Cardiac: III/VI  systolicmurmur  Pulmonary:   no respiratory distress  Abdomen:  Non-distended  Extremities: Trace edema B/L  Neuro: Alert and oriented X 3  Skin: Warm/Dry, without rashes or lesions      Assessment:  Principal Problem:    Nonrheumatic aortic valve stenosis  Active Problems:    Peripheral artery disease (HCC)    Claudication of left lower extremity (HCC)    S/P RIGHT Femoral- PT bypass 2011    Dyslipidemia    ASCVD (arteriosclerotic cardiovascular disease)    Stage 3 chronic kidney disease    Essential hypertension Embolism and thrombosis of arteries of the lower extremities (HCC)    Protein C deficiency ([address] )    Former heavy tobacco smoker    AAA (abdominal aortic aneurysm) without rupture (HCC)       Aortic stenosis, Non-Rheumatic  TF TAVR w/u    Plan:    Cardiac:   NSR; HR/BP well-controlled  Norvasc 5mg PO QDay  HCTZ 25mg PO QDay  Continue Statin therapy  Continue Plavix for PAD w/ claudication  On heparin for h/o PE w/ hypercoagulable d/o, INR 1 50    Pulmonary:   Good Room air oxygen saturation; Continue incentive spirometry/Coughing/Deep breathing exercises    Renal:   Intake/Output net: (-)1525 5 mL/24 hours  Post op cath stable    Neuro:  Neurologically intact; No active issues    GI:  Tolerating TLC 2 3 gm sodium diet  Maintain 1800 mL daily fluid restriction   Continue stool softeners and prn suppository  Continue GI prophylaxis    Endo:   No h/o DM2, good BS control    7    Hematology:    borderline K, replete today   Leukocytosis, afebrile, likely reactive, add IS    8     Disposition:      Ambulating independently, TAVR tomorrow    VTE Pharmacologic Prophylaxis: Heparin  VTE Mechanical Prophylaxis: sequential compression device    Collaborative rounds completed with supervising physician  Plan of care discussed with bedside nurse    SIGNATURE: Charisma Blount PA-C  DATE: September 15, 2021  TIME: 8:06 AM

## 2021-09-15 NOTE — CASE MANAGEMENT
Losa day 1   Bundle No  30 day readmission No  Unplanned readmission risk score 8    Met with the patient and explained the role of the CM and the CM department  Pt lives with his wife Jd who can assist him if needed and she is primary emergency contact at [phone]  Pt lives in a 2 story home with approx 3-4 RADHA  Pt was independent with ADL's and ambulation prior to hospitlization  Pt has bedroom/bathroom on the first floor of his home  Pt has not been to [facility] in the past but has [facility] following cardiac bypass surgery when he required a wound vac  Pt would like them again if VNA will be needed following his TAVR surgery  Pt drives and is retired  Pt does not have POA/LW/AD  DME pt does not know if he has any DME in the home, he possibly has walker but will have his wife check this  [facility] [phone] and pt denies problems paying for his medications  PCP is Dr Shaina Valdes at [phone]  Pt denies drug or alcohol abuse and mental health issues  Pt denies inpatient psychiatric stays  Pt's wife Jd will transport him home at Dc  CM reviewed d/c planning process including the following: identifying help at home, patient preference for d/c planning needs, Discharge Lounge, [facility] Meds to Bed program, availability of treatment team to discuss questions or concerns patient and/or family may have regarding understanding medications and recognizing signs and symptoms once discharged  CM also encouraged patient to follow up with all recommended appointments after discharge  Patient advised of importance for patient and family to participate in managing patients medical well being  Patient/caregiver received discharge checklist   Content reviewed  Patient/caregiver encouraged to participate in discharge plan of care prior to discharge home

## 2021-09-15 NOTE — PLAN OF CARE
Problem: CARDIOVASCULAR - ADULT  Goal: Maintains optimal cardiac output and hemodynamic stability  Description: INTERVENTIONS:  - Monitor I/O, vital signs and rhythm  - Monitor for S/S and trends of decreased cardiac output  - Administer and titrate ordered vasoactive medications to optimize hemodynamic stability  - Assess quality of pulses, skin color and temperature  - Assess for signs of decreased coronary artery perfusion  - Instruct patient to report change in severity of symptoms  Outcome: Progressing  Goal: Absence of cardiac dysrhythmias or at baseline rhythm  Description: INTERVENTIONS:  - Continuous cardiac monitoring, vital signs, obtain 12 lead EKG if ordered  - Administer antiarrhythmic and heart rate control medications as ordered  - Monitor electrolytes and administer replacement therapy as ordered  Outcome: Progressing     Problem: HEMATOLOGIC - ADULT  Goal: Maintains hematologic stability  Description: INTERVENTIONS  - Assess for signs and symptoms of bleeding or hemorrhage  - Monitor labs  - Administer supportive blood products/factors as ordered and appropriate  Outcome: Progressing     Problem: PAIN - ADULT  Goal: Verbalizes/displays adequate comfort level or baseline comfort level  Description: Interventions:  - Encourage patient to monitor pain and request assistance  - Assess pain using appropriate pain scale  - Administer analgesics based on type and severity of pain and evaluate response  - Implement non-pharmacological measures as appropriate and evaluate response  - Consider cultural and social influences on pain and pain management  - Notify physician/advanced practitioner if interventions unsuccessful or patient reports new pain  Outcome: Progressing     Problem: SAFETY ADULT  Goal: Patient will remain free of falls  Description: INTERVENTIONS:  - Educate patient/family on patient safety including physical limitations  - Instruct patient to call for assistance with activity   - Consult OT/PT to assist with strengthening/mobility   - Keep Call bell within reach  - Keep bed low and locked with side rails adjusted as appropriate  - Keep care items and personal belongings within reach  - Initiate and maintain comfort rounds  - Make Fall Risk Sign visible to staff  - Apply yellow socks and bracelet for high fall risk patients  - Consider moving patient to room near nurses station  Outcome: Progressing  Goal: Maintain or return to baseline ADL function  Description: INTERVENTIONS:  -  Assess patient's ability to carry out ADLs; assess patient's baseline for ADL function and identify physical deficits which impact ability to perform ADLs (bathing, care of mouth/teeth, toileting, grooming, dressing, etc )  - Assess/evaluate cause of self-care deficits   - Assess range of motion  - Assess patient's mobility; develop plan if impaired  - Assess patient's need for assistive devices and provide as appropriate  - Encourage maximum independence but intervene and supervise when necessary  - Involve family in performance of ADLs  - Assess for home care needs following discharge   - Consider OT consult to assist with ADL evaluation and planning for discharge  - Provide patient education as appropriate  Outcome: Progressing  Goal: Maintains/Returns to pre admission functional level  Description: INTERVENTIONS:  - Perform BMAT or MOVE assessment daily    - Set and communicate daily mobility goal to care team and patient/family/caregiver  - Collaborate with rehabilitation services on mobility goals if consulted  - Out of bed for toileting  - Record patient progress and toleration of activity level   Outcome: Progressing     Problem: Knowledge Deficit  Goal: Patient/family/caregiver demonstrates understanding of disease process, treatment plan, medications, and discharge instructions  Description: Complete learning assessment and assess knowledge base    Interventions:  - Provide teaching at level of understanding  - Provide teaching via preferred learning methods  Outcome: Progressing

## 2021-09-16 ENCOUNTER — ANESTHESIA EVENT (INPATIENT)
Dept: PERIOP | Facility: HOSPITAL | Age: 71
DRG: 267 | End: 2021-09-16
Payer: MEDICARE

## 2021-09-16 ENCOUNTER — APPOINTMENT (OUTPATIENT)
Dept: NON INVASIVE DIAGNOSTICS | Facility: HOSPITAL | Age: 71
DRG: 267 | End: 2021-09-16
Payer: MEDICARE

## 2021-09-16 ENCOUNTER — ANESTHESIA (INPATIENT)
Dept: PERIOP | Facility: HOSPITAL | Age: 71
DRG: 267 | End: 2021-09-16
Payer: MEDICARE

## 2021-09-16 ENCOUNTER — APPOINTMENT (INPATIENT)
Dept: RADIOLOGY | Facility: HOSPITAL | Age: 71
DRG: 267 | End: 2021-09-16
Payer: MEDICARE

## 2021-09-16 DIAGNOSIS — I35.0 NONRHEUMATIC AORTIC VALVE STENOSIS: Primary | ICD-10-CM

## 2021-09-16 PROBLEM — I44.7 LBBB (LEFT BUNDLE BRANCH BLOCK): Status: ACTIVE | Noted: 2021-09-16

## 2021-09-16 PROBLEM — Z79.01 ANTICOAGULATION GOAL OF INR 2 TO 3: Status: ACTIVE | Noted: 2021-09-16

## 2021-09-16 PROBLEM — Z51.81 ANTICOAGULATION GOAL OF INR 2 TO 3: Status: ACTIVE | Noted: 2021-09-16

## 2021-09-16 PROBLEM — D69.6 THROMBOCYTOPENIA (HCC): Status: ACTIVE | Noted: 2021-09-16

## 2021-09-16 PROBLEM — Z95.2 S/P TAVR (TRANSCATHETER AORTIC VALVE REPLACEMENT): Status: ACTIVE | Noted: 2021-09-16

## 2021-09-16 PROBLEM — Z79.01 ANTICOAGULATED ON COUMADIN: Status: ACTIVE | Noted: 2021-09-16

## 2021-09-16 LAB
ANION GAP SERPL CALCULATED.3IONS-SCNC: 3 MMOL/L (ref 4–13)
ANION GAP SERPL CALCULATED.3IONS-SCNC: 4 MMOL/L (ref 4–13)
APTT PPP: 174 SECONDS (ref 23–37)
BASE EXCESS BLDA CALC-SCNC: 1 MMOL/L (ref -2–3)
BASE EXCESS BLDA CALC-SCNC: 3 MMOL/L (ref -2–3)
BUN SERPL-MCNC: 24 MG/DL (ref 5–25)
BUN SERPL-MCNC: 24 MG/DL (ref 5–25)
CA-I BLD-SCNC: 1.1 MMOL/L (ref 1.12–1.32)
CA-I BLD-SCNC: 1.15 MMOL/L (ref 1.12–1.32)
CALCIUM SERPL-MCNC: 8.5 MG/DL (ref 8.3–10.1)
CALCIUM SERPL-MCNC: 8.7 MG/DL (ref 8.3–10.1)
CHLORIDE SERPL-SCNC: 105 MMOL/L (ref 100–108)
CHLORIDE SERPL-SCNC: 106 MMOL/L (ref 100–108)
CO2 SERPL-SCNC: 28 MMOL/L (ref 21–32)
CO2 SERPL-SCNC: 29 MMOL/L (ref 21–32)
CREAT SERPL-MCNC: 1.3 MG/DL (ref 0.6–1.3)
CREAT SERPL-MCNC: 1.48 MG/DL (ref 0.6–1.3)
GFR SERPL CREATININE-BSD FRML MDRD: 47 ML/MIN/1.73SQ M
GFR SERPL CREATININE-BSD FRML MDRD: 55 ML/MIN/1.73SQ M
GLUCOSE SERPL-MCNC: 102 MG/DL (ref 65–140)
GLUCOSE SERPL-MCNC: 107 MG/DL (ref 65–140)
GLUCOSE SERPL-MCNC: 109 MG/DL (ref 65–140)
GLUCOSE SERPL-MCNC: 115 MG/DL (ref 65–140)
GLUCOSE SERPL-MCNC: 123 MG/DL (ref 65–140)
GLUCOSE SERPL-MCNC: 140 MG/DL (ref 65–140)
GLUCOSE SERPL-MCNC: 85 MG/DL (ref 65–140)
GLUCOSE SERPL-MCNC: 87 MG/DL (ref 65–140)
HCO3 BLDA-SCNC: 28.3 MMOL/L (ref 22–28)
HCO3 BLDA-SCNC: 28.5 MMOL/L (ref 24–30)
HCT VFR BLD AUTO: 45.8 % (ref 36.5–49.3)
HCT VFR BLD CALC: 38 % (ref 36.5–49.3)
HCT VFR BLD CALC: 41 % (ref 36.5–49.3)
HGB BLD-MCNC: 15.5 G/DL (ref 12–17)
HGB BLDA-MCNC: 12.9 G/DL (ref 12–17)
HGB BLDA-MCNC: 13.9 G/DL (ref 12–17)
INR PPP: 1.31 (ref 0.84–1.19)
INR PPP: 1.39 (ref 0.84–1.19)
KCT BLD-ACNC: 104 SEC (ref 89–137)
KCT BLD-ACNC: 161 SEC (ref 89–137)
KCT BLD-ACNC: 397 SEC (ref 89–137)
MRSA NOSE QL CULT: NORMAL
PCO2 BLD: 30 MMOL/L (ref 21–32)
PCO2 BLD: 30 MMOL/L (ref 21–32)
PCO2 BLD: 47.3 MM HG (ref 36–44)
PCO2 BLD: 54.5 MM HG (ref 42–50)
PH BLD: 7.33 [PH] (ref 7.3–7.4)
PH BLD: 7.38 [PH] (ref 7.35–7.45)
PLATELET # BLD AUTO: 141 THOUSANDS/UL (ref 149–390)
PMV BLD AUTO: 8.9 FL (ref 8.9–12.7)
PO2 BLD: 241 MM HG (ref 75–129)
PO2 BLD: 68 MM HG (ref 35–45)
POTASSIUM BLD-SCNC: 3.3 MMOL/L (ref 3.5–5.3)
POTASSIUM BLD-SCNC: 3.7 MMOL/L (ref 3.5–5.3)
POTASSIUM SERPL-SCNC: 3.4 MMOL/L (ref 3.5–5.3)
POTASSIUM SERPL-SCNC: 3.8 MMOL/L (ref 3.5–5.3)
PROTHROMBIN TIME: 15.7 SECONDS (ref 11.6–14.5)
PROTHROMBIN TIME: 16.5 SECONDS (ref 11.6–14.5)
SAO2 % BLD FROM PO2: 100 % (ref 60–85)
SAO2 % BLD FROM PO2: 91 % (ref 60–85)
SODIUM BLD-SCNC: 139 MMOL/L (ref 136–145)
SODIUM BLD-SCNC: 142 MMOL/L (ref 136–145)
SODIUM SERPL-SCNC: 136 MMOL/L (ref 136–145)
SODIUM SERPL-SCNC: 139 MMOL/L (ref 136–145)
SPECIMEN SOURCE: ABNORMAL
SPECIMEN SOURCE: NORMAL

## 2021-09-16 PROCEDURE — NC001 PR NO CHARGE: Performed by: PHYSICIAN ASSISTANT

## 2021-09-16 PROCEDURE — 76377 3D RENDER W/INTRP POSTPROCES: CPT

## 2021-09-16 PROCEDURE — C1894 INTRO/SHEATH, NON-LASER: HCPCS | Performed by: THORACIC SURGERY (CARDIOTHORACIC VASCULAR SURGERY)

## 2021-09-16 PROCEDURE — 82948 REAGENT STRIP/BLOOD GLUCOSE: CPT

## 2021-09-16 PROCEDURE — 85018 HEMOGLOBIN: CPT | Performed by: PHYSICIAN ASSISTANT

## 2021-09-16 PROCEDURE — 99222 1ST HOSP IP/OBS MODERATE 55: CPT | Performed by: INTERNAL MEDICINE

## 2021-09-16 PROCEDURE — 93355 ECHO TRANSESOPHAGEAL (TEE): CPT

## 2021-09-16 PROCEDURE — 82330 ASSAY OF CALCIUM: CPT

## 2021-09-16 PROCEDURE — 93005 ELECTROCARDIOGRAM TRACING: CPT

## 2021-09-16 PROCEDURE — 84295 ASSAY OF SERUM SODIUM: CPT

## 2021-09-16 PROCEDURE — 33361 REPLACE AORTIC VALVE PERQ: CPT | Performed by: INTERNAL MEDICINE

## 2021-09-16 PROCEDURE — 80048 BASIC METABOLIC PNL TOTAL CA: CPT | Performed by: PHYSICIAN ASSISTANT

## 2021-09-16 PROCEDURE — 94760 N-INVAS EAR/PLS OXIMETRY 1: CPT

## 2021-09-16 PROCEDURE — 85049 AUTOMATED PLATELET COUNT: CPT | Performed by: PHYSICIAN ASSISTANT

## 2021-09-16 PROCEDURE — 02RF38Z REPLACEMENT OF AORTIC VALVE WITH ZOOPLASTIC TISSUE, PERCUTANEOUS APPROACH: ICD-10-PCS | Performed by: THORACIC SURGERY (CARDIOTHORACIC VASCULAR SURGERY)

## 2021-09-16 PROCEDURE — G9197 ORDER FOR CEPH: HCPCS | Performed by: INTERNAL MEDICINE

## 2021-09-16 PROCEDURE — 33361 REPLACE AORTIC VALVE PERQ: CPT | Performed by: THORACIC SURGERY (CARDIOTHORACIC VASCULAR SURGERY)

## 2021-09-16 PROCEDURE — 85347 COAGULATION TIME ACTIVATED: CPT

## 2021-09-16 PROCEDURE — C1769 GUIDE WIRE: HCPCS | Performed by: THORACIC SURGERY (CARDIOTHORACIC VASCULAR SURGERY)

## 2021-09-16 PROCEDURE — 93306 TTE W/DOPPLER COMPLETE: CPT | Performed by: INTERNAL MEDICINE

## 2021-09-16 PROCEDURE — 85610 PROTHROMBIN TIME: CPT | Performed by: PHYSICIAN ASSISTANT

## 2021-09-16 PROCEDURE — 82947 ASSAY GLUCOSE BLOOD QUANT: CPT

## 2021-09-16 PROCEDURE — C1760 CLOSURE DEV, VASC: HCPCS | Performed by: THORACIC SURGERY (CARDIOTHORACIC VASCULAR SURGERY)

## 2021-09-16 PROCEDURE — 82803 BLOOD GASES ANY COMBINATION: CPT

## 2021-09-16 PROCEDURE — 85014 HEMATOCRIT: CPT | Performed by: PHYSICIAN ASSISTANT

## 2021-09-16 PROCEDURE — 71045 X-RAY EXAM CHEST 1 VIEW: CPT

## 2021-09-16 PROCEDURE — 02H633Z INSERTION OF INFUSION DEVICE INTO RIGHT ATRIUM, PERCUTANEOUS APPROACH: ICD-10-PCS | Performed by: ANESTHESIOLOGY

## 2021-09-16 PROCEDURE — 84132 ASSAY OF SERUM POTASSIUM: CPT

## 2021-09-16 PROCEDURE — 93306 TTE W/DOPPLER COMPLETE: CPT

## 2021-09-16 PROCEDURE — 85014 HEMATOCRIT: CPT

## 2021-09-16 PROCEDURE — 85730 THROMBOPLASTIN TIME PARTIAL: CPT | Performed by: THORACIC SURGERY (CARDIOTHORACIC VASCULAR SURGERY)

## 2021-09-16 PROCEDURE — G9197 ORDER FOR CEPH: HCPCS | Performed by: THORACIC SURGERY (CARDIOTHORACIC VASCULAR SURGERY)

## 2021-09-16 DEVICE — PERCLOSE PROGLIDE™ SUTURE-MEDIATED CLOSURE SYSTEM
Type: IMPLANTABLE DEVICE | Site: ARTERIAL | Status: FUNCTIONAL
Brand: PERCLOSE PROGLIDE™

## 2021-09-16 DEVICE — TAVR SAPIEN 3 CMNDR DLV SYS 29MM: Type: IMPLANTABLE DEVICE | Site: ARTERIAL | Status: FUNCTIONAL

## 2021-09-16 RX ORDER — HEPARIN SODIUM 1000 [USP'U]/ML
INJECTION, SOLUTION INTRAVENOUS; SUBCUTANEOUS AS NEEDED
Status: DISCONTINUED | OUTPATIENT
Start: 2021-09-16 | End: 2021-09-16

## 2021-09-16 RX ORDER — CEFAZOLIN SODIUM 2 G/50ML
SOLUTION INTRAVENOUS AS NEEDED
Status: DISCONTINUED | OUTPATIENT
Start: 2021-09-16 | End: 2021-09-16

## 2021-09-16 RX ORDER — BISACODYL 10 MG
10 SUPPOSITORY, RECTAL RECTAL DAILY PRN
Status: DISCONTINUED | OUTPATIENT
Start: 2021-09-16 | End: 2021-09-16

## 2021-09-16 RX ORDER — SODIUM CHLORIDE, SODIUM LACTATE, POTASSIUM CHLORIDE, CALCIUM CHLORIDE 600; 310; 30; 20 MG/100ML; MG/100ML; MG/100ML; MG/100ML
INJECTION, SOLUTION INTRAVENOUS CONTINUOUS PRN
Status: DISCONTINUED | OUTPATIENT
Start: 2021-09-16 | End: 2021-09-16

## 2021-09-16 RX ORDER — OXYCODONE HYDROCHLORIDE AND ACETAMINOPHEN 5; 325 MG/1; MG/1
1 TABLET ORAL ONCE
Status: COMPLETED | OUTPATIENT
Start: 2021-09-16 | End: 2021-09-16

## 2021-09-16 RX ORDER — ONDANSETRON 2 MG/ML
4 INJECTION INTRAMUSCULAR; INTRAVENOUS EVERY 6 HOURS PRN
Status: DISCONTINUED | OUTPATIENT
Start: 2021-09-16 | End: 2021-09-18 | Stop reason: HOSPADM

## 2021-09-16 RX ORDER — LIDOCAINE HYDROCHLORIDE 10 MG/ML
INJECTION, SOLUTION EPIDURAL; INFILTRATION; INTRACAUDAL; PERINEURAL AS NEEDED
Status: DISCONTINUED | OUTPATIENT
Start: 2021-09-16 | End: 2021-09-16

## 2021-09-16 RX ORDER — ONDANSETRON 2 MG/ML
4 INJECTION INTRAMUSCULAR; INTRAVENOUS ONCE AS NEEDED
Status: DISCONTINUED | OUTPATIENT
Start: 2021-09-16 | End: 2021-09-16 | Stop reason: HOSPADM

## 2021-09-16 RX ORDER — MAGNESIUM HYDROXIDE 1200 MG/15ML
LIQUID ORAL AS NEEDED
Status: DISCONTINUED | OUTPATIENT
Start: 2021-09-16 | End: 2021-09-16 | Stop reason: HOSPADM

## 2021-09-16 RX ORDER — POLYETHYLENE GLYCOL 3350 17 G/17G
17 POWDER, FOR SOLUTION ORAL DAILY
Status: DISCONTINUED | OUTPATIENT
Start: 2021-09-17 | End: 2021-09-18 | Stop reason: HOSPADM

## 2021-09-16 RX ORDER — CEFAZOLIN SODIUM 2 G/50ML
2000 SOLUTION INTRAVENOUS EVERY 8 HOURS
Status: COMPLETED | OUTPATIENT
Start: 2021-09-16 | End: 2021-09-17

## 2021-09-16 RX ORDER — ROCURONIUM BROMIDE 10 MG/ML
INJECTION, SOLUTION INTRAVENOUS AS NEEDED
Status: DISCONTINUED | OUTPATIENT
Start: 2021-09-16 | End: 2021-09-16

## 2021-09-16 RX ORDER — GLYCOPYRROLATE 0.2 MG/ML
INJECTION INTRAMUSCULAR; INTRAVENOUS AS NEEDED
Status: DISCONTINUED | OUTPATIENT
Start: 2021-09-16 | End: 2021-09-16

## 2021-09-16 RX ORDER — SODIUM CHLORIDE 9 MG/ML
INJECTION, SOLUTION INTRAVENOUS CONTINUOUS PRN
Status: DISCONTINUED | OUTPATIENT
Start: 2021-09-16 | End: 2021-09-16

## 2021-09-16 RX ORDER — ACETAMINOPHEN 325 MG/1
975 TABLET ORAL EVERY 6 HOURS SCHEDULED
Status: DISCONTINUED | OUTPATIENT
Start: 2021-09-16 | End: 2021-09-18 | Stop reason: HOSPADM

## 2021-09-16 RX ORDER — BISACODYL 10 MG
10 SUPPOSITORY, RECTAL RECTAL DAILY PRN
Status: DISCONTINUED | OUTPATIENT
Start: 2021-09-16 | End: 2021-09-18 | Stop reason: HOSPADM

## 2021-09-16 RX ORDER — FENTANYL CITRATE/PF 50 MCG/ML
25 SYRINGE (ML) INJECTION
Status: DISCONTINUED | OUTPATIENT
Start: 2021-09-16 | End: 2021-09-16 | Stop reason: HOSPADM

## 2021-09-16 RX ORDER — ONDANSETRON 2 MG/ML
INJECTION INTRAMUSCULAR; INTRAVENOUS AS NEEDED
Status: DISCONTINUED | OUTPATIENT
Start: 2021-09-16 | End: 2021-09-16

## 2021-09-16 RX ORDER — PROTAMINE SULFATE 10 MG/ML
INJECTION, SOLUTION INTRAVENOUS AS NEEDED
Status: DISCONTINUED | OUTPATIENT
Start: 2021-09-16 | End: 2021-09-16

## 2021-09-16 RX ORDER — NEOSTIGMINE METHYLSULFATE 1 MG/ML
INJECTION INTRAVENOUS AS NEEDED
Status: DISCONTINUED | OUTPATIENT
Start: 2021-09-16 | End: 2021-09-16

## 2021-09-16 RX ORDER — ACETAMINOPHEN 325 MG/1
650 TABLET ORAL EVERY 4 HOURS PRN
Status: DISCONTINUED | OUTPATIENT
Start: 2021-09-16 | End: 2021-09-16

## 2021-09-16 RX ORDER — CEFAZOLIN SODIUM 2 G/50ML
2000 SOLUTION INTRAVENOUS ONCE
Status: DISCONTINUED | OUTPATIENT
Start: 2021-09-16 | End: 2021-09-16

## 2021-09-16 RX ORDER — PANTOPRAZOLE SODIUM 40 MG/1
40 TABLET, DELAYED RELEASE ORAL
Status: DISCONTINUED | OUTPATIENT
Start: 2021-09-17 | End: 2021-09-18 | Stop reason: HOSPADM

## 2021-09-16 RX ORDER — MIDAZOLAM HYDROCHLORIDE 2 MG/2ML
INJECTION, SOLUTION INTRAMUSCULAR; INTRAVENOUS AS NEEDED
Status: DISCONTINUED | OUTPATIENT
Start: 2021-09-16 | End: 2021-09-16

## 2021-09-16 RX ORDER — PROPOFOL 10 MG/ML
INJECTION, EMULSION INTRAVENOUS AS NEEDED
Status: DISCONTINUED | OUTPATIENT
Start: 2021-09-16 | End: 2021-09-16

## 2021-09-16 RX ORDER — SODIUM CHLORIDE, SODIUM GLUCONATE, SODIUM ACETATE, POTASSIUM CHLORIDE, MAGNESIUM CHLORIDE, SODIUM PHOSPHATE, DIBASIC, AND POTASSIUM PHOSPHATE .53; .5; .37; .037; .03; .012; .00082 G/100ML; G/100ML; G/100ML; G/100ML; G/100ML; G/100ML; G/100ML
50 INJECTION, SOLUTION INTRAVENOUS CONTINUOUS
Status: DISPENSED | OUTPATIENT
Start: 2021-09-16 | End: 2021-09-16

## 2021-09-16 RX ORDER — FENTANYL CITRATE 50 UG/ML
INJECTION, SOLUTION INTRAMUSCULAR; INTRAVENOUS AS NEEDED
Status: DISCONTINUED | OUTPATIENT
Start: 2021-09-16 | End: 2021-09-16

## 2021-09-16 RX ORDER — CHLORHEXIDINE GLUCONATE 0.12 MG/ML
15 RINSE ORAL EVERY 12 HOURS SCHEDULED
Status: DISCONTINUED | OUTPATIENT
Start: 2021-09-16 | End: 2021-09-18 | Stop reason: HOSPADM

## 2021-09-16 RX ORDER — CLOPIDOGREL BISULFATE 75 MG/1
75 TABLET ORAL DAILY
Status: DISCONTINUED | OUTPATIENT
Start: 2021-09-16 | End: 2021-09-16

## 2021-09-16 RX ORDER — OXYCODONE HYDROCHLORIDE 5 MG/1
2.5 TABLET ORAL EVERY 6 HOURS PRN
Status: DISCONTINUED | OUTPATIENT
Start: 2021-09-16 | End: 2021-09-17

## 2021-09-16 RX ORDER — WARFARIN SODIUM 5 MG/1
5 TABLET ORAL
Status: COMPLETED | OUTPATIENT
Start: 2021-09-16 | End: 2021-09-16

## 2021-09-16 RX ADMIN — CHLORHEXIDINE GLUCONATE 15 ML: 1.2 SOLUTION ORAL at 20:53

## 2021-09-16 RX ADMIN — CEFAZOLIN SODIUM 2000 MG: 2 SOLUTION INTRAVENOUS at 23:52

## 2021-09-16 RX ADMIN — IOHEXOL 18 ML: 350 INJECTION, SOLUTION INTRAVENOUS at 08:29

## 2021-09-16 RX ADMIN — MUPIROCIN 1 APPLICATION: 20 OINTMENT TOPICAL at 20:53

## 2021-09-16 RX ADMIN — CEFAZOLIN SODIUM 2000 MG: 2 SOLUTION INTRAVENOUS at 07:47

## 2021-09-16 RX ADMIN — NICARDIPINE HYDROCHLORIDE 2.5 MG/HR: 2.5 INJECTION, SOLUTION INTRAVENOUS at 23:23

## 2021-09-16 RX ADMIN — OXYCODONE HYDROCHLORIDE AND ACETAMINOPHEN 1 TABLET: 5; 325 TABLET ORAL at 11:02

## 2021-09-16 RX ADMIN — ROCURONIUM BROMIDE 50 MG: 50 INJECTION, SOLUTION INTRAVENOUS at 07:29

## 2021-09-16 RX ADMIN — NEOSTIGMINE METHYLSULFATE 5 MG: 1 INJECTION INTRAVENOUS at 08:20

## 2021-09-16 RX ADMIN — WARFARIN SODIUM 5 MG: 5 TABLET ORAL at 17:09

## 2021-09-16 RX ADMIN — PROPOFOL 120 MG: 10 INJECTION, EMULSION INTRAVENOUS at 07:29

## 2021-09-16 RX ADMIN — MIDAZOLAM 2 MG: 1 INJECTION INTRAMUSCULAR; INTRAVENOUS at 07:28

## 2021-09-16 RX ADMIN — ACETAMINOPHEN 975 MG: 325 TABLET, FILM COATED ORAL at 14:45

## 2021-09-16 RX ADMIN — LIDOCAINE HYDROCHLORIDE 10 ML: 10 INJECTION, SOLUTION EPIDURAL; INFILTRATION; INTRACAUDAL; PERINEURAL at 07:29

## 2021-09-16 RX ADMIN — CEFAZOLIN SODIUM 2000 MG: 2 SOLUTION INTRAVENOUS at 17:09

## 2021-09-16 RX ADMIN — FENTANYL CITRATE 100 MCG: 50 INJECTION INTRAMUSCULAR; INTRAVENOUS at 07:28

## 2021-09-16 RX ADMIN — OXYCODONE HYDROCHLORIDE 2.5 MG: 5 TABLET ORAL at 14:46

## 2021-09-16 RX ADMIN — PANTOPRAZOLE SODIUM 40 MG: 40 TABLET, DELAYED RELEASE ORAL at 05:27

## 2021-09-16 RX ADMIN — NICARDIPINE HYDROCHLORIDE 5 MG/HR: 2.5 INJECTION, SOLUTION INTRAVENOUS at 08:38

## 2021-09-16 RX ADMIN — SODIUM CHLORIDE, SODIUM GLUCONATE, SODIUM ACETATE, POTASSIUM CHLORIDE, MAGNESIUM CHLORIDE, SODIUM PHOSPHATE, DIBASIC, AND POTASSIUM PHOSPHATE 50 ML/HR: .53; .5; .37; .037; .03; .012; .00082 INJECTION, SOLUTION INTRAVENOUS at 08:40

## 2021-09-16 RX ADMIN — PROTAMINE SULFATE 200 MG: 10 INJECTION, SOLUTION INTRAVENOUS at 08:17

## 2021-09-16 RX ADMIN — SODIUM CHLORIDE: 0.9 INJECTION, SOLUTION INTRAVENOUS at 07:32

## 2021-09-16 RX ADMIN — HEPARIN SODIUM 12000 UNITS: 1000 INJECTION INTRAVENOUS; SUBCUTANEOUS at 08:06

## 2021-09-16 RX ADMIN — SODIUM CHLORIDE, SODIUM LACTATE, POTASSIUM CHLORIDE, AND CALCIUM CHLORIDE: .6; .31; .03; .02 INJECTION, SOLUTION INTRAVENOUS at 07:37

## 2021-09-16 RX ADMIN — GLYCOPYRROLATE 0.8 MG: 0.2 INJECTION, SOLUTION INTRAMUSCULAR; INTRAVENOUS at 08:20

## 2021-09-16 RX ADMIN — ONDANSETRON 4 MG: 2 INJECTION INTRAMUSCULAR; INTRAVENOUS at 08:18

## 2021-09-16 RX ADMIN — ATORVASTATIN CALCIUM 80 MG: 80 TABLET, FILM COATED ORAL at 17:09

## 2021-09-16 NOTE — OP NOTE
OPERATIVE REPORT  PATIENT NAME: Eveline Walker    :  1950  MRN: [id number]  Pt Location: [facility] OR ROOM 02    SURGERY DATE: 2021    SURGEON: Destiney Stacy DO    CO-SURGEON: Derrell Maloney DO    ASSISTANT: Snow Serrano PA-C    ADDITIONAL ASSISTANT: N/A    PREOPERATIVE DIAGNOSIS: Symptomatic severe aortic stenosis  POSTOPERATIVE DIAGNOSIS: Symptomatic severe aortic stenosis  NYHA Class: 3  CCS Class: 3    PROCEDURE:   Transcatheter aortic valve replacement with a 29 mm Dukes BREEZY 3 bioprosthetic valve via left transfemoral approach  CARDIOPULMONARY BYPASS TIME: 0 minutes  ANESTHESIA Dr Tracy Ya, general endotracheal anesthesia with transesophageal echocardiogram guidance  INDICATIONS:  The patient is a 80y o  year-old male with clinical and echocardiographic findings consistent with severe aortic stenosis  FINDINGS:  1  Intraoperative transesophageal echocardiogram revealed severe aortic stenosis  2  Final transesophageal echocardiogram demonstrated prosthetic valve with normal function mild perivalvular leak  OPERATIVE TECHNIQUE:    The patient was taken to the operating room and placed supine on the operating table  Following the satisfactory induction of general anesthesia and placement of monitoring lines, the patient was prepped and draped in the usual sterile fashion  A time-out procedure was performed  The left common femoral artery and left common femoral vein were accessed percutaneously using Seldinger technique and fluoroscopy  Through the left common femoral vein sheath, a balloon-tip temporary pacing catheter was inserted and advanced into the right ventricle and its capture was confirmed  The left common femoral artery was accessed percutaneously using Seldinger technique and fluoroscopy  Two (2) Perclose sutures were deployed in the standard fashion  The patient was systemically heparinized   A pigtail catheter was inserted and advanced to the right coronary cusp, an aortogram was performed to determine proper angle and orientation for valve deployment   The left common femoral artery was then accessed, a Euro[employer] Powererquist extra-stiff wire was positioned in the ascending aorta over an exchange catheter  The sheath for the delivery system was inserted through the left common femoral artery and advanced into the aorta  A 6 [place] right 4 coronary catheter was advanced through the delivery sheath to the aortic valve  The aortic valve was crossed with a 0 035 straight-tip wire, the right coronary catheter was advanced into the left ventricular cavity, this was then exchanged for a curved tip Amplatzer extra stiff wire  A 29 mm BREEZY 3 valve delivery system was advanced through the delivery sheath into the aorta, the delivery system was configured for deployment  The valve on the delivery system was then advanced and the aortic valve was crossed  At this point, the catheter was desheathed in the standard fashion  The valve was positioned appropriately using a combination of fluoroscopy and transesophageal echocardiogram guidance  During an episode of rapid pacing, balloon deployment of the valve was performed  Following deployment, the position of the valve was confirmed by fluoroscopy and echocardiography and its position appeared appropriate with mild perivalvular leak  An additional 2cc were added to the balloon and a second balloon dilation was performed while rapid pacing  There was slight improvement in PVR  The valve delivery system was subsequently removed followed by removal of the sheath while the Perclose sutures were secured and direct pressure was held  Protamine was administered with normalization of the ACT  Pressure was released, without evidence of active bleeding  The left common femoral artery sheath was removed followed by deployment of a closure device   The left common femoral vein sheath was removed and pressure was held  COMPLICATIONS: None    PACKS/TUBES/DRAINS: None  EBL: Minimal     TRANSFUSION: None  SPECIMENS: None      As the attending surgeon, I was present and scrubbed for all critical portions of this procedure  There was no qualified surgical resident available  Sponge, needle and instrument counts were reported as correct by the nursing staff       SIGNATURE: Jagruti Melo DO  DATE: September 16, 2021  TIME: 8:24 AM

## 2021-09-16 NOTE — ANESTHESIA PROCEDURE NOTES
Arterial Line Insertion  Performed by: Louisa Lundborg, MD  Authorized by: Louisa Lundborg, MD   Preparation: Patient was prepped and draped in the usual sterile fashion    Indications: hemodynamic monitoring  Orientation:  Left  Location: radial artery  Sedation:  Patient sedated: yes  Analgesia: see MAR for details    Procedure Details:  Needle gauge: 20  Number of attempts: 1    Post-procedure:  Post-procedure: dressing applied  Waveform: good waveform  Patient tolerance: Patient tolerated the procedure well with no immediate complications

## 2021-09-16 NOTE — ANESTHESIA PROCEDURE NOTES
Central Line Insertion  Performed by: Alix Dowling CRNA  Authorized by: Juanpablo Colmenares MD     Date/Time: 9/16/2021 7:37 AM  Catheter Type:  triple lumen  Consent: Written consent obtained    Risks and benefits: risks, benefits and alternatives were discussed  Consent given by: patient  Patient understanding: patient states understanding of the procedure being performed  Patient consent: the patient's understanding of the procedure matches consent given  Indications: central pressure monitoring  Catheter size: 7 Fr  Patient position: Trendelenburg  Assessment: blood return through all ports and free fluid flow  Preparation: skin prepped with 2% chlorhexidine  Skin prep agent dried: skin prep agent completely dried prior to procedure  Sterile barriers: all five maximum sterile barriers used - cap, mask, sterile gown, sterile gloves, and large sterile sheet  Hand hygiene: hand hygiene performed prior to central venous catheter insertion  Ultrasound guidance: yes  sterile gel and probe cover used in ultrasound-guided central venous catheter insertionultrasound permanent image saved  Number of attempts: 1  Successful placement: yes  Post-procedure: line sutured and dressing applied  Patient tolerance: Patient tolerated the procedure well with no immediate complications

## 2021-09-16 NOTE — CONSULTS
Consultation - Electrophysiology - Cardiology  Zenon Michel 80 y o  male MRN: [id number]  Unit/Bed#: [facility] [place] Encounter: [id number]      Inpatient consult to Electrophysiology  Consult performed by: Dianna Jimenez PA-C  Consult ordered by: Pati Perla PA-C          History of Present Illness   Physician Requesting Consult: Karen Fey, DO  Reason for Consult / Principal Problem: New LBBB s/p TAVR      Assessment/Plan   1  Severe aortic stenosis  a  TTE from 6/10/2021 showed EF 60% with severe AS, moderate AI  b  S/p TF TAVR 9/16/2021  2  Coronary artery disease, nonobstructive  3  Hypertension  a  Maintained on Norvasc 5 mg daily and HCTZ 25 mg as needed as outpatient  4  Hyperlipidemia  a  Maintained on Lipitor 80 mg as outpatient  5  CKD 3  6  Protein C deficiency  a  Maintained on Coumadin as outpatient  7  History of DVT/PE  8  PAD  a  Maintained on Plavix as outpatient  b  S/p right femoral PT bypass  9  Abdominal aortic aneurysm      Telemetry review shows resolution of LBBB with a narrow QRS  He is bradycardic with heart rates ranging between 50-60s  Vitals from this admission show HR 50-80s prior to valve placement  Continue to monitor patient on telemetry and repeat EKG per protocol  EP will reassess tomorrow morning  Patient was not on a beta blocker prior to admission  Beta blocker should not be initiated due to bradycardia  HPI: Zenon Michel is a 80y o  year old male with PMH as stated above who presented to [facility] to undergo elective transcatheter aortic valve replacement  Patient was admitted on 09/14 to undergo Coumadin to heparin bridge prior to his procedure  He underwent successful TAVR today without complication  EP consulted for new LBBB noted on his postoperative EKG  Patient followed with cardiologist Dr Val Rosen then Dr Manuel Perrin for many years    Serial TTEs showed progression of aortic stenosis to severe, although he states he remained asymptomatic from a cardiac standpoint  Patient denies chest pain/heaviness/tightness/pressure, palpitations, shortness of breath, orthopnea, lightheadedness, presyncope, syncope or N/V  Primary Cardiologist: Dr Hernandez [facility]      EKG:   Massimo Pulse      Review of Systems   Constitutional: Positive for fatigue  Negative for chills and diaphoresis  Respiratory: Negative for cough, chest tightness and shortness of breath  Cardiovascular: Negative for chest pain, palpitations and leg swelling  Gastrointestinal: Negative for abdominal pain, diarrhea, nausea and vomiting  Musculoskeletal: Positive for back pain  Skin: Negative for color change, pallor and rash  Neurological: Negative for dizziness, syncope and light-headedness  Hematological: Negative for adenopathy  Does not bruise/bleed easily  Psychiatric/Behavioral: Negative for behavioral problems, confusion and decreased concentration  All other systems reviewed and are negative  Historical Information   Past Medical History:   Diagnosis Date    DVT (deep venous thrombosis) (HCC)     Protein C deficiency (HCC)     Pulmonary embolus (HCC)        Past Surgical History:   Procedure Laterality Date    BACK SURGERY      BYPASS FEMORAL-TIBIAL Right 2011    VEIN LIGATION         Social History     Substance and Sexual Activity   Alcohol Use No     Social History     Substance and Sexual Activity   Drug Use No     Social History     Tobacco Use   Smoking Status Former Smoker    Types: Cigarettes    Quit date: 2018    Years since quittin 9   Smokeless Tobacco Never Used   Tobacco Comment    1 pk every 2days , currently on patches to quit          Family History   Problem Relation Age of Onset    Cancer Mother 50    Heart attack Father 46    Hypertension Father     Prostate cancer Brother     Arrhythmia Neg Hx     Clotting disorder Neg Hx     Fainting Neg Hx     Heart disease Neg Hx     Anuerysm Neg Hx     Stroke Neg Hx Meds/Allergies   Hospital Medications:   Current Facility-Administered Medications   Medication Dose Route Frequency    acetaminophen (TYLENOL) rectal suppository 650 mg  650 mg Rectal Q4H PRN    acetaminophen (TYLENOL) tablet 650 mg  650 mg Oral Q4H PRN    amLODIPine (NORVASC) tablet 5 mg  5 mg Oral Daily    atorvastatin (LIPITOR) tablet 80 mg  80 mg Oral Daily With Dinner    bisacodyl (DULCOLAX) rectal suppository 10 mg  10 mg Rectal Daily PRN    ceFAZolin (ANCEF) IVPB (premix in dextrose) 2,000 mg 50 mL  2,000 mg Intravenous Q8H    chlorhexidine (PERIDEX) 0 12 % oral rinse 15 mL  15 mL Mouth/Throat BID    clopidogrel (PLAVIX) tablet 75 mg  75 mg Oral Daily    clopidogrel (PLAVIX) tablet 75 mg  75 mg Oral Daily    fentaNYL (SUBLIMAZE) injection 25 mcg  25 mcg Intravenous Q5 Min PRN    hydrochlorothiazide (HYDRODIURIL) tablet 25 mg  25 mg Oral Daily    insulin lispro (HumaLOG) 100 units/mL subcutaneous injection 1-5 Units  1-5 Units Subcutaneous HS    insulin lispro (HumaLOG) 100 units/mL subcutaneous injection 1-6 Units  1-6 Units Subcutaneous TID AC    melatonin tablet 3 mg  3 mg Oral HS    multi-electrolyte (PLASMALYTE-A/ISOLYTE-S PH 7 4) IV solution  50 mL/hr Intravenous Continuous    mupirocin (BACTROBAN) 2 % nasal ointment 1 application  1 application Nasal R94Z ABDULKADIR    niCARdipine (CARDENE) 25 mg (STANDARD CONCENTRATION) in sodium chloride 0 9% 250 mL  1-15 mg/hr Intravenous Titrated    niCARdipine (CARDENE) 25 mg (STANDARD CONCENTRATION) in sodium chloride 0 9% 250 mL  1-15 mg/hr Intravenous Continuous    ondansetron (ZOFRAN) injection 4 mg  4 mg Intravenous Q6H PRN    ondansetron (ZOFRAN) injection 4 mg  4 mg Intravenous Once PRN    pantoprazole (PROTONIX) EC tablet 40 mg  40 mg Oral Daily    polyethylene glycol (MIRALAX) packet 17 g  17 g Oral Daily    senna (SENOKOT) tablet 8 6 mg  1 tablet Oral HS PRN    warfarin (COUMADIN) tablet 5 mg  5 mg Oral Once (warfarin)       Home Medications:   Medications Prior to Admission   Medication    amLODIPine (NORVASC) 5 mg tablet    atorvastatin (LIPITOR) 80 mg tablet    clopidogrel (PLAVIX) 75 mg tablet    hydrochlorothiazide (HYDRODIURIL) 25 mg tablet    warfarin (COUMADIN) 5 mg tablet         No Known Allergies      Objective   Vitals: Blood pressure 132/71, pulse 64, temperature 97 5 °F (36 4 °C), resp  rate 14, height 5' 8" (1 727 m), weight 84 9 kg (187 lb 2 7 oz), SpO2 100 %  Orthostatic Blood Pressures      Most Recent Value   Blood Pressure  132/71 filed at 09/16/2021 0930   Patient Position - Orthostatic VS  Sitting filed at 09/15/2021 1917            Intake/Output Summary (Last 24 hours) at 9/16/2021 1007  Last data filed at 9/16/2021 0915  Gross per 24 hour   Intake 854 83 ml   Output 575 ml   Net 279 83 ml       Invasive Devices     Central Venous Catheter Line            CVC Central Lines 09/16/21 Triple <1 day          Peripheral Intravenous Line            Peripheral IV 09/14/21 Left;Ventral (anterior) Forearm 1 day    Peripheral IV 09/16/21 Right Forearm <1 day          Arterial Line            Arterial Line 09/16/21 Left Radial <1 day          Drain            Urethral Catheter Non-latex 16 Fr  <1 day                  Physical Exam  Vitals reviewed  Constitutional:       General: He is not in acute distress  Appearance: He is not ill-appearing or diaphoretic  HENT:      Head: Normocephalic and atraumatic  Right Ear: External ear normal       Left Ear: External ear normal       Nose: Nose normal    Eyes:      General:         Right eye: No discharge  Left eye: No discharge  Cardiovascular:      Rate and Rhythm: Regular rhythm  Bradycardia present  Heart sounds: No murmur heard  No friction rub  Pulmonary:      Effort: Pulmonary effort is normal       Breath sounds: Normal breath sounds  No wheezing, rhonchi or rales  Abdominal:      General: There is no distension        Palpations: Abdomen is soft       Tenderness: There is no abdominal tenderness  Musculoskeletal:         General: No deformity or signs of injury  Cervical back: No rigidity  No muscular tenderness  Right lower leg: No edema  Left lower leg: No edema  Skin:     General: Skin is warm and dry  Capillary Refill: Capillary refill takes less than 2 seconds  Coloration: Skin is not jaundiced or pale  Neurological:      General: No focal deficit present  Mental Status: He is alert and oriented to person, place, and time  Mental status is at baseline  Psychiatric:         Mood and Affect: Mood normal          Behavior: Behavior normal          Thought Content: Thought content normal               Lab Results: I have personally reviewed pertinent lab results  Results from last 7 days   Lab Units 09/16/21  0850 09/16/21  0812 09/16/21  0741 09/15/21  0448 09/14/21  1206   WBC Thousand/uL  --   --   --  10 32* 10 66*   HEMOGLOBIN g/dL 15 5  --   --  14 8 15 5   I STAT HEMOGLOBIN g/dl  --  12 9 13 9  --   --    HEMATOCRIT % 45 8  --   --  44 2 46 4   HEMATOCRIT, ISTAT %  --  38 41  --   --    PLATELETS Thousands/uL 141*  --   --  172 193     Results from last 7 days   Lab Units 09/16/21  0850 09/16/21  0812 09/16/21  0741 09/16/21  0508 09/15/21  0448 09/15/21  0448   POTASSIUM mmol/L 3 8  --   --  3 4*  --  3 5   CHLORIDE mmol/L 105  --   --  106  --  108   CO2 mmol/L 28  --   --  29  --  26   CO2, I-STAT mmol/L  --  30 30  --   --   --    BUN mg/dL 24  --   --  24  --  20   CREATININE mg/dL 1 48*  --   --  1 30  --  1 12   GLUCOSE, ISTAT mg/dl  --  102 87  --    < >  --    CALCIUM mg/dL 8 5  --   --  8 7  --  8 6    < > = values in this interval not displayed       Results from last 7 days   Lab Units 09/16/21  0850 09/16/21  0507 09/15/21  0448 09/15/21  0130 09/14/21  1828   INR  1 39* 1 31* 1 50*  --   --    PTT seconds  --  174*  --  77* 72*             Imaging: I have personally reviewed pertinent films in PACS      Cardiac testing:  ECHO: 6/10/21  SUMMARY     LEFT VENTRICLE:  Systolic function was normal  Ejection fraction was estimated to be 60 %  There were no regional wall motion abnormalities  Doppler parameters were consistent with abnormal left ventricular relaxation (grade 1 diastolic dysfunction)      AORTIC VALVE:  The valve was trileaflet  Leaflets exhibited marked calcification and markedly reduced cuspal separation  Transaortic velocity was increased due to valvular stenosis  There was severe stenosis  There was moderate regurgitation  Valve mean gradient was 39 mmHg  CATH: 8/27/21  CORONARY CIRCULATION:  Mid LAD: There was a 50 % stenosis  1st obtuse marginal: There was a 30 % stenosis    Proximal RCA: There was a diffuse 20 % stenosis        VTE Prophylaxis: Heparin

## 2021-09-16 NOTE — RESPIRATORY THERAPY NOTE
RT Protocol Note  Jameel Chaudhari 80 y o  male MRN: [id number]  Unit/Bed#: [facility] 406-01 Encounter: [id number]    Assessment    Principal Problem:    Nonrheumatic aortic valve stenosis  Active Problems:    Peripheral artery disease (HCC)    Claudication of left lower extremity (HCC)    S/P RIGHT Femoral- PT bypass     Dyslipidemia    ASCVD (arteriosclerotic cardiovascular disease)    Stage 3 chronic kidney disease    Essential hypertension    Embolism and thrombosis of arteries of the lower extremities (HCC)    Protein C deficiency (HCC)    Former heavy tobacco smoker    AAA (abdominal aortic aneurysm) without rupture (HCC)    S/P TAVR (transcatheter aortic valve replacement)    Thrombocytopenia (HCC)    Anticoagulation goal of INR 2 to 3    Anticoagulated on Coumadin    LBBB (left bundle branch block)      Home Pulmonary Medications:  ACP       Past Medical History:   Diagnosis Date    DVT (deep venous thrombosis) (HCC)     Protein C deficiency (HCC)     Pulmonary embolus ([address] )      Social History     Socioeconomic History    Marital status: /Civil Union     Spouse name: None    Number of children: None    Years of education: None    Highest education level: None   Occupational History    None   Tobacco Use    Smoking status: Former Smoker     Types: Cigarettes     Quit date: 2018     Years since quittin 9    Smokeless tobacco: Never Used    Tobacco comment: 1 pk every 2days , currently on patches to quit      Vaping Use    Vaping Use: Never used   Substance and Sexual Activity    Alcohol use: No    Drug use: No    Sexual activity: None   Other Topics Concern    None   Social History Narrative    None     Social Determinants of Health     Financial Resource Strain:     Difficulty of Paying Living Expenses:    Food Insecurity:     Worried About Running Out of Food in the Last Year:     [address] in the Last Year:    Transportation Needs:     Lack of Transportation (Medical):  Lack of Transportation (Non-Medical):    Physical Activity:     Days of Exercise per Week:     Minutes of Exercise per Session:    Stress:     Feeling of Stress :    Social Connections:     Frequency of Communication with Friends and Family:     Frequency of Social Gatherings with Friends and Family:     Attends [employer] Services:     Active Member of Clubs or Organizations:     Attends Club or Organization Meetings:     Marital Status:    Intimate Partner Violence:     Fear of Current or Ex-Partner:     Emotionally Abused:     Physically Abused:     Sexually Abused:        Subjective         Objective    Physical Exam:   Assessment Type: Assess only  General Appearance: Alert, Awake  Respiratory Pattern: Normal  Chest Assessment: Chest expansion symmetrical  Bilateral Breath Sounds: Clear (Anterior)    Vitals:  Blood pressure 120/65, pulse (!) 54, temperature (!) 97 4 °F (36 3 °C), temperature source Oral, resp  rate 14, height 5' 8" (1 727 m), weight 84 9 kg (187 lb 2 7 oz), SpO2 95 %  Imaging and other studies: I have personally reviewed pertinent films in PACS with a Radiologist       09/16/21 1211   Respiratory Protocol   Airway Clearance Plan Discontinue Protocol   Respiratory Assessment   Assessment Type Assess only   General Appearance Alert; Awake   Respiratory Pattern Normal   Chest Assessment Chest expansion symmetrical   Bilateral Breath Sounds Clear  (Anterior)   Resp Comments Patient S/P TAVR, resting comfortably in bed without respiratory complaints  Patient denies history of chronic lung disease or use of pulmonary medications  Patient with excellent technique on incentive spirometer, achieving 1750-2000cc  Patient performs independently, will dc ACP  Plan       Airway Clearance Plan: Discontinue Protocol     Resp Comments: Patient S/P TAVR, resting comfortably in bed without respiratory complaints   Patient denies history of chronic lung disease or use of pulmonary medications  Patient with excellent technique on incentive spirometer, achieving 1750-2000cc  Patient performs independently, will dc ACP

## 2021-09-16 NOTE — OP NOTE
OPERATIVE REPORT  PATIENT NAME: Truman Antonio    :  1950  MRN: [id number]  Pt Location: [facility] OR ROOM 02    SURGERY DATE: 2021    Surgeon(s) and Role:     * Rani Luciano DO - Primary     * Stephen Wiseman PA-C - Assisting     * Joshua Chapin DO - Co-surgeon    Co-Surgeons: Russell Morgan DO; Rani Luciano DO          PREOPERATIVE DIAGNOSIS Symptomatic severe aortic stenosis  POSTOPERATIVE DIAGNOSIS Symptomatic severe aortic stenosis  PROCEDURE Transcatheter aortic valve replacement with a 29 mm Dukes BREEZY 3 bioprosthetic valve via a percutaneous left transfemoral approach  ANESTHESIA Dr Daniel Randall, general endotracheal anesthesia with transesophageal echocardiogram guidance  After informed consent was obtained, patient brought to hybrid operating room in fasting state  Time out was performed  Using modified seldinger technique sheaths were placed in the left   femoral artery and vein respectively  The left   femoral artery was also used for placement of delivery sheath  The vessel was accessed using modified seldinger technique  Using fluoroscopy a temporary wire was advanced into RV apex through transfemoral sheath  The coplanar view was obtained using pigtail catheter placed in ascending aorta with subsequent ascending aortography  The TAVR delivery sheath was ulltimately advanced over a stiff wire  Next the 29 mm Dukes BREEZY 3  valve was implanted using rapid pacing with fluoro and RADHA guidance  There was no significant paravalvular leak appreciated post implant  The sheaths were removed and hemostasis obtained by manual compression of the venous sheath  The  arterial sheath used for  Pigtail catheter insertion and ascending aortography was closed with an Proglide system  The TAVR delivery sheath was removed and percutaneous closure was obtained using Preclose technique with two Proglide devices deployed pre sheath insertion  Patient left the hybrid operating room in stable condition  Impression  Successful 29mm Dukes BREEZY 3 transcatheter aortic valve replacement  SIGNATURE: Trygfreeman Celia, DO  DATE: September 16, 2021  TIME: 8:24 AM    NOTE: A cardiac surgeon as co-surgeon was required as is a CMS requirement as well as needed for conventional open (non catheter based) surgical skills should become necessary

## 2021-09-16 NOTE — ANESTHESIA PROCEDURE NOTES
Procedure Performed: RADHA Anesthesia  Start Time:  9/16/2021 7:49 AM        Preanesthesia Checklist    Patient identified, IV assessed, risks and benefits discussed, monitors and equipment assessed, procedure being performed at surgeon's request and anesthesia consent obtained  Procedure    Diagnostic Indications for RADHA:  assessment of surgical repair  Type of RADHA: interventional RADHA with real time guidance of intracardiac procedure  Images Saved: ultrasound permanent image saved  Physician Requesting Echo: Tamie Gonsales DO  Intubated  Bite block placed  Heart visualized  Insertion of RADHA Probe:  Atraumatic  Probe Type:  Multiplane  Modalities:  3D, color flow mapping, continuous wave Doppler and pulse wave Doppler  Echocardiographic and Doppler Measurements    PREPROCEDURE    LEFT VENTRICLE:  Systolic Function: normal  Ejection Fraction: 55%  Regional Wall Motion Abnormalities: none  RIGHT VENTRICLE:  Systolic Function: normal               AORTIC VALVE:  Leaflets: trileaflet  Leaflet motions restricted  Peak Gradient: 42 mmHg  Regurgitation: moderate  MITRAL VALVE:  Leaflets: calcified  Leaflet Motions: restricted  Regurgitation: trace  TRICUSPID VALVE:  Leaflets: normal  Regurgitation: trace  OTHER VALVE FINDINGS:  AOV: HEAVILY CALCIFIED W/ FUSION ALONG L/R CUSPS; MILD-MOD CENTRAL AR; ANNULUS AREA 5 48     ASCENDING AORTA:  Size:  normal      AORTIC ARCH:  Size:  normal      DESCENDING AORTA:  Grade 4: atheroma protruding => 0 5 cm into lumen  RIGHT ATRIUM:  No spontaneous echo contrast     LEFT ATRIUM:  No spontaneous echo contrast     LEFT ATRIAL APPENDAGE:  No spontaneous echo contrast         ATRIAL SEPTUM:  Intra-atrial septal morphology: normal          VENTRICULAR SEPTUM:  Intra-ventricular septum morphology: normal              OTHER FINDINGS:  Pericardium:  normal          POSTPROCEDURE    LEFT VENTRICLE: Unchanged         RIGHT VENTRICLE: Unchanged Samia Jones AORTIC VALVE:   Leaflets: bioprosthetic  Stenosis: none  Mean Gradient: 2 mmHg  Regurgitation: mild  MITRAL VALVE: Unchanged   TRICUSPID VALVE: Unchanged   PULMONIC VALVE: Unchanged       OTHER VALVE FINDINGS: S/P 29 TF TAVR; VALVE WELL SEATED; MILD-MOD PVL IN AREA OF AO-MITRAL CURTAIN; UNIMPROVED AFTER BV; EOA 3 56CM2  ATRIA: Unchanged   Left Atrial Appendage Ligate: No  Residual Flow in Left Atrial Appendage by Color Flow Doppler: No       AORTA: Unchanged   Dissection: Dissection not present  REMOVAL:  Probe Removal: atraumatic

## 2021-09-16 NOTE — QUICK NOTE
Called to the bedside regarding pulses in feet s/p TAVR  Patient had left sided approach TAVR- groin site is clean and dry no pain  Doppler pulse achieved distal pedal on the left foot easily but overly strong  Doppler pulse achieved distal pedal on the right but not easily and faint  Both feet are warm and sensory intact    Mr Harper has significant vascular disease, hx of claudication in LLE, PAD s/p right femoral -PT bypass follows with Dr Anitra Ying outpatient on chronic Plavix

## 2021-09-16 NOTE — ANESTHESIA POSTPROCEDURE EVALUATION
Post-Op Assessment Note    CV Status:  Stable  Pain Score: 0    Pain management: adequate     Mental Status:  Alert and awake   Hydration Status:  Euvolemic   PONV Controlled:  Controlled   Airway Patency:  Patent      Post Op Vitals Reviewed: Yes      Staff: CRNA         No complications documented      BP (P) 165/71 (09/16/21 0837)    Temp (P) 97 5 °F (36 4 °C) (09/16/21 0837)    Pulse (P) 84 (09/16/21 0837)   Resp (P) 18 (09/16/21 0837)    SpO2 (P) 99 % (09/16/21 0837)

## 2021-09-16 NOTE — ANESTHESIA PREPROCEDURE EVALUATION
Procedure:  REPLACEMENT AORTIC VALVE TRANSCATHETER (TAVR) TRANSFEMORAL W/ 34 MM COLÓN BREEZY S3 ULTRA VALVE (ACCESS ON LEFT) (N/A Chest)  TRANSESOPHAGEAL ECHOCARDIOGRAM (RADHA) (N/A Esophagus)    Relevant Problems   CARDIO   (+) AAA (abdominal aortic aneurysm) without rupture (HCC)   (+) Essential hypertension   (+) Nonrheumatic aortic valve stenosis      /RENAL   (+) Stage 3 chronic kidney disease      HEMATOLOGY   (+) Protein C deficiency (HCC)      NEURO/PSYCH   (+) Claudication of left lower extremity (HCC)     nyha class 1-2 sx       Lab Results   Component Value Date    WBC 10 32 (H) 09/15/2021    HGB 14 8 09/15/2021     09/15/2021     Lab Results   Component Value Date    SODIUM 139 09/15/2021    K 3 5 09/15/2021    BUN 20 09/15/2021    CREATININE 1 12 09/15/2021    EGFR 66 09/15/2021     Lab Results   Component Value Date     (HH) 09/16/2021      Lab Results   Component Value Date    INR 1 31 (H) 09/16/2021       Blood type O    No results found for: HGBA1C        [facility] REPORT  CLINICAL:  Indications:  Patient presents for a general health evaluation secondary to future open heart  surgery  Patient is asymptomatic at this time  Physician wants to rule out B/L  ICA stenosis  Operative History:  2011-09-15 Right Femoro_distal posterior tibial bypass Vein graft  Failed stent in RLE  Vein ligation (harvest) for right LE BPG  Risk Factors  The patient has history of HTN, HLD, CKD, PAD, severe aortic stenosis, Protein  C deficiency, and prior smoking (quit < 1 year ago)  Height:  68 inches  Weight:  192 lbs  FINDINGS:     Right        Impression  PSV  EDV (cm/s)  Direction of Flow  Ratio    Dist  ICA    Occluded                                                 Mid  ICA     Occluded                                                 Prox   ICA    Occluded                                                 Dist CCA                  33           0                              Mid CCA 47           0                      1 18    Prox CCA                  40           0                              Ext Carotid               63           7                      1 33    Prox Vert                 67          13  Antegrade                   Subclavian                94           0                                 Left         Impression  PSV  EDV (cm/s)  Direction of Flow  Ratio    Dist  ICA                 78          28                      1 11    Mid  ICA                  66          22                      0 93    Prox  ICA    1 - 49%      52          17                      0 74    Dist CCA                  74          17                              Mid CCA                   71          15                      0 85    Prox CCA                  83          13                              Ext Carotid               74           8                      1 05    Prox Vert                 58          18  Antegrade                   Subclavian               137           0                                       CONCLUSION:     Impression     RIGHT:  Evaluation shows a high grade stenosis vs probable occlusion of the internal  carotid artery  Plaque is heterogenous and irregular  Vertebral artery flow is antegrade  There is no significant subclavian artery  disease  LEFT:  There is <50% stenosis noted in the internal carotid artery  Plaque is  heterogenous and irregular  Vertebral artery flow is antegrade  There is no significant subclavian artery  disease  Recommend repeat testing in 6 month as per protocol unless otherwise indicated  Technical findings faxed to chart  Internal carotid artery stenosis determination by consensus criteria from:  Heather Greenwood , et al  Carotid Artery Stenosis: Gray-Scale and Doppler US Diagnosis  - Society of [employer] in [address] [place] [address], Radiology 2003;  [phone]       SIGNATURE:  Electronically Signed by: Yi Torrez MD, RPVI on 2021-08-17 03:42:54 PM  Physical Exam    Airway    Mallampati score: II  TM Distance: >3 FB       Dental   upper dentures,     Cardiovascular      Pulmonary      Other Findings        Anesthesia Plan  ASA Score- 4     Anesthesia Type- general with ASA Monitors  Additional Monitors: central venous line and arterial line  Airway Plan: ETT  Comment: HIMA Buitrago , have personally seen and evaluated the patient prior to anesthetic care  I have reviewed the pre-anesthetic record, and other medical records if appropriate to the anesthetic care  If a CRNA is involved in the case, I have reviewed the CRNA assessment, if present, and agree  Risks/benefits and alternatives discussed with patient including possible PONV, sore throat, and possibility of rare anesthetic and surgical emergencies  Patient denies history of dysphagia, diverticula, esophageal dysmotility, strictures, stents, or varices  Preinduction ABP; post-induction TLC and PAC; RADHA and ICU post-operatively          Plan Factors-    Chart reviewed  EKG reviewed  Imaging results reviewed  Existing labs reviewed  Patient summary reviewed  Patient is not a current smoker  Patient instructed to abstain from smoking on day of procedure  Obstructive sleep apnea risk education given perioperatively  Induction- intravenous  Postoperative Plan- Plan for postoperative opioid use  Planned trial extubation    Informed Consent- Anesthetic plan and risks discussed with patient  I personally reviewed this patient with the CRNA  Discussed and agreed on the Anesthesia Plan with the CRNA  Jeannie Wiseman

## 2021-09-17 ENCOUNTER — APPOINTMENT (INPATIENT)
Dept: RADIOLOGY | Facility: HOSPITAL | Age: 71
DRG: 267 | End: 2021-09-17
Payer: MEDICARE

## 2021-09-17 PROBLEM — D72.829 LEUKOCYTOSIS: Status: ACTIVE | Noted: 2021-09-17

## 2021-09-17 PROBLEM — E83.51 HYPOCALCEMIA: Status: ACTIVE | Noted: 2021-09-17

## 2021-09-17 LAB
ABO GROUP BLD BPU: NORMAL
ANION GAP SERPL CALCULATED.3IONS-SCNC: 5 MMOL/L (ref 4–13)
APTT PPP: 110 SECONDS (ref 23–37)
APTT PPP: 29 SECONDS (ref 23–37)
APTT PPP: 69 SECONDS (ref 23–37)
ATRIAL RATE: 74 BPM
ATRIAL RATE: 84 BPM
BPU ID: NORMAL
BUN SERPL-MCNC: 24 MG/DL (ref 5–25)
CALCIUM SERPL-MCNC: 8.2 MG/DL (ref 8.3–10.1)
CHLORIDE SERPL-SCNC: 104 MMOL/L (ref 100–108)
CO2 SERPL-SCNC: 28 MMOL/L (ref 21–32)
CREAT SERPL-MCNC: 1.28 MG/DL (ref 0.6–1.3)
CROSSMATCH: NORMAL
ERYTHROCYTE [DISTWIDTH] IN BLOOD BY AUTOMATED COUNT: 12.8 % (ref 11.6–15.1)
GFR SERPL CREATININE-BSD FRML MDRD: 56 ML/MIN/1.73SQ M
GLUCOSE SERPL-MCNC: 105 MG/DL (ref 65–140)
GLUCOSE SERPL-MCNC: 108 MG/DL (ref 65–140)
GLUCOSE SERPL-MCNC: 114 MG/DL (ref 65–140)
GLUCOSE SERPL-MCNC: 127 MG/DL (ref 65–140)
GLUCOSE SERPL-MCNC: 128 MG/DL (ref 65–140)
HCT VFR BLD AUTO: 43.6 % (ref 36.5–49.3)
HGB BLD-MCNC: 14.5 G/DL (ref 12–17)
INR PPP: 1.16 (ref 0.84–1.19)
MAGNESIUM SERPL-MCNC: 1.8 MG/DL (ref 1.6–2.6)
MCH RBC QN AUTO: 30.6 PG (ref 26.8–34.3)
MCHC RBC AUTO-ENTMCNC: 33.3 G/DL (ref 31.4–37.4)
MCV RBC AUTO: 92 FL (ref 82–98)
P AXIS: 66 DEGREES
P AXIS: 69 DEGREES
PLATELET # BLD AUTO: 138 THOUSANDS/UL (ref 149–390)
PMV BLD AUTO: 9.3 FL (ref 8.9–12.7)
POTASSIUM SERPL-SCNC: 3.6 MMOL/L (ref 3.5–5.3)
PR INTERVAL: 150 MS
PR INTERVAL: 167 MS
PROTHROMBIN TIME: 14.3 SECONDS (ref 11.6–14.5)
QRS AXIS: -2 DEGREES
QRS AXIS: 9 DEGREES
QRSD INTERVAL: 146 MS
QRSD INTERVAL: 82 MS
QT INTERVAL: 376 MS
QT INTERVAL: 429 MS
QTC INTERVAL: 417 MS
QTC INTERVAL: 508 MS
RBC # BLD AUTO: 4.74 MILLION/UL (ref 3.88–5.62)
SODIUM SERPL-SCNC: 137 MMOL/L (ref 136–145)
T WAVE AXIS: 147 DEGREES
T WAVE AXIS: 43 DEGREES
UNIT DISPENSE STATUS: NORMAL
UNIT PRODUCT CODE: NORMAL
UNIT PRODUCT VOLUME: 350 ML
UNIT RH: NORMAL
VENTRICULAR RATE: 74 BPM
VENTRICULAR RATE: 84 BPM
WBC # BLD AUTO: 15.61 THOUSAND/UL (ref 4.31–10.16)

## 2021-09-17 PROCEDURE — 71045 X-RAY EXAM CHEST 1 VIEW: CPT

## 2021-09-17 PROCEDURE — 97530 THERAPEUTIC ACTIVITIES: CPT

## 2021-09-17 PROCEDURE — 97163 PT EVAL HIGH COMPLEX 45 MIN: CPT

## 2021-09-17 PROCEDURE — 93010 ELECTROCARDIOGRAM REPORT: CPT | Performed by: INTERNAL MEDICINE

## 2021-09-17 PROCEDURE — NC001 PR NO CHARGE: Performed by: INTERNAL MEDICINE

## 2021-09-17 PROCEDURE — 85730 THROMBOPLASTIN TIME PARTIAL: CPT | Performed by: THORACIC SURGERY (CARDIOTHORACIC VASCULAR SURGERY)

## 2021-09-17 PROCEDURE — 85730 THROMBOPLASTIN TIME PARTIAL: CPT | Performed by: PHYSICIAN ASSISTANT

## 2021-09-17 PROCEDURE — 83735 ASSAY OF MAGNESIUM: CPT | Performed by: PHYSICIAN ASSISTANT

## 2021-09-17 PROCEDURE — 82948 REAGENT STRIP/BLOOD GLUCOSE: CPT

## 2021-09-17 PROCEDURE — 97166 OT EVAL MOD COMPLEX 45 MIN: CPT

## 2021-09-17 PROCEDURE — 99222 1ST HOSP IP/OBS MODERATE 55: CPT | Performed by: INTERNAL MEDICINE

## 2021-09-17 PROCEDURE — 93005 ELECTROCARDIOGRAM TRACING: CPT

## 2021-09-17 PROCEDURE — 99232 SBSQ HOSP IP/OBS MODERATE 35: CPT | Performed by: INTERNAL MEDICINE

## 2021-09-17 PROCEDURE — 85610 PROTHROMBIN TIME: CPT | Performed by: PHYSICIAN ASSISTANT

## 2021-09-17 PROCEDURE — 85027 COMPLETE CBC AUTOMATED: CPT | Performed by: PHYSICIAN ASSISTANT

## 2021-09-17 PROCEDURE — 80048 BASIC METABOLIC PNL TOTAL CA: CPT | Performed by: PHYSICIAN ASSISTANT

## 2021-09-17 PROCEDURE — 99233 SBSQ HOSP IP/OBS HIGH 50: CPT | Performed by: THORACIC SURGERY (CARDIOTHORACIC VASCULAR SURGERY)

## 2021-09-17 RX ORDER — FUROSEMIDE 10 MG/ML
40 INJECTION INTRAMUSCULAR; INTRAVENOUS DAILY
Status: DISCONTINUED | OUTPATIENT
Start: 2021-09-17 | End: 2021-09-18 | Stop reason: HOSPADM

## 2021-09-17 RX ORDER — DOCUSATE SODIUM 100 MG/1
100 CAPSULE, LIQUID FILLED ORAL 2 TIMES DAILY
Status: DISCONTINUED | OUTPATIENT
Start: 2021-09-17 | End: 2021-09-18 | Stop reason: HOSPADM

## 2021-09-17 RX ORDER — WARFARIN SODIUM 5 MG/1
5 TABLET ORAL
Status: COMPLETED | OUTPATIENT
Start: 2021-09-17 | End: 2021-09-17

## 2021-09-17 RX ORDER — POTASSIUM CHLORIDE 20 MEQ/1
20 TABLET, EXTENDED RELEASE ORAL DAILY
Status: DISCONTINUED | OUTPATIENT
Start: 2021-09-17 | End: 2021-09-18 | Stop reason: HOSPADM

## 2021-09-17 RX ORDER — POTASSIUM CHLORIDE 29.8 MG/ML
40 INJECTION INTRAVENOUS ONCE
Status: COMPLETED | OUTPATIENT
Start: 2021-09-17 | End: 2021-09-17

## 2021-09-17 RX ORDER — HEPARIN SODIUM 10000 [USP'U]/100ML
3-20 INJECTION, SOLUTION INTRAVENOUS
Status: DISCONTINUED | OUTPATIENT
Start: 2021-09-17 | End: 2021-09-18 | Stop reason: HOSPADM

## 2021-09-17 RX ADMIN — ACETAMINOPHEN 975 MG: 325 TABLET, FILM COATED ORAL at 12:16

## 2021-09-17 RX ADMIN — AMLODIPINE BESYLATE 5 MG: 5 TABLET ORAL at 08:23

## 2021-09-17 RX ADMIN — ACETAMINOPHEN 975 MG: 325 TABLET, FILM COATED ORAL at 05:40

## 2021-09-17 RX ADMIN — WARFARIN SODIUM 5 MG: 5 TABLET ORAL at 17:17

## 2021-09-17 RX ADMIN — CEFAZOLIN SODIUM 2000 MG: 2 SOLUTION INTRAVENOUS at 08:23

## 2021-09-17 RX ADMIN — PANTOPRAZOLE SODIUM 40 MG: 40 TABLET, DELAYED RELEASE ORAL at 05:40

## 2021-09-17 RX ADMIN — POTASSIUM CHLORIDE 40 MEQ: 29.8 INJECTION, SOLUTION INTRAVENOUS at 09:52

## 2021-09-17 RX ADMIN — CHLORHEXIDINE GLUCONATE 15 ML: 1.2 SOLUTION ORAL at 21:47

## 2021-09-17 RX ADMIN — POLYETHYLENE GLYCOL 3350 17 G: 17 POWDER, FOR SOLUTION ORAL at 08:24

## 2021-09-17 RX ADMIN — FUROSEMIDE 40 MG: 10 INJECTION, SOLUTION INTRAMUSCULAR; INTRAVENOUS at 12:16

## 2021-09-17 RX ADMIN — CLOPIDOGREL BISULFATE 75 MG: 75 TABLET ORAL at 08:23

## 2021-09-17 RX ADMIN — ACETAMINOPHEN 975 MG: 325 TABLET, FILM COATED ORAL at 17:17

## 2021-09-17 RX ADMIN — HYDROCHLOROTHIAZIDE 25 MG: 25 TABLET ORAL at 08:23

## 2021-09-17 RX ADMIN — ACETAMINOPHEN 975 MG: 325 TABLET, FILM COATED ORAL at 23:59

## 2021-09-17 RX ADMIN — MELATONIN 3 MG: at 21:46

## 2021-09-17 RX ADMIN — DOCUSATE SODIUM 100 MG: 100 CAPSULE, LIQUID FILLED ORAL at 17:17

## 2021-09-17 RX ADMIN — Medication 12.5 MG: at 09:52

## 2021-09-17 RX ADMIN — HEPARIN SODIUM 11.8 UNITS/KG/HR: 10000 INJECTION, SOLUTION INTRAVENOUS at 10:07

## 2021-09-17 RX ADMIN — POTASSIUM CHLORIDE 20 MEQ: 1500 TABLET, EXTENDED RELEASE ORAL at 12:17

## 2021-09-17 RX ADMIN — MUPIROCIN 1 APPLICATION: 20 OINTMENT TOPICAL at 08:23

## 2021-09-17 RX ADMIN — ATORVASTATIN CALCIUM 80 MG: 80 TABLET, FILM COATED ORAL at 17:17

## 2021-09-17 RX ADMIN — MUPIROCIN 1 APPLICATION: 20 OINTMENT TOPICAL at 21:46

## 2021-09-17 RX ADMIN — CHLORHEXIDINE GLUCONATE 15 ML: 1.2 SOLUTION ORAL at 08:23

## 2021-09-17 NOTE — CONSULTS
Consultation - Geriatric Medicine   Yesi Heck 80 y o  male MRN: [id number]  Unit/Bed#: [facility] 406-01 Encounter: [id number]      Assessment/Plan     Severe symptomatic aortic stenosis  -s/p TAVR 9/16/21  -currently on HCTZ, Lipitor, amlodipine, ISS, and Tylenol available for pain  -followup chest x-ray completed, final read pending  -follow-up chest x-ray completed, final read pending    Protein C deficiency   -maintained on chronic systemic anticoagulation with Coumadin  -currently on Coumadin 5 mg daily  -INR 1 16    Cognitive screening  -no prior cognitive testing on record for review  -no prior CNS imaging for review  -awake and alert, oriented and reports independence with all ADLs and IADLs  -denies memory concerns  -encourage patient remain physically, socially, and cognitively active engaged to maintain cognitive acuity  -discussed additional ways risk tool of your brain and stay cognitively sharp with aging, tip sheet/education left with patient at bedside    CKD-III  -creatinine 1 28  -avoid extremes of BP especially perioperative times  -renally dose all medications and avoid nephrotoxic agents    Ambulatory dysfunction  -due to claudication  -denies use of assist device for ambulation at baseline  -denies falls within the past 6 months although endurance is limited due to claudication  -encourage good body mechanics assist with transfers  -keep personal items and call bell close to reaching  -maintain environment free of fall hazards  -encourage appropriate and adequate lighting at all times when out of bed  -consider long-term fall risk reduction strategies such as matter of balance or Silver sneakers  -consider home fall risk assessment and personal fall alert system    Deconditioning/debility/frailty  -clinical frailty scale stage 4, vulnerable  -albumin 3 4, encourage well-balanced nutrition, consider nutritional supplements between meals if oral intake is poor  -continue optimization chronic medical conditions and address acute metabolic derangements as arise  -ensure that any mood/anxiety/depression symptoms are treated as may impact patient's response to therapies as well as overall sense of well-being and quality of life    Delirium precautions  -Patient is high risk of delirium due to age, surgical procedure, anesthesia, hospitalization  -Initiate delirium precautions  -maintain normal sleep/wake cycle  -minimize overnight interruptions, group overnight vitals/labs/nursing checks as possible  -dim lights, close blinds and turn off tv to minimize stimulation and encourage sleep environment in evenings  -ensure that pain is well controlled consider Tylenol 975mg Q8H scheduled if having consistent pain  -monitor for fecal and urinary retention which may precipitate delirium  -encourage early mobilization and ambulation once cleared by cardiothoracic surgery to do so  -provide frequent reorientation and redirection as indicated appropriate  -encourage family and friends at the bedside to help help calm patient if anxious  -avoid medications which may precipitate or worsen delirium such as tramadol, benzodiazepine, anticholinergics, and benadryl  -encourage hydration and nutrition   -redirect unwanted behaviors as first line, avoid physical restraints, use chemical restraint only if all other attempts have been unsuccessful, would consider Zyprexa 2 5mg IM Q, monitor for orthostatic hypotension and QTc prolongation with repeat dosing, recommend lowest dose possible for shortest duration possible     Home medication review  Will confirm with [facility] [phone] when open during business hours as not open at time of initial eval     History of Present Illness   Physician Requesting Consult: Dino Aase, DO  Reason for Consult / Principal Problem:  Routine post TAVR geriatric evaluation  Hx and PE limited by: N/A  Additional history obtained from:  Chart review and patient evaluation    HPI: Carmenza Masterson Osmin Roper is a 80y o  year old male with hyperlipidemia, left bundle-branch block, abdominal aortic aneurysm without rupture, chronic systemic anticoagulation, CKD stage 3, protein C deficiency and severe symptomatic aortic stenosis  He is admitted to cardiothoracic surgery being seen in consultation by Geriatrics for routine post TAVR geriatric evaluation  Seen examined bedside where he sitting resting comfortably, reports slept well overnight and denies pain or acute complaints this morning  Prior to admission he was residing home with his wife reports independence with ADLs and IADLs, does not wear glasses hearing aids or dentures and denies memory concerns, denies any falls within the past 6 months  Inpatient consult to Gerontology  Consult performed by: Sharri Last DO  Consult ordered by: Rose Pereira PA-C        Review of Systems   Constitutional: Negative for chills and fever  HENT: Negative  Eyes: Negative  Respiratory: Negative  Cardiovascular: Negative  Gastrointestinal: Negative  Endocrine: Negative  Genitourinary: Negative  Musculoskeletal: Negative  Skin: Negative  Allergic/Immunologic: Negative  Neurological: Negative  Hematological: Negative  Psychiatric/Behavioral: Negative  All other systems reviewed and are negative      Historical Information   Past Medical History:   Diagnosis Date    DVT (deep venous thrombosis) (HCC)     Protein C deficiency (HCC)     Pulmonary embolus (HCC)      Past Surgical History:   Procedure Laterality Date    BACK SURGERY      BYPASS FEMORAL-TIBIAL Right 2011    VEIN LIGATION       Social History   Social History     Substance and Sexual Activity   Alcohol Use No     Social History     Substance and Sexual Activity   Drug Use No     Social History     Tobacco Use   Smoking Status Former Smoker    Types: Cigarettes    Quit date: 2018    Years since quittin 9   Smokeless Tobacco Never Used   Tobacco Comment 1 pk every 2days , currently on patches to quit  Family History:   Family History   Problem Relation Age of Onset    Cancer Mother 50    Heart attack Father 46    Hypertension Father     Prostate cancer Brother     Arrhythmia Neg Hx     Clotting disorder Neg Hx     Fainting Neg Hx     Heart disease Neg Hx     Anuerysm Neg Hx     Stroke Neg Hx      Meds/Allergies   all current active meds have been reviewed    No Known Allergies    Objective     Intake/Output Summary (Last 24 hours) at 9/17/2021 0739  Last data filed at 9/17/2021 0600  Gross per 24 hour   Intake 2230 38 ml   Output 1225 ml   Net 1005 38 ml     Invasive Devices     Central Venous Catheter Line            CVC Central Lines 09/16/21 Triple 1 day          Peripheral Intravenous Line            Peripheral IV 09/14/21 Left;Ventral (anterior) Forearm 2 days    Peripheral IV 09/16/21 Right Forearm 1 day          Arterial Line            Arterial Line 09/16/21 Left Radial 1 day                Physical Exam  Vitals and nursing note reviewed  Constitutional:       General: He is not in acute distress  Comments: Elderly male appears older than stated age sitting in chair side bed resting comfortably   HENT:      Head: Normocephalic and atraumatic  Nose: Nose normal       Mouth/Throat:      Mouth: Mucous membranes are dry  Eyes:      General:         Right eye: No discharge  Left eye: No discharge  Neck:      Comments: Phonation normal  Cardiovascular:      Rate and Rhythm: Normal rate  Pulmonary:      Effort: No respiratory distress  Breath sounds: No wheezing  Abdominal:      Palpations: Abdomen is soft  Tenderness: There is no abdominal tenderness  Musculoskeletal:      Cervical back: Neck supple  Comments: Mildly reduced overall muscle mass   Skin:     General: Skin is warm and dry  Neurological:      Mental Status: He is alert        Comments: Sleeping but awakens to voice, answers questions appropriately, no word-finding difficulties appreciated   Psychiatric:      Comments: Flat affect       Lab Results:   I have personally reviewed pertinent lab results including the following:  -sodium 137, potassium 3 6, chloride 104, CO2 28, BUN 24, creatinine 1 28, glucose 127, calcium 8 2, GFR 56, magnesium 1 8  -hemoglobin 14 5, hematocrit 43 6, WBC 15 61, platelets 431    I have personally reviewed the following imaging study reports in PACS:    CTA chest abdomen pelvis 8/17/21:  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

## 2021-09-17 NOTE — PROGRESS NOTES
Progress Note - Cardiothoracic Surgery   Nino Martinez 80 y o  male MRN: [id number]  Unit/Bed#: [facility] 406-01 Encounter: [id number]    Aortic stenosis, Non-Rheumatic  S/P transfemoral transcatheter aortic valve replacement; POD # 1    24 Hour Events: Seen by EP, no need for PPM as LBBB not present, EKG this AM w/o BBB as well  Issues w/ pulses b/l yesterday, dopplerable this AM  C/o LBP post-op, given Oxy w/ improvement  Geriatrics eval complete this AM  No other events  No complaints  Tolerating diet  (+) flatus, (+) BM  Has not ambulated yet  Pain controlled, no more LBP      Medications:   Scheduled Meds:  Current Facility-Administered Medications   Medication Dose Route Frequency Provider Last Rate    acetaminophen  650 mg Rectal Q4H PRN Rm Chris PA-C      acetaminophen  975 mg Oral Q6H [address]  Delphine Wnig PA-C      amLODIPine  5 mg Oral Daily [place]      atorvastatin  80 mg Oral Daily With [address] [place] [address]      bisacodyl  10 mg Rectal Daily PRN Toro Patel DO      cefazolin  2,000 mg Intravenous [employer] Stopped (09/17/21 0022)    chlorhexidine  15 mL Mouth/Throat Q12H [address] [place] [address] VANCE Champagne      clopidogrel  75 mg Oral Daily Rm Chris PA-C      hydrochlorothiazide  25 mg Oral Daily [place], [place]      insulin lispro  1-5 Units Subcutaneous [facility]      insulin lispro  1-6 Units Subcutaneous TID [facility] VANCE Champagne      melatonin  3 mg Oral HS [facility]      mupirocin  1 application Nasal L41H [address] [place] [address]      niCARdipine  1-15 mg/hr Intravenous Titrated Rm Chris PA-C 5 mg/hr (09/16/21 1000)    niCARdipine  1-15 mg/hr Intravenous Continuous Roselinda ERNESTO Llamas Stopped (09/17/21 2538)    ondansetron  4 mg Intravenous Q6H PRN Rm Chris PA-C      oxyCODONE  2 5 mg Oral Q6H PRN Rachel Kilts Lugiano, PA-C      pantoprazole  40 mg Oral Early Morning Shikha Bonifacio Meyer PA-C      polyethylene glycol  17 g Oral Daily VANCE Bruce      senna  1 tablet Oral HS PRN [place], PA-C       Continuous Infusions:niCARdipine, 1-15 mg/hr, Last Rate: 5 mg/hr (09/16/21 1000)  niCARdipine, 1-15 mg/hr, Last Rate: Stopped (09/17/21 3757)      PRN Meds:   acetaminophen    bisacodyl    ondansetron    oxyCODONE    senna    Vitals:   Vitals:    09/17/21 0400 09/17/21 0500 09/17/21 0600 09/17/21 0615   BP: 139/61 122/69 128/68 124/63   BP Location: Right arm  Right arm Right arm   Pulse:       Resp:       Temp:       TempSrc:       SpO2: 94%  94%    Weight:   87 8 kg (193 lb 9 6 oz)    Height:         Bp x24hrs: 100-140/40-80    Telemetry: NSR; Heart Rate: 77; 10 beat run NSVT QHS    Respiratory:   SpO2: SpO2: 94 %, SpO2 Activity: SpO2 Activity: At Rest, SpO2 Device: O2 Device: None (Room air); Room Air    Intake/Output:     Intake/Output Summary (Last 24 hours) at 9/17/2021 0812  Last data filed at 9/17/2021 0600  Gross per 24 hour   Intake 2230 38 ml   Output 1225 ml   Net 1005 38 ml      UOP - 100cc/8hrs; 1225cc/24hrs    Weights:   Weight (last 2 days)     Date/Time   Weight    09/17/21 0600   87 8 (193 6)    09/16/21 0838   --    Comment rows:    OBSERV: 12 lead EKG done  at 09/16/21 0838    09/16/21 0541   84 9 (187 17)    09/15/21 0600   85 7 (188 93)    09/15/21 0500   85 7 (188 93)            Admit weight: 87 2kg - up 0 5kg from admission weight    Results:   Results from last 7 days   Lab Units 09/17/21  0244 09/16/21  0850 09/16/21  0812 09/15/21  0448 09/14/21  1206 09/14/21  1206   WBC Thousand/uL 15 61*  --   --  10 32*  --  10 66*   HEMOGLOBIN g/dL 14 5 15 5  --  14 8  --  15 5   I STAT HEMOGLOBIN g/dl  --   --  12 9  --    < >  --    HEMATOCRIT % 43 6 45 8  --  44 2  --  46 4   HEMATOCRIT, ISTAT %  --   --  38  --    < >  --    PLATELETS Thousands/uL 138* 141*  --  172  --  193    < > = values in this interval not displayed       Results from last 7 days Lab Units 09/17/21  0244 09/16/21  0850 09/16/21  0812 09/16/21  0508   POTASSIUM mmol/L 3 6 3 8  --  3 4*   CHLORIDE mmol/L 104 105  --  106   CO2 mmol/L 28 28  --  29   CO2, I-STAT mmol/L  --   --  30  --    BUN mg/dL 24 24  --  24   CREATININE mg/dL 1 28 1 48*  --  1 30   GLUCOSE, ISTAT mg/dl  --   --  102  --    CALCIUM mg/dL 8 2* 8 5  --  8 7     Results from last 7 days   Lab Units 09/17/21  0244 09/16/21  0850 09/16/21  0507 09/15/21  0130 09/14/21  1828   INR  1 16 1 39* 1 31*  --   --    PTT seconds  --   --  174* 77* 72*     Coumadin mg 9/16 - 5          Point of care glucose: 128 - 140 - 123 - 115    Studies:  CXR: stable study, mild central PVC, lines in place, no PTX -- FINAL REPORT PENDING  EKG: NSR, rate 74, QTc 417  TTE 9/16: EF 58%, no RWMA, grade 1 DD, LA mildly dilated, mild MAC, TAVR well seated w/o malfunction or PVL (mean 6), trace TR    I have personally reviewed pertinent reports  and I have personally reviewed pertinent films in PACS    Invasive Lines/Tubes:  Invasive Devices     Central Venous Catheter Line            CVC Central Lines 09/16/21 Triple 1 day          Peripheral Intravenous Line            Peripheral IV 09/14/21 Left;Ventral (anterior) Forearm 2 days    Peripheral IV 09/16/21 Right Forearm 1 day          Arterial Line            Arterial Line 09/16/21 Left Radial 1 day                Physical Exam:    HEENT/NECK:  Normocephalic  Atraumatic  No jugular venous distention  Cardiac: Regular rate and rhythm and No murmurs/rubs/gallops  Pulmonary:  Crackles bilaterally and good inspiratory effort  Abdomen:  Non-tender, Non-distended and Normal bowel sounds  Incisions: Groin puncture sites dressed with sterile dressing  No hematoma, erythema, or drainage  Extremities: Extremities warm/dry, DP pulses dopperable bilaterally and Trace edema B/L  Neuro: Alert and oriented X 3  Skin: Warm/Dry, without rashes or lesions      Assessment:  Principal Problem:    Nonrheumatic aortic valve stenosis  Active Problems:    Peripheral artery disease (HCC)    Claudication of left lower extremity (HCC)    S/P RIGHT Femoral- PT bypass 2011    Dyslipidemia    ASCVD (arteriosclerotic cardiovascular disease)    Stage 3 chronic kidney disease    Essential hypertension    Embolism and thrombosis of arteries of the lower extremities (HCC)    Protein C deficiency (HCC)    Former heavy tobacco smoker    AAA (abdominal aortic aneurysm) without rupture (HCC)    S/P TAVR (transcatheter aortic valve replacement)    Thrombocytopenia (HCC)    Anticoagulation goal of INR 2 to 3    Anticoagulated on Coumadin    LBBB (left bundle branch block)       Aortic stenosis, Non-Rheumatic  S/P transfemoral transcatheter aortic valve replacement; POD # 1    Plan:    1  Cardiac:   NSR; HR/BP well-controlled  Start Lopressor, 12 5mg PO BID   Norvasc 5mg PO QDay  HCTZ 25mg PO QDay  Wean cardene to off then discontinue  Maintain central IV access today for blood draws/heparin gtt  ASA/Coumadin  Consult cardiology for postoperative medical management  Follow up transthoracic echocardiogram today  Continue Statin therapy  Continue DVT prophylaxis    2  Pulmonary:   Extubated  CXR findings: as above  Good Room air oxygen saturation; Continue incentive spirometry/Coughing/Deep breathing exercises    3  Renal:   Intake/Output net: (+)1005 4 mL/24 hours  Start diuresis   Lasix 40mg IV QDay   Potassium Chlorhide 20mEq PO QDay  Post op Creatinine stable; Follow up labs prn    4  Neuro:  Neurologically intact; No active issues  Incisional pain well-controlled; Continue prn Tylenol    Continue Meltonin    5  GI:  Tolerating TLC 2 3 gm sodium diet  Maintain 1800 mL daily fluid restriction   Continue daily stool softeners and prn suppository  Continue GI prophylaxis    6  Endo:   Glucose well-controlled  Continue Insulin sliding scale coverage    7     Hematology:    Leukocytosis, afebrile, likely reactive   Borderline K, replete today   Thrombocytopaenia, no active bleeding   H/o protein C w/ DVT/PE, start heparin gtt, INR 1 17, dose Coumadin tonight    8     Disposition:   Gerontology consultation already completed for routine assessment of TAVR patient over 72years old  Transfer from stepdown to telemetry today    VTE Pharmacologic Prophylaxis: Heparin  VTE Mechanical Prophylaxis: sequential compression device    Bedside rounds completed with supervising physician  Plan of care discussed with bedside nurse    SIGNATURE: Charisma Blount PA-C  DATE: September 17, 2021  TIME: 8:12 AM

## 2021-09-17 NOTE — RESTORATIVE TECHNICIAN NOTE
Restorative Technician Note      Patient Name: Aubrie Mcclendon     Restorative Tech Visit Date: 09/17/21  Note Type: Mobility  Patient Position Upon Consult: Bedside chair  Activity Performed: Ambulated  Assistive Device: Other (Comment) (held onto IV pole)  Patient Position at End of Consult: All needs within reach; Bedside chair

## 2021-09-17 NOTE — OCCUPATIONAL THERAPY NOTE
Occupational Therapy Evaluation     Patient Name: Nino Martinez  KOJQE'M Date: 9/17/2021  Problem List  Principal Problem:    Nonrheumatic aortic valve stenosis  Active Problems:    Peripheral artery disease (HCC)    Claudication of left lower extremity (HCC)    S/P RIGHT Femoral- PT bypass 2011    Dyslipidemia    ASCVD (arteriosclerotic cardiovascular disease)    Stage 3 chronic kidney disease    Essential hypertension    Embolism and thrombosis of arteries of the lower extremities (HCC)    Protein C deficiency ([address] )    Former heavy tobacco smoker    AAA (abdominal aortic aneurysm) without rupture (HCC)    S/P TAVR (transcatheter aortic valve replacement)    Thrombocytopenia (HCC)    Anticoagulation goal of INR 2 to 3    Anticoagulated on Coumadin    LBBB (left bundle branch block)    Leukocytosis    Hypocalcemia    Past Medical History  Past Medical History:   Diagnosis Date    [facility]     [facility] C deficiency [facility]     Pulmonary embolus ([facility] )      Past Surgical History  Past Surgical History:   Procedure Laterality Date    BACK SURGERY      BYPASS FEMORAL-TIBIAL Right 2011    CO ECHO TRANSESOPHAG R-T 2D W/PRB IMG ACQUISJ I&R N/A 9/16/2021    Procedure: TRANSESOPHAGEAL ECHOCARDIOGRAM (RADHA); Surgeon: Toro Patel DO;  [facility];  Service: Cardiac Surgery    CO REPLACE AORTIC VALVE OPENFEMORAL ARTERY APPROACH N/A 9/16/2021    Procedure: REPLACEMENT AORTIC VALVE TRANSCATHETER (TAVR) TRANSFEMORAL W/ 29 MM COLÓN BREEZY S3 ULTRA VALVE (ACCESS ON LEFT);   Surgeon: Toro Patel DO;  [facility];  Service: Cardiac Surgery    VEIN LIGATION               09/17/21 0854   OT Last Visit   OT Visit Date 09/17/21   Note Type   Note type Evaluation   Restrictions/Precautions   Weight Bearing Precautions Per Order No   Other Precautions Cardiac/sternal;Telemetry;Multiple lines  (a-line)   Pain Assessment   Pain Assessment Tool 0-10   Pain Score No Pain   Home Living   Type of [address] [place] [address] Two level; Able to live on main level with bedroom/bathroom;Stairs to enter with rails   Bathroom Shower/Tub Tub/shower unit   Bathroom Toilet Standard   Bathroom Equipment Grab bars in shower   [address]   (denies)   Additional Comments Pt reports living in a 2 story home with 3-4 RADHA and a FFSU  Prior Function   Level of [place] Independent with ADLs and functional mobility   Lives With Spouse   Receives Help From Family   ADL Assistance Independent   IADLs Independent   Falls in the last 6 months 0   Vocational Retired   Lifestyle   Autonomy Pt reports being I with ADLS, IADLS and mobility without device PTA  (+)    Reciprocal Relationships Pt lives with his wife who is able to assist as needed upon d/c     Service to Others Retired   [address] Enjoys wood working    Subjective   Subjective Pt reports that he has no concerns about returning home upon d/c  ADL   Where Assessed Chair   Eating Assistance 7  Independent   Grooming Assistance 5  Supervision/Setup   UB Bathing Assistance 5  Supervision/Setup   LB Bathing Assistance 5  Supervision/Setup   UB Dressing Assistance 5  Supervision/Setup   LB Dressing Assistance 5  Supervision/Setup   Toileting Assistance  5  Supervision/Setup   Functional Assistance 5  Supervision/Setup   Bed Mobility   Additional Comments Unable to assess, pt seated OOB in chair upon OT arrival  After OT session pt in chair with all needs within reach  Transfers   Sit to Stand 5  Supervision   Stand to Sit 5  Supervision   Functional Mobility   Functional Mobility 5  Supervision   Additional Comments Pt demonstrated household mobility with no device      Balance   Static Sitting Good   Dynamic Sitting Fair +   Static Standing Fair   Dynamic Standing Fair   Ambulatory Fair -   Activity Tolerance   Activity Tolerance Patient tolerated treatment well   Medical Staff Made Aware Seen with PT 2* medical complexity and multiple comorbidities   Nurse Made Aware RN confirmed okay to see pt   RUE Assessment   RUE Assessment WFL   LUE Assessment   LUE Assessment WFL   Cognition   Overall Cognitive Status WFL   Arousal/Participation Alert; Cooperative   Attention Within functional limits   Orientation Level Oriented X4   Memory Decreased recall of precautions   Following Commands Follows all commands and directions without difficulty   Comments Pt is very pleasant and cooperative  Pt participated in cardiac packet this session  Assessment   Assessment Pt is a 80 y o  male admitted [facility]2021 w/ nonrheumatic aortic valve stenosis s/p TAVR on 9/16/2021  Pt  has a past medical history of DVT (deep venous thrombosis) ([facility] ), Protein C deficiency ([facility] ), and Pulmonary embolus ([facility] )  Pt with active OT orders and ambulate  orders  Pt resides in a 2 story home with 3-4 RADHA and a [facility]  Pt lives with his wife who is able to assist as needed upon d/c  Pt was I w/  ADLS and IADLS, (+) drove, & required no use of DME PTA  Currently pt is supervision for functional transfers and functional mobility, and supervision for ADLS overall  Based on the aforementioned OT evaluation, functional performance deficits, and assessments, pt has been identified as a moderate complexity evaluation  From OT standpoint, anticipate d/c home with family support  Recommend continued participation in [facility] and functional mobility with staff  No further acute OT needs, d/c OT      Goals   Patient Goals To return home   Recommendation   OT Discharge Recommendation No rehabilitation needs  (Home with increased social support)   OT - OK to Discharge Yes  (When medically appropriate)   AM-PAC Daily Activity Inpatient   Lower Body Dressing 3   Bathing 4   Toileting 4   Upper Body Dressing 4   Grooming 4   Eating 4   Daily Activity Raw Score 23   Daily Activity Standardized Score (Calc for Raw Score >=11) 51 12   AM-PAC Applied Cognition Inpatient   Following a Speech/Presentation 4   Understanding Ordinary Conversation 4   Taking Medications 4   Remembering Where Things Are Placed or Put Away 4   Remembering List of 4-5 Errands 4   Taking Care of Complicated Tasks 4   Applied Cognition Raw Score 24   Applied Cognition Standardized Score 62 21   Modified Kevyn Scale   Modified Kevyn Scale 3       Shaina Ngo, SUGEY, OTR/L

## 2021-09-17 NOTE — PHYSICAL THERAPY NOTE
Physical Therapy Evaluation     Patient's Name: Brock Sawant    Admitting Diagnosis  Nonrheumatic aortic (valve) stenosis [I35 0]    Problem List  Patient Active Problem List   Diagnosis    [facility]    [facility] of [facility]    S/P RIGHT Femoral- PT bypass 2011    Dyslipidemia    ASCVD (arteriosclerotic cardiovascular disease)    Stage 3 chronic kidney disease    Nonrheumatic aortic valve stenosis    Essential hypertension    Elevated PSA, between 10 and less than 20 ng/ml    Embolism and thrombosis of arteries of the lower extremities ([facility] )    Protein C deficiency [facility]    Former heavy tobacco smoker    AAA [facility]abdominal aortic aneurysm) [facility] rupture [facility] )    S/P TAVR (transcatheter aortic valve replacement)    Thrombocytopenia (HCC)    Anticoagulation goal of INR 2 to 3    Anticoagulated on Coumadin    LBBB (left bundle branch block)    Leukocytosis    Hypocalcemia       Past Medical History  Past Medical History:   Diagnosis Date    [facility])     [facility]     Pulmonary embolus (HCC)        Past Surgical History  Past Surgical History:   Procedure Laterality Date    BACK SURGERY      BYPASS FEMORAL-TIBIAL Right 2011    CT ECHO TRANSESOPHAG R-T 2D W/PRB IMG ACQUISJ I&R N/A 9/16/2021    Procedure: TRANSESOPHAGEAL ECHOCARDIOGRAM (RADHA); Surgeon: Candida Fairbanks DO;  [facility];  Service: Cardiac Surgery    CT REPLACE AORTIC VALVE OPENFEMORAL ARTERY APPROACH N/A 9/16/2021    Procedure: REPLACEMENT AORTIC VALVE TRANSCATHETER (TAVR) TRANSFEMORAL W/ 29 MM COLÓN BREEZY S3 ULTRA VALVE (ACCESS ON LEFT);   Surgeon: Candida Fairbanks DO;  [facility];  Service: Cardiac Surgery    VEIN LIGATION            09/17/21 0846   PT Last Visit   PT Visit Date 09/17/21   Note Type   Note type Evaluation  (and Tx)   Pain Assessment   Pain Assessment Tool Pain Assessment not indicated - pt denies pain   Pain Score No Pain   Home Living   Type of Home House   Home Layout Two level; Able to live on main level with bedroom/bathroom  (plans to stay on 1st floor; 2 RADHA w/ hand rail)   Prior Function   Level of [place] Independent with ADLs and functional mobility  (amb w/o AD)   Lives With Spouse   Restrictions/Precautions   Other Precautions Cardiac/sternal;Multiple lines;Telemetry;Hard of hearing  (a-line)   General   Additional Pertinent History cleared for assessment (spoke to nsg)   Cognition   Overall Cognitive Status [facility]   Arousal/Participation Alert   Attention Attends with cues to redirect   Orientation Level Oriented to person;Oriented to place;Oriented to situation   Memory Decreased recall of precautions   Following Commands Follows one step commands without difficulty   Comments Pt is in the chair; mod [place]; pleasant and cooperative; agreeable to mobilize;   RUE Assessment   RUE Assessment WFL  (AROM)   LUE Assessment   LUE Assessment WFL  (AROM)   RLE Assessment   RLE Assessment WFL  (AROM)   Strength RLE   RLE Overall Strength   (fair +/ good - (grossly))   LLE Assessment   LLE Assessment WFL  (AROM)   Strength LLE   LLE Overall Strength   (fair +/ good - (grossly))   Transfers   Sit to Stand 5  Supervision   Stand to Sit 5  Supervision   Ambulation/Elevation   Gait pattern Excessively slow; Short stride; Inconsistent little   Gait Assistance 4  Minimal assist   Additional items Assist x 1;Verbal cues; Tactile cues  (stand by (A) of 2 more for lines and chair follow)   Assistive Device None   Distance 90 ft   Stair Management Assistance 5  Supervision   Additional items Assist x 1;Verbal cues; Tactile cues   Stair Management Technique One rail R;Step to pattern; Foreward;Backward;Nonreciprocal   Number of Stairs 2  (1 step x 2 trials)   Balance   Static Sitting Good   Dynamic Sitting Fair +   Static Standing Fair   Dynamic Standing Fair -   Ambulatory Poor +   Activity Tolerance   Activity Tolerance Patient tolerated treatment well   Medical Staff Made Aware Co-eval performed w/ OTR due to complexity of medical status and multiple comorbidities   Nurse Made Aware spoke to Eric, [facility]   Assessment   Prognosis Good   Problem List Decreased strength;Decreased endurance; Impaired balance;Decreased mobility   Assessment Pt is 80 y o  male admitted with Dx of Nonrheumatic aortic valve stenosis and underwent Transcatheter aortic valve replacement with a 29 mm Dukes BREEZY 3 bioprosthetic valve via left transfemoral approach on 9/16/2021  Pt 's comorbidities affecting POC include: AAA, HTN, CKD, claudication (L) LE and personal factors of: RADHA, Pt's clinical presentation is currently  unstable/unpredictable which is evident in ongoing telem monitoring w/ a-line in place, abn lab values and need for stand by assist w/ all phases of mobility when usually mobilizing independently  Pt presents w/ min overall weakness, incl decreased LE strength, decreased functional endurance and inconsistent amb balance and gait patterns requiring min (A) for amb at this time  Will cont to follow pt in PT for progressive mobilization to max level of (I), endurance, and safety  Otherwise, anticipate pt will return home w/ available family support upon D/C provided he cont improving w/ mobility skills, safety, and endurance and when medically cleared; will follow  Goals   Patient Goals to go home   STG Expiration Date 09/22/21   Short Term Goal #1 3-5 days  Pt will amb 400 ft w/o assistive device, (I) in order to facilitate safe return to premorbid environment and community amb status  Pt will negotiate 2 --> 12 steps w/ hand rail, mod (I) in order to navigate in and out of the house and between levels of home environment efficiently  Pt will achieve (I) level w/ bed mob in order to facilitate safety with OOB and back to bed transitions in own living environment   Pt will perform transfers w/ mod (I) to assure (I) and safety w/ transitions during aspects of mobility/locomotion  Pt will participate in LE therex and balance activities to max progression w/ mobility skills  PT Treatment Day 1  (follow up Tx session )   Plan   Treatment/Interventions Functional transfer training;LE strengthening/ROM; Elevations; Therapeutic exercise; Endurance training;Bed mobility;Gait training;Spoke to nursing;OT   PT Frequency Other (Comment)  (3-5x/wk)   Recommendation   PT Discharge Recommendation No rehabilitation needs   Equipment Recommended   (none at this time)   Alannah 8 in Bed Without Bedrails 4   Lying on Back to Sitting on Edge of Flat Bed 4   Moving Bed to Chair 3   Standing Up From Chair 3   Walk in Room 3   Climb 3-5 Stairs 3   Basic Mobility Inpatient Raw Score 20   Basic Mobility Standardized Score 43 99   Modified Beauty Scale   Modified Kevyn Scale 4         Horacio Beatty, PT  PT Tx note    Time In: 08:46  Time Out: 08:55  Total Time: 9 min      S:  Pt is in the chair; agreeable to try further ambulation;     O:  Additional functional mobility training performed as following: sit <--> stand transfers at mod (I) level; amb 220 ft w/o AD, (S) --> mod (I) and stand by (A) of 2 more for lines and chair follow; pillow placed for UE support; call bell placed w/in reach;     A:  Additional follow up consecutive session performed to progress further w/ mobilization/ambulation distance to facilitate progression w/ functional mobility skills, endurance and overall level of (I)  Pt was able to ambulate further distance w/o AD and demonstrated mod (I) level w/ transfers and amb as well; no overt uncorrected LOB, gross knee buckling, or swaying observed; pt remained in NAD w/ no excessive fatigue or SOB expressed; currently, cont to anticipate pt will return home w/ family support when medically cleared; will follow       P:  Cont to follow pt 3-5x/week for LE therex, functional mobility training, endurance training and pt education per POC to max functional (I) and safety        Adrienne Trammell, PT

## 2021-09-17 NOTE — PROGRESS NOTES
Progress Note - Electrophysiology-Cardiology (EP)   Umesh Chung 80 y o  male MRN: [id number]  Unit/Bed#: [facility] 406-01 Encounter: [id number]      Assessment/Plan:  1  Severe aortic stenosis  a  TTE from 6/10/2021 showed EF 60% with severe AS, moderate AI  b  S/p TF TAVR 9/16/2021  2  New LBBB  1  Seen on immediate post-op EKG from 9/16  2  Completely resolved  3  Bradycardia  4  Coronary artery disease, nonobstructive  5  Hypertension  a  Maintained on Norvasc 5 mg daily and HCTZ 25 mg as needed as outpatient  6  Hyperlipidemia  a  Maintained on Lipitor 80 mg as outpatient  7  CKD 3  8  Protein C deficiency  a  Maintained on Coumadin as outpatient  9  History of DVT/PE  10  PAD  a  Maintained on Plavix as outpatient  b  S/p right femoral PT bypass  11  Abdominal aortic aneurysm      Telemetry review and EKG review shows LBBB has completely resolved  He did have one episode of NSVT this morning  We will arrange for a 2 week ambulatory cardiac event monitor to be mailed to his home after discharge for further monitoring  He should avoid beta blockers given his baseline bradycardia  Subjective/Objective     Subjective: Upon examination, patient is resting comfortably in his chair  He is currently on a heparin to coumadin bridge due to his history of protein C deficiency  He offers no complaints  Patient denies chest pain/heaviness/tightness/pressure, palpitations, shortness of breath, orthopnea, lightheadedness, presyncope, syncope or N/V  He is agreeable to the plan outlined above          Objective:     Vitals: /61   Pulse 58   Temp 98 5 °F (36 9 °C) (Oral)   Resp 18   Ht 5' 8" (1 727 m)   Wt 87 8 kg (193 lb 9 6 oz)   SpO2 94%   BMI 29 44 kg/m²   Vitals:    09/16/21 0541 09/17/21 0600   Weight: 84 9 kg (187 lb 2 7 oz) 87 8 kg (193 lb 9 6 oz)     Orthostatic Blood Pressures      Most Recent Value   Blood Pressure  112/61 filed at 09/17/2021 1100   Patient Position - Orthostatic VS  Lying filed at 09/17/2021 0400            Intake/Output Summary (Last 24 hours) at 9/17/2021 1144  Last data filed at 9/17/2021 6130  Gross per 24 hour   Intake 1980 38 ml   Output 1150 ml   Net 830 38 ml       Invasive Devices     Central Venous Catheter Line            CVC Central Lines 09/16/21 Triple 1 day          Peripheral Intravenous Line            Peripheral IV 09/14/21 Left;Ventral (anterior) Forearm 2 days    Peripheral IV 09/16/21 Right Forearm 1 day          Arterial Line            Arterial Line 09/16/21 Left Radial 1 day                            Scheduled Meds:  Current Facility-Administered Medications   Medication Dose Route Frequency Provider Last Rate    acetaminophen  650 mg Rectal Q4H PRN VANCE Bruce      acetaminophen  975 mg Oral Q6H [address] Delphine Wing PA-C      amLODIPine  5 mg Oral Daily [place], [place]      atorvastatin  80 mg Oral Daily [place]      bisacodyl  10 mg Rectal Daily PRN Latasha Liu DO      chlorhexidine  15 mL Mouth/Throat Q12H [address] [place], [place]      clopidogrel  75 mg Oral Daily [place], [place]      docusate sodium  100 mg Oral BID Roxanne Medina PA-C      furosemide  40 mg Intravenous Daily Roxanne Medina PA-C      heparin (porcine)  3-20 Units/kg/hr (Order-Specific) Intravenous Titrated Roxanne Medina PA-C 11 8 Units/kg/hr (09/17/21 1007)    hydrochlorothiazide  25 mg Oral Daily VANCE Bruce      insulin lispro  1-5 Units Subcutaneous HS Shikha Champagne PA-C      insulin lispro  1-6 Units Subcutaneous TID  Shikha Champagne PA-C      melatonin  3 mg Oral HS Shikha Champagne PA-C      metoprolol tartrate  12 5 mg Oral Q12H [address] Roxanne Medina PA-C      mupirocin  1 application Nasal Q74J [address] Shikha Champagne PA-C      ondansetron  4 mg Intravenous Q6H PRN VANCE Bruce      pantoprazole  40 mg Oral Early Morning Shikha Jesus PA-C      polyethylene glycol  17 g Oral Daily Jacinto Milton      potassium chloride  20 mEq Oral Daily Roxanne Medina PA-C      potassium chloride  40 mEq Intravenous Once Roxanne Medina PA-C 40 mEq (09/17/21 2906)    senna  1 tablet Oral HS PRN Elier Gracia PA-C      warfarin  5 mg Oral Once (warfarin) Roxanne Medina PA-C       Continuous Infusions:heparin (porcine), 3-20 Units/kg/hr (Order-Specific), Last Rate: 11 8 Units/kg/hr (09/17/21 1007)      PRN Meds:   acetaminophen    bisacodyl    ondansetron    senna    Review of Systems:  ROS as noted above, otherwise 12 point review of systems was performed and is negative  Physical Exam:   Physical Exam  Vitals reviewed  Constitutional:       General: He is not in acute distress  Appearance: He is not ill-appearing or diaphoretic  HENT:      Head: Normocephalic and atraumatic  Right Ear: External ear normal       Left Ear: External ear normal       Nose: Nose normal    Eyes:      General:         Right eye: No discharge  Left eye: No discharge  Cardiovascular:      Rate and Rhythm: Regular rhythm  Bradycardia present  Heart sounds: No murmur heard  No friction rub  Pulmonary:      Effort: Pulmonary effort is normal       Breath sounds: Normal breath sounds  No wheezing, rhonchi or rales  Abdominal:      General: There is no distension  Palpations: Abdomen is soft  Tenderness: There is no abdominal tenderness  Musculoskeletal:         General: No deformity or signs of injury  Cervical back: No rigidity  No muscular tenderness  Right lower leg: No edema  Left lower leg: No edema  Skin:     General: Skin is warm and dry  Capillary Refill: Capillary refill takes less than 2 seconds  Coloration: Skin is not jaundiced or pale  Neurological:      General: No focal deficit present  Mental Status: He is alert and oriented to person, place, and time  Mental status is at baseline     Psychiatric:         Mood and Affect: Mood normal          Behavior: Behavior normal          Thought Content: Thought content normal                      Lab Results: I have personally reviewed pertinent lab results  Results from last 7 days   Lab Units 09/17/21  0244 09/16/21  0850 09/16/21  0812 09/15/21  0448 09/14/21  1206 09/14/21  1206   WBC Thousand/uL 15 61*  --   --  10 32*  --  10 66*   HEMOGLOBIN g/dL 14 5 15 5  --  14 8  --  15 5   I STAT HEMOGLOBIN g/dl  --   --  12 9  --    < >  --    HEMATOCRIT % 43 6 45 8  --  44 2  --  46 4   HEMATOCRIT, ISTAT %  --   --  38  --    < >  --    PLATELETS Thousands/uL 138* 141*  --  172  --  193    < > = values in this interval not displayed  Results from last 7 days   Lab Units 09/17/21  0244 09/16/21  0850 09/16/21  0812 09/16/21  0508   POTASSIUM mmol/L 3 6 3 8  --  3 4*   CHLORIDE mmol/L 104 105  --  106   CO2 mmol/L 28 28  --  29   CO2, I-STAT mmol/L  --   --  30  --    BUN mg/dL 24 24  --  24   CREATININE mg/dL 1 28 1 48*  --  1 30   GLUCOSE, ISTAT mg/dl  --   --  102  --    CALCIUM mg/dL 8 2* 8 5  --  8 7     Results from last 7 days   Lab Units 09/17/21  0833 09/17/21  0244 09/16/21  0850 09/16/21  0507 09/15/21  0130   INR   --  1 16 1 39* 1 31*  --    PTT seconds 29  --   --  174* 77*     Results from last 7 days   Lab Units 09/17/21  0244   MAGNESIUM mg/dL 1 8         Imaging: I have personally reviewed pertinent films in PACS  ECHO: 6/10/21  SUMMARY     LEFT VENTRICLE:  Systolic function was normal  Ejection fraction was estimated to be 60 %  There were no regional wall motion abnormalities  Doppler parameters were consistent with abnormal left ventricular relaxation (grade 1 diastolic dysfunction)      AORTIC VALVE:  The valve was trileaflet  Leaflets exhibited marked calcification and markedly reduced cuspal separation  Transaortic velocity was increased due to valvular stenosis  There was severe stenosis  There was moderate regurgitation    Valve mean gradient was 39 mmHg         CATH: 8/27/21  CORONARY CIRCULATION:  Mid LAD: There was a 50 % stenosis  1st obtuse marginal: There was a 30 % stenosis  Proximal RCA: There was a diffuse 20 % stenosis          VTE Pharmacologic Prophylaxis: Heparin and Warfarin (Coumadin)  VTE Mechanical Prophylaxis: sequential compression device

## 2021-09-17 NOTE — CONSULTS
Seen by EP for new LBBB post TAVR  Plans for 2 week event recorder  Please reach out to general cardiology if other cardiac needs  Thank you

## 2021-09-17 NOTE — PLAN OF CARE
Problem: PHYSICAL THERAPY ADULT  Goal: Performs mobility at highest level of function for planned discharge setting  See evaluation for individualized goals  Description: Treatment/Interventions: Functional transfer training, LE strengthening/ROM, Elevations, Therapeutic exercise, Endurance training, Bed mobility, Gait training, Spoke to nursing, OT  Equipment Recommended:  (none at this time)       See flowsheet documentation for full assessment, interventions and recommendations  Note: Prognosis: Good  Problem List: Decreased strength, Decreased endurance, Impaired balance, Decreased mobility  Assessment: Pt is 80 y o  male admitted with Dx of Nonrheumatic aortic valve stenosis and underwent Transcatheter aortic valve replacement with a 29 mm Dukes BREEZY 3 bioprosthetic valve via left transfemoral approach on 9/16/2021  Pt 's comorbidities affecting POC include: AAA, HTN, CKD, claudication (L) LE and personal factors of: RADHA, Pt's clinical presentation is currently  unstable/unpredictable which is evident in ongoing telem monitoring w/ a-line in place, abn lab values and need for stand by assist w/ all phases of mobility when usually mobilizing independently  Pt presents w/ min overall weakness, incl decreased LE strength, decreased functional endurance and inconsistent amb balance and gait patterns requiring min (A) for amb at this time  Will cont to follow pt in PT for progressive mobilization to max level of (I), endurance, and safety  Otherwise, anticipate pt will return home w/ available family support upon D/C provided he cont improving w/ mobility skills, safety, and endurance and when medically cleared; will follow  PT Discharge Recommendation: No rehabilitation needs          See flowsheet documentation for full assessment

## 2021-09-18 VITALS
WEIGHT: 189.4 LBS | DIASTOLIC BLOOD PRESSURE: 83 MMHG | BODY MASS INDEX: 28.7 KG/M2 | TEMPERATURE: 97.3 F | SYSTOLIC BLOOD PRESSURE: 146 MMHG | HEART RATE: 83 BPM | HEIGHT: 68 IN | OXYGEN SATURATION: 97 % | RESPIRATION RATE: 16 BRPM

## 2021-09-18 PROBLEM — E87.6 HYPOKALEMIA: Status: ACTIVE | Noted: 2021-09-18

## 2021-09-18 LAB
ANION GAP SERPL CALCULATED.3IONS-SCNC: 6 MMOL/L (ref 4–13)
APTT PPP: 187 SECONDS (ref 23–37)
BUN SERPL-MCNC: 31 MG/DL (ref 5–25)
CALCIUM SERPL-MCNC: 8.5 MG/DL (ref 8.3–10.1)
CHLORIDE SERPL-SCNC: 102 MMOL/L (ref 100–108)
CO2 SERPL-SCNC: 28 MMOL/L (ref 21–32)
CREAT SERPL-MCNC: 1.28 MG/DL (ref 0.6–1.3)
ERYTHROCYTE [DISTWIDTH] IN BLOOD BY AUTOMATED COUNT: 13 % (ref 11.6–15.1)
GFR SERPL CREATININE-BSD FRML MDRD: 56 ML/MIN/1.73SQ M
GLUCOSE SERPL-MCNC: 102 MG/DL (ref 65–140)
GLUCOSE SERPL-MCNC: 103 MG/DL (ref 65–140)
GLUCOSE SERPL-MCNC: 110 MG/DL (ref 65–140)
HCT VFR BLD AUTO: 40.7 % (ref 36.5–49.3)
HGB BLD-MCNC: 13.3 G/DL (ref 12–17)
INR PPP: 1.79 (ref 0.84–1.19)
MCH RBC QN AUTO: 30.3 PG (ref 26.8–34.3)
MCHC RBC AUTO-ENTMCNC: 32.7 G/DL (ref 31.4–37.4)
MCV RBC AUTO: 93 FL (ref 82–98)
PLATELET # BLD AUTO: 123 THOUSANDS/UL (ref 149–390)
PMV BLD AUTO: 9.4 FL (ref 8.9–12.7)
POTASSIUM SERPL-SCNC: 3.4 MMOL/L (ref 3.5–5.3)
PROTHROMBIN TIME: 20 SECONDS (ref 11.6–14.5)
RBC # BLD AUTO: 4.39 MILLION/UL (ref 3.88–5.62)
SODIUM SERPL-SCNC: 136 MMOL/L (ref 136–145)
WBC # BLD AUTO: 16.04 THOUSAND/UL (ref 4.31–10.16)

## 2021-09-18 PROCEDURE — NC001 PR NO CHARGE: Performed by: PHYSICIAN ASSISTANT

## 2021-09-18 PROCEDURE — 80048 BASIC METABOLIC PNL TOTAL CA: CPT | Performed by: PHYSICIAN ASSISTANT

## 2021-09-18 PROCEDURE — 85027 COMPLETE CBC AUTOMATED: CPT | Performed by: PHYSICIAN ASSISTANT

## 2021-09-18 PROCEDURE — 85730 THROMBOPLASTIN TIME PARTIAL: CPT | Performed by: THORACIC SURGERY (CARDIOTHORACIC VASCULAR SURGERY)

## 2021-09-18 PROCEDURE — 82948 REAGENT STRIP/BLOOD GLUCOSE: CPT

## 2021-09-18 PROCEDURE — 99238 HOSP IP/OBS DSCHRG MGMT 30/<: CPT | Performed by: THORACIC SURGERY (CARDIOTHORACIC VASCULAR SURGERY)

## 2021-09-18 PROCEDURE — 85610 PROTHROMBIN TIME: CPT | Performed by: PHYSICIAN ASSISTANT

## 2021-09-18 RX ORDER — TORSEMIDE 20 MG/1
20 TABLET ORAL DAILY
Qty: 5 TABLET | Refills: 1 | Status: SHIPPED | OUTPATIENT
Start: 2021-09-18 | End: 2021-09-27 | Stop reason: HOSPADM

## 2021-09-18 RX ORDER — POTASSIUM CHLORIDE 20 MEQ/1
20 TABLET, EXTENDED RELEASE ORAL DAILY
Qty: 5 TABLET | Refills: 1 | Status: SHIPPED | OUTPATIENT
Start: 2021-09-19 | End: 2021-10-06 | Stop reason: ALTCHOICE

## 2021-09-18 RX ORDER — ACETAMINOPHEN 325 MG/1
TABLET ORAL
Refills: 0 | COMMUNITY
Start: 2021-09-18

## 2021-09-18 RX ORDER — PANTOPRAZOLE SODIUM 40 MG/1
40 TABLET, DELAYED RELEASE ORAL
Qty: 30 TABLET | Refills: 0 | Status: SHIPPED | OUTPATIENT
Start: 2021-09-19 | End: 2021-10-06 | Stop reason: ALTCHOICE

## 2021-09-18 RX ORDER — POTASSIUM CHLORIDE 29.8 MG/ML
40 INJECTION INTRAVENOUS ONCE
Status: COMPLETED | OUTPATIENT
Start: 2021-09-18 | End: 2021-09-18

## 2021-09-18 RX ORDER — WARFARIN SODIUM 5 MG/1
5 TABLET ORAL
Status: DISCONTINUED | OUTPATIENT
Start: 2021-09-18 | End: 2021-09-18 | Stop reason: HOSPADM

## 2021-09-18 RX ORDER — TAMSULOSIN HYDROCHLORIDE 0.4 MG/1
0.4 CAPSULE ORAL
Qty: 30 CAPSULE | Refills: 2 | Status: SHIPPED | OUTPATIENT
Start: 2021-09-19 | End: 2022-01-03 | Stop reason: SDUPTHER

## 2021-09-18 RX ORDER — DOCUSATE SODIUM 100 MG/1
100 CAPSULE, LIQUID FILLED ORAL 2 TIMES DAILY PRN
Qty: 60 CAPSULE | Refills: 0 | Status: SHIPPED | OUTPATIENT
Start: 2021-09-18 | End: 2022-05-10

## 2021-09-18 RX ORDER — POLYETHYLENE GLYCOL 3350 17 G/17G
17 POWDER, FOR SOLUTION ORAL DAILY PRN
Refills: 0 | COMMUNITY
Start: 2021-09-18 | End: 2022-05-10

## 2021-09-18 RX ORDER — TAMSULOSIN HYDROCHLORIDE 0.4 MG/1
0.4 CAPSULE ORAL
Status: DISCONTINUED | OUTPATIENT
Start: 2021-09-18 | End: 2021-09-18 | Stop reason: HOSPADM

## 2021-09-18 RX ADMIN — FUROSEMIDE 40 MG: 10 INJECTION, SOLUTION INTRAMUSCULAR; INTRAVENOUS at 09:46

## 2021-09-18 RX ADMIN — CLOPIDOGREL BISULFATE 75 MG: 75 TABLET ORAL at 09:46

## 2021-09-18 RX ADMIN — POTASSIUM CHLORIDE 40 MEQ: 29.8 INJECTION, SOLUTION INTRAVENOUS at 09:47

## 2021-09-18 RX ADMIN — DOCUSATE SODIUM 100 MG: 100 CAPSULE, LIQUID FILLED ORAL at 09:46

## 2021-09-18 RX ADMIN — MUPIROCIN 1 APPLICATION: 20 OINTMENT TOPICAL at 09:46

## 2021-09-18 RX ADMIN — PANTOPRAZOLE SODIUM 40 MG: 40 TABLET, DELAYED RELEASE ORAL at 06:13

## 2021-09-18 RX ADMIN — HYDROCHLOROTHIAZIDE 25 MG: 25 TABLET ORAL at 09:46

## 2021-09-18 RX ADMIN — POTASSIUM CHLORIDE 20 MEQ: 1500 TABLET, EXTENDED RELEASE ORAL at 09:46

## 2021-09-18 RX ADMIN — TAMSULOSIN HYDROCHLORIDE 0.4 MG: 0.4 CAPSULE ORAL at 09:46

## 2021-09-18 RX ADMIN — ACETAMINOPHEN 975 MG: 325 TABLET, FILM COATED ORAL at 06:13

## 2021-09-18 RX ADMIN — HEPARIN SODIUM 6.8 UNITS/KG/HR: 10000 INJECTION, SOLUTION INTRAVENOUS at 09:59

## 2021-09-18 RX ADMIN — CHLORHEXIDINE GLUCONATE 15 ML: 1.2 SOLUTION ORAL at 09:46

## 2021-09-18 RX ADMIN — AMLODIPINE BESYLATE 5 MG: 5 TABLET ORAL at 09:46

## 2021-09-18 NOTE — DISCHARGE SUMMARY
Discharge Summary - Cardiothoracic Surgery   Gini Gomes 80 y o  male MRN: [id number]  [facility] Encounter: [id number]    Admission Date: 9/14/2021     Discharge Date: 09/18/21    Admitting Diagnosis: Nonrheumatic aortic (valve) stenosis [I35 0]    Primary Discharge Diagnosis:   Aortic stenosis, Non-Rheumatic  S/P transfemoral transcatheter aortic valve replacement;    Secondary Discharge Diagnosis:   1  CAD nonobstructive  2  HTN  3  HL  4  CKD 3  5  Protein C deficiency with history of DVT / PE on Coumadin  6  PAD s/p right femoral PT bypass on Plavix  7  Leg claudication  8  Hearing loss    Attending: BEATRIZ Rogers  Consulting Physician(s):   Cardiology  Occupational Therapy  Physical Therapy    Procedures Performed:   Procedure(s):  REPLACEMENT AORTIC VALVE TRANSCATHETER (TAVR) TRANSFEMORAL W/ 29 MM COLÓN LIZBETH S3 ULTRA VALVE (ACCESS ON LEFT)  TRANSESOPHAGEAL ECHOCARDIOGRAM (RADHA)     Hospital Course: The patient was seen in consultation prior to this admission for evaluation of Aortic stenosis, Non-Rheumatic  Risks and benefits of transfemoral transcatheter aortic valve replacement were discussed in detail, and patient was agreeable  Routine preoperative evaluation was completed and informed consent was obtained prior to admission  9/14: Elective admission for Coumadin to IV heparin bridge in preparation for TAVR on Thursday  Consent scanned into epic  Labs ordered for tomorrow: INR, TS, MRSA  Bactroban and peridex ordered  He will continue on his Plavix  9/15: No events  Pre-op orders placed  Consent already scanned into chart  Hold heparin gtt 4 hours prior to OR  Transfer to  when bed available  Ready for OR tomorrow  9/16: TF TAVR #29mm Lizbeth S3 Ultra  Extubated in OR  Dopplerable DP pulses  Transferred to PACU hemodynamically stable on Cardene 5  Coumadin and Plavix  Cardiology and Gerontology consulted  New LBBB on postop EKG  EP consulted   Transfer to [facility] Stepdown  C/o LBP, given Oxy PRN  INR 1 39, start heparin gtt tomorrow morning per Dr Tiki Kraft, dose Coumadin tonight  9/17: Seen by EP, no need for PPM as LBBB not present, EKG this AM w/o BBB as well  Issues w/ pulses b/l yesterday, dopplerable this AM  C/o LBP post-op, given Oxy w/ improvement  Geriatrics eval complete this AM  TTE complete & reported  Cardiology consult pending  Start Lasix 40mg IV QDay, voiding well w/o gifford catheter  INR 1 19, tart heparin gtt, dose Coumadin 5mg tonight  Cr 1 28 from 1 48  Transfer to telem  9/18: Competing junctional/NSR in 50-60 range since beta blocker, discontinued yesterday, asymptomatic otherwise, ok for discharge per cardiology  Straight cath x1 QHS, hematuria this AM, per RN patient did not have hematuria w/ straight cath just post, h/o urinary retention in past w/ gifford at discharge, no BPH meds PTA, started on Flomax, gifford inserted for retention w/ o/p voiding trial  K 3 4, replete today  INR 1 79, dose Coumadin tonight, ok to discontinue heparin gtt per Dr Telly Diallo  Discontiune TLC  Stable for discarge to home  Condition at Discharge:   good     Discharge Physical Exam:    Please see the documented physical exam from this morning's progress note for details      Discharge Data:  Results from last 7 days   Lab Units 09/18/21  0438 09/17/21  0244 09/16/21  0850 09/15/21  0448   WBC Thousand/uL 16 04* 15 61*  --  10 32*   HEMOGLOBIN g/dL 13 3 14 5 15 5 14 8   HEMATOCRIT % 40 7 43 6 45 8 44 2   PLATELETS Thousands/uL 123* 138* 141* 172     Results from last 7 days   Lab Units 09/18/21  0438 09/17/21  0244 09/16/21  0850 09/16/21  0812   POTASSIUM mmol/L 3 4* 3 6 3 8  --    CHLORIDE mmol/L 102 104 105  --    CO2 mmol/L 28 28 28  --    CO2, I-STAT mmol/L  --   --   --  30   BUN mg/dL 31* 24 24  --    CREATININE mg/dL 1 28 1 28 1 48*  --    GLUCOSE, ISTAT mg/dl  --   --   --  102   CALCIUM mg/dL 8 5 8 2* 8 5  --      Results from last 7 days   Lab Units 09/18/21  0438 09/17/21  2145 09/17/21  1602 09/17/21  0244 09/16/21  0850   INR  1 79*  --   --  1 16 1 39*   PTT seconds 187* 110* 69*  --   --        Discharge instructions/Information to patient and family:   See after visit summary for information provided to patient and family  Hung Villagomez was educated on restrictions regarding driving and lifting, and techniques of proper incisional care  They were specifically counselled on signs and symptoms of an incisional infection, and advised to contact our service immediately should they develop fevers, sweats, chill, redness or drainage at the site of any incisions  Provisions for Follow-Up Care:  See after visit summary for information related to follow-up care and any pertinent home health orders  Disposition:  Home w/ HHC, home PT, RW & leg bag    Planned Readmission:   No    Discharge Medications:  See after visit summary for reconciled discharge medications provided to patient and family  Hung Villagomez was provided contact information and scheduled a follow up appointment with BEATRIZ Davila  Additionally, follow up appointments have been scheduled for their primary care physician and primary cardiologist   Contact information was provided  Upon admission, troponins were Not checked    Hung Villagomez was counseled on the importance of avoiding tobacco products  As with all patients whom have undergone open heart surgery, tobacco cessation medication was contraindicated at the time of discharge  ACE/ARB was Contraindicated secondary to renal dysfunction (CKD)    Beta Blocker was Prescribed at discharge      The patient was discharged on ongoing diuretic therapy with Torsemide 20 mg, PO QD and Potassium Chloride 20 mEq, PO QD  They were advised to continue these medications for 5 days, unless otherwise directed  Tylenol PRN for pain control  Plavix/Coumadin for antiplatlet w/ TAVR   Hung Villagomez is being discharged on anticoagulation therapy for treatment of Protein C deficiency w/ h//o DVT/PE  As they have been treated with Coumadin prior to this admission, arrangements have been made for the office of his PCP to continue to monitor INR levels and provide dosing instructions  The Coumadin clinic was notified by direct Epic messaging, which itemized the patients daily INRs and correlating Coumadin doses during their hospitalization  They have been advised to resume their preoperative regimen, unless otherwise directed  Follow up PT/INR will be ordered at the discretion of the Coumadin clinic  No refills on PTD medications per patient  Leg bag w/ urology f/u for retention  The patient was informed that following their postoperative surgical evaluation, they will be referred to outpatient cardiac rehabilitation  They were counseled that this program is run by specialists who will help them safely strengthen their heart and prevent more heart disease  Cardiac rehabilitation will include exercise, relaxation, stress management, and heart-healthy nutrition  Caregivers will also check to make sure their medication regimen is working  During this admission, the patient was questioned on their use of tobacco, alcohol, and illicit/non-prescription drug use in the  previous 24 months  Jameel Chaudhari states that they have not used any of these substances in this time frame  I spent 30 minutes discharging the patient  This time was spent on the day of discharge  I had direct contact with the patient on the day of discharge  Additional documentation is required if more than 30 minutes were spent on discharge       SIGNATUREYehuda Albee, PA-C  DATE: September 18, 2021  TIME: 10:59 AM

## 2021-09-18 NOTE — DISCHARGE INSTRUCTIONS
Transcatheter Aortic Valve Replacement   WHAT YOU NEED TO KNOW:   · A TAVR is a procedure to replace your aortic valve  It is done without removing your old valve  The aortic valve is between the left ventricle and the aorta  The left ventricle is the lower left chamber of your heart  The aorta is a blood vessel that pumps blood to your brain and body  The aortic valve opens and closes to let blood flow from your heart to the rest of your body  · TAVR may be used to replace your aortic valve if open heart surgery is not safe for you  Your valve will be replaced with a tissue valve  The tissue may be taken from an animal, such as a pig or cow  DISCHARGE INSTRUCTIONS:   Call 911 for any of the following:   · You have any of the following signs of a heart attack:      ? Squeezing, pressure, or pain in your chest    ? You may  also have any of the following:     § Discomfort or pain in your back, neck, jaw, stomach, or arm    § Shortness of breath    § Nausea or vomiting    § Lightheadedness or a sudden cold sweat    · You have any of the following signs of a stroke:      ? Numbness or drooping on one side of your face     ? Weakness in an arm or leg    ? Confusion or difficulty speaking    ? Dizziness, a severe headache, or vision loss    · You feel lightheaded, short of breath, and have chest pain  · You cough up blood  · You have trouble breathing  Seek care immediately if:   · Blood soaks through your bandage  · Your stitches come apart  · Your wound gets swollen quickly  · Your heart is beating faster or slower than usual      · Your arm or leg feels numb, cool, or looks pale  · Your arm or leg feels warm, tender, and painful  It may look swollen and red  · You feel weak or dizzy  Contact your healthcare provider if:   · You have a fever or chills  · Your wound is red, swollen, or draining pus  · Your wound looks more bruised or there is new bruising near your wound  · You have nausea or are vomiting  · Your skin is itchy, swollen, or you have a rash  · You have questions or concerns about your condition or care  Medicines: You may need any of the following:  · Blood thinners  help prevent blood clots  Clots can cause strokes, heart attacks, and death  The following are general safety guidelines to follow while you are taking a blood thinner:    ? Watch for bleeding and bruising while you take blood thinners  Watch for bleeding from your gums or nose  Watch for blood in your urine and bowel movements  Use a soft washcloth on your skin, and a soft toothbrush to brush your teeth  This can keep your skin and gums from bleeding  If you shave, use an electric shaver  Do not play contact sports  ? Tell your dentist and other healthcare providers that you take a blood thinner  Wear a bracelet or necklace that says you take this medicine  ? Do not start or stop any other medicines unless your healthcare provider tells you to  Many medicines cannot be used with blood thinners  ? Take your blood thinner exactly as prescribed by your healthcare provider  Do not skip does or take less than prescribed  Tell your provider right away if you forget to take your blood thinner, or if you take too much  ? Warfarin  is a blood thinner that you may need to take  The following are things you should be aware of if you take warfarin:     § Foods and medicines can affect the amount of warfarin in your blood  Do not make major changes to your diet while you take warfarin  Warfarin works best when you eat about the same amount of vitamin K every day  Vitamin K is found in green leafy vegetables and certain other foods  Ask for more information about what to eat when you are taking warfarin  § You will need to see your healthcare provider for follow-up visits when you are on warfarin  You will need regular blood tests   These tests are used to decide how much medicine you need     · Antiplatelets , such as aspirin, help prevent blood clots  Take your antiplatelet medicine exactly as directed  These medicines make it more likely for you to bleed or bruise  If you are told to take aspirin, do not take acetaminophen or ibuprofen instead  · Acetaminophen  helps decrease your pain  This medicine is available without a doctor's order  Ask how much medicine is safe to take, and how often to take it  Acetaminophen can cause liver damage if not taken correctly  · Take your medicine as directed  Contact your healthcare provider if you think your medicine is not helping or if you have side effects  Tell him or her if you are allergic to any medicine  Keep a list of the medicines, vitamins, and herbs you take  Include the amounts, and when and why you take them  Bring the list or the pill bottles to follow-up visits  Carry your medicine list with you in case of an emergency  Care for your wound as directed:  Ask your healthcare provider when your wound can get wet  Carefully wash around the wound with soap and water  Do not scrub your wound  You can let soap and water gently run over your wound  Gently pat dry the area and put on new, clean bandages as directed  Check your wound every day for signs of infection such as swelling, pus, or redness  Change your bandages when they get wet or dirty  Do not put lotions or powders on your wound  Do not take a bath or swim until your healthcare provider says it is okay  These activities can increase your risk for a wound infection  Activity:  Do not lift anything heavier than 5 pounds or do vigorous activities such as sports  These actions will put too much stress on your wound and heart  Take short walks around the house several times a day  This will help prevent blood clots  Ask your healthcare provider what other activities are safe for you to do  Also ask when you can return to your normal activities and work or school     Self-care: · Eat heart healthy foods  You may need to eat foods that are low in salt, fat, or cholesterol  Healthy foods include fruits, vegetables, whole-grain breads, low-fat dairy products, beans, lean meats, and fish  Ask your healthcare provider for more information about a heart healthy diet  · Do not smoke  Nicotine and other chemicals in cigarettes and cigars can cause heart and lung damage  Nicotine can also slow healing  Ask your healthcare provider for information if you currently smoke and need help to quit  E-cigarettes or smokeless tobacco still contain nicotine  Talk to your healthcare provider before you use these products  · Do not drink alcohol  Ask your cardiologist if it is safe for you to drink alcohol  Alcohol can increase your risk for high blood pressure, diabetes, and coronary artery disease  · Maintain a healthy weight  Ask your healthcare provider how much you should weigh  Extra weight can increase the stress on your heart  Ask him to help you create a weight loss plan if you are overweight  · Exercise as directed after you recover  Your healthcare provider can help you create an exercise plan that is right for you  Exercise will help keep your heart healthy  Ask your healthcare provider if you need antibiotics before procedures:  Some procedures may allow bacteria to get into your blood and travel to your heart  This can cause an infection in your heart and prevent you from healing  You may need antibiotics before certain procedures that happen in the next 6 months to prevent infection  This may also include certain dental procedures  Ask your healthcare provider for more information  Follow up with your healthcare provider as directed: You may need to return for tests  These tests will make sure your valve is working correctly  Write down your questions so you remember to ask them during your visits    © Copyright [employer] 2021 Information is for End User's use only and may not be sold, redistributed or otherwise used for commercial purposes  All illustrations and images included in CareNotes® are the copyrighted property of [employer]  or [employer]   The above information is an  only  It is not intended as medical advice for individual conditions or treatments  Talk to your doctor, nurse or pharmacist before following any medical regimen to see if it is safe and effective for you  Heart Healthy Diet   WHAT YOU NEED TO KNOW:   What is a heart healthy diet? A heart healthy diet is an eating plan low in unhealthy fats and sodium (salt)  The plan is high in healthy fats and fiber  A heart healthy diet helps improve your cholesterol levels and lowers your risk for heart disease and stroke  A dietitian will teach you how to read and understand food labels  What diet guidelines should I follow? · Choose foods that contain healthy fats  ? Unsaturated fats  include monounsaturated and polyunsaturated fats  Unsaturated fat is found in foods such as soybean, canola, olive, corn, and safflower oils  It is also found in soft tub margarine that is made with liquid vegetable oil  ? Omega-3 fat  is found in certain fish, such as salmon, tuna, and trout, and in walnuts and flaxseed  Eat fish high in omega-3 fats at least 2 times a week  · Get 20 to 30 grams of fiber each day  Fruits, vegetables, whole-grain foods, and legumes (cooked beans) are good sources of fiber  · Limit or do not have unhealthy fats  ? Cholesterol  is found in animal foods, such as eggs and lobster, and in dairy products made from whole milk  Limit cholesterol to less than 200 mg each day  ? Saturated fat  is found in meats, such as montes and hamburger  It is also found in chicken or turkey skin, whole milk, and butter  Limit saturated fat to less than 7% of your total daily calories      ? Trans fat  is found in packaged foods, such as potato chips and cookies  It is also in hard margarine, some fried foods, and shortening  Do not eat foods that contain trans fats  · Limit sodium as directed  You may be told to limit sodium to 2,000 to 2,300 mg each day  Choose low-sodium or no-salt-added foods  Add little or no salt to food you prepare  Use herbs and spices in place of salt  What can I include in my heart healthy plan? Ask your dietitian or healthcare provider how many servings to have from each of the following food groups:  · Grains:      ? Whole-wheat breads, cereals, and pastas, and brown rice    ? Low-fat, low-sodium crackers and chips    · Vegetables:      ? Broccoli, green beans, green peas, and spinach    ? Collards, kale, and lima beans    ? Carrots, sweet potatoes, tomatoes, and peppers    ? Canned vegetables with no salt added    · Fruits:      ? Bananas, peaches, pears, and pineapple    ? Grapes, raisins, and dates    ? Oranges, tangerines, grapefruit, orange juice, and grapefruit juice    ? Apricots, mangoes, melons, and papaya    ? Raspberries and strawberries    ? Canned fruit with no added sugar    · Low-fat dairy:      ? Nonfat (skim) milk, 1% milk, and low-fat almond, cashew, or soy milks fortified with calcium    ? Low-fat cheese, regular or frozen yogurt, and cottage cheese    · Meats and proteins:      ? Lean cuts of beef and pork (loin, leg, round), skinless chicken and turkey    ? Legumes, soy products, egg whites, or nuts    What should I limit or not include in my heart healthy plan? · Unhealthy fats and oils:      ? Whole or 2% milk, cream cheese, sour cream, or cheese    ? High-fat cuts of beef (T-bone steaks, ribs), chicken or turkey with skin, and organ meats such as liver    ?  Butter, stick margarine, shortening, and cooking oils such as coconut or palm oil    · Foods and liquids high in sodium:      ? Packaged foods, such as frozen dinners, cookies, macaroni and cheese, and cereals with more than 300 mg of sodium per serving    ? Vegetables with added sodium, such as instant potatoes, vegetables with added sauces, or regular canned vegetables    ? Cured or smoked meats, such as hot dogs, montes, and sausage    ? High-sodium ketchup, barbecue sauce, salad dressing, pickles, olives, soy sauce, or miso    · Foods and liquids high in sugar:      ? Candy, cake, cookies, pies, or doughnuts    ? Soft drinks (soda), sports drinks, or sweetened tea    ? Canned or dry mixes for cakes, soups, sauces, or gravies    What other guidelines should I follow for a healthy heart? · Do not smoke  Nicotine and other chemicals in cigarettes and cigars can cause lung and heart damage  Ask your healthcare provider for information if you currently smoke and need help to quit  E-cigarettes or smokeless tobacco still contain nicotine  Talk to your healthcare provider before you use these products  · Limit or do not drink alcohol as directed  Alcohol can damage your heart and raise your blood pressure  Your healthcare provider may give you specific daily and weekly limits  The general recommended limit is 1 drink a day for women 21 or older and for men 72 or older  Do not have more than 3 drinks in a day or 7 in a week  The recommended limit is 2 drinks a day for men 24to 59years of age  Do not have more than 4 drinks in a day or 14 in a week  A drink of alcohol is 12 ounces of beer, 5 ounces of wine, or 1½ ounces of liquor  · Exercise regularly  Exercise can help you maintain a healthy weight and improve your blood pressure and cholesterol levels  Regular exercise can also decrease your risk for heart problems  Ask your healthcare provider about the best exercise plan for you  Do not start an exercise program without asking your healthcare provider  CARE AGREEMENT:   You have the right to help plan your care  Discuss treatment options with your healthcare provider to decide what care you want to receive   You always have the right to refuse treatment  The above information is an  only  It is not intended as medical advice for individual conditions or treatments  Talk to your doctor, nurse or pharmacist before following any medical regimen to see if it is safe and effective for you  © Copyright [employer] 2021 Information is for End User's use only and may not be sold, redistributed or otherwise used for commercial purposes  All illustrations and images included in CareNotes® are the copyrighted property of [employer]  or [employer]      Warfarin (By mouth)   Warfarin (WAR-far-in)  Prevents and treats blood clots  May lower the risk of serious complications after a heart attack  This medicine is a blood thinner  Brand Name(s): Coumadin, Jantoven   There may be other brand names for this medicine  When This Medicine Should Not Be Used: This medicine is not right for everyone  Do not use it if you had an allergic reaction to warfarin, if you are pregnant, or if you have health problems that could cause bleeding  How to Use This Medicine:   Tablet  · Take your medicine as directed  Your dose may need to be changed several times to find what works best for you  · This medicine should come with a Medication Guide  Ask your pharmacist for a copy if you do not have one  · Missed dose: Take a dose as soon as you remember  If it is almost time for your next dose, wait until then and take a regular dose  Do not take extra medicine to make up for a missed dose  · Store the medicine in a closed container at room temperature, away from heat, moisture, and direct light  Drugs and Foods to Avoid:   Ask your doctor or pharmacist before using any other medicine, including over-the-counter medicines, vitamins, and herbal products  · Many medicines and foods can affect how warfarin works and may affect the PT/INR test results  Tell your doctor before you start or stop any medicine, especially the following:   ?  Co-enzyme Y91, echinacea, garlic, ginkgo, ginseng, goldenseal, or Lorrie's wort  ? Another blood thinner, including apixaban, argatroban, bivalirudin, cilostazol, clopidogrel, dabigatran, desirudin, dipyridamole, heparin, lepirudin, prasugrel, rivaroxaban, ticlopidine  ? Medicine to treat depression or anxiety, including citalopram, desvenlafaxine, duloxetine, escitalopram, fluoxetine, fluvoxamine, milnacipran, paroxetine, sertraline, venlafaxine, vilazodone  ? Medicine to treat an infection  ? NSAID pain or arthritis medicine, including aspirin, celecoxib, diclofenac, diflunisal, fenoprofen, ibuprofen, indomethacin, ketoprofen, ketorolac, mefenamic acid, naproxen, oxaprozin, piroxicam, sulindac  Check labels for over-the-counter medicines to find out if they contain an NSAID   ? Steroid medicine, including dexamethasone, hydrocortisone, methylprednisolone, prednisolone, prednisone  · Warfarin works best if you eat about the same amount of vitamin K every day  Foods high in vitamin K include asparagus, broccoli, brussels sprouts, cabbage, green leafy vegetables, plums, rhubarb, and canola oil  Talk to your doctor before you make changes to your normal diet  · Do not eat grapefruit or drink grapefruit juice while you are using this medicine  Warnings While Using This Medicine:   · It is not safe to take this medicine during pregnancy  It could harm an unborn baby  Tell your doctor right away if you become pregnant  Use an effective form of birth control to keep from getting pregnant during treatment and for at least 1 month after your last dose  · Tell your doctor if you are breastfeeding, or if you have kidney disease, liver disease, heart disease, diabetes, heart failure, high blood pressure, an infection, a stomach ulcer, or protein C deficiency  Also tell your doctor if you had recent surgery or an injury, or a history of stroke, anemia, severe bleeding or bruising, or problems caused by heparin use    · This medicine may cause the following problems:   ? Bleeding, which may be life-threatening  ? Gangrene (skin or tissue damage)  ? Calciphylaxis or calcium uremic arteriolopathy  ? Kidney problems, including acute kidney injury  ? Purple toes syndrome  · You must have a PT/INR blood test while you use this medicine to check how well your blood is clotting  Your doctor will use the test results to make sure the medicine is working properly  Keep all appointments for the PT/INR blood tests  · You may bleed or bruise more easily with warfarin  To prevent injury or cuts, do not play rough sports, be careful with sharp objects, and brush and floss your teeth gently  Lyndy Ra your nose gently, and do not pick your nose  · Carry an ID card or wear a medical alert bracelet to let emergency caregivers know that you use warfarin  · Tell any doctor or dentist who treats you that you are using this medicine  You may need to stop using this medicine several days before you have surgery or medical tests  · Keep all medicine out of the reach of children  Never share your medicine with anyone  Possible Side Effects While Using This Medicine:   Call your doctor right away if you notice any of these side effects:  · Allergic reaction: Itching or hives, swelling in your face or hands, swelling or tingling in your mouth or throat, chest tightness, trouble breathing  · Bleeding from your gums or nose, coughing up blood, heavy monthly periods  · Bleeding that does not stop, bruising, or weakness  · Dizziness, fainting, lightheadedness  · Pain, brown or black skin, or skin that is cool to the touch  · Purple toes or feet, or new pain in your leg, foot, or toes  · Purplish red, net-like, blotchy spots on the skin  · Red or dark brown urine, or red or black, tarry stools  · Vomiting blood or material that looks like coffee grounds  If you notice other side effects that you think are caused by this medicine, tell your doctor     Call your doctor for medical advice about side effects  You may report side effects to FDA at 8-990-FDA-8149  © Copyright [facility] 2021 Information is for End User's use only and may not be sold, redistributed or otherwise used for commercial purposes  The above information is an  only  It is not intended as medical advice for individual conditions or treatments  Talk to your doctor, nurse or pharmacist before following any medical regimen to see if it is safe and effective for you  Vitamin K in Foods   WHAT YOU NEED TO KNOW:   · Warfarin is a type of medicine called a blood thinner  It makes your blood clot more slowly  This can help prevent dangerous problems, such as a stroke (a blood clot in the brain)  Vitamin K helps your blood to clot (thicken to stop bleeding)  Warfarin works by making it harder for your body to use vitamin K to clot blood  Changes in the amount of vitamin K that you normally eat can affect how warfarin works  · Your healthcare provider can tell how well warfarin is working from a blood test that you will have regularly  This test is called an international normalized ratio (INR)  It shows how quickly your blood clots  To keep your INR at a healthy level, you need to manage how much vitamin K you eat  DISCHARGE INSTRUCTIONS:   While you take warfarin:  Eat the same amount of vitamin K each day  Do not change the amount of vitamin K you normally have from foods or supplements  This helps keep your INR at the same healthy level  · A big increase in vitamin K can lower your INR  This can cause dangerous clotting in your blood  · A big decrease in vitamin K can raise your INR  This can make it harder for your blood to clot  It can cause you to bleed too much  Do not avoid foods that contain vitamin K  Foods that contain vitamin K:  Dark green leafy vegetables have the highest amounts of vitamin K   Foods that contain vitamin K include the following:  · Foods with more than 100 mcg per serving:      ? ½ cup of cooked kale (531 mcg)    ? ½ cup of cooked spinach (444 mcg)    ? ½ cup of cooked aishwarya greens (418 mcg)    ? 1 cup of cooked broccoli (220 mcg)    ? 1 cup of cooked brussels sprouts (219 mcg)    ? 1 cup of raw aishwarya greens (184 mcg)    ? 1 cup of raw spinach (145 mcg)    ? 1 cup of raw endive (116 mcg)    · Foods with 50 to 100 mcg per serving:      ? 1 cup of raw broccoli (89 mcg)    ? ½ cup of cooked cabbage (82 mcg)    ? 1 cup of green leaf lettuce (71 mcg)    ? 1 cup of brenda lettuce (57 mcg)    · Foods with 15 to 50 mcg per serving:      ? 4 woods of asparagus (48 mcg)    ? 1 medium kiwi fruit (31 mcg)    ? 1 cup of raw blackberries or blueberries (29 mcg)    ? 1 cup of red or green grapes (23 mcg)    ? ½ cup of cooked peas (19 mcg)     Other sources of vitamin K:  Multivitamins and other supplements may contain 10 to 80 mcg of vitamin K  These amounts can cause changes in your INR  Read the labels of any supplements you take  Do not take more than 1 supplement that contains vitamin K  Talk to your healthcare provider about the supplements you are taking  Contact your healthcare provider if:   · You have a poor appetite  · You have an upset stomach  · You have hair loss  · You bruise more easily than normal      · You have questions about your medicines, supplements, or the amount of vitamin K you eat  Seek care immediately if:   · You cough up blood  · You have red or black bowel movements  · Your urine is red or dark brown  · You have bleeding from your gums or nose  · You have heavy bleeding from a wound that does not stop, or unusually heavy monthly periods  · You have a severe headache  © Copyright [employer] 2021 Information is for End User's use only and may not be sold, redistributed or otherwise used for commercial purposes   All illustrations and images included in CareNotes® are the copyrighted property of [employer]  or [facility]  The above information is an  only  It is not intended as medical advice for individual conditions or treatments  Talk to your doctor, nurse or pharmacist before following any medical regimen to see if it is safe and effective for you  Molina Catheter Placement and Care   WHAT YOU NEED TO KNOW:   A Molina catheter is a sterile tube that is inserted into your bladder to drain urine  It is also called an indwelling urinary catheter  DISCHARGE INSTRUCTIONS:   Seek care immediately if:   · Your catheter comes out  · You suddenly have material that looks like sand in the tubing or drainage bag  · No urine is draining into the bag and you have checked the system  · You have pain in your hip, back, pelvis, or lower abdomen  · You are confused or cannot think clearly  Call your doctor or urologist if:   · You have a fever  · You have bladder spasms for more than 1 day after the catheter is placed  · You see blood in the tubing or drainage bag  · You have a rash or itching where the catheter tube is secured to your skin  · Urine leaks from or around the catheter, tubing, or drainage bag  · The closed drainage system has accidently come open or apart  · You see a layer of crystals inside the tubing  · You have questions or concerns about your condition or care  Care for your catheter and drainage bag: You can reduce your risk for infection and injury by caring for your catheter and drainage bag properly  · Wash your hands often  Wash before and after you touch your catheter, tubing, or drainage bag  Use soap and water  Wear clean disposable gloves when you care for your catheter or disconnect the drainage bag  Wash your hands before you prepare or eat food  · Clean your genital area 2 times every day  Clean your catheter area and anal opening after every bowel movement  ?  For men:  Use a soapy cloth to clean the tip of your penis  Start where the catheter enters  Wipe backward making sure to pull back the foreskin  Then use a cloth with clear water in the same direction to clean away the soap  ? For women:  Use a soapy cloth to clean the area that the catheter enters your body  Make sure to separate your labia and wipe toward the anus  Then use a cloth with clear water and wipe in the same direction  · Secure the catheter tube  so you do not pull or move the catheter  This helps prevent pain and bladder spasms  Healthcare providers will show you how to use medical tape or a strap to secure the catheter tube to your body  · Keep a closed drainage system  Your catheter should always be attached to the drainage bag to form a closed system  Do not disconnect any part of the closed system unless you need to change the bag  · Keep the drainage bag below the level of your waist   This helps stop urine from moving back up the tubing and into your bladder  Do not loop or kink the tubing  This can cause urine to back up and collect in your bladder  Do not let the drainage bag touch or lie on the floor  · Empty the drainage bag when needed  The weight of a full drainage bag can be painful  Empty the drainage bag every 3 to 6 hours or when it is ? full  · Clean and change the drainage bag as directed  Ask your healthcare provider how often you should change the drainage bag and what cleaning solution to use  Wear disposable gloves when you change the bag  Do not allow the end of the catheter or tubing to touch anything  Clean the ends with an alcohol pad before you reconnect them  What to do if problems develop:   · No urine is draining into the bag:      ? Check for kinks in the tubing and straighten them out  ? Check the tape or strap used to secure the catheter tube to your skin  Make sure it is not blocking the tube  ? Make sure you are not sitting or lying on the tubing      ? Make sure the urine bag is hanging below the level of your waist     · Urine leaks from or around the catheter, tubing, or drainage bag:  Check if the closed drainage system has accidently come open or apart  Clean the catheter and tubing ends with a new alcohol pad and reconnect them  Follow up with your doctor or urologist as directed:  Write down your questions so you remember to ask them during your visits  © Copyright [employer] 2021 Information is for End User's use only and may not be sold, redistributed or otherwise used for commercial purposes  All illustrations and images included in CareNotes® are the copyrighted property of A D A M [employer] Inc  or [employer]   The above information is an  only  It is not intended as medical advice for individual conditions or treatments  Talk to your doctor, nurse or pharmacist before following any medical regimen to see if it is safe and effective for you  Urinary Leg Bag   WHAT YOU NEED TO KNOW:   What is a urinary leg bag? A urinary leg bag holds urine that drains from your catheter  It fits under your clothes and allows you to do your normal daily activities  How do I use a urinary leg bag? · Wash your hands  before and after you touch your catheter, tubing, or drainage bag  Use soap and water  This reduces the risk of infection  · Strap your leg bag to your thigh or calf  Make sure the straps are comfortable  The straps can cause problems with blood flow in your leg if they are too tight  · Clean the tip of the drainage tube with alcohol  before attaching it to your catheter  This helps prevent bacteria from getting into your catheter  · The connecting tube should not pull on your catheter  Skin breakdown can occur if there is constant pulling on the catheter  · Check the tube often to make sure it is not kinked or twisted  Blockage in the tube can cause urine to back up into your bladder   Your urine must flow straight through the tube into your leg bag  · Always keep the leg bag below your bladder  This prevents urine from the bag going back into your bladder, which may cause an infection  · Empty your leg bag when it is ½ full, or every 3 hours  A full bag may break or disconnect from the catheter  · Change to your bedside bag before you go to bed  Your bedside bag can hold more urine  Do not use your leg bag at night because it could become too full or break  · Clean your leg bag after every use  Fill the bag with 2 parts vinegar and 3 parts water  Let it soak for 20 minutes, then rinse and let dry  Follow your healthcare provider's instruction on replacing your leg bag with a new one  CARE AGREEMENT:   You have the right to help plan your care  Learn about your health condition and how it may be treated  Discuss treatment options with your healthcare providers to decide what care you want to receive  You always have the right to refuse treatment  The above information is an  only  It is not intended as medical advice for individual conditions or treatments  Talk to your doctor, nurse or pharmacist before following any medical regimen to see if it is safe and effective for you  © Copyright [employer] 2021 Information is for End User's use only and may not be sold, redistributed or otherwise used for commercial purposes   All illustrations and images included in CareNotes® are the copyrighted property of [employer]  or [facility] [employer]

## 2021-09-18 NOTE — PROGRESS NOTES
Progress Note - Cardiothoracic Surgery   [place] Eduardo 80 y o  male MRN: [id number]  Unit/Bed#: [facility] 406-01 Encounter: [id number]    Aortic stenosis, Non-Rheumatic  S/P transfemoral transcatheter aortic valve replacement; POD # 2    24 Hour Events: Competing junctional/NSR in 50-60 range since beta blocker, discontinued yesterday, asymptomatic otherwise  Straight cath x1 QHS, hematuria this AM, per RN patient did not have hematuria w/ straight cath just post, h/o urinary retention in past w/ gifford at discharge, no BPH meds PTA  No other events  No complaints      Medications:   Scheduled Meds:  Current Facility-Administered Medications   Medication Dose Route Frequency Provider Last Rate    acetaminophen  650 mg Rectal Q4H PRN Hector Perez PA-C      acetaminophen  975 mg Oral Q6H [address] Delphine Wing PA-C      amLODIPine  5 mg Oral Daily [place], [place]      atorvastatin  80 mg Oral Daily [address] [place] [address]      bisacodyl  10 mg Rectal Daily PRN Nicole Leggett DO      chlorhexidine  15 mL Mouth/Throat Q12H [address] [place], [place]      clopidogrel  75 mg Oral Daily [place], [place]      docusate sodium  100 mg Oral BID Roxanne Medina PA-C      furosemide  40 mg Intravenous Daily Roxanne Medina PA-C      heparin (porcine)  3-20 Units/kg/hr (Order-Specific) Intravenous Titrated Roxanne Medina PA-C 9 8 Units/kg/hr (09/17/21 7723)    hydrochlorothiazide  25 mg Oral Daily Hector Perez PA-C      insulin lispro  1-5 Units Subcutaneous HS Shikha Champagne PA-C      insulin lispro  1-6 Units Subcutaneous TID AC Shikha Champagne PA-C      melatonin  3 mg Oral HS Shikha Champagne PA-C      mupirocin  1 application Nasal H73B [address] Shikha Champagne PA-C      ondansetron  4 mg Intravenous Q6H PRN Hector Perez PA-C      pantoprazole  40 mg Oral Early Morning Shikha Champagne PA-C      polyethylene glycol  17 g Oral Daily Hector Perez PA-C      potassium chloride 20 mEq Oral Daily Roxanne Medina PA-C      senna  1 tablet Oral HS PRN Giana Longo PA-C       Continuous Infusions:heparin (porcine), 3-20 Units/kg/hr (Order-Specific), Last Rate: 9 8 Units/kg/hr (09/17/21 2353)      PRN Meds:   acetaminophen    bisacodyl    ondansetron    senna    Vitals:   Vitals:    09/17/21 1505 09/17/21 1912 09/17/21 2249 09/18/21 0224   BP: 101/75 97/69 137/72 144/65   BP Location: Left arm Right arm Left arm Left arm   Pulse: 62 61 63 65   Resp: 16 18 18 18   Temp: 97 8 °F (36 6 °C) 97 7 °F (36 5 °C) 98 °F (36 7 °C) 97 6 °F (36 4 °C)   TempSrc: Oral Oral Oral Oral   SpO2: 96% 95% 94% 98%   Weight:       Height:         Bp x24hrs: /60-70    Telemetry: NSR -- intermittent junctional at same rate; Heart Rate: 61    Respiratory:   SpO2: SpO2: 98 %, SpO2 Activity: SpO2 Activity: At Rest, SpO2 Device: O2 Device: None (Room air); Room Air    Intake/Output:     Intake/Output Summary (Last 24 hours) at 9/18/2021 0618  Last data filed at 9/18/2021 0609  Gross per 24 hour   Intake 603 43 ml   Output 1750 ml   Net -1146 57 ml      UOP - 525cc/8hrs; 1750cc/24hrs    Stool - 1/24hrs    Weights:   Weight (last 2 days)       Date/Time   Weight    09/17/21 0600   87 8 (193 6)    09/16/21 0838   --    Comment rows:    OBSERV: 12 lead EKG done   at 09/16/21 0838    09/16/21 0541   84 9 (187 17)              Admit weight: 87 2kg -- no new weight today    Results:  INR PENDING THIS AM  Results from last 7 days   Lab Units 09/18/21  0438 09/17/21  0244 09/16/21  0850 09/15/21  0448   WBC Thousand/uL 16 04* 15 61*  --  10 32*   HEMOGLOBIN g/dL 13 3 14 5 15 5 14 8   HEMATOCRIT % 40 7 43 6 45 8 44 2   PLATELETS Thousands/uL 123* 138* 141* 172     Results from last 7 days   Lab Units 09/18/21  0438 09/17/21  0244 09/16/21  0850 09/16/21  0812   POTASSIUM mmol/L 3 4* 3 6 3 8  --    CHLORIDE mmol/L 102 104 105  --    CO2 mmol/L 28 28 28  --    CO2, I-STAT mmol/L  --   --   --  30   BUN mg/dL 31* 24 24  -- CREATININE mg/dL 1 28 1 28 1 48*  --    GLUCOSE, ISTAT mg/dl  --   --   --  102   CALCIUM mg/dL 8 5 8 2* 8 5  --      Results from last 7 days   Lab Units 09/17/21  2145 09/17/21  1602 09/17/21  0833 09/17/21  0244 09/16/21  0850 09/16/21  0507   INR   --   --   --  1 16 1 39* 1 31*   PTT seconds 110* 69* 29  --   --  174*     Coumadin mg 9/17 - 5 9/16 - 5          Point of care glucose: 102 - 108 - 105 - 114    Studies:  No new studies    I have personally reviewed pertinent reports  and I have personally reviewed pertinent films in PACS    Invasive Lines/Tubes:  Invasive Devices       Central Venous Catheter Line              CVC Central Lines 09/16/21 Triple 1 day              Peripheral Intravenous Line              Peripheral IV 09/14/21 Left;Ventral (anterior) Forearm 3 days                    Physical Exam:    HEENT/NECK:  Normocephalic  Atraumatic  No jugular venous distention  Cardiac: Regular rate and rhythm  Pulmonary:   no respiratory distress  Abdomen:  Non-distended  Incisions: Groin puncture sites dressed with sterile dressing  No hematoma, erythema, or drainage  Extremities: Extremities warm/dry, DP pulses dopperable bilaterally, and Trace edema B/L  Neuro: Alert and oriented X 3  Skin: Warm/Dry, without rashes or lesions      Assessment:  Principal Problem:    Nonrheumatic aortic valve stenosis  Active Problems:    Peripheral artery disease (HCC)    Claudication of left lower extremity (HCC)    S/P RIGHT Femoral- PT bypass 2011    Dyslipidemia    ASCVD (arteriosclerotic cardiovascular disease)    Stage 3 chronic kidney disease    Essential hypertension    Embolism and thrombosis of arteries of the lower extremities (HCC)    Protein C deficiency (HCC)    Former heavy tobacco smoker    AAA (abdominal aortic aneurysm) without rupture ([facility])    S/P TAVR (transcatheter aortic valve replacement)    Thrombocytopenia (HCC)    Anticoagulation goal of INR 2 to 3    Anticoagulated on Coumadin    LBBB (left bundle branch block)    Leukocytosis    Hypocalcemia       Aortic stenosis, Non-Rheumatic  S/P transfemoral transcatheter aortic valve replacement; POD # 2    Plan:    1  Cardiac:   NSR; HR/BP well-controlled  Hold beta blocker  Norvasc 5mg PO QDay  HCTZ 25mg PO QDay  Maintain central IV access today for heparin gtt  Plavix/Coumadin; INR pending, dose Coumadin tonight -- d/w surgeon INR goal for discharge today (Dr Rajwinder Crockett suggested just to be rising PTD)  Continue Statin therapy  Continue DVT prophylaxis    2  Pulmonary:   Good Room air oxygen saturation; Continue incentive spirometry/Coughing/Deep breathing exercises    3  Renal:   Intake/Output net: (-)1146 6 mL/24 hours  Continuediuresis              Lasix 40mg IV QDay              Potassium Chlorhide 20mEq PO QDay  Post op Creatinine stable; Follow up labs prn  DTV this AM, may need gifford at discharge  Start Flomax    4  Neuro:  Neurologically intact; No active issues  Incisional pain well-controlled; Continue prn Tylenol   Continue Meltonin    5  GI:  Tolerating TLC 2 3 gm sodium diet  Maintain 1800 mL daily fluid restriction   Continue stool softeners and prn suppository  Continue GI prophylaxis    6  Endo:   Glucose well-controlled with insulin sliding scale coverage    7  Hematology:   Leukocytosis, afebrile, likely reactive   Thrombocytopenia, no active bleeding   Hypokalemia, replete today    8    Disposition:  Gerontology consultation already completed for routine assessment of TAVR patient over 72years old  Ambulating independently, Anticipate discharge to home once cleared by surgeon     VTE Pharmacologic Prophylaxis: Heparin and Warfarin (Coumadin)  VTE Mechanical Prophylaxis: sequential compression device    Collaborative rounds completed with supervising physician  Plan of care discussed with bedside nurse    SIGNATURE: Jagruti Leiva PA-C  DATE: September 18, 2021  TIME: 6:18 AM

## 2021-09-18 NOTE — NURSING NOTE
Pt RFA iv bleeding  DCd IV  IV continued to bleed  Pressure held for 10 minutes  Pressure dressing applied  Ice applied

## 2021-09-19 ENCOUNTER — TELEPHONE (OUTPATIENT)
Dept: FAMILY MEDICINE CLINIC | Facility: CLINIC | Age: 71
End: 2021-09-19

## 2021-09-19 NOTE — TELEPHONE ENCOUNTER
----- Message from Joe Chou PA-C sent at 9/18/2021 11:06 AM EDT -----  Regarding: Discharge Coumadin  Cooper Neely is being discharged on anticoagulation therapy for treatment of protein C deficiency w/ h/o DVT/PE  He stated he does not need refills of his PTA Coumadin medication  They have been advised to take 10 mg daily, unless otherwise directed  Interacting medications: none                                        Hospital Anticoagulation Course:    Date INR Warfarin dose (mg)  9/18 1 79 10  9/17 1 16 5  9/16 1 39 5    If you have any questions, you can reach me at [phone]       Roxanne Medina PA-C  09/18/21  11:06 AM

## 2021-09-20 ENCOUNTER — TELEPHONE (OUTPATIENT)
Dept: UROLOGY | Facility: CLINIC | Age: 71
End: 2021-09-20

## 2021-09-20 ENCOUNTER — TRANSITIONAL CARE MANAGEMENT (OUTPATIENT)
Dept: FAMILY MEDICINE CLINIC | Facility: CLINIC | Age: 71
End: 2021-09-20

## 2021-09-20 ENCOUNTER — TELEPHONE (OUTPATIENT)
Dept: CARDIOLOGY CLINIC | Facility: CLINIC | Age: 71
End: 2021-09-20

## 2021-09-20 DIAGNOSIS — I10 ESSENTIAL HYPERTENSION: Primary | ICD-10-CM

## 2021-09-20 RX ORDER — AMLODIPINE BESYLATE 5 MG/1
5 TABLET ORAL AS NEEDED
Qty: 30 TABLET | Refills: 3 | Status: SHIPPED | OUTPATIENT
Start: 2021-09-20 | End: 2021-09-27 | Stop reason: HOSPADM

## 2021-09-20 RX ORDER — HYDROCHLOROTHIAZIDE 25 MG/1
25 TABLET ORAL AS NEEDED
Qty: 30 TABLET | Refills: 3 | Status: SHIPPED | OUTPATIENT
Start: 2021-09-20 | End: 2021-09-27 | Stop reason: HOSPADM

## 2021-09-20 NOTE — TELEPHONE ENCOUNTER
----- Message from Mariah Tamez PA-C sent at 2021 12:46 PM EDT -----  Regardin week event monitor  Markell Chu, this patient needs a 2 week cardiac event monitor mailed to his home after discharge  Predicted discharge date is Saturday,   It does not need to be a live monitor  This is for a new LBBB s/p TAVR, sinus bradycardia, and NSVT      Thanks,  Jenna

## 2021-09-20 NOTE — TELEPHONE ENCOUNTER
Is patient still experiencing gross hematuria? Recommend patient increase water intake, and would keep gifford catheter in until urine has been clear

## 2021-09-20 NOTE — TELEPHONE ENCOUNTER
Message retrieval from call center mailbox    Pts wife Galina Billsr left message  Pt had cardiac surgery and has catheter  Pt of Dr Beryle Min office - last seen 2020    Pt was discharged Saturday  Needs appt for cath removal and void trial   Pt has blood in his urine, katrin in morning  Would like to discuss      Call on either number    [phone]

## 2021-09-20 NOTE — TELEPHONE ENCOUNTER
Patient was last seen by Dr Samayoa Hidden 1/28/20 for elevated PSA  He was advised to return in 6 months with PSA prior to visit, however, did not  Per review of chart, patient is s/p TAVR  9/16/21 and went into urinary retention post operatively  He is on Coumadin and Plavix  Please advise timeframe for void trail for RN visit

## 2021-09-21 NOTE — TELEPHONE ENCOUNTER
Called and left message for patient to call our office back  When patient calls back please review message below  Michela Hankins PractitionerSigned  Yesterday              Is patient still experiencing gross hematuria? Recommend patient increase water intake, and would keep gifford catheter in until urine has been clear

## 2021-09-22 ENCOUNTER — TELEPHONE (OUTPATIENT)
Dept: CARDIAC SURGERY | Facility: CLINIC | Age: 71
End: 2021-09-22

## 2021-09-22 NOTE — TELEPHONE ENCOUNTER
Wife returned my call    POST OP CALL    9/16/21; Elective TAVR  9/18/21: Discharge home (w/ Molina)  9/22/21; Spoke with patient  He states he is doing well, offers no complaints  He denies SOB, lightheadedness or angina  Weight is stable, no edema  Groin site healing well  Molina is in place, still with hematuria, mostly at night  Appt with Urology on Friday  Tolerating activity and ambulation  Understands medications    Questions answered  Appts reviewed

## 2021-09-22 NOTE — TELEPHONE ENCOUNTER
Wife of patient is calling to say he is drinking a lot of water during during day  Only has blood in urine in the morning first urine with sediment  Wanted to schedule gifford removal for recommended time on Friday    Should she reach out when urine is clear to set up voiding trial

## 2021-09-22 NOTE — TELEPHONE ENCOUNTER
Called patients listed phone number, to perform f/u post op call   No answer; left voicemail for return call to our office [phone]

## 2021-09-22 NOTE — TELEPHONE ENCOUNTER
Called and spoke with patient's wife at this time  Reviewed we can schedule gifford removal and void trial with RN for Friday  If hematuria does not resolve by Friday they should call to reschedule for next week  Patient's wife agreeable to plan       Gifford removal and void trial scheudled for 9/24/21 at 8am and 2pm

## 2021-09-24 ENCOUNTER — PROCEDURE VISIT (OUTPATIENT)
Dept: UROLOGY | Facility: CLINIC | Age: 71
End: 2021-09-24
Payer: MEDICARE

## 2021-09-24 VITALS
WEIGHT: 185 LBS | HEIGHT: 68 IN | BODY MASS INDEX: 28.04 KG/M2 | RESPIRATION RATE: 18 BRPM | DIASTOLIC BLOOD PRESSURE: 78 MMHG | SYSTOLIC BLOOD PRESSURE: 138 MMHG

## 2021-09-24 DIAGNOSIS — R97.20 ELEVATED PSA, BETWEEN 10 AND LESS THAN 20 NG/ML: Primary | ICD-10-CM

## 2021-09-24 DIAGNOSIS — R33.9 URINARY RETENTION: ICD-10-CM

## 2021-09-24 LAB — POST-VOID RESIDUAL VOLUME, ML POC: 191 ML

## 2021-09-24 PROCEDURE — 51798 US URINE CAPACITY MEASURE: CPT

## 2021-09-24 NOTE — PROGRESS NOTES
9/24/2021    Gattis Frankel  1950  [id number]    Diagnosis  Chief Complaint     Urinary Retention; Gross Hematuria          Patient presents for gifford removal and void trial post acute retention after TAVR 9/16/21 managed by Dr Janett Pettit  Patient will follow up as scheduled in 6 weeks with PSA prior  Procedure Gifford removal/voiding trial    Patient arrived to office with clear yellow urine He reports hematuria fully cleared by yesterday  Gifford catheter removed after deflation of an intact balloon  Patient tolerated well  Encouraged patient to hydrate well and return this afternoon for post void residual   he knows he may return early if uncomfortable and unable to urinate  Patient agrees to this plan  Patient returned this afternoon  Patient states able to void  Bladder ultrasound performed and PVR measured 191ml  Reviewed timed voiding and double voiding with patient       Recent Results (from the past 4 hour(s))   POCT Measure PVR    Collection Time: 09/24/21  2:16 PM   Result Value Ref Range    POST-VOID RESIDUAL VOLUME, ML  mL           Vitals:    09/24/21 0755   BP: 138/78   BP Location: Left arm   Patient Position: Sitting   Cuff Size: Standard   Resp: 18   Weight: 83 9 kg (185 lb)   Height: 5' 8" (1 727 m)           Ronaldo Ramírez RN BSN

## 2021-09-25 ENCOUNTER — APPOINTMENT (EMERGENCY)
Dept: RADIOLOGY | Facility: HOSPITAL | Age: 71
DRG: 683 | End: 2021-09-25
Payer: MEDICARE

## 2021-09-25 ENCOUNTER — HOSPITAL ENCOUNTER (INPATIENT)
Facility: HOSPITAL | Age: 71
LOS: 2 days | Discharge: HOME | DRG: 683 | End: 2021-09-27
Attending: EMERGENCY MEDICINE | Admitting: INTERNAL MEDICINE
Payer: MEDICARE

## 2021-09-25 ENCOUNTER — APPOINTMENT (EMERGENCY)
Dept: CT IMAGING | Facility: HOSPITAL | Age: 71
DRG: 683 | End: 2021-09-25
Payer: MEDICARE

## 2021-09-25 DIAGNOSIS — R33.9 URINARY RETENTION: Primary | ICD-10-CM

## 2021-09-25 DIAGNOSIS — Z95.2 S/P TAVR (TRANSCATHETER AORTIC VALVE REPLACEMENT): ICD-10-CM

## 2021-09-25 DIAGNOSIS — I47.1 SVT (SUPRAVENTRICULAR TACHYCARDIA) (HCC): ICD-10-CM
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
## 2021-09-25 PROBLEM — I47.2 NSVT (NONSUSTAINED VENTRICULAR TACHYCARDIA) (HCC): Status: ACTIVE | Noted: 2021-09-25

## 2021-09-25 PROBLEM — I47.29 NSVT (NONSUSTAINED VENTRICULAR TACHYCARDIA): Status: ACTIVE | Noted: 2021-09-25

## 2021-09-25 PROBLEM — N17.9 AKI (ACUTE KIDNEY INJURY) (HCC): Status: ACTIVE | Noted: 2021-09-25

## 2021-09-25 PROBLEM — I47.10 SVT (SUPRAVENTRICULAR TACHYCARDIA): Status: ACTIVE | Noted: 2021-09-25

## 2021-09-25 LAB
ALBUMIN SERPL BCP-MCNC: 3.2 G/DL (ref 3.5–5)
ALP SERPL-CCNC: 163 U/L (ref 46–116)
ALT SERPL W P-5'-P-CCNC: 52 U/L (ref 12–78)
ANION GAP SERPL CALCULATED.3IONS-SCNC: 13 MMOL/L (ref 4–13)
ANION GAP SERPL CALCULATED.3IONS-SCNC: 7 MMOL/L (ref 4–13)
APTT PPP: 37 SECONDS (ref 23–37)
AST SERPL W P-5'-P-CCNC: 32 U/L (ref 5–45)
ATRIAL RATE: 61 BPM
BACTERIA UR QL AUTO: ABNORMAL /HPF
BASE EXCESS BLDA CALC-SCNC: 8 MMOL/L (ref -2–3)
BASOPHILS # BLD AUTO: 0.06 THOUSANDS/ΜL (ref 0–0.1)
BASOPHILS NFR BLD AUTO: 0 % (ref 0–1)
BILIRUB SERPL-MCNC: 0.78 MG/DL (ref 0.2–1)
BILIRUB UR QL STRIP: NEGATIVE
BUN SERPL-MCNC: 38 MG/DL (ref 5–25)
BUN SERPL-MCNC: 42 MG/DL (ref 5–25)
CALCIUM ALBUM COR SERPL-MCNC: 9.1 MG/DL (ref 8.3–10.1)
CALCIUM SERPL-MCNC: 8.5 MG/DL (ref 8.3–10.1)
CALCIUM SERPL-MCNC: 9 MG/DL (ref 8.3–10.1)
CHLORIDE SERPL-SCNC: 97 MMOL/L (ref 100–108)
CHLORIDE SERPL-SCNC: 99 MMOL/L (ref 100–108)
CLARITY UR: CLEAR
CO2 SERPL-SCNC: 28 MMOL/L (ref 21–32)
CO2 SERPL-SCNC: 32 MMOL/L (ref 21–32)
COLOR UR: YELLOW
CREAT SERPL-MCNC: 1.64 MG/DL (ref 0.6–1.3)
CREAT SERPL-MCNC: 1.76 MG/DL (ref 0.6–1.3)
EOSINOPHIL # BLD AUTO: 0.12 THOUSAND/ΜL (ref 0–0.61)
EOSINOPHIL NFR BLD AUTO: 1 % (ref 0–6)
ERYTHROCYTE [DISTWIDTH] IN BLOOD BY AUTOMATED COUNT: 12.5 % (ref 11.6–15.1)
ERYTHROCYTE [DISTWIDTH] IN BLOOD BY AUTOMATED COUNT: 12.6 % (ref 11.6–15.1)
GFR SERPL CREATININE-BSD FRML MDRD: 38 ML/MIN/1.73SQ M
GFR SERPL CREATININE-BSD FRML MDRD: 42 ML/MIN/1.73SQ M
GLUCOSE SERPL-MCNC: 111 MG/DL (ref 65–140)
GLUCOSE SERPL-MCNC: 120 MG/DL (ref 65–140)
GLUCOSE SERPL-MCNC: 131 MG/DL (ref 65–140)
GLUCOSE UR STRIP-MCNC: NEGATIVE MG/DL
HCO3 BLDA-SCNC: 32.7 MMOL/L (ref 24–30)
HCT VFR BLD AUTO: 41.8 % (ref 36.5–49.3)
HCT VFR BLD AUTO: 42.6 % (ref 36.5–49.3)
HCT VFR BLD CALC: 44 % (ref 36.5–49.3)
HGB BLD-MCNC: 14.5 G/DL (ref 12–17)
HGB BLD-MCNC: 14.7 G/DL (ref 12–17)
HGB BLDA-MCNC: 15 G/DL (ref 12–17)
HGB UR QL STRIP.AUTO: ABNORMAL
IMM GRANULOCYTES # BLD AUTO: 0.14 THOUSAND/UL (ref 0–0.2)
IMM GRANULOCYTES NFR BLD AUTO: 1 % (ref 0–2)
INR PPP: 3.6 (ref 0.84–1.19)
INR PPP: 3.75 (ref 0.84–1.19)
KETONES UR STRIP-MCNC: NEGATIVE MG/DL
LEUKOCYTE ESTERASE UR QL STRIP: NEGATIVE
LYMPHOCYTES # BLD AUTO: 1.5 THOUSANDS/ΜL (ref 0.6–4.47)
LYMPHOCYTES NFR BLD AUTO: 9 % (ref 14–44)
MAGNESIUM SERPL-MCNC: 2.1 MG/DL (ref 1.6–2.6)
MCH RBC QN AUTO: 30.9 PG (ref 26.8–34.3)
MCH RBC QN AUTO: 31.3 PG (ref 26.8–34.3)
MCHC RBC AUTO-ENTMCNC: 34.5 G/DL (ref 31.4–37.4)
MCHC RBC AUTO-ENTMCNC: 34.7 G/DL (ref 31.4–37.4)
MCV RBC AUTO: 90 FL (ref 82–98)
MCV RBC AUTO: 90 FL (ref 82–98)
MONOCYTES # BLD AUTO: 1.34 THOUSAND/ΜL (ref 0.17–1.22)
MONOCYTES NFR BLD AUTO: 8 % (ref 4–12)
NEUTROPHILS # BLD AUTO: 13.68 THOUSANDS/ΜL (ref 1.85–7.62)
NEUTS SEG NFR BLD AUTO: 81 % (ref 43–75)
NITRITE UR QL STRIP: NEGATIVE
NON-SQ EPI CELLS URNS QL MICRO: ABNORMAL /HPF
NRBC BLD AUTO-RTO: 0 /100 WBCS
P AXIS: 59 DEGREES
PCO2 BLD: 34 MMOL/L (ref 21–32)
PCO2 BLD: 42 MM HG (ref 42–50)
PH BLD: 7.5 [PH] (ref 7.3–7.4)
PH UR STRIP.AUTO: 6.5 [PH]
PHOSPHATE SERPL-MCNC: 3.2 MG/DL (ref 2.3–4.1)
PLATELET # BLD AUTO: 235 THOUSANDS/UL (ref 149–390)
PLATELET # BLD AUTO: 258 THOUSANDS/UL (ref 149–390)
PMV BLD AUTO: 9 FL (ref 8.9–12.7)
PMV BLD AUTO: 9.1 FL (ref 8.9–12.7)
PO2 BLD: 34 MM HG (ref 35–45)
POTASSIUM BLD-SCNC: 3.2 MMOL/L (ref 3.5–5.3)
POTASSIUM SERPL-SCNC: 3.2 MMOL/L (ref 3.5–5.3)
POTASSIUM SERPL-SCNC: 3.4 MMOL/L (ref 3.5–5.3)
PR INTERVAL: 292 MS
PROT SERPL-MCNC: 6.5 G/DL (ref 6.4–8.2)
PROT UR STRIP-MCNC: NEGATIVE MG/DL
PROTHROMBIN TIME: 35.1 SECONDS (ref 11.6–14.5)
PROTHROMBIN TIME: 36.2 SECONDS (ref 11.6–14.5)
QRS AXIS: 15 DEGREES
QRSD INTERVAL: 90 MS
QT INTERVAL: 460 MS
QTC INTERVAL: 463 MS
RBC # BLD AUTO: 4.64 MILLION/UL (ref 3.88–5.62)
RBC # BLD AUTO: 4.76 MILLION/UL (ref 3.88–5.62)
RBC #/AREA URNS AUTO: ABNORMAL /HPF
SAO2 % BLD FROM PO2: 70 % (ref 60–85)
SODIUM BLD-SCNC: 135 MMOL/L (ref 136–145)
SODIUM SERPL-SCNC: 138 MMOL/L (ref 136–145)
SODIUM SERPL-SCNC: 138 MMOL/L (ref 136–145)
SP GR UR STRIP.AUTO: <=1.005 (ref 1–1.03)
SPECIMEN SOURCE: ABNORMAL
T WAVE AXIS: 66 DEGREES
TROPONIN I SERPL-MCNC: 0.06 NG/ML
UROBILINOGEN UR QL STRIP.AUTO: 0.2 E.U./DL
VENTRICULAR RATE: 61 BPM
WBC # BLD AUTO: 16.26 THOUSAND/UL (ref 4.31–10.16)
WBC # BLD AUTO: 16.84 THOUSAND/UL (ref 4.31–10.16)
WBC #/AREA URNS AUTO: ABNORMAL /HPF

## 2021-09-25 PROCEDURE — 99285 EMERGENCY DEPT VISIT HI MDM: CPT

## 2021-09-25 PROCEDURE — 83735 ASSAY OF MAGNESIUM: CPT | Performed by: PHYSICIAN ASSISTANT

## 2021-09-25 PROCEDURE — 93005 ELECTROCARDIOGRAM TRACING: CPT

## 2021-09-25 PROCEDURE — 85025 COMPLETE CBC W/AUTO DIFF WBC: CPT | Performed by: PHYSICIAN ASSISTANT

## 2021-09-25 PROCEDURE — NC001 PR NO CHARGE: Performed by: PHYSICIAN ASSISTANT

## 2021-09-25 PROCEDURE — 81001 URINALYSIS AUTO W/SCOPE: CPT | Performed by: PHYSICIAN ASSISTANT

## 2021-09-25 PROCEDURE — 70450 CT HEAD/BRAIN W/O DYE: CPT

## 2021-09-25 PROCEDURE — 84484 ASSAY OF TROPONIN QUANT: CPT | Performed by: INTERNAL MEDICINE

## 2021-09-25 PROCEDURE — 99222 1ST HOSP IP/OBS MODERATE 55: CPT | Performed by: INTERNAL MEDICINE

## 2021-09-25 PROCEDURE — 85610 PROTHROMBIN TIME: CPT | Performed by: FAMILY MEDICINE

## 2021-09-25 PROCEDURE — 99223 1ST HOSP IP/OBS HIGH 75: CPT | Performed by: GENERAL PRACTICE

## 2021-09-25 PROCEDURE — 84100 ASSAY OF PHOSPHORUS: CPT | Performed by: PHYSICIAN ASSISTANT

## 2021-09-25 PROCEDURE — 99284 EMERGENCY DEPT VISIT MOD MDM: CPT | Performed by: SURGERY

## 2021-09-25 PROCEDURE — 99222 1ST HOSP IP/OBS MODERATE 55: CPT | Performed by: NURSE PRACTITIONER

## 2021-09-25 PROCEDURE — 82947 ASSAY GLUCOSE BLOOD QUANT: CPT

## 2021-09-25 PROCEDURE — 84132 ASSAY OF SERUM POTASSIUM: CPT

## 2021-09-25 PROCEDURE — 84484 ASSAY OF TROPONIN QUANT: CPT | Performed by: PHYSICIAN ASSISTANT

## 2021-09-25 PROCEDURE — 85027 COMPLETE CBC AUTOMATED: CPT | Performed by: FAMILY MEDICINE

## 2021-09-25 PROCEDURE — 72125 CT NECK SPINE W/O DYE: CPT

## 2021-09-25 PROCEDURE — 82803 BLOOD GASES ANY COMBINATION: CPT

## 2021-09-25 PROCEDURE — 0T9B70Z DRAINAGE OF BLADDER WITH DRAINAGE DEVICE, VIA NATURAL OR ARTIFICIAL OPENING: ICD-10-PCS | Performed by: GENERAL PRACTICE

## 2021-09-25 PROCEDURE — 85014 HEMATOCRIT: CPT

## 2021-09-25 PROCEDURE — 71045 X-RAY EXAM CHEST 1 VIEW: CPT

## 2021-09-25 PROCEDURE — 85610 PROTHROMBIN TIME: CPT

## 2021-09-25 PROCEDURE — 76705 ECHO EXAM OF ABDOMEN: CPT | Performed by: SURGERY

## 2021-09-25 PROCEDURE — 1124F ACP DISCUSS-NO DSCNMKR DOCD: CPT | Performed by: SURGERY

## 2021-09-25 PROCEDURE — 36415 COLL VENOUS BLD VENIPUNCTURE: CPT

## 2021-09-25 PROCEDURE — 80053 COMPREHEN METABOLIC PANEL: CPT | Performed by: FAMILY MEDICINE

## 2021-09-25 PROCEDURE — 93010 ELECTROCARDIOGRAM REPORT: CPT | Performed by: INTERNAL MEDICINE

## 2021-09-25 PROCEDURE — 80048 BASIC METABOLIC PNL TOTAL CA: CPT | Performed by: PHYSICIAN ASSISTANT

## 2021-09-25 PROCEDURE — 93308 TTE F-UP OR LMTD: CPT | Performed by: SURGERY

## 2021-09-25 PROCEDURE — 85730 THROMBOPLASTIN TIME PARTIAL: CPT | Performed by: FAMILY MEDICINE

## 2021-09-25 PROCEDURE — 84295 ASSAY OF SERUM SODIUM: CPT

## 2021-09-25 RX ORDER — AMLODIPINE BESYLATE 5 MG/1
5 TABLET ORAL DAILY
Status: DISCONTINUED | OUTPATIENT
Start: 2021-09-25 | End: 2021-09-26

## 2021-09-25 RX ORDER — CLOPIDOGREL BISULFATE 75 MG/1
75 TABLET ORAL DAILY
Status: DISCONTINUED | OUTPATIENT
Start: 2021-09-25 | End: 2021-09-27 | Stop reason: HOSPADM

## 2021-09-25 RX ORDER — ACETAMINOPHEN 325 MG/1
650 TABLET ORAL EVERY 6 HOURS PRN
Status: DISCONTINUED | OUTPATIENT
Start: 2021-09-25 | End: 2021-09-27 | Stop reason: HOSPADM

## 2021-09-25 RX ORDER — PANTOPRAZOLE SODIUM 40 MG/1
40 TABLET, DELAYED RELEASE ORAL
Status: DISCONTINUED | OUTPATIENT
Start: 2021-09-25 | End: 2021-09-27 | Stop reason: HOSPADM

## 2021-09-25 RX ORDER — TAMSULOSIN HYDROCHLORIDE 0.4 MG/1
0.4 CAPSULE ORAL
Status: DISCONTINUED | OUTPATIENT
Start: 2021-09-25 | End: 2021-09-27 | Stop reason: HOSPADM

## 2021-09-25 RX ORDER — POLYETHYLENE GLYCOL 3350 17 G/17G
17 POWDER, FOR SOLUTION ORAL DAILY PRN
Status: DISCONTINUED | OUTPATIENT
Start: 2021-09-25 | End: 2021-09-27 | Stop reason: HOSPADM

## 2021-09-25 RX ORDER — ATORVASTATIN CALCIUM 40 MG/1
80 TABLET, FILM COATED ORAL DAILY
Status: DISCONTINUED | OUTPATIENT
Start: 2021-09-25 | End: 2021-09-27 | Stop reason: HOSPADM

## 2021-09-25 RX ORDER — POTASSIUM CHLORIDE 20 MEQ/1
40 TABLET, EXTENDED RELEASE ORAL ONCE
Status: COMPLETED | OUTPATIENT
Start: 2021-09-25 | End: 2021-09-25

## 2021-09-25 RX ORDER — DOCUSATE SODIUM 100 MG/1
100 CAPSULE, LIQUID FILLED ORAL 2 TIMES DAILY PRN
Status: DISCONTINUED | OUTPATIENT
Start: 2021-09-25 | End: 2021-09-27 | Stop reason: HOSPADM

## 2021-09-25 RX ADMIN — POTASSIUM CHLORIDE 40 MEQ: 1500 TABLET, EXTENDED RELEASE ORAL at 08:54

## 2021-09-25 RX ADMIN — POTASSIUM CHLORIDE 40 MEQ: 1500 TABLET, EXTENDED RELEASE ORAL at 11:26

## 2021-09-25 RX ADMIN — DILTIAZEM HYDROCHLORIDE 30 MG: 30 TABLET, FILM COATED ORAL at 17:26

## 2021-09-25 RX ADMIN — ATORVASTATIN CALCIUM 80 MG: 40 TABLET, FILM COATED ORAL at 08:54

## 2021-09-25 RX ADMIN — DILTIAZEM HYDROCHLORIDE 30 MG: 30 TABLET, FILM COATED ORAL at 11:25

## 2021-09-25 RX ADMIN — CLOPIDOGREL BISULFATE 75 MG: 75 TABLET ORAL at 08:54

## 2021-09-25 RX ADMIN — AMLODIPINE BESYLATE 5 MG: 5 TABLET ORAL at 08:54

## 2021-09-25 RX ADMIN — TAMSULOSIN HYDROCHLORIDE 0.4 MG: 0.4 CAPSULE ORAL at 17:26

## 2021-09-25 RX ADMIN — PANTOPRAZOLE SODIUM 40 MG: 40 TABLET, DELAYED RELEASE ORAL at 06:23

## 2021-09-25 NOTE — ASSESSMENT & PLAN NOTE
· Follows with Vascular Dr Shital Acosta   · Per vascular note   · CT angiogram shows minimal posterior plaque in the common femoral artery an adequate ileofemoral and iliac systems

## 2021-09-25 NOTE — ASSESSMENT & PLAN NOTE
· Status post TAVR on 09/16  · Surgery performed at [facility] by Dr Francesca Mix   · Patient did develop left bundle-branch block postop-reverted back to narrow complex  · One brief run of nonsustained VT which was monomorphic per Cardiology note  · Cardiology/electrophysiology did not recommend pacemaker for patient

## 2021-09-25 NOTE — CONSULTS
CONSULT    Patient Name: Clifton Marcelo  Patient MRN: [id number]  Date of Service: 9/25/2021   Date / Time Note Created: 9/25/2021 9:50 AM   Referring Provider: Jacqueline Watson DO   Provider Creating Note: FILIPPO Tamez    PCP: Bennett Sandy  Attending Provider:  Radha Peters DO    Reason for Consult: Urinary Retention    MAREK Douglass is a 80year old male with a pmh of HTN, HLD, protein C deficiency, AAA, CAD, hx PE/DVT and severe Aortic Stenosis who underwent TAVR on 9/16/21  Post op patient developed acute urinary retention and was discharged with gifford cath  Patient was seen in our office yesterday for a void trial and catheter was removed  Unfortunatley, patient was unable to void and came in to the ED where he was straight catheterized  Patient endorses that he has bene having palpitations since Tuesday and was found to be in SVT in the ED and admitted for further work up         Source:chart review and the patient           Patient Active Problem List   Diagnosis    Peripheral artery disease ([address] )    Claudication of left lower extremity ([address] )    S/P RIGHT Femoral- PT bypass 2011    Dyslipidemia    ASCVD (arteriosclerotic cardiovascular disease)    Stage 3 chronic kidney disease    Nonrheumatic aortic valve stenosis    Essential hypertension    Embolism and thrombosis of arteries of the lower extremities ([address] )    Protein C deficiency ([address] )    Former heavy tobacco smoker    AAA (abdominal aortic aneurysm) without rupture ([address] )    S/P TAVR (transcatheter aortic valve replacement)    Thrombocytopenia (HCC)    Anticoagulation goal of INR 2 to 3    Anticoagulated on Coumadin    LBBB (left bundle branch block)    Leukocytosis    Hypocalcemia    Hypokalemia    Urinary retention    SVT (supraventricular tachycardia) ([address] )    FAMILIA (acute kidney injury) ([address] )       Impressions  Acute Urinary Retention  BPH, CT August 2021 revealed enlarged prostate   Elevated PSA, follows with our group     Recommendations  1  Place gifford catheter, recommend dc with catheter in place   2  Continue flomax   3  Patient will require follow up with our office, has scheduled appt 11/10/21  Requires PSA check prior and closer to appt         Past Medical History:   Diagnosis Date    DVT [facility]deep venous thrombosis) [facility]     Protein C deficiency ([facility]     Pulmonary embolus (HCC)        Past Surgical History:   Procedure Laterality Date    BACK SURGERY      BYPASS FEMORAL-TIBIAL Right     CT ECHO TRANSESOPHAG R-T 2D W/PRB IMG ACQUISJ I&R N/A 2021    Procedure: TRANSESOPHAGEAL ECHOCARDIOGRAM (ARDHA); Surgeon: Jagruti Melo DO;  [facility];  Service: Cardiac Surgery    CT REPLACE AORTIC VALVE OPENFEMORAL ARTERY APPROACH N/A 2021    Procedure: REPLACEMENT AORTIC VALVE TRANSCATHETER (TAVR) TRANSFEMORAL W/ 29 MM COLÓN BREEZY S3 ULTRA VALVE (ACCESS ON LEFT); Surgeon: Jagruti Melo DO;  [facility];  Service: Cardiac Surgery    VEIN LIGATION         Family History   Problem Relation Age of Onset    Cancer Mother 50    Heart attack Father 46    Hypertension Father     Prostate cancer Brother     Arrhythmia Neg Hx     Clotting disorder Neg Hx     Fainting Neg Hx     Heart disease Neg Hx     Anuerysm Neg Hx     Stroke Neg Hx        Social History     Socioeconomic History    Marital status: /Civil Union     Spouse name: Not on file    Number of children: Not on file    Years of education: Not on file    Highest education level: Not on file   Occupational History    Not on file   Tobacco Use    Smoking status: Former Smoker     Types: Cigarettes     Quit date: 2018     Years since quittin 9    Smokeless tobacco: Never Used    Tobacco comment: 1 pk every 2days , currently on patches to quit      Vaping Use    Vaping Use: Never used   Substance and Sexual Activity    Alcohol use: No    Drug use: No    Sexual activity: Not on file   Other Topics Concern    Not on file   Social History Narrative    Not on file     Social Determinants of Health     Financial Resource Strain:     Difficulty of Paying Living Expenses:    Food Insecurity:     Worried About Running Out of Food in the Last Year:     [address] in the Last Year:    Transportation Needs:     Lack of Transportation (Medical):      Lack of Transportation (Non-Medical):    Physical Activity:     Days of Exercise per Week:     Minutes of Exercise per Session:    Stress:     Feeling of Stress :    Social Connections:     Frequency of Communication with Friends and Family:     Frequency of Social Gatherings with Friends and Family:     Attends [employer] Services:     Active Member of Clubs or Organizations:     Attends Club or Organization Meetings:     Marital Status:    Intimate Partner Violence:     Fear of Current or Ex-Partner:     Emotionally Abused:     Physically Abused:     Sexually Abused:        No Known Allergies    Review of Systems  Review of Systems - History obtained from chart review and the patient  General ROS: negative  Psychological ROS: negative  Respiratory ROS: no cough, shortness of breath, or wheezing  Cardiovascular ROS: no chest pain or dyspnea on exertion, palpitations   Gastrointestinal ROS: abdominal pain prior to straight cath   Genito-Urinary ROS: positive for - dysuria and hematuria  Musculoskeletal ROS: negative    Chart Review   Allergies, current medications, history, problem list    Vital Signs & Physical Exam  /77   Pulse 69   Temp (!) 97 2 °F (36 2 °C) (Oral)   Resp 16   Ht 5' 7" (1 702 m)   Wt 83 9 kg (184 lb 15 5 oz)   SpO2 98%   BMI 28 97 kg/m²   General appearance: alert and oriented, in no acute distress  Head: Normocephalic, without obvious abnormality, atraumatic  Neck: no adenopathy, no carotid bruit, no JVD, supple, symmetrical, trachea midline and thyroid not enlarged, symmetric, no tenderness/mass/nodules  Back: symmetric, no curvature  ROM normal  No CVA tenderness  Lungs: clear to auscultation bilaterally  Chest wall: no tenderness  Heart: regular rate and rhythm, S1, S2 normal, no murmur, click, rub or gallop  Abdomen: soft, non-tender; bowel sounds normal; no masses,  no organomegaly  Extremities: extremities normal, warm and well-perfused; no cyanosis, clubbing, or edema  Pulses: 2+ and symmetric  Neurologic: Grossly normal     Laboratory Studies  Lab Results   Component Value Date    K 3 2 (L) 2021    CL 99 (L) 2021    CO2 32 2021    CO2 34 (H) 2021    GLUCOSE 131 2021    CREATININE 1 64 (H) 2021    BUN 38 (H) 2021    MG 2 1 2021    PHOS 3 2 2021               Imaging and Other Studies  )Echo complete with contrast if indicated    Result Date: 2021  Narrative: [address], [address] [facility] [phone] Transthoracic Echocardiogram 2D, M-mode, Doppler, and Color Doppler Study date:  16-Sep-2021 Patient: Radu Oneill MR number: [id number] Account number: [de-identified] : 1950 Age: 80 years Gender: Male Status: Inpatient Location: Bedside Height: 68 in Weight: 187 lb BP: 112/ 63 mmHg Indications: S/P TAVR 29MM Dukes Lizbeth S3 Diagnoses: Z95 2 - Presence of prosthetic heart valve Sonographer:  ALICJA Tapia Primary Physician:  Gretel Pelaez MD Referring Physician:  Erika Najjar, CRNP Group:  [facility] Interpreting Physician:  Angel Salgado MD SUMMARY LEFT VENTRICLE: Systolic function was normal  Ejection fraction was estimated to be 58 %  There were no regional wall motion abnormalities  Wall thickness was mildly increased  There was mild concentric hypertrophy  Doppler parameters were consistent with abnormal left ventricular relaxation (grade 1 diastolic dysfunction)  LEFT ATRIUM: The atrium was mildly dilated   MITRAL VALVE: There was mild annular calcification  AORTIC VALVE: A bioprosthesis (#29 mm Dukes Lizbeth 3 TAVR) was present  It exhibited normal function  Valve mean gradient was 6 mmHg  TRICUSPID VALVE: There was trace regurgitation  HISTORY: PRIOR HISTORY: PAD,CKD Stage 3,AAA,LBBB PROCEDURE: The procedure was performed at the bedside  This was a routine study  The transthoracic approach was used  The study included complete 2D imaging, M-mode, complete spectral Doppler, and color Doppler  The heart rate was 54 bpm, at the start of the study  Images were obtained from the parasternal, apical, subcostal, and suprasternal notch acoustic windows  Echocardiographic views were limited due to restricted patient mobility, poor acoustic window availability, and decreased penetration  This was a technically difficult study  LEFT VENTRICLE: Size was normal  Systolic function was normal  Ejection fraction was estimated to be 58 %  There were no regional wall motion abnormalities  Wall thickness was mildly increased  There was mild concentric hypertrophy  DOPPLER: Doppler parameters were consistent with abnormal left ventricular relaxation (grade 1 diastolic dysfunction)  RIGHT VENTRICLE: The size was normal  Systolic function was normal  Wall thickness was normal  LEFT ATRIUM: The atrium was mildly dilated  RIGHT ATRIUM: Size was normal  MITRAL VALVE: There was mild annular calcification  Valve structure was normal  There was normal leaflet separation  DOPPLER: The transmitral velocity was within the normal range  There was no evidence for stenosis  There was no regurgitation  AORTIC VALVE: A bioprosthesis (#29 mm Dukes Lizbeth 3 TAVR) was present  It exhibited normal function  DOPPLER: There was no evidence for stenosis  There was no regurgitation  TRICUSPID VALVE: The valve structure was normal  There was normal leaflet separation  DOPPLER: The transtricuspid velocity was within the normal range  There was no evidence for stenosis   There was trace regurgitation  PULMONIC VALVE: Leaflets exhibited normal thickness, no calcification, and normal cuspal separation  DOPPLER: The transpulmonic velocity was within the normal range  There was no regurgitation  PERICARDIUM: There was no pericardial effusion  The pericardium was normal in appearance  AORTA: The root exhibited normal size  MEASUREMENT TABLES DOPPLER MEASUREMENTS Aortic valve   (Reference normals) Peak gradient   17 mmHg   (--) Mean gradient   6 mmHg   (--) Valve area, cont   2 1 cmï¾²   (--) SYSTEM MEASUREMENT TABLES 2D %FS: 32 23 % Ao Diam: 2 57 cm EDV(Teich): 129 65 ml EF(Teich): 60 06 % ESV(Teich): 51 78 ml IVSd: 1 01 cm LA Area: 22 21 cm2 LA Diam: 4 3 cm LVEDV MOD A4C: 90 44 ml LVEF MOD A4C: 57 7 % LVESV MOD A4C: 38 26 ml LVIDd: 5 2 cm LVIDs: 3 53 cm LVLd A4C: 8 38 cm LVLs A4C: 7 18 cm LVOT Diam: 2 17 cm LVPWd: 0 97 cm RA Area: 14 17 cm2 RVIDd: 3 41 cm SV MOD A4C: 52 18 ml SV(Teich): 77 87 ml CW AV Env  Ti: 287 35 ms AV VTI: 32 64 cm AV Vmax: 1 98 m/s AV Vmean: 1 14 m/s AV maxPG: 15 77 mmHg AV meanP 23 mmHg MM TAPSE: 2 02 cm PW TACHO (VTI): 2 37 cm2 TACHO Vmax: 1 96 cm2 E' Sept: 0 05 m/s E/E' Sept: 15 5 LVOT Env  Ti: 300 67 ms LVOT VTI: 20 9 cm LVOT Vmax: 1 05 m/s LVOT Vmean: 0 69 m/s LVOT maxP 42 mmHg LVOT meanP 24 mmHg LVSV Dopp: 77 45 ml MV A Nicolas: 1 15 m/s MV Dec [place]: 2 93 m/s2 MV DecT: 249 04 ms MV E Nicolas: 0 73 m/s MV E/A Ratio: 0 64 MV PHT: 72 22 ms MVA By PHT: 3 05 cm2 Intersocietal Commission Accredited Echocardiography Laboratory Prepared and electronically signed by Francisco Segundo MD Signed 16-Sep-2021 15:50:30     XR chest portable    Result Date: 2021  Narrative: CHEST INDICATION:   Post Open Heart Surgey  COMPARISON:  Compared with 2021 EXAM PERFORMED/VIEWS:  XR CHEST PORTABLE FINDINGS:  Right neck catheter in the right atrium  Cardiomediastinal silhouette appears unremarkable  Points patient  Mild prominent vascular markings  No pneumothorax or pleural effusion  Osseous structures appear within normal limits for patient age  Impression: Mild congestive changes  Workstation performed: [id number]     TRAUMA - CT head wo contrast    Result Date: 9/25/2021  Narrative: CT BRAIN - WITHOUT CONTRAST INDICATION:   TRAUMA  COMPARISON:  None  TECHNIQUE:  CT examination of the brain was performed  In addition to axial images, sagittal and coronal 2D reformatted images were created and submitted for interpretation  Radiation dose length product (DLP) for this visit:  1047 mGy-cm   This examination, like all CT scans performed in the [facility], was performed utilizing techniques to minimize radiation dose exposure, including the use of iterative reconstruction and automated exposure control  IMAGE QUALITY:  Diagnostic  FINDINGS: PARENCHYMA:  No intracranial mass, mass effect or midline shift  No CT signs of acute infarction  No acute parenchymal hemorrhage  VENTRICLES AND EXTRA-AXIAL SPACES:  Normal for the patient's age  VISUALIZED ORBITS AND PARANASAL SINUSES:  Unremarkable  CALVARIUM AND EXTRACRANIAL SOFT TISSUES:  Normal      Impression: No acute intracranial abnormality  I personally discussed this study with Dr Faith Arnold on 9/25/2021 at 3:11 AM  Workstation performed: [id number]     TRAUMA - CT spine cervical wo contrast    Result Date: 9/25/2021  Narrative: CT CERVICAL SPINE - WITHOUT CONTRAST INDICATION:   TRAUMA  COMPARISON:  None  TECHNIQUE:  CT examination of the cervical spine was performed without intravenous contrast   Contiguous axial images were obtained  Sagittal and coronal reconstructions were performed  Radiation dose length product (DLP) for this visit:  431 mGy-cm   This examination, like all CT scans performed in the [facility], was performed utilizing techniques to minimize radiation dose exposure, including the use of iterative reconstruction and automated exposure control  IMAGE QUALITY:  Diagnostic   FINDINGS: ALIGNMENT:  Normal alignment of the cervical spine  No subluxation  VERTEBRAL BODIES:  No fracture  DEGENERATIVE CHANGES:  Mild multilevel cervical degenerative changes are noted without critical central canal stenosis  PREVERTEBRAL AND PARASPINAL SOFT TISSUES:  Emphysematous changes are noted at both lung apices  THORACIC INLET:  Normal      Impression: No cervical spine fracture or traumatic malalignment  Workstation performed: [id number]     RADHA Anesthesia    Result Date: 9/16/2021  Narrative: Idalia Key CRNA     9/16/2021  8:20 AM Procedure Performed: RADHA Anesthesia Start Time:  9/16/2021 7:49 AM Preanesthesia Checklist Patient identified, IV assessed, risks and benefits discussed, monitors and equipment assessed, procedure being performed at surgeon's request and anesthesia consent obtained  Procedure Diagnostic Indications for RADHA:  assessment of surgical repair  Type of RADHA: interventional RADHA with real time guidance of intracardiac procedure  Images Saved: ultrasound permanent image saved  Physician Requesting Echo: Anjel Maria, DO  Intubated  Bite block placed  Heart visualized  Insertion of RADHA Probe:  Atraumatic  Probe Type:  Multiplane  Modalities:  3D, color flow mapping, continuous wave Doppler and pulse wave Doppler  Echocardiographic and Doppler Measurements PREPROCEDURE LEFT VENTRICLE: Systolic Function: normal  Ejection Fraction: 55%  Regional Wall Motion Abnormalities: none  RIGHT VENTRICLE: Systolic Function: normal   AORTIC VALVE: Leaflets: trileaflet  Leaflet motions restricted  Peak Gradient: 42 mmHg  Regurgitation: moderate  MITRAL VALVE: Leaflets: calcified  Leaflet Motions: restricted  Regurgitation: trace  TRICUSPID VALVE: Leaflets: normal  Regurgitation: trace   OTHER VALVE FINDINGS: AOV: HEAVILY CALCIFIED W/ FUSION ALONG L/R CUSPS; MILD-MOD CENTRAL AR; ANNULUS AREA 5 48  ASCENDING AORTA: Size:  normal  AORTIC ARCH: Size:  normal  DESCENDING AORTA: Grade 4: atheroma protruding => 0 5 cm into lumen  RIGHT ATRIUM: No spontaneous echo contrast  LEFT ATRIUM: No spontaneous echo contrast  LEFT ATRIAL APPENDAGE: No spontaneous echo contrast ATRIAL SEPTUM: Intra-atrial septal morphology: normal  VENTRICULAR SEPTUM: Intra-ventricular septum morphology: normal  OTHER FINDINGS: Pericardium:  normal  POSTPROCEDURE LEFT VENTRICLE: Unchanged   RIGHT VENTRICLE: Unchanged   AORTIC VALVE: Leaflets: bioprosthetic  Stenosis: none  Mean Gradient: 2 mmHg  Regurgitation: mild  MITRAL VALVE: Unchanged   TRICUSPID VALVE: Unchanged   PULMONIC VALVE: Unchanged OTHER VALVE FINDINGS: S/P 29 TF TAVR; VALVE WELL SEATED; MILD-MOD PVL IN AREA OF AO-MITRAL CURTAIN; UNIMPROVED AFTER BV; EOA 3 56CM2  ATRIA: Unchanged   Left Atrial Appendage [place]: No  Residual Flow in Left Atrial Appendage by Color Flow Doppler: No  AORTA: Unchanged   Dissection: Dissection not present  REMOVAL: Probe Removal: atraumatic  XR chest portable ICU    Result Date: 9/16/2021  Narrative: CHEST INDICATION:   Post TAVR  COMPARISON:  Chest CT from 8/17/2021  EXAM PERFORMED/VIEWS:  AP PORTABLE  FINDINGS: Right jugular catheter in SVC  Cardiomediastinal silhouette appears unremarkable  Post TAVR  Mild pulmonary venous congestion  No effusion or pneumothorax  Osseous structures within normal limits for age  Impression: Mild pulmonary venous congestion  Post TAVR  Workstation performed: [id number]     RADHA intraop interventional w/realtime guidance of cardiac procedures    Result Date: 9/16/2021  Narrative: This order contains the linked images for the RADHA that was performed by the Anesthesiologist   Please see the  CARDIAC RADHA ANESTHESIA procedure for results      Cardiac rhc/lhc    Result Date: 8/27/2021  Narrative: [address] [place] [address] [phone] [facility] Invasive Cardiovascular Lab Complete Report Patient: Lavinia Sinclair MR number: [id number] Account number: [id number] Study date: 2021 Gender: Male : 1950 Height: 68 1 in Weight: 192 3 lb BSA: 2 01 mï¾² Allergies: NO KNOWN ALLERGIES Diagnostic Cardiologist:  Julissa Kaur DO Primary Physician:  Neville Espinosa SUMMARY CORONARY CIRCULATION: Mid LAD: There was a 50 % stenosis  1st obtuse marginal: There was a 30 % stenosis  Proximal RCA: There was a diffuse 20 % stenosis  INDICATIONS: --  Cardiac: aortic valve disease  PROCEDURES PERFORMED --  Left coronary angiography  --  Right coronary angiography  --  Mod Sedation Same Physician Initial 15min  --  Mod Sedation Same Physician Add 15min  --  Coronary Catheterization (w/o LHC)  PROCEDURE: The risks and alternatives of the procedures and conscious sedation were explained to the patient and informed consent was obtained  The patient was brought to the cath lab and placed on the table  The planned puncture sites were prepped and draped in the usual sterile fashion  --  Right radial artery access  After performing an Chintan's test to verify adequate ulnar artery supply to the hand, the radial site was prepped  The puncture site was infiltrated with local anesthetic  The vessel was accessed using the modified Seldinger technique, a wire was advanced into the vessel, and a sheath was advanced over the wire into the vessel  --  Left coronary artery angiography  A catheter was advanced over a guidewire into the aorta and positioned in the left coronary artery ostium under fluoroscopic guidance  Angiography was performed  --  Right coronary artery angiography  A catheter was advanced over a guidewire into the aorta and positioned in the right coronary artery ostium under fluoroscopic guidance  Angiography was performed  --  Mod Sedation Same Physician Initial 15min  --  Mod Sedation Same Physician Add 15min  --  Coronary Catheterization (w/o LHC)  PROCEDURE COMPLETION: The patient tolerated the procedure well and was discharged from the cath lab   TIMING: Test started at 11:48  Test concluded at 12:13  HEMOSTASIS: The sheath was removed  The site was compressed with a Hemoband device  Hemostasis was obtained  MEDICATIONS GIVEN: Fentanyl (1OOmcg/2 ml), 50 mcg, IV, at 11:39  Versed (2mg/2ml), 2 mg, IV, at 11:39  1% Lidocaine, 1 ml, subcutaneously, at 11:51  Nitroglycerin (200mcg/ml), 200 mcg, at 11:51  Verapamil (5mg/2ml), 2 5 mg, IV, at 11:52  Heparin 1000 units/ml, 4,000 units, IV, at 11:52  CONTRAST GIVEN: 100 ml Omnipaque (350mg I /ml)  RADIATION EXPOSURE: Fluoroscopy time: 6 07 min  CORONARY VESSELS:   --  The coronary circulation is right dominant  --  Left main: Angiography showed minor luminal irregularities  --  LAD: The vessel was calcified  --  Mid LAD: There was a 50 % stenosis  --  1st obtuse marginal: There was a 30 % stenosis  --  Proximal RCA: There was a diffuse 20 % stenosis  IMPRESSIONS: Mild epicardial CAD, no revascularization required percutaneously pre TAVR  RECOMMENDATIONS follow up TAVR scheduling, pt needs dental work pre tavr  DISPOSITION: The patient left the catheterization laboratory in stable condition  Prepared and signed by Carolyn Travis DO Signed 2021 12:35:02 Study diagram Angiographic findings Native coronary lesions: ï¾·Mid LAD: Lesion 1: 50 % stenosis  ï¾·OM1: Lesion 1: 30 % stenosis  ï¾·Proximal RCA: Lesion 1: diffuse, 20 % stenosis  Hemodynamic tables Outputs:  Baseline Outputs:  -- CALCULATIONS: [age] Outputs:  -- CALCULATIONS: Body Surface Area: 2 01 Outputs:  -- CALCULATIONS: Height in cm: 173 00 Outputs:  -- CALCULATIONS: Sex: Male Outputs:  -- CALCULATIONS: Weight in k 40         Medications     meds        )FILIPPO Lauren

## 2021-09-25 NOTE — ASSESSMENT & PLAN NOTE
· Secondary to urinary retention  · Baseline creatinine 1 2-1 4  · Monitor creatinine  · Urinary retention protocol  · Encourage good oral intake  · Replete potassium

## 2021-09-25 NOTE — CONSULTS
Consultation - Cardiology   Aubrie Mcclendon 80 y o  male MRN: [id number]  Unit/Bed#: S -01 Encounter: [id number]    Assessment/Plan     Principal Problem:    Urinary retention  Active Problems:    Essential hypertension    Embolism and thrombosis of arteries of the lower extremities ([facility])    Protein C deficiency ([facility] )    AAA (abdominal aortic aneurysm) without rupture ([facility])    S/P TAVR (transcatheter aortic valve replacement)    Leukocytosis    SVT (supraventricular tachycardia) ([facility])    FAMILIA (acute kidney injury) ([facility] )      Assessment/Plan    1  SVT-new diagnosis  EKG in ED / SVT  He had palpitations in the ED associated with SVT  He has been having palpitations at home as well  Unclear if his dizziness/lightheadedness is associated  No conversion strip  Telemetry on floor reviewed-sinus bradycardia with PVCs  No tachyarrhythmias  Baseline bradycardia  History of transient left bundle-branch block post TAVR which has resolved  Will discuss management with attending  If recurrent tachycardia may need  PPM for tachy-alana  Discussed with patient and wife at bedside    2  Recent fall with head strike  CT of the head no acute intracranial abnormality  Patient states he lost his balance  He has been is frequently getting lightheaded/dizzy  Check orthostatics  ? Related to dysrhythmia  3  Urinary retention- s/p St cath  Management per urology  4  Severe AS status post TAVR  Postop echo normal LV function, RV function, mild concentric LVH  No AS/AI  On Plavix  Euvolemic     5  Nonocclusive CAD  AP-Plavix, statin    6  Protein C deficiency  On warfarin with supratherapeutic INR    7  History of PE/DVT    8  AK I-baseline creatinine 1 2-1 4  Present with BUN/creatinine 42/1 76  Secondary to urinary retention  Reports good oral intake  He is no longer taking torsemide was only 5 days postop  Not taking HCTZ because he "ran out"    9   PAD- AAA/history of lower extremity bypass  Continue to follow with vascular     10  HTN-hypertensive at time  On amlodipine 5 mg daily  Continue to trend    11  HLD- 4/2021-   Atorvastatin 80 mg    12  Elevated troponin  Minimally elevated troponin 0 06 in the setting of tachyarrhythmia  Non MI troponin elevation  He did have pronounced ST changes without any chest pain or pressure  Repeat EKG  Recent coronary angiogram- no occlusive CAD  Continue Plavix , on no BB d/t baseline bradycardia      History of Present Illness   Physician Requesting Consult: Lazaro Monique DO  Reason for Consult / Principal Problem: S/P TAVR    HPI: Aubrie Mcclendon is a 80y o  year old male s/p severe AS s/p TAVR with new LBBB post op subsequently resolved, HTN, HLD, protein C deficiency , AAA, nonobstructive CAD, history of PE/DVT, PAD with right leg bypass who presents with urinary difficulties  He had Molina catheter removed by Urology and came into the ED with the inability to void  He had urinary retention requiring straight catheterization  He had reported a fall a few days ago with head strike  In ED he was noted to have SVT  Trauma workup was unremarkable  He has had palpitations since Tuesday  Cardiology consulted for abnormal EKG/SVT  Direction was given for amiodarone bolus but does not appear to be given  Likely had converted by that time spontaneously  He underwent transfemoral TAVR 9/16/2021  for severe AS  He was seen by the EP service for new left bundle-branch block postop  This apparently completely resolved prior to discharge  Also noted to have some bradycardia and NSVT  He wore a 2 week event recorder upon discharge  Avoided beta-blockers given his baseline bradycardia  Since patient's discharge post TAVR he has been having frequent lightheadedness particularly upon standing  No chest pain or pressure  Few days ago he feels he lost his balance and fell  As he was trying to get he was weak and felt lightheaded    He went down 1-2 more times and had his head  He reports that he has been eating and drinking quite a bit  No nausea vomiting diarrhea  Complains of constipation  He has really felt unwell since his TAVR  Prior to that reportedly asymptomatic  He has been having palpitations at home  Post op TTE 9/16/2021- LVEF 58% with mild concentric LVH  RV size and function normal   Aortic valve bioprosthesis present with normal function  No regurgitation  Coronary angiogram 08/2021-mid LAD 50%, 1st obtuse marginal 30%, prox RCA 20%    Inpatient consult to Cardiology  Consult performed by: FILIPPO Hampton  Consult ordered by: Gerardo Palacio MD          Review of Systems   Constitutional: Positive for activity change and fatigue  HENT: Negative  Eyes: Negative  Respiratory: Negative for shortness of breath  Cardiovascular: Positive for palpitations  Negative for chest pain and leg swelling  Gastrointestinal: Positive for constipation  Endocrine: Negative  Genitourinary: Positive for difficulty urinating  Musculoskeletal: Positive for gait problem  Skin: Negative  Allergic/Immunologic: Negative  Neurological: Positive for dizziness, weakness and light-headedness  Hematological: Bruises/bleeds easily  Psychiatric/Behavioral: Negative  All other systems reviewed and are negative  Historical Information   Past Medical History:   Diagnosis Date    DVT (deep venous thrombosis) (HCC)     Protein C deficiency ([address] )     Pulmonary embolus (HCC)      Past Surgical History:   Procedure Laterality Date    BACK SURGERY      BYPASS FEMORAL-TIBIAL Right 2011    NV ECHO TRANSESOPHAG R-T 2D W/PRB IMG ACQUISJ I&R N/A 9/16/2021    Procedure: TRANSESOPHAGEAL ECHOCARDIOGRAM (RADHA);   Surgeon: Junior Martínez DO;  Location: [facility];  Service: Cardiac Surgery    NV REPLACE AORTIC VALVE OPENFEMORAL ARTERY APPROACH N/A 9/16/2021    Procedure: REPLACEMENT AORTIC VALVE TRANSCATHETER (TAVR) TRANSFEMORAL W/ 34 MM COLÓN BREEZY S3 ULTRA VALVE (ACCESS ON LEFT); Surgeon: Nicole Leggett DO;  Location: [facility];  Service: Cardiac Surgery    VEIN LIGATION       Social History     Substance and Sexual Activity   Alcohol Use No     Social History     Substance and Sexual Activity   Drug Use No     Social History     Tobacco Use   Smoking Status Former Smoker    Types: Cigarettes    Quit date: 2018    Years since quittin 9   Smokeless Tobacco Never Used   Tobacco Comment    1 pk every 2days , currently on patches to quit        Family History:   Family History   Problem Relation Age of Onset    Cancer Mother 50    Heart attack Father 46    Hypertension Father     Prostate cancer Brother     Arrhythmia Neg Hx     Clotting disorder Neg Hx     Fainting Neg Hx     Heart disease Neg Hx     Anuerysm Neg Hx     Stroke Neg Hx        Meds/Allergies   current meds:   Current Facility-Administered Medications   Medication Dose Route Frequency    acetaminophen (TYLENOL) tablet 650 mg  650 mg Oral Q6H PRN    amiodarone (CORDARONE) 900 mg in dextrose 5 % 500 mL infusion  1 mg/min Intravenous Continuous    Followed by   Malia Roles amiodarone (CORDARONE) 900 mg in dextrose 5 % 500 mL infusion  0 5 mg/min Intravenous Continuous    amiodarone 150 mg in dextrose 5 % 100 mL IV bolus  150 mg Intravenous Once    amLODIPine (NORVASC) tablet 5 mg  5 mg Oral Daily    atorvastatin (LIPITOR) tablet 80 mg  80 mg Oral Daily    clopidogrel (PLAVIX) tablet 75 mg  75 mg Oral Daily    docusate sodium (COLACE) capsule 100 mg  100 mg Oral BID PRN    pantoprazole (PROTONIX) EC tablet 40 mg  40 mg Oral Early Morning    polyethylene glycol (MIRALAX) packet 17 g  17 g Oral Daily PRN    tamsulosin (FLOMAX) capsule 0 4 mg  0 4 mg Oral Daily With Dinner    and PTA meds:    Medications Prior to Admission   Medication    amLODIPine (NORVASC) 5 mg tablet    atorvastatin (LIPITOR) 80 mg tablet    clopidogrel (PLAVIX) 75 mg tablet    docusate sodium (COLACE) 100 mg capsule    hydrochlorothiazide (HYDRODIURIL) 25 mg tablet    pantoprazole (PROTONIX) 40 mg tablet    polyethylene glycol (MIRALAX) 17 g packet    tamsulosin (FLOMAX) 0 4 mg    warfarin (COUMADIN) 5 mg tablet    acetaminophen (TYLENOL) 325 mg tablet    potassium chloride (K-DUR,KLOR-CON) 20 mEq tablet    torsemide (DEMADEX) 20 mg tablet     No Known Allergies    Objective   Vitals: Blood pressure 153/77, pulse 69, temperature (!) 97 2 °F (36 2 °C), temperature source Oral, resp  rate 16, height 5' 7" (1 702 m), weight 83 9 kg (184 lb 15 5 oz), SpO2 98 %    Orthostatic Blood Pressures      Most Recent Value   Blood Pressure  153/77 filed at 09/25/2021 0401   Patient Position - Orthostatic VS  Lying filed at 09/25/2021 0756            Intake/Output Summary (Last 24 hours) at 9/25/2021 0859  Last data filed at 9/25/2021 0405  Gross per 24 hour   Intake --   Output 500 ml   Net -500 ml       Invasive Devices     Peripheral Intravenous Line            Peripheral IV 09/25/21 Left Antecubital <1 day          Drain            Urethral Catheter Latex 16 Fr  6 days                Physical Exam: /77   Pulse 69   Temp (!) 97 2 °F (36 2 °C) (Oral)   Resp 16   Ht 5' 7" (1 702 m)   Wt 83 9 kg (184 lb 15 5 oz)   SpO2 98%   BMI 28 97 kg/m²   General Appearance:    Alert, cooperative, no distress, appears stated age   Head:    Normocephalic, no scleral icterus   Eyes:    PERRL   Nose:   Nares normal, septum midline, mucosa normal, no drainage    Throat:   Lips, mucosa, and tongue normal   Neck:   Supple, symmetrical, trachea midline            Lungs:     Clear to auscultation bilaterally, respirations unlabored        Heart:    Regular rate and rhythm, S1 and S2 normal, no murmur, rub   or gallop   Abdomen:     Soft, non-tender, bowel sounds active all four quadrants,     no masses, no organomegaly   Extremities:   Extremities normal, atraumatic, no cyanosis or edema       Skin:   Skin color, texture, turgor normal, no rashes or lesions   Neurologic:   Alert and oriented to person place and time   No focal deficits       Lab Results:   Recent Results (from the past 72 hour(s))   POCT Measure PVR    Collection Time: 09/24/21  2:16 PM   Result Value Ref Range    POST-VOID RESIDUAL VOLUME, ML  mL   Protime-INR    Collection Time: 09/25/21  2:32 AM   Result Value Ref Range    Protime 36 2 (H) 11 6 - 14 5 seconds    INR 3 75 (H) 0 84 - 1 19   POCT Blood Gas (CG8+)    Collection Time: 09/25/21  2:34 AM   Result Value Ref Range    ph, Luis ISTAT 7 499 (HH) 7 300 - 7 400    pCO2, Luis i-STAT 42 0 42 0 - 50 0 mm HG    pO2, Luis i-STAT 34 0 (L) 35 0 - 45 0 mm HG    BE, i-STAT 8 (H) -2 - 3 mmol/L    HCO3, Luis i-STAT 32 7 (H) 24 0 - 30 0 mmol/L    CO2, i-STAT 34 (H) 21 - 32 mmol/L    O2 Sat, i-STAT 70 60 - 85 %    SODIUM, I-STAT 135 (L) 136 - 145 mmol/l    Potassium, i-STAT 3 2 (L) 3 5 - 5 3 mmol/L    Hct, i-STAT 44 36 5 - 49 3 %    Hgb, i-STAT 15 0 12 0 - 17 0 g/dl    Glucose, i-STAT 131 65 - 140 mg/dl    Specimen Type VENOUS    CBC and differential    Collection Time: 09/25/21  3:13 AM   Result Value Ref Range    WBC 16 84 (H) 4 31 - 10 16 Thousand/uL    RBC 4 76 3 88 - 5 62 Million/uL    Hemoglobin 14 7 12 0 - 17 0 g/dL    Hematocrit 42 6 36 5 - 49 3 %    MCV 90 82 - 98 fL    MCH 30 9 26 8 - 34 3 pg    MCHC 34 5 31 4 - 37 4 g/dL    RDW 12 5 11 6 - 15 1 %    MPV 9 0 8 9 - 12 7 fL    Platelets 548 337 - 129 Thousands/uL    nRBC 0 /100 WBCs    Neutrophils Relative 81 (H) 43 - 75 %    Immat GRANS % 1 0 - 2 %    Lymphocytes Relative 9 (L) 14 - 44 %    Monocytes Relative 8 4 - 12 %    Eosinophils Relative 1 0 - 6 %    Basophils Relative 0 0 - 1 %    Neutrophils Absolute 13 68 (H) 1 85 - 7 62 Thousands/µL    Immature Grans Absolute 0 14 0 00 - 0 20 Thousand/uL    Lymphocytes Absolute 1 50 0 60 - 4 47 Thousands/µL    Monocytes Absolute 1 34 (H) 0 17 - 1 22 Thousand/µL    Eosinophils Absolute 0 12 0 00 - 0 61 Thousand/µL    Basophils Absolute 0 06 0 00 - 0 10 Thousands/µL   Basic metabolic panel    Collection Time: 09/25/21  3:13 AM   Result Value Ref Range    Sodium 138 136 - 145 mmol/L    Potassium 3 4 (L) 3 5 - 5 3 mmol/L    Chloride 97 (L) 100 - 108 mmol/L    CO2 28 21 - 32 mmol/L    ANION GAP 13 4 - 13 mmol/L    BUN 42 (H) 5 - 25 mg/dL    Creatinine 1 76 (H) 0 60 - 1 30 mg/dL    Glucose 120 65 - 140 mg/dL    Calcium 9 0 8 3 - 10 1 mg/dL    eGFR 38 ml/min/1 73sq m   Magnesium    Collection Time: 09/25/21  3:13 AM   Result Value Ref Range    Magnesium 2 1 1 6 - 2 6 mg/dL   Phosphorus    Collection Time: 09/25/21  3:13 AM   Result Value Ref Range    Phosphorus 3 2 2 3 - 4 1 mg/dL   Troponin I    Collection Time: 09/25/21  3:18 AM   Result Value Ref Range    Troponin I 0 06 (H) <=0 04 ng/mL   UA w Reflex to Microscopic w Reflex to Culture    Collection Time: 09/25/21  4:04 AM    Specimen: Urine, Straight Cath   Result Value Ref Range    Color, UA Yellow     Clarity, UA Clear     Specific Gravity, UA <=1 005 1 003 - 1 030    pH, UA 6 5 4 5, 5 0, 5 5, 6 0, 6 5, 7 0, 7 5, 8 0    Leukocytes, UA Negative Negative    Nitrite, UA Negative Negative    Protein, UA Negative Negative mg/dl    Glucose, UA Negative Negative mg/dl    Ketones, UA Negative Negative mg/dl    Urobilinogen, UA 0 2 0 2, 1 0 E U /dl E U /dl    Bilirubin, UA Negative Negative    Blood, UA Moderate (A) Negative   Urine Microscopic    Collection Time: 09/25/21  4:04 AM   Result Value Ref Range    RBC, UA 4-10 (A) None Seen, 0-1, 1-2, 2-4, 0-5 /hpf    WBC, UA 0-1 None Seen, 0-1, 1-2, 0-5, 2-4 /hpf    Epithelial Cells None Seen None Seen, Occasional /hpf    Bacteria, UA None Seen None Seen, Occasional /hpf   Comprehensive metabolic panel    Collection Time: 09/25/21  6:19 AM   Result Value Ref Range    Sodium 138 136 - 145 mmol/L    Potassium 3 2 (L) 3 5 - 5 3 mmol/L    Chloride 99 (L) 100 - 108 mmol/L    CO2 32 21 - 32 mmol/L    ANION GAP 7 4 - 13 mmol/L    BUN 38 (H) 5 - 25 mg/dL    Creatinine 1 64 (H) 0 60 - 1 30 mg/dL    Glucose 111 65 - 140 mg/dL    Calcium 8 5 8 3 - 10 1 mg/dL    Corrected Calcium 9 1 8 3 - 10 1 mg/dL    AST 32 5 - 45 U/L    ALT 52 12 - 78 U/L    Alkaline Phosphatase 163 (H) 46 - 116 U/L    Total Protein 6 5 6 4 - 8 2 g/dL    Albumin 3 2 (L) 3 5 - 5 0 g/dL    Total Bilirubin 0 78 0 20 - 1 00 mg/dL    eGFR 42 ml/min/1 73sq m   APTT six (6) hours after Heparin bolus/drip initiation or dosing change    Collection Time: 21  6:19 AM   Result Value Ref Range    PTT 37 23 - 37 seconds   CBC    Collection Time: 21  6:19 AM   Result Value Ref Range    WBC 16 26 (H) 4 31 - 10 16 Thousand/uL    RBC 4 64 3 88 - 5 62 Million/uL    Hemoglobin 14 5 12 0 - 17 0 g/dL    Hematocrit 41 8 36 5 - 49 3 %    MCV 90 82 - 98 fL    MCH 31 3 26 8 - 34 3 pg    MCHC 34 7 31 4 - 37 4 g/dL    RDW 12 6 11 6 - 15 1 %    Platelets 278 986 - 673 Thousands/uL    MPV 9 1 8 9 - 12 7 fL   Protime-INR    Collection Time: 21  6:19 AM   Result Value Ref Range    Protime 35 1 (H) 11 6 - 14 5 seconds    INR 3 60 (H) 0 84 - 1 19       [address]  [phone]     Transthoracic Echocardiogram  2D, M-mode, Doppler, and Color Doppler     Study date:  16-Sep-2021     Patient: Cristiano Crenshaw  MR number: [id number]  Account number: [de-identified]  : 1950  Age: 80 years  Gender: Male  Status: Inpatient  Location: Bedside  Height: 68 in  Weight: 187 lb  BP: 112/ 63 mmHg     Indications: S/P TAVR 29MM Dukes Lizbeth S3     Diagnoses: Z95 2 - Presence of prosthetic heart valve     Sonographer:  ALICJA Craven  Primary Physician:  Chetan Ball MD  Referring Physician:  FILIPPO Hernández  Group:  [facility]  Interpreting Physician:  Ghada Kirkland MD     SUMMARY     LEFT VENTRICLE:  Systolic function was normal  Ejection fraction was estimated to be 58 %    There were no regional wall motion abnormalities  Wall thickness was mildly increased  There was mild concentric hypertrophy  Doppler parameters were consistent with abnormal left ventricular relaxation (grade 1 diastolic dysfunction)      LEFT ATRIUM:  The atrium was mildly dilated      MITRAL VALVE:  There was mild annular calcification      AORTIC VALVE:  A bioprosthesis (#29 mm Dukes Lizbeth 3 TAVR) was present  It exhibited normal function  Valve mean gradient was 6 mmHg      TRICUSPID VALVE:  There was trace regurgitation      HISTORY: PRIOR HISTORY: PAD,CKD Stage 3,AAA,LBBB     PROCEDURE: The procedure was performed at the bedside  This was a routine study  The transthoracic approach was used  The study included complete 2D imaging, M-mode, complete spectral Doppler, and color Doppler  The heart rate was 54 bpm,  at the start of the study  Images were obtained from the parasternal, apical, subcostal, and suprasternal notch acoustic windows  Echocardiographic views were limited due to restricted patient mobility, poor acoustic window availability,  and decreased penetration  This was a technically difficult study      LEFT VENTRICLE: Size was normal  Systolic function was normal  Ejection fraction was estimated to be 58 %  There were no regional wall motion abnormalities  Wall thickness was mildly increased  There was mild concentric hypertrophy  DOPPLER: Doppler parameters were consistent with abnormal left ventricular relaxation (grade 1 diastolic dysfunction)      RIGHT VENTRICLE: The size was normal  Systolic function was normal  Wall thickness was normal      LEFT ATRIUM: The atrium was mildly dilated      RIGHT ATRIUM: Size was normal      MITRAL VALVE: There was mild annular calcification  Valve structure was normal  There was normal leaflet separation  DOPPLER: The transmitral velocity was within the normal range  There was no evidence for stenosis   There was no  regurgitation      AORTIC VALVE: A bioprosthesis (#29 mm Dukes Lizbeth 3 TAVR) was present  It exhibited normal function  DOPPLER: There was no evidence for stenosis  There was no regurgitation      TRICUSPID VALVE: The valve structure was normal  There was normal leaflet separation  DOPPLER: The transtricuspid velocity was within the normal range  There was no evidence for stenosis  There was trace regurgitation      PULMONIC VALVE: Leaflets exhibited normal thickness, no calcification, and normal cuspal separation  DOPPLER: The transpulmonic velocity was within the normal range  There was no regurgitation      PERICARDIUM: There was no pericardial effusion  The pericardium was normal in appearance      AORTA: The root exhibited normal size      MEASUREMENT TABLES     DOPPLER MEASUREMENTS  Aortic valve   (Reference normals)  Peak gradient   17 mmHg   (--)  Mean gradient   6 mmHg   (--)  Valve area, cont   2 1 cmï¾²   (--)     SYSTEM MEASUREMENT TABLES     2D  %FS: 32 23 %  Ao Diam: 2 57 cm  EDV(Teich): 129 65 ml  EF(Teich): 60 06 %  ESV(Teich): 51 78 ml  IVSd: 1 01 cm  LA Area: 22 21 cm2  LA Diam: 4 3 cm  LVEDV MOD A4C: 90 44 ml  LVEF MOD A4C: 57 7 %  LVESV MOD A4C: 38 26 ml  LVIDd: 5 2 cm  LVIDs: 3 53 cm  LVLd A4C: 8 38 cm  LVLs A4C: 7 18 cm  LVOT Diam: 2 17 cm  LVPWd: 0 97 cm  RA Area: 14 17 cm2  RVIDd: 3 41 cm  SV MOD A4C: 52 18 ml  SV(Teich): 77 87 ml     CW  AV Env  Ti: 287 35 ms  AV VTI: 32 64 cm  AV Vmax: 1 98 m/s  AV Vmean: 1 14 m/s  AV maxPG: 15 77 mmHg  AV meanP 23 mmHg     MM  TAPSE: 2 02 cm     PW  TACHO (VTI): 2 37 cm2  TACHO Vmax: 1 96 cm2  E' Sept: 0 05 m/s  E/E' Sept: 15 5  LVOT Env  Ti: 300 67 ms  LVOT VTI: 20 9 cm  LVOT Vmax: 1 05 m/s  LVOT Vmean: 0 69 m/s  LVOT maxP 42 mmHg  LVOT meanP 24 mmHg  LVSV Dopp: 77 45 ml  MV A Nicolas: 1 15 m/s  MV Dec Montgomery: 2 93 m/s2  MV DecT: 249 04 ms  MV E Nicolas: 0 73 m/s  MV E/A Ratio: 0 64  MV PHT: 72 22 ms  MVA By PHT: 3 05 cm2     [employer] Accredited Echocardiography Laboratory     Prepared and electronically signed by  Joaquin Parham MD  Signed 16-Sep-2021 15:50:30       Patient: Klaudia Khan  MR number: [id number]  Account number: [de-identified]  Study date: 2021  Gender: Male  : 1950  Height: 68 1 in  Weight: 192 3 lb  BSA: 2 01 mï¾²     Allergies: NO KNOWN ALLERGIES     Diagnostic Cardiologist:  Pavan Randolph DO  Primary Physician:  Darrin John     SUMMARY     CORONARY CIRCULATION:  Mid LAD: There was a 50 % stenosis  1st obtuse marginal: There was a 30 % stenosis  Proximal RCA: There was a diffuse 20 % stenosis        Imaging: I have personally reviewed pertinent reports  EKG: SVT rate 146  VTE Prophylaxis: Warfarin (Coumadin)    Code Status: Level 1 - Full Code  Advance Directive and Living Will:      Power of :    POLST:      Counseling / Coordination of Care  Total floor / unit time spent today 60 minutes  Greater than 50% of total time was spent with the patient and / or family counseling and / or coordination of care

## 2021-09-25 NOTE — ASSESSMENT & PLAN NOTE
· Urinary tension postop  · Discharged with Molina catheter  · Molina catheter was removed today by Urology  · Was able to complete a voiding trial with Urology today but symptoms returned this evening  · Bladder scan showed 500-trace CT  · Urinary retention protocol  · Consider restarting Molina catheter  · Urology consult  · Continue Flomax  · UA unremarkable

## 2021-09-25 NOTE — PROCEDURES
POC FAST US    Date/Time: 9/25/2021 3:01 AM  Performed by: Guzman Hernandez PA-C  Authorized by: Guzman Hernandez PA-C     Patient location:  Trauma  Procedure details:     Exam Type:  Diagnostic    Indications: blunt abdominal trauma      Assess for:  Intra-abdominal fluid and pericardial effusion    Technique: FAST      Views obtained:  Heart - Pericardial sac, Suprapubic - Pouch of Eliud, RUQ - Tolbert's Pouch and LUQ - Splenorenal space    Image quality: diagnostic      Image availability:  Images available in PACS  FAST Findings:     RUQ (Hepatorenal) free fluid: absent      LUQ (Splenorenal) free fluid: absent      Suprapubic free fluid: absent      Cardiac wall motion: identified      Pericardial effusion: absent    Interpretation:     Impressions: negative

## 2021-09-25 NOTE — ASSESSMENT & PLAN NOTE
· Outpatient blood pressure medications include amlodipine 5 mg and hydrochlorothiazide 25 mg which he only takes p r n   When blood pressure is elevated <140/90  · Follow with Dr Amber Madison

## 2021-09-25 NOTE — H&P
[facility]  H&P- Nino Crews 1950, 80 y o  male MRN: [id number]  Unit/Bed#: ED 11 Encounter: [id number]  Primary Care Provider: Светлана Bee MD   Date and time admitted to hospital: 9/25/2021  2:03 AM    * Urinary retention  Assessment & Plan  · Urinary tension postop  · Discharged with Molina catheter  · Molina catheter was removed today by Urology  · Was able to complete a voiding trial with Urology today but symptoms returned this evening  · Bladder scan showed 500-trace CT  · Urinary retention protocol  · Consider restarting Molina catheter  · Urology consult  · Continue Flomax  · UA unremarkable    SVT (supraventricular tachycardia) ([facility] [address] )  Assessment & Plan  · Heart rate  on admission  · Patient is asymptomatic  · Initial troponin 0 06 will trend  · Telemetry   · EKG changes appreciated from prior EKG on 09/17  · I appreciated ST depression in lead II, V1-V4  · ST-elevation AVR  · INR currently 3 75 holding Coumadin    Will hold off on heparin drip in setting of supratherapeutic INR  · Consulted cardiology Dr Francois Doll by tiger text  · Reviewed EKG-SVT  · Recommending Amiodarone   · ST changes should improve with heart rate control    S/P TAVR (transcatheter aortic valve replacement)  Assessment & Plan  · Status post TAVR on 09/16  · Surgery performed at [facility] by Dr Carmencita Ewing   · Patient did develop left bundle-branch block postop-reverted back to narrow complex  · One brief run of nonsustained VT which was monomorphic per Cardiology note  · Cardiology/electrophysiology did not recommend pacemaker for patient      FAMILIA (acute kidney injury) ([facility]  Assessment & Plan  · Secondary to urinary retention  · Baseline creatinine 1 2-1 4  · Monitor creatinine  · Urinary retention protocol  · Encourage good oral intake  · Replete potassium    Leukocytosis  Assessment & Plan  · Wbc's 16 K on admission--last 3 CBC showed elevated WBC  · Absolute neutrophil count 13 68  · Trend    AAA (abdominal aortic aneurysm) without rupture (HCC)  Assessment & Plan  · Follow vascular surgery for AAA  · Ultrasound in 6 months  · Continue statin    Protein C deficiency ([address] )  Assessment & Plan  · History of protein C deficiency  · Currently on warfarin 5 mg daily and Plavix  · INR 3 75  · Holding warfarin in setting of supratherapeutic INR    Embolism and thrombosis of arteries of the lower extremities ([address] )  Assessment & Plan  · Follows with Vascular Dr Lianet Cade   · Per vascular note   · CT angiogram shows minimal posterior plaque in the common femoral artery an adequate ileofemoral and iliac systems     Essential hypertension  Assessment & Plan  · Outpatient blood pressure medications include amlodipine 5 mg and hydrochlorothiazide 25 mg which he only takes p r n  When blood pressure is elevated <140/90  · Follow with Dr Josue Morrison     VTE Pharmacologic Prophylaxis:   High Risk (Score >/= 5) - Pharmacological DVT Prophylaxis Ordered: Warfarin held  Supratherapeutic INR  Sequential Compression Devices Ordered  Code Status: Level 1 - Full Code   Discussion with family: Updated  (wife) at bedside  Anticipated Length of Stay: Patient will be admitted on an inpatient basis with an anticipated length of stay of greater than 2 midnights secondary to Urinary Retention   Chief Complaint: Difficulty Urinating     History of Present Illness:  Graciela Chery is a 80 y o  male with a PMH of vascular disease, TAVR on 09/16, hypertension, hyperlipidemia, protein C deficiency and AAA who presents with difficulty urinating  Patient had Molina catheter removed today by Urology was able complete voiding trial   This evening patient began having difficulty voiding again and came to the emergency department  Patient was bladder scan showing 500 and was subsequently straight cath  Patient was initially worked up by Trauma after reporting a fall this past Tuesday  Trauma workup was unremarkable    EKG however did show significant changes from prior on 09/17  From a cardiac standpoint patient only complaint is occasional palpitations which he has felt since Tuesday  Patient denies any chest pain at this time initial troponin is 0 06  INR 3 75  Coumadin has been held  Patient placed on telemetry  Reached out to Cardiology due to patient's complicated cardiac history of TAVR on 09/17 presenting with SVT and EKG changes  Dr Francis Allen recommends starting amiodarone for SVT  He feels ST changes will correct with improvement of heart rate  Cardiology will see today  Review of Systems:  Review of Systems   Constitutional: Negative for fatigue and fever  HENT: Negative for sore throat  Eyes: Negative for visual disturbance  Respiratory: Negative for cough, chest tightness and shortness of breath  Cardiovascular: Positive for palpitations (Occasional)  Negative for chest pain and leg swelling  Gastrointestinal: Negative for abdominal pain, constipation, diarrhea and nausea  Endocrine: Negative for cold intolerance and heat intolerance  Genitourinary: Positive for difficulty urinating  Negative for dysuria and flank pain  Musculoskeletal: Negative for back pain and neck pain  Skin: Negative for rash  Neurological: Negative for headaches  Psychiatric/Behavioral: Negative for behavioral problems and confusion  Past Medical and Surgical History:   Past Medical History:   Diagnosis Date    DVT (deep venous thrombosis) [facility] )     Protein C deficiency ([facility] [address] )     Pulmonary embolus (HCC)        Past Surgical History:   Procedure Laterality Date    BACK SURGERY      BYPASS FEMORAL-TIBIAL Right 2011    NM ECHO TRANSESOPHAG R-T 2D W/PRB IMG ACQUISJ I&R N/A 9/16/2021    Procedure: TRANSESOPHAGEAL ECHOCARDIOGRAM (RADHA);   Surgeon: Siddhartha Yoder DO;  Location: [facility];  Service: Cardiac Surgery    NM REPLACE AORTIC VALVE OPENFEMORAL ARTERY APPROACH N/A 9/16/2021    Procedure: REPLACEMENT AORTIC VALVE TRANSCATHETER (TAVR) TRANSFEMORAL W/ 29 MM COLÓN BREEZY S3 ULTRA VALVE (ACCESS ON LEFT); Surgeon: Elo Maza DO;  Location: [facility];  Service: Cardiac Surgery    VEIN LIGATION         Meds/Allergies:  Prior to Admission medications    Medication Sig Start Date End Date Taking? Authorizing Provider   acetaminophen (TYLENOL) 325 mg tablet Take 1-2 tablets Q4-6 hours PRN pain  Do not take more than 4 grams in 24 hours  9/18/21   Roxanne Medina PA-C   amLODIPine (NORVASC) 5 mg tablet Take 1 tablet (5 mg total) by mouth as needed (only takes when B/P elevated) 9/20/21   Danna Pickard MD   atorvastatin (LIPITOR) 80 mg tablet Take 1 tablet (80 mg total) by mouth daily 8/2/21   Danna Pickard MD   clopidogrel (PLAVIX) 75 mg tablet Take 1 tablet (75 mg total) by mouth daily 7/6/21   Muriel Dupont PA-C   docusate sodium (COLACE) 100 mg capsule Take 1 capsule (100 mg total) by mouth 2 (two) times a day as needed for constipation Hold for soft stools  9/18/21 10/18/21  Roxanne Medina PA-C   hydrochlorothiazide (HYDRODIURIL) 25 mg tablet Take 1 tablet (25 mg total) by mouth as needed (takes with norvasc when b?p elevated) 9/20/21   Danna Pickard MD   pantoprazole (PROTONIX) 40 mg tablet Take 1 tablet (40 mg total) by mouth daily in the early morning 9/19/21 10/19/21  Roxanne Medina PA-C   polyethylene glycol (MIRALAX) 17 g packet Take 17 g by mouth daily as needed (constipation) Hold for soft stools  9/18/21 10/18/21  Roxanne Medina PA-C   potassium chloride (K-DUR,KLOR-CON) 20 mEq tablet Take 1 tablet (20 mEq total) by mouth daily for 5 days Unless otherwise directed  9/19/21 9/24/21  Roxanne Medina PA-C   tamsulosin (FLOMAX) 0 4 mg Take 1 capsule (0 4 mg total) by mouth daily with dinner 9/19/21   Roxanne Medina PA-C   torsemide (DEMADEX) 20 mg tablet Take 1 tablet (20 mg total) by mouth daily for 5 days Unless otherwise directed   9/18/21 9/24/21  Roxanne Medina PA-C   warfarin (COUMADIN) 5 mg tablet Take 1 tablet (5 mg total) by mouth daily 3/23/21   Giovana Charlton MD     I have reviewed home medications with patient personally  Allergies: No Known Allergies    Social History:  Marital Status: /Civil Union   Occupation:  Unknown  Patient Pre-hospital Living Situation: Home  Patient Pre-hospital Level of Mobility: walks  Patient Pre-hospital Diet Restrictions:  None  Substance Use History:   Social History     Substance and Sexual Activity   Alcohol Use No     Social History     Tobacco Use   Smoking Status Former Smoker    Types: Cigarettes    Quit date: 2018    Years since quittin 9   Smokeless Tobacco Never Used   Tobacco Comment    1 pk every 2days , currently on patches to quit  Social History     Substance and Sexual Activity   Drug Use No       Family History:  Family History   Problem Relation Age of Onset    Cancer Mother 50    Heart attack Father 46    Hypertension Father     Prostate cancer Brother     Arrhythmia Neg Hx     Clotting disorder Neg Hx     Fainting Neg Hx     Heart disease Neg Hx     Anuerysm Neg Hx     Stroke Neg Hx        Physical Exam:     Vitals:   Blood Pressure: 161/72 (21 0400)  Pulse: 94 (21 0400)  Temperature: (!) 97 2 °F (36 2 °C) (21)  Temp Source: Oral (21)  Respirations: 18 (21 0400)  Weight - Scale: 83 9 kg (184 lb 15 5 oz) (21)  SpO2: 97 % (21 0400)    Physical Exam  Vitals and nursing note reviewed  Constitutional:       Appearance: Normal appearance  He is well-developed  HENT:      Head: Normocephalic and atraumatic  Eyes:      Conjunctiva/sclera: Conjunctivae normal       Pupils: Pupils are equal, round, and reactive to light  Cardiovascular:      Rate and Rhythm: Tachycardia present  Rhythm irregular  Pulses: Normal pulses  Pulmonary:      Effort: Pulmonary effort is normal       Breath sounds: Normal breath sounds  No wheezing     Abdominal: General: Bowel sounds are normal  There is no distension  Palpations: Abdomen is soft  Tenderness: There is no abdominal tenderness  Musculoskeletal:         General: Normal range of motion  Cervical back: Normal range of motion  Skin:     General: Skin is dry  Comments: Cool     Neurological:      Mental Status: He is alert and oriented to person, place, and time  Psychiatric:         Mood and Affect: Mood normal          Speech: Speech normal          Behavior: Behavior normal           Additional Data:     Lab Results:  Results from last 7 days   Lab Units 09/25/21  0313   WBC Thousand/uL 16 84*   HEMOGLOBIN g/dL 14 7   HEMATOCRIT % 42 6   PLATELETS Thousands/uL 258   NEUTROS PCT % 81*   LYMPHS PCT % 9*   MONOS PCT % 8   EOS PCT % 1     Results from last 7 days   Lab Units 09/25/21  0313   SODIUM mmol/L 138   POTASSIUM mmol/L 3 4*   CHLORIDE mmol/L 97*   CO2 mmol/L 28   BUN mg/dL 42*   CREATININE mg/dL 1 76*   ANION GAP mmol/L 13   CALCIUM mg/dL 9 0   GLUCOSE RANDOM mg/dL 120     Results from last 7 days   Lab Units 09/25/21  0232   INR  3 75*     Results from last 7 days   Lab Units 09/18/21  1100 09/18/21  0544   POC GLUCOSE mg/dl 110 102               Imaging: Reviewed radiology reports from this admission including: CT head and CT spine and chest x-ray  TRAUMA - CT head wo contrast   Final Result by Rik Chaudhari MD (09/25 3586)      No acute intracranial abnormality  I personally discussed this study with Dr Juan Crocker on 9/25/2021 at 3:11 AM                Workstation performed: [id number]         TRAUMA - CT spine cervical wo contrast   Final Result by Rik Chaudhari MD (09/25 1988)      No cervical spine fracture or traumatic malalignment  Workstation performed: [id number]         XR trauma multiple    (Results Pending)   XR chest 1 view    (Results Pending)       EKG and Other Studies Reviewed on Admission:   EKG: Left axis deviation  ST depression in lead 2, V1-V4  ST elevation AVR       ** Please Note: This note has been constructed using a voice recognition system   **

## 2021-09-25 NOTE — ASSESSMENT & PLAN NOTE
· Heart rate  on admission  · Patient is asymptomatic  · Initial troponin 0 06 will trend  · Telemetry   · EKG changes appreciated from prior EKG on 09/17  · I appreciated ST depression in lead II, V1-V4  · ST-elevation AVR  · INR currently 3 75 holding Coumadin    Will hold off on heparin drip in setting of supratherapeutic INR  · Consulted cardiology Dr Brock Seymour by tiger text  · Reviewed EKG-SVT  · Recommending Amiodarone   · ST changes should improve with heart rate control

## 2021-09-25 NOTE — ASSESSMENT & PLAN NOTE
· Wbc's 16 K on admission--last 3 CBC showed elevated WBC  · Absolute neutrophil count 13 68  · Trend

## 2021-09-25 NOTE — PROGRESS NOTES
Progress Note - Tertiary Trauma [facility]   Pradeep Cruz 80 y o  male MRN: [id number]  Unit/Bed#: S - Encounter: [id number]    Summary of Diagnosed Injuries:   1  Fall on Coumadin  2  Urinary retention  3  Arrhythmia  4  Leukocytosis    Clinical Plan:   - no workup at this time per trauma  - scans reviewed are negative  - patient offering no new complaints  - trauma will sign off    Disposition:  Call with questions or concerns trauma will sign off at this time  Code status:  Level 1 - Full Code    Consultants:  Trauma is now consulted    Is the patient 72 years or older?: YES:    1  Before the illness or injury that brought you to the Emergency, did you need someone to help you on a regular basis? 0=No   2  Since the illness or injury that brought you to the Emergency, have you needed more help than usual to take care of yourself? 1=Yes   3  Have you been hospitalized for one or more nights during the past 6 months (excluding a stay in the Emergency Department)? 1=Yes   4  In general, do you see well? 0=Yes   5  In general, do you have serious problems with your memory? 0=No   6  Do you take more than three different medications everyday? 1=Yes   TOTAL   3     Did you order a geriatric consult if the score was 2 or greater?:  At the discretion of the primary team        SUBJECTIVE:     Transfer from:  Not a transfer  Outside Films Received: not applicable  Tertiary Exam Due on:  2021    Mechanism of Injury:Fall    Chief Complaint:  No new complaints    HPI/Last 24 hour events:  Patient resting in bed this afternoon offering no new complaints  Reports that he is tired of the hospital but understands why he is receiving workup  Reports that his pain is well controlled  Denies any new pain anywhere else      Active medications:           Current Facility-Administered Medications:     acetaminophen (TYLENOL) tablet 650 mg, 650 mg, Oral, Q6H PRN    [] amiodarone (CORDARONE) 900 mg in dextrose 5 % 500 mL infusion, 1 mg/min, Intravenous, Continuous, Held at 09/25/21 0619 **FOLLOWED BY** amiodarone (CORDARONE) 900 mg in dextrose 5 % 500 mL infusion, 0 5 mg/min, Intravenous, Continuous    amiodarone 150 mg in dextrose 5 % 100 mL IV bolus, 150 mg, Intravenous, Once, Held at 09/25/21 0619    amLODIPine (NORVASC) tablet 5 mg, 5 mg, Oral, Daily, 5 mg at 09/25/21 0854    atorvastatin (LIPITOR) tablet 80 mg, 80 mg, Oral, Daily, 80 mg at 09/25/21 0854    clopidogrel (PLAVIX) tablet 75 mg, 75 mg, Oral, Daily, 75 mg at 09/25/21 0854    diltiazem (CARDIZEM) tablet 30 mg, 30 mg, Oral, Q6H ABDULKADIR, 30 mg at 09/25/21 1726    docusate sodium (COLACE) capsule 100 mg, 100 mg, Oral, BID PRN    pantoprazole (PROTONIX) EC tablet 40 mg, 40 mg, Oral, Early Morning, 40 mg at 09/25/21 7312    polyethylene glycol (MIRALAX) packet 17 g, 17 g, Oral, Daily PRN    tamsulosin (FLOMAX) capsule 0 4 mg, 0 4 mg, Oral, Daily With Dinner, 0 4 mg at 09/25/21 1726      OBJECTIVE:     Vitals:   Vitals:    09/25/21 1700   BP:    Pulse: 60   Resp:    Temp:    SpO2:        Physical Exam:   GENERAL APPEARANCE:  No acute distress  NEURO:  GCS 15  HEENT:  Normocephalic  CV:  Regular rate and rhythm  LUNGS:  CTA bilaterally  GI:  Nontender, nondistended  :  Deferred  MSK:  Moving all extremities  SKIN:  Warm, dry, intact      I/O:   I/O       09/23 0701 - 09/24 0700 09/24 0701 - 09/25 0700 09/25 0701 - 09/26 0700    Urine (mL/kg/hr)  500 1000 (1)    Total Output  500 1000    Net  -500 -1000                 Invasive Devices: Invasive Devices     Peripheral Intravenous Line            Peripheral IV 09/25/21 Left Antecubital <1 day          Drain            Urethral Catheter Coude 18 Fr  <1 day                  Imaging:   TRAUMA - CT head wo contrast    Result Date: 9/25/2021  Impression: No acute intracranial abnormality    I personally discussed this study with Dr Kori Celaya on 9/25/2021 at 3:11 AM  Workstation performed: [id number] TRAUMA - CT spine cervical wo contrast    Result Date: 9/25/2021  Impression: No cervical spine fracture or traumatic malalignment  Workstation performed: [id number]     XR trauma multiple    Result Date: 9/25/2021  Impression: No acute cardiopulmonary disease within limitations of supine imaging   Workstation performed: [id number]       Labs:   CBC:   Lab Results   Component Value Date    WBC 16 26 (H) 09/25/2021    HGB 14 5 09/25/2021    HCT 41 8 09/25/2021    MCV 90 09/25/2021     09/25/2021    MCH 31 3 09/25/2021    MCHC 34 7 09/25/2021    RDW 12 6 09/25/2021    MPV 9 1 09/25/2021    NRBC 0 09/25/2021     CMP:   Lab Results   Component Value Date    CL 99 (L) 09/25/2021    CO2 32 09/25/2021    CO2 34 (H) 09/25/2021    BUN 38 (H) 09/25/2021    CREATININE 1 64 (H) 09/25/2021    GLUCOSE 131 09/25/2021    CALCIUM 8 5 09/25/2021    AST 32 09/25/2021    ALT 52 09/25/2021    ALKPHOS 163 (H) 09/25/2021    EGFR 42 09/25/2021

## 2021-09-25 NOTE — H&P
H&P Exam - Trauma   Nicole Mitchell 80 y o  male MRN: [id number]  Unit/Bed#: ED 11 Encounter: [id number]    Assessment/Plan   Trauma Alert: Level B  Model of Arrival: Self  Trauma Team: Attending Carolina Voss and REJI Solano  Consultants: None    Trauma Active Problems:   Fall on coumadin  Urinary retention  Tachy arrhythmia with hemodynamic stability 1 week post TAVR  Coagulopathy secondary to coumadin with INR 3 75  Asymptomatic leukocytosis     Trauma Plan:   Admit to medical service with cardiology consult  Retention protocol - straight cath now with bladder scan >500cc  Hold coumadin  Check UA       Chief Complaint: Abdominal pressure    History of Present Illness   HPI:  Nicole Mitchell is a 80 y o  male 1 week post transfemoral TAVR, who was seen in the urology office today for removal of indwelling gifford catheter and void trial  Patient was able to void on his own and had a PVR of 191cc in the office  He reports he has been voiding at home however had increasing abdominal pressure presented to the ER with concerns for urinary retention  While being triaged in the ER the patient admitted to a fall day prior to arrival striking his head and he was unsure if he had loss consciousness  He reports taking Coumadin  He denies headaches, dizziness, nausea, vomiting, chest pain, shortness of breath, abdominal pain  Mechanism:Fall    Review of Systems   Constitutional: Negative for activity change, appetite change, diaphoresis, fatigue and fever  HENT: Negative for congestion, facial swelling, nosebleeds, postnasal drip, rhinorrhea, sinus pressure, sinus pain and sore throat  Eyes: Negative for photophobia and visual disturbance  Respiratory: Negative for cough, shortness of breath and wheezing  Cardiovascular: Positive for palpitations  Negative for chest pain  Gastrointestinal: Negative for abdominal distention, abdominal pain, blood in stool, constipation, diarrhea, nausea and vomiting     Genitourinary: Negative for decreased urine volume, dysuria, flank pain, frequency and hematuria  Musculoskeletal: Negative for arthralgias, back pain and neck stiffness  Skin: Negative for wound  Neurological: Negative for dizziness, seizures, syncope, facial asymmetry, weakness, light-headedness, numbness and headaches  12-point, complete review of systems was reviewed and negative except as stated above  Historical Information     Past Medical History:   Diagnosis Date    DVT (deep venous thrombosis) (HCC)     Protein C deficiency ([address] )     Pulmonary embolus (HCC)      Past Surgical History:   Procedure Laterality Date    BACK SURGERY      BYPASS FEMORAL-TIBIAL Right     ID ECHO TRANSESOPHAG R-T 2D W/PRB IMG ACQUISJ I&R N/A 2021    Procedure: TRANSESOPHAGEAL ECHOCARDIOGRAM (RADHA); Surgeon: Kristopher Marlow DO;  Location: [facility];  Service: Cardiac Surgery    ID REPLACE AORTIC VALVE OPENFEMORAL ARTERY APPROACH N/A 2021    Procedure: REPLACEMENT AORTIC VALVE TRANSCATHETER (TAVR) TRANSFEMORAL W/ 29 MM COLÓN BREEZY S3 ULTRA VALVE (ACCESS ON LEFT); Surgeon: Kristopehr Marlow DO;  Location: [facility];  Service: Cardiac Surgery    VEIN LIGATION       Social History   Social History     Substance and Sexual Activity   Alcohol Use No     Social History     Substance and Sexual Activity   Drug Use No     Social History     Tobacco Use   Smoking Status Former Smoker    Types: Cigarettes    Quit date: 2018    Years since quittin 9   Smokeless Tobacco Never Used   Tobacco Comment    1 pk every 2days , currently on patches to quit        E-Cigarette/Vaping    E-Cigarette Use Never User      E-Cigarette/Vaping Substances    Nicotine No     THC No     CBD No     Flavoring No     Other No     Unknown No      Immunization History   Administered Date(s) Administered    Influenza, high dose seasonal 0 7 mL 2020    Pneumococcal Polysaccharide PPV23 2020    SARS-CoV-2 / COVID-19 mRNA IM (Moderna) 03/17/2021     Last Tetanus:  n/a  Family History: Non-contributory      Meds/Allergies   all current active meds have been reviewed    No Known Allergies      PHYSICAL EXAM    Objective   Vitals:   First set: Temperature: 97 9 °F (36 6 °C) (09/25/21 0208)  Pulse: 85 (09/25/21 0208)  Respirations: 16 (09/25/21 0208)  Blood Pressure: 126/88 (09/25/21 4326)    Primary Survey:   (A) Airway: Intact  (B) Breathing: equal bilterally  (C) Circulation: Pulses:   pedal  2/4, radial  2/4 and femoral  2/4  (D) Disabliity:  GCS Total:  15  (E) Expose:  Completed    Secondary Survey: (Click on Physical Exam tab above)  Physical Exam  Vitals and nursing note reviewed  Constitutional:       General: He is not in acute distress  Appearance: Normal appearance  He is normal weight  He is not ill-appearing or toxic-appearing  HENT:      Head: Normocephalic and atraumatic  Right Ear: Tympanic membrane normal       Left Ear: Tympanic membrane normal       Nose: Nose normal  No congestion or rhinorrhea  Mouth/Throat:      Mouth: Mucous membranes are moist       Pharynx: Oropharynx is clear  No oropharyngeal exudate or posterior oropharyngeal erythema  Eyes:      Extraocular Movements: Extraocular movements intact  Conjunctiva/sclera: Conjunctivae normal       Pupils: Pupils are equal, round, and reactive to light  Cardiovascular:      Rate and Rhythm: Tachycardia present  Rhythm irregular  Heart sounds: No murmur heard  No friction rub  No gallop  Pulmonary:      Effort: Pulmonary effort is normal       Breath sounds: Normal breath sounds  No wheezing or rhonchi  Abdominal:      General: Abdomen is flat  Bowel sounds are normal  There is no distension  Palpations: Abdomen is soft  Tenderness: There is no abdominal tenderness  There is no guarding or rebound     Musculoskeletal:      Right shoulder: Normal       Left shoulder: Normal       Right upper arm: Normal       Left upper arm: Normal       Right elbow: Normal       Left elbow: Normal       Right forearm: Normal       Left forearm: Normal       Right wrist: Normal       Left wrist: Normal       Right hand: Normal       Left hand: Normal       Cervical back: Normal range of motion  No rigidity or tenderness  Thoracic back: No deformity or tenderness  Lumbar back: No deformity or tenderness  Right hip: Normal       Left hip: Normal       Right upper leg: Normal       Left upper leg: Normal       Right knee: Normal       Left knee: Normal       Right lower leg: Normal       Left lower leg: Normal       Right ankle: Normal       Left ankle: Normal       Right foot: Normal       Left foot: Normal    Skin:     Capillary Refill: Capillary refill takes less than 2 seconds  Neurological:      General: No focal deficit present  Mental Status: He is alert and oriented to person, place, and time  Sensory: No sensory deficit  Motor: No weakness           Invasive Devices     Peripheral Intravenous Line            Peripheral IV 09/25/21 Left Antecubital <1 day          Drain            Urethral Catheter Latex 16 Fr  6 days                Lab Results:   BMP/CMP:   Lab Results   Component Value Date    CO2 34 (H) 09/25/2021    GLUCOSE 131 09/25/2021   , CBC:   Lab Results   Component Value Date    WBC 16 84 (H) 09/25/2021    HGB 14 7 09/25/2021    HCT 42 6 09/25/2021    MCV 90 09/25/2021     09/25/2021    MCH 30 9 09/25/2021    MCHC 34 5 09/25/2021    RDW 12 5 09/25/2021    MPV 9 0 09/25/2021    NRBC 0 09/25/2021   , Coagulation:   Lab Results   Component Value Date    INR 3 75 (H) 09/25/2021    and ISTAT: No components found for: VBG  Imaging/EKG Studies: FAST: Neg, Chest X-Ray: No acute traumatic injuries, CT Scan Head: No intracranial hemorrhage or fracture, CT Scan C-Spine: No fracture or traumatic malalignmet  Other Studies: none    Code Status: Prior

## 2021-09-25 NOTE — QUICK NOTE
The patient says he has had better days when asked the question how he is feeling  He still feels that his heart is racing from time to time  Telemetry showed sinus bradicardia with PVCs  No tachyarrhythmias  Cardiology is following with the patient  Cardizem was recommended given bradycardia  Repeat ECG was ordered to recheck ST changes  Urology is following with patient and they recommended use of gifford, and discharge the patient with gifford  The patient received 80 meq potassium altogether for repletion  Patient's spouse was present and updated at the bedside

## 2021-09-26 ENCOUNTER — TELEPHONE (OUTPATIENT)
Dept: OTHER | Facility: OTHER | Age: 71
End: 2021-09-26

## 2021-09-26 PROBLEM — R79.89 ELEVATED TROPONIN: Status: ACTIVE | Noted: 2021-09-26

## 2021-09-26 PROBLEM — R77.8 ELEVATED TROPONIN: Status: ACTIVE | Noted: 2021-09-26

## 2021-09-26 LAB
ANION GAP SERPL CALCULATED.3IONS-SCNC: 7 MMOL/L (ref 4–13)
ATRIAL RATE: 146 BPM
BASOPHILS # BLD AUTO: 0.05 THOUSANDS/ΜL (ref 0–0.1)
BASOPHILS NFR BLD AUTO: 0 % (ref 0–1)
BUN SERPL-MCNC: 34 MG/DL (ref 5–25)
CALCIUM SERPL-MCNC: 8.3 MG/DL (ref 8.3–10.1)
CHLORIDE SERPL-SCNC: 105 MMOL/L (ref 100–108)
CO2 SERPL-SCNC: 29 MMOL/L (ref 21–32)
CREAT SERPL-MCNC: 1.55 MG/DL (ref 0.6–1.3)
EOSINOPHIL # BLD AUTO: 0.3 THOUSAND/ΜL (ref 0–0.61)
EOSINOPHIL NFR BLD AUTO: 2 % (ref 0–6)
ERYTHROCYTE [DISTWIDTH] IN BLOOD BY AUTOMATED COUNT: 12.6 % (ref 11.6–15.1)
GFR SERPL CREATININE-BSD FRML MDRD: 45 ML/MIN/1.73SQ M
GLUCOSE SERPL-MCNC: 95 MG/DL (ref 65–140)
HCT VFR BLD AUTO: 39.8 % (ref 36.5–49.3)
HGB BLD-MCNC: 13.6 G/DL (ref 12–17)
IMM GRANULOCYTES # BLD AUTO: 0.06 THOUSAND/UL (ref 0–0.2)
IMM GRANULOCYTES NFR BLD AUTO: 1 % (ref 0–2)
INR PPP: 2.83 (ref 0.84–1.19)
LYMPHOCYTES # BLD AUTO: 2.56 THOUSANDS/ΜL (ref 0.6–4.47)
LYMPHOCYTES NFR BLD AUTO: 19 % (ref 14–44)
MCH RBC QN AUTO: 31.3 PG (ref 26.8–34.3)
MCHC RBC AUTO-ENTMCNC: 34.2 G/DL (ref 31.4–37.4)
MCV RBC AUTO: 92 FL (ref 82–98)
MONOCYTES # BLD AUTO: 1.28 THOUSAND/ΜL (ref 0.17–1.22)
MONOCYTES NFR BLD AUTO: 10 % (ref 4–12)
NEUTROPHILS # BLD AUTO: 8.94 THOUSANDS/ΜL (ref 1.85–7.62)
NEUTS SEG NFR BLD AUTO: 68 % (ref 43–75)
NRBC BLD AUTO-RTO: 0 /100 WBCS
P AXIS: 246 DEGREES
PLATELET # BLD AUTO: 237 THOUSANDS/UL (ref 149–390)
PMV BLD AUTO: 8.9 FL (ref 8.9–12.7)
POTASSIUM SERPL-SCNC: 3.8 MMOL/L (ref 3.5–5.3)
PR INTERVAL: 296 MS
PROTHROMBIN TIME: 29.2 SECONDS (ref 11.6–14.5)
QRS AXIS: -40 DEGREES
QRSD INTERVAL: 162 MS
QT INTERVAL: 308 MS
QTC INTERVAL: 479 MS
RBC # BLD AUTO: 4.35 MILLION/UL (ref 3.88–5.62)
SODIUM SERPL-SCNC: 141 MMOL/L (ref 136–145)
T WAVE AXIS: 65 DEGREES
VENTRICULAR RATE: 146 BPM
WBC # BLD AUTO: 13.19 THOUSAND/UL (ref 4.31–10.16)

## 2021-09-26 PROCEDURE — 85025 COMPLETE CBC W/AUTO DIFF WBC: CPT

## 2021-09-26 PROCEDURE — 85610 PROTHROMBIN TIME: CPT

## 2021-09-26 PROCEDURE — 99232 SBSQ HOSP IP/OBS MODERATE 35: CPT | Performed by: INTERNAL MEDICINE

## 2021-09-26 PROCEDURE — 99232 SBSQ HOSP IP/OBS MODERATE 35: CPT | Performed by: GENERAL PRACTICE

## 2021-09-26 PROCEDURE — 80048 BASIC METABOLIC PNL TOTAL CA: CPT

## 2021-09-26 PROCEDURE — 93010 ELECTROCARDIOGRAM REPORT: CPT | Performed by: INTERNAL MEDICINE

## 2021-09-26 RX ORDER — POTASSIUM CHLORIDE 20MEQ/15ML
20 LIQUID (ML) ORAL ONCE
Status: COMPLETED | OUTPATIENT
Start: 2021-09-26 | End: 2021-09-26

## 2021-09-26 RX ORDER — DILTIAZEM HYDROCHLORIDE 120 MG/1
120 CAPSULE, COATED, EXTENDED RELEASE ORAL DAILY
Status: DISCONTINUED | OUTPATIENT
Start: 2021-09-26 | End: 2021-09-27 | Stop reason: HOSPADM

## 2021-09-26 RX ADMIN — AMLODIPINE BESYLATE 5 MG: 5 TABLET ORAL at 08:03

## 2021-09-26 RX ADMIN — POTASSIUM CHLORIDE 20 MEQ: 20 SOLUTION ORAL at 12:04

## 2021-09-26 RX ADMIN — PANTOPRAZOLE SODIUM 40 MG: 40 TABLET, DELAYED RELEASE ORAL at 05:32

## 2021-09-26 RX ADMIN — TAMSULOSIN HYDROCHLORIDE 0.4 MG: 0.4 CAPSULE ORAL at 17:16

## 2021-09-26 RX ADMIN — CLOPIDOGREL BISULFATE 75 MG: 75 TABLET ORAL at 08:03

## 2021-09-26 RX ADMIN — DILTIAZEM HYDROCHLORIDE 30 MG: 30 TABLET, FILM COATED ORAL at 12:04

## 2021-09-26 RX ADMIN — ATORVASTATIN CALCIUM 80 MG: 40 TABLET, FILM COATED ORAL at 08:02

## 2021-09-26 RX ADMIN — DILTIAZEM HYDROCHLORIDE 120 MG: 120 CAPSULE, COATED, EXTENDED RELEASE ORAL at 14:57

## 2021-09-26 NOTE — PROGRESS NOTES
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 (HCC)    Protein C deficiency ([facility] )    AAA (abdominal aortic aneurysm) without rupture ([facility])    S/P TAVR (transcatheter aortic valve replacement)    Leukocytosis    SVT (supraventricular tachycardia) ([facility])    FAMILIA (acute kidney injury) ([facility] )        Assessment/Plan     1  SVT-new diagnosis  EKG in ED - SVT with significant ST changes  He had palpitations in the ED associated with SVT  He has been having palpitations at home as well  Unclear if his dizziness/lightheadedness is associated  We have opted for Cardizem 30 mg every 6 hours given less effect on sinus rate (last 2 doses held due to heart rate 50)  Heart rate is not persistently low  Will have them give his next dose now  Telemetry- no significant bradycardia, no further tachyarrhythmia  Appears to have nonsustained slow VT  SC remains elevated 330 ms  ( EKG yesterday 292ms)   Two week event recorder was previously planned by EP- continue with   Follow-up is scheduled- Oct 6th  Should also followup with EP       2  Recent fall with head strike  CT of the head no acute intracranial abnormality  Patient states he lost his balance  He has been is frequently getting lightheaded/dizzy  Check orthostatics  ? Related to dysrhythmia  Today patient reports lightheadedness is completely gone  Has been up out of bed       3  Urinary retention- s/p St cath  Management per urology      4  Severe AS status post TAVR  Postop echo normal LV function, RV function, mild concentric LVH  No AS/AI  On Plavix  Euvolemic      5  Nonocclusive CAD  AP-Plavix, statin     6  Protein C deficiency  On warfarin- held d/t  supratherapeutic INR with hematuria     7   History of PE/DVT     8  AK I-baseline creatinine 1 2-1 4  Present with BUN/creatinine 42/1 76, improving  Secondary to urinary retention  Reports good oral intake  He is no longer taking torsemide was only 5 days postop  Not taking HCTZ because he "ran out"  Will not plan to resume     9  PAD- AAA/history of lower extremity bypass  Continue to follow with vascular      10  HTN-normotensive today  Cardizem initiated thus will stop amlodipine     11  HLD- 4/2021-   Atorvastatin 80 mg     12  Elevated troponin  Minimally elevated troponin 0 06 in the setting of tachyarrhythmia  Non MI troponin elevation  He did have pronounced ST changes without any chest pain or pressure  Repeat EKG- ST depression resolved  Recent coronary angiogram- no occlusive CAD  Continue Plavix , on no BB d/t baseline bradycardia    13  Conduction system disease-transient left bundle-branch block post TAVR- resolved prior to discharge  Long first-degree AV block-  Monitor closely for bradyarrhythmias  Especially now started on low  Cardizem  Follow-up with EP  Two week event recorder planned        Subjective/Objective   Chief Complaint/Subjective  Patient without chest pain shortness of breath  Dizziness is resolved    No palpitations      Vitals: /72   Pulse (!) 53   Temp 98 3 °F (36 8 °C)   Resp 16   Ht 5' 7" (1 702 m)   Wt 79 9 kg (176 lb 2 4 oz)   SpO2 95%   BMI 27 59 kg/m²     Vitals:    09/25/21 0228 09/26/21 0600   Weight: 83 9 kg (184 lb 15 5 oz) 79 9 kg (176 lb 2 4 oz)     Orthostatic Blood Pressures      Most Recent Value   Blood Pressure  117/72 filed at 09/26/2021 0703   Patient Position - Orthostatic VS  Lying filed at 09/25/2021 2241            Intake/Output Summary (Last 24 hours) at 9/26/2021 1119  Last data filed at 9/25/2021 2240  Gross per 24 hour   Intake 240 ml   Output 1000 ml   Net -760 ml       Invasive Devices     Peripheral Intravenous Line            Peripheral IV 09/25/21 Left Antecubital 1 day Drain            Urethral Catheter Coude 18 Fr  <1 day                Current Facility-Administered Medications   Medication Dose Route Frequency    acetaminophen (TYLENOL) tablet 650 mg  650 mg Oral Q6H PRN    amiodarone (CORDARONE) 900 mg in dextrose 5 % 500 mL infusion  0 5 mg/min Intravenous Continuous    amiodarone 150 mg in dextrose 5 % 100 mL IV bolus  150 mg Intravenous Once    atorvastatin (LIPITOR) tablet 80 mg  80 mg Oral Daily    clopidogrel (PLAVIX) tablet 75 mg  75 mg Oral Daily    diltiazem (CARDIZEM) tablet 30 mg  30 mg Oral Q6H [facility]    docusate sodium (COLACE) capsule 100 mg  100 mg Oral BID PRN    pantoprazole (PROTONIX) EC tablet 40 mg  40 mg Oral Early Morning    polyethylene glycol (MIRALAX) packet 17 g  17 g Oral Daily PRN    tamsulosin (FLOMAX) capsule 0 4 mg  0 4 mg Oral Daily With Dinner         Physical Exam: /72   Pulse (!) 53   Temp 98 3 °F (36 8 °C)   Resp 16   Ht 5' 7" (1 702 m)   Wt 79 9 kg (176 lb 2 4 oz)   SpO2 95%   BMI 27 59 kg/m²     General Appearance:    Alert, cooperative, no distress, appears stated age   Head:    Normocephalic, no scleral icterus   Eyes:    PERRL   Nose:   Nares normal, septum midline, no drainage    Throat:   Lips, mucosa, and tongue normal   Neck:   Supple, symmetrical, trachea midline,              Lungs:     Clear to auscultation bilaterally, respirations unlabored   Chest Wall:    No tenderness or deformity    Heart:    Regular rate and rhythm, S1 and S2 normal, no murmur, rub   or gallop   Abdomen:     Soft, non-tender   Extremities:   Extremities normal, atraumatic, no cyanosis or edema       Skin:   Skin color, texture, turgor normal, no rashes or lesions   Neurologic:   Alert and oriented to person place and time, no focal deficits                 Lab Results:   Recent Results (from the past 72 hour(s))   POCT Measure PVR    Collection Time: 09/24/21  2:16 PM   Result Value Ref Range    POST-VOID RESIDUAL VOLUME, ML  mL Protime-INR    Collection Time: 09/25/21  2:32 AM   Result Value Ref Range    Protime 36 2 (H) 11 6 - 14 5 seconds    INR 3 75 (H) 0 84 - 1 19   POCT Blood Gas (CG8+)    Collection Time: 09/25/21  2:34 AM   Result Value Ref Range    ph, Luis ISTAT 7 499 (HH) 7 300 - 7 400    pCO2, Luis i-STAT 42 0 42 0 - 50 0 mm HG    pO2, Luis i-STAT 34 0 (L) 35 0 - 45 0 mm HG    BE, i-STAT 8 (H) -2 - 3 mmol/L    HCO3, Luis i-STAT 32 7 (H) 24 0 - 30 0 mmol/L    CO2, i-STAT 34 (H) 21 - 32 mmol/L    O2 Sat, i-STAT 70 60 - 85 %    SODIUM, I-STAT 135 (L) 136 - 145 mmol/l    Potassium, i-STAT 3 2 (L) 3 5 - 5 3 mmol/L    Hct, i-STAT 44 36 5 - 49 3 %    Hgb, i-STAT 15 0 12 0 - 17 0 g/dl    Glucose, i-STAT 131 65 - 140 mg/dl    Specimen Type VENOUS    CBC and differential    Collection Time: 09/25/21  3:13 AM   Result Value Ref Range    WBC 16 84 (H) 4 31 - 10 16 Thousand/uL    RBC 4 76 3 88 - 5 62 Million/uL    Hemoglobin 14 7 12 0 - 17 0 g/dL    Hematocrit 42 6 36 5 - 49 3 %    MCV 90 82 - 98 fL    MCH 30 9 26 8 - 34 3 pg    MCHC 34 5 31 4 - 37 4 g/dL    RDW 12 5 11 6 - 15 1 %    MPV 9 0 8 9 - 12 7 fL    Platelets 426 165 - 806 Thousands/uL    nRBC 0 /100 WBCs    Neutrophils Relative 81 (H) 43 - 75 %    Immat GRANS % 1 0 - 2 %    Lymphocytes Relative 9 (L) 14 - 44 %    Monocytes Relative 8 4 - 12 %    Eosinophils Relative 1 0 - 6 %    Basophils Relative 0 0 - 1 %    Neutrophils Absolute 13 68 (H) 1 85 - 7 62 Thousands/µL    Immature Grans Absolute 0 14 0 00 - 0 20 Thousand/uL    Lymphocytes Absolute 1 50 0 60 - 4 47 Thousands/µL    Monocytes Absolute 1 34 (H) 0 17 - 1 22 Thousand/µL    Eosinophils Absolute 0 12 0 00 - 0 61 Thousand/µL    Basophils Absolute 0 06 0 00 - 0 10 Thousands/µL   Basic metabolic panel    Collection Time: 09/25/21  3:13 AM   Result Value Ref Range    Sodium 138 136 - 145 mmol/L    Potassium 3 4 (L) 3 5 - 5 3 mmol/L    Chloride 97 (L) 100 - 108 mmol/L    CO2 28 21 - 32 mmol/L    ANION GAP 13 4 - 13 mmol/L    BUN 42 (H) 5 - 25 mg/dL    Creatinine 1 76 (H) 0 60 - 1 30 mg/dL    Glucose 120 65 - 140 mg/dL    Calcium 9 0 8 3 - 10 1 mg/dL    eGFR 38 ml/min/1 73sq m   Magnesium    Collection Time: 09/25/21  3:13 AM   Result Value Ref Range    Magnesium 2 1 1 6 - 2 6 mg/dL   Phosphorus    Collection Time: 09/25/21  3:13 AM   Result Value Ref Range    Phosphorus 3 2 2 3 - 4 1 mg/dL   Troponin I    Collection Time: 09/25/21  3:18 AM   Result Value Ref Range    Troponin I 0 06 (H) <=0 04 ng/mL   UA w Reflex to Microscopic w Reflex to Culture    Collection Time: 09/25/21  4:04 AM    Specimen: Urine, Straight Cath   Result Value Ref Range    Color, UA Yellow     Clarity, UA Clear     Specific Gravity, UA <=1 005 1 003 - 1 030    pH, UA 6 5 4 5, 5 0, 5 5, 6 0, 6 5, 7 0, 7 5, 8 0    Leukocytes, UA Negative Negative    Nitrite, UA Negative Negative    Protein, UA Negative Negative mg/dl    Glucose, UA Negative Negative mg/dl    Ketones, UA Negative Negative mg/dl    Urobilinogen, UA 0 2 0 2, 1 0 E U /dl E U /dl    Bilirubin, UA Negative Negative    Blood, UA Moderate (A) Negative   Urine Microscopic    Collection Time: 09/25/21  4:04 AM   Result Value Ref Range    RBC, UA 4-10 (A) None Seen, 0-1, 1-2, 2-4, 0-5 /hpf    WBC, UA 0-1 None Seen, 0-1, 1-2, 0-5, 2-4 /hpf    Epithelial Cells None Seen None Seen, Occasional /hpf    Bacteria, UA None Seen None Seen, Occasional /hpf   Comprehensive metabolic panel    Collection Time: 09/25/21  6:19 AM   Result Value Ref Range    Sodium 138 136 - 145 mmol/L    Potassium 3 2 (L) 3 5 - 5 3 mmol/L    Chloride 99 (L) 100 - 108 mmol/L    CO2 32 21 - 32 mmol/L    ANION GAP 7 4 - 13 mmol/L    BUN 38 (H) 5 - 25 mg/dL    Creatinine 1 64 (H) 0 60 - 1 30 mg/dL    Glucose 111 65 - 140 mg/dL    Calcium 8 5 8 3 - 10 1 mg/dL    Corrected Calcium 9 1 8 3 - 10 1 mg/dL    AST 32 5 - 45 U/L    ALT 52 12 - 78 U/L    Alkaline Phosphatase 163 (H) 46 - 116 U/L    Total Protein 6 5 6 4 - 8 2 g/dL    Albumin 3 2 (L) 3 5 - 5 0 g/dL    Total Bilirubin 0 78 0 20 - 1 00 mg/dL    eGFR 42 ml/min/1 73sq m   APTT six (6) hours after Heparin bolus/drip initiation or dosing change    Collection Time: 09/25/21  6:19 AM   Result Value Ref Range    PTT 37 23 - 37 seconds   CBC    Collection Time: 09/25/21  6:19 AM   Result Value Ref Range    WBC 16 26 (H) 4 31 - 10 16 Thousand/uL    RBC 4 64 3 88 - 5 62 Million/uL    Hemoglobin 14 5 12 0 - 17 0 g/dL    Hematocrit 41 8 36 5 - 49 3 %    MCV 90 82 - 98 fL    MCH 31 3 26 8 - 34 3 pg    MCHC 34 7 31 4 - 37 4 g/dL    RDW 12 6 11 6 - 15 1 %    Platelets 033 135 - 203 Thousands/uL    MPV 9 1 8 9 - 12 7 fL   Protime-INR    Collection Time: 09/25/21  6:19 AM   Result Value Ref Range    Protime 35 1 (H) 11 6 - 14 5 seconds    INR 3 60 (H) 0 84 - 1 19   Troponin I    Collection Time: 09/25/21  6:37 AM   Result Value Ref Range    Troponin I 0 06 (H) <=0 04 ng/mL   Troponin I    Collection Time: 09/25/21  9:56 AM   Result Value Ref Range    Troponin I 0 06 (H) <=0 04 ng/mL   ECG 12 lead    Collection Time: 09/25/21 10:03 AM   Result Value Ref Range    Ventricular Rate 61 BPM    Atrial Rate 61 BPM    [place] Interval 292 ms    QRSD Interval 90 ms    QT Interval 460 ms    QTC Interval 463 ms    P [place] 59 degrees    QRS Axis 15 degrees    T Wave Axis 66 degrees   Protime-INR    Collection Time: 09/26/21  4:37 AM   Result Value Ref Range    Protime 29 2 (H) 11 6 - 14 5 seconds    INR 2 83 (H) 0 84 - 1 19   CBC and differential    Collection Time: 09/26/21  4:37 AM   Result Value Ref Range    WBC 13 19 (H) 4 31 - 10 16 Thousand/uL    RBC 4 35 3 88 - 5 62 Million/uL    Hemoglobin 13 6 12 0 - 17 0 g/dL    Hematocrit 39 8 36 5 - 49 3 %    MCV 92 82 - 98 fL    MCH 31 3 26 8 - 34 3 pg    MCHC 34 2 31 4 - 37 4 g/dL    RDW 12 6 11 6 - 15 1 %    MPV 8 9 8 9 - 12 7 fL    Platelets 529 093 - 321 Thousands/uL    nRBC 0 /100 WBCs    Neutrophils Relative 68 43 - 75 %    Immat GRANS % 1 0 - 2 %    Lymphocytes Relative 19 14 - 44 % Monocytes Relative 10 4 - 12 %    Eosinophils Relative 2 0 - 6 %    Basophils Relative 0 0 - 1 %    Neutrophils Absolute 8 94 (H) 1 85 - 7 62 Thousands/µL    Immature Grans Absolute 0 06 0 00 - 0 20 Thousand/uL    Lymphocytes Absolute 2 56 0 60 - 4 47 Thousands/µL    Monocytes Absolute 1 28 (H) 0 17 - 1 22 Thousand/µL    Eosinophils Absolute 0 30 0 00 - 0 61 Thousand/µL    Basophils Absolute 0 05 0 00 - 0 10 Thousands/µL   Basic metabolic panel    Collection Time: 09/26/21  4:37 AM   Result Value Ref Range    Sodium 141 136 - 145 mmol/L    Potassium 3 8 3 5 - 5 3 mmol/L    Chloride 105 100 - 108 mmol/L    CO2 29 21 - 32 mmol/L    ANION GAP 7 4 - 13 mmol/L    BUN 34 (H) 5 - 25 mg/dL    Creatinine 1 55 (H) 0 60 - 1 30 mg/dL    Glucose 95 65 - 140 mg/dL    Calcium 8 3 8 3 - 10 1 mg/dL    eGFR 45 ml/min/1 73sq m     Imaging: I have personally reviewed pertinent reports  Tele- nsr slow vt      Counseling / Coordination of Care  Total time spent today 30 minutes  Greater than 50% of total time was spent with the patient and / or family counseling and / or coordination of care

## 2021-09-26 NOTE — ASSESSMENT & PLAN NOTE
· History of protein C deficiency  · Currently on warfarin 5 mg daily and Plavix  · INR 3 75, repeat INR 2 83    · Holding warfarin in setting of supratherapeutic INR

## 2021-09-26 NOTE — ASSESSMENT & PLAN NOTE
· Patient underwent TAVR on 9/16/21  · Post op patient developed acute urinary retention and was discharged with gifford cath  · Patient was seen in Urology office 9/23/21 for a void trial and catheter was removed  Unfortunatley, patient was unable to void and came in to the ED where he was straight catheterized  · Bladder scan showed 500-trace CT  · UA unremarkable  · Placed gifford on 9/25 and continued on Flomax  On exam on 9/26/21, blood tinged urine noted  No clots  Patient reports no pain       Plan, Urology recommendations appreciated:    · Urinary retention protocol  · Continue Flomax  · F/u outpatient with urology   · Discharge with Gifford

## 2021-09-26 NOTE — ASSESSMENT & PLAN NOTE
· Wbc's 16 K on admission--last 3 CBC showed elevated WBC  · Absolute neutrophil count 13 68    Lab Results   Component Value Date    WBC 13 19 (H) 09/26/2021    HGB 13 6 09/26/2021    HCT 39 8 09/26/2021    MCV 92 09/26/2021     09/26/2021     Plan:   · Trend CBC

## 2021-09-26 NOTE — ASSESSMENT & PLAN NOTE
· Secondary to urinary retention  · Baseline creatinine 1 2-1 4  · Monitor creatinine    Plan:   · Urinary retention protocol  · Encourage good oral intake  · Replete potassium 20 mEq

## 2021-09-26 NOTE — ASSESSMENT & PLAN NOTE
· Heart rate  on admission  · Patient is asymptomatic  · EKG in ED - SVT with significant ST changes on 09/17  · He had palpitations in the ED associated with SVT   He has been having palpitations at home as well  Unclear if his dizziness/lightheadedness is associated  · INR 3 75 (9/25) - held Coumadin, Warfarin  Lab Results   Component Value Date    INR 2 83 (H) 09/26/2021    INR 3 60 (H) 09/25/2021    INR 3 75 (H) 09/25/2021    PROTIME 29 2 (H) 09/26/2021    PROTIME 35 1 (H) 09/25/2021    PROTIME 36 2 (H) 09/25/2021     · Trops x3 negative  · Telemetry- bradycardia (50s) w/ PVCs  · Appears to have nonsustained slow VT  HI remains elevated 330 ms  ( EKG yesterday 292ms)   · Patient was not given Cardizem last 2 doses on 9/25 due to HR in 50s  Plan, Cardiology recomendations appreciated:   · Cardizem 30 mg q6h given less effect on sinus rate (last 2 doses held due to heart rate 50)  Give next dose Cardizem now  · Will hold off on Coumadin, heparin drip in setting of supratherapeutic INR  · Heart rate is not persistently low  · Two week event recorder was previously planned by EP  · Follow-up is scheduled- Oct 6th  Should also followup with EP

## 2021-09-26 NOTE — ASSESSMENT & PLAN NOTE
· Minimally elevated troponin 0 06 in the setting of tachyarrhythmia   Non MI troponin elevation   He did have pronounced ST changes without any chest pain or pressure    · Repeat EKG- ST depression resolved  · Recent coronary angiogram- no occlusive CAD    Plan, appreciate Cardiology recommendations:   · Continue Plavix , on no BB d/t baseline bradycardia

## 2021-09-26 NOTE — PHYSICAL THERAPY NOTE
Physical Therapy Discontinue Note       09/26/21 1536   PT Last Visit   PT Visit Date 09/26/21   Note Type   Note type Screen   Cancel Reasons Other   Assessment   Assessment referral received for PT eval and tx  chart review performed  therapist attempted to see pt for PT evaluation - explained role of inpatient PT and need to assess pt to expedite safe return home following discharge from hospital  pt stated he is moving fine and has no needs regarding mobility status  therapist questioned pt falling after discharge home and need to prevent future falls  pt continued to refuse participation  therapist offered to return at any time of pt's choice though pt continued to refuse  therapist stated pt would be discontinued from PT services per his wishes  pt stated agreeing to this plan  notified Tiffani Wade of pt's declining participation in PT eval and wishing to be discontinued from PT services       Harris Mccain, PT

## 2021-09-26 NOTE — ASSESSMENT & PLAN NOTE
· Follows with Vascular Dr Tiffany Little   · Per vascular note:   · CT angiogram shows minimal posterior plaque in the common femoral artery an adequate ileofemoral and iliac systems

## 2021-09-26 NOTE — PLAN OF CARE
Problem: Potential for Falls  Goal: Patient will remain free of falls  Description: INTERVENTIONS:  - Educate patient/family on patient safety including physical limitations  - Instruct patient to call for assistance with activity   - Consult OT/PT to assist with strengthening/mobility   - Keep Call bell within reach  - Keep bed low and locked with side rails adjusted as appropriate  - Keep care items and personal belongings within reach  - Initiate and maintain comfort rounds  - Make Fall Risk Sign visible to staff  - Offer Toileting every 2 Hours, in advance of need  - Initiate/Maintain bed alarm  - Obtain necessary fall risk management equipment:  bed alarm  - Apply yellow socks and bracelet for high fall risk patients  - Consider moving patient to room near nurses station  Outcome: Progressing     Problem: PAIN - ADULT  Goal: Verbalizes/displays adequate comfort level or baseline comfort level  Description: Interventions:  - Encourage patient to monitor pain and request assistance  - Assess pain using appropriate pain scale  - Administer analgesics based on type and severity of pain and evaluate response  - Implement non-pharmacological measures as appropriate and evaluate response  - Consider cultural and social influences on pain and pain management  - Notify physician/advanced practitioner if interventions unsuccessful or patient reports new pain  Outcome: Progressing     Problem: INFECTION - ADULT  Goal: Absence or prevention of progression during hospitalization  Description: INTERVENTIONS:  - Assess and monitor for signs and symptoms of infection  - Monitor lab/diagnostic results  - Monitor all insertion sites, i e  indwelling lines, tubes, and drains  - Monitor endotracheal if appropriate and nasal secretions for changes in amount and color  - [place] appropriate cooling/warming therapies per order  - Administer medications as ordered  - Instruct and encourage patient and family to use good hand hygiene technique  - Identify and instruct in appropriate isolation precautions for identified infection/condition  Outcome: Progressing  Goal: Absence of fever/infection during neutropenic period  Description: INTERVENTIONS:  - Monitor WBC    Outcome: Progressing     Problem: DISCHARGE PLANNING  Goal: Discharge to home or other facility with appropriate resources  Description: INTERVENTIONS:  - Identify barriers to discharge w/patient and caregiver  - Arrange for needed discharge resources and transportation as appropriate  - Identify discharge learning needs (meds, wound care, etc )  - Arrange for interpretive services to assist at discharge as needed  - Refer to Case Management Department for coordinating discharge planning if the patient needs post-hospital services based on physician/advanced practitioner order or complex needs related to functional status, cognitive ability, or social support system  Outcome: Progressing     Problem: CARDIOVASCULAR - ADULT  Goal: Maintains optimal cardiac output and hemodynamic stability  Description: INTERVENTIONS:  - Monitor I/O, vital signs and rhythm  - Monitor for S/S and trends of decreased cardiac output  - Administer and titrate ordered vasoactive medications to optimize hemodynamic stability  - Assess quality of pulses, skin color and temperature  - Assess for signs of decreased coronary artery perfusion  - Instruct patient to report change in severity of symptoms  Outcome: Progressing  Goal: Absence of cardiac dysrhythmias or at baseline rhythm  Description: INTERVENTIONS:  - Continuous cardiac monitoring, vital signs, obtain 12 lead EKG if ordered  - Administer antiarrhythmic and heart rate control medications as ordered  - Monitor electrolytes and administer replacement therapy as ordered  Outcome: Progressing     Problem: GENITOURINARY - ADULT  Goal: Maintains or returns to baseline urinary function  Description: INTERVENTIONS:  - Assess urinary function  - Encourage oral fluids to ensure adequate hydration if ordered  - Administer IV fluids as ordered to ensure adequate hydration  - Administer ordered medications as needed  - Offer frequent toileting  - Follow urinary retention protocol if ordered  Outcome: Progressing  Goal: Absence of urinary retention  Description: INTERVENTIONS:  - Assess patients ability to void and empty bladder  - Monitor I/O  - Bladder scan as needed  - Discuss with physician/AP medications to alleviate retention as needed  - Discuss catheterization for long term situations as appropriate  Outcome: Progressing  Goal: Urinary catheter remains patent  Description: INTERVENTIONS:  - Assess patency of urinary catheter  - If patient has a chronic gifford, consider changing catheter if non-functioning  - Follow guidelines for intermittent irrigation of non-functioning urinary catheter  Outcome: Progressing

## 2021-09-26 NOTE — PROGRESS NOTES
[facility]  Progress Note - Kika Washita 1950, 80 y o  male MRN: [id number]  Unit/Bed#: S -01 Encounter: [id number]  Primary Care Provider: Kaylah Joyce MD   Date and time admitted to hospital: 9/25/2021  2:03 AM    * Urinary retention  Assessment & Plan  · Patient underwent TAVR on 9/16/21  · Post op patient developed acute urinary retention and was discharged with gifford cath  · Patient was seen in Urology office 9/23/21 for a void trial and catheter was removed  Unfortunatley, patient was unable to void and came in to the ED where he was straight catheterized  · Bladder scan showed 500-trace CT  · UA unremarkable  · Placed gifford on 9/25 and continued on Flomax  On exam on 9/26/21, blood tinged urine noted  No clots  Patient reports no pain  Plan, Urology recommendations appreciated:    · Urinary retention protocol  · Continue Flomax  · F/u outpatient with urology   · Discharge with Gifford     SVT (supraventricular tachycardia) ([facility] [address] )  Assessment & Plan  · Heart rate  on admission  · Patient is asymptomatic  · EKG in ED - SVT with significant ST changes on 09/17  · He had palpitations in the ED associated with SVT   He has been having palpitations at home as well  Unclear if his dizziness/lightheadedness is associated  · INR 3 75 (9/25) - held Coumadin, Warfarin  Lab Results   Component Value Date    INR 2 83 (H) 09/26/2021    INR 3 60 (H) 09/25/2021    INR 3 75 (H) 09/25/2021    PROTIME 29 2 (H) 09/26/2021    PROTIME 35 1 (H) 09/25/2021    PROTIME 36 2 (H) 09/25/2021     · Trops x3 negative  · Telemetry- bradycardia (50s) w/ PVCs  · Appears to have nonsustained slow VT  OH remains elevated 330 ms  ( EKG yesterday 292ms)   · Patient was not given Cardizem last 2 doses on 9/25 due to HR in 50s  Plan, Cardiology recomendations appreciated:   · Cardizem 30 mg q6h given less effect on sinus rate (last 2 doses held due to heart rate 50)  Give next dose Cardizem now  · Will hold off on Coumadin, heparin drip in setting of supratherapeutic INR  · Heart rate is not persistently low  · Two week event recorder was previously planned by EP  · Follow-up is scheduled- Oct 6th  Should also followup with EP  S/P TAVR (transcatheter aortic valve replacement)  Assessment & Plan  · Status post TAVR on 09/16  · Surgery performed at [place] by Dr Rangel Coad   · Patient developed left bundle-branch block postop-reverted back to narrow complex  · One brief run of nonsustained VT which was monomorphic per Cardiology note  · Cardiology/electrophysiology did not recommend pacemaker for patient  Plan:   F/u with EP outpatient    Protein C deficiency ([facility] )  Assessment & Plan  · History of protein C deficiency  · Currently on warfarin 5 mg daily and Plavix  · INR 3 75, repeat INR 2 83    · Holding warfarin in setting of supratherapeutic INR    Leukocytosis  Assessment & Plan  · Wbc's 16 K on admission--last 3 CBC showed elevated WBC  · Absolute neutrophil count 13 68    Lab Results   Component Value Date    WBC 13 19 (H) 09/26/2021    HGB 13 6 09/26/2021    HCT 39 8 09/26/2021    MCV 92 09/26/2021     09/26/2021     Plan:   · Trend CBC    Essential hypertension  Assessment & Plan  · Outpatient blood pressure medications include amlodipine 5 mg and hydrochlorothiazide 25 mg which he only takes p r n  When blood pressure is elevated <140/90  · Follow with Dr Jaylene Chang       Embolism and thrombosis of arteries of the lower extremities ([facility] )  Assessment & Plan  · Follows with Vascular Dr Wan Ruiz   · Per vascular note:   · CT angiogram shows minimal posterior plaque in the common femoral artery an adequate ileofemoral and iliac systems     Elevated troponin  Assessment & Plan  · Minimally elevated troponin 0 06 in the setting of tachyarrhythmia   Non MI troponin elevation   He did have pronounced ST changes without any chest pain or pressure    · Repeat EKG- ST depression resolved  · Recent coronary angiogram- no occlusive CAD    Plan, appreciate Cardiology recommendations:   · Continue Plavix , on no BB d/t baseline bradycardia  FAMILIA (acute kidney injury) ([address] )  Assessment & Plan  · Secondary to urinary retention  · Baseline creatinine 1 2-1 4  · Monitor creatinine    Plan:   · Urinary retention protocol  · Encourage good oral intake  · Replete potassium 20 mEq     AAA (abdominal aortic aneurysm) without rupture (HCC)  Assessment & Plan  · Follow vascular surgery for AAA  · Ultrasound in 6 months    Plan:   · Continue statin    VTE Pharmacologic Prophylaxis:   Held in setting of high INR formerly, now bloody urine  Patient Centered Rounds: I performed bedside rounds with nursing staff today  Discussions with Specialists or Other Care Team Provider: Urology, Cardiology  Education and Discussions with Family / Patient: Updated  (wife) via phone  Current Length of Stay: 1 day(s)  Current Patient Status: Inpatient   Discharge Plan: Anticipate discharge in 24-48 hrs to home  Code Status: Level 1 - Full Code    Subjective:   Mr Yakov French is lying in bed comfortably  He says he was able to sleep very well and feels great overall  Patient denies any fevers/chills, CP, SOB, abdominal pain, nausea, vomiting, diarrhea, increased urinary frequency, dysuria, hematuria, diarrhea, constipation, bloody stools, weakness, numbness/tingling  Objective:   Vitals:   Temp (24hrs), Av 3 °F (36 8 °C), Min:98 1 °F (36 7 °C), Max:98 4 °F (36 9 °C)    Temp:  [98 1 °F (36 7 °C)-98 4 °F (36 9 °C)] 98 3 °F (36 8 °C)  HR:  [50-68] 68  Resp:  [16-18] 16  BP: (109-155)/(67-73) 128/69  SpO2:  [95 %-98 %] 95 %  Body mass index is 27 59 kg/m²  Input and Output Summary (last 24 hours):      Intake/Output Summary (Last 24 hours) at 2021 1416  Last data filed at 2021 0900  Gross per 24 hour   Intake 600 ml   Output 1000 ml Net -400 ml       PHYSICAL EXAMINATION   General Examination: In no apparent distress, well developed and well nourished, and cooperative   HEENT: Normocephalic, Atraumatic  Moist mucus membranes  Anicteric  PPC    CVS: Bradycardia; S1 S2 noted  No audible murmurs  No carotids bruits  Lungs: Clear to auscultation bilaterally  No rales, rhonchi, wheezing  Abdomen: Bowel sounds positive  Non- tender  Non-distended  No organomegaly  : Molina bag filled with blood tinged urine, no clots  Ext: No edema, clubbing, cyanosis noted  Psych: Thought content - No VH/AH  No delusions  Though Process - logical    Skin - No rash, new lesions noted      Additional Data:   Labs:  Results from last 7 days   Lab Units 09/26/21  0437   WBC Thousand/uL 13 19*   HEMOGLOBIN g/dL 13 6   HEMATOCRIT % 39 8   PLATELETS Thousands/uL 237   NEUTROS PCT % 68   LYMPHS PCT % 19   MONOS PCT % 10   EOS PCT % 2     Results from last 7 days   Lab Units 09/26/21  0437 09/25/21  0619   SODIUM mmol/L 141 138   POTASSIUM mmol/L 3 8 3 2*   CHLORIDE mmol/L 105 99*   CO2 mmol/L 29 32   BUN mg/dL 34* 38*   CREATININE mg/dL 1 55* 1 64*   ANION GAP mmol/L 7 7   CALCIUM mg/dL 8 3 8 5   ALBUMIN g/dL  --  3 2*   TOTAL BILIRUBIN mg/dL  --  0 78   ALK PHOS U/L  --  163*   ALT U/L  --  52   AST U/L  --  32   GLUCOSE RANDOM mg/dL 95 111     Results from last 7 days   Lab Units 09/26/21  0437   INR  2 83*                 Lines/Drains:  Invasive Devices     Peripheral Intravenous Line            Peripheral IV 09/25/21 Left Antecubital 1 day          Drain            Urethral Catheter Coude 18 Fr  1 day              Urinary Catheter:  Goal for removal: N/A- Discharging with Molina         Telemetry:  Telemetry Orders (From admission, onward)             48 Hour Telemetry Monitoring  Continuous x 48 hours     Question:  Reason for 48 Hour Telemetry  Answer:  Arrhythmias Requiring Medical Therapy (eg  SVT, Vtach/fib, Bradycardia, Uncontrolled A-fib) Telemetry Reviewed: Sinus Bradycardia  Indication for Continued Telemetry Use: Arrthymias requiring medical therapy           Imaging: Reviewed radiology reports from this admission including: as noted below  TRAUMA - CT head wo contrast   Final Result by Israel Acosta MD (09/25 3773)      No acute intracranial abnormality  I personally discussed this study with Dr Stephanie Kramer on 9/25/2021 at 3:11 AM                Workstation performed: [id number]         TRAUMA - CT spine cervical wo contrast   Final Result by Israel Acosta MD (09/25 4251)      No cervical spine fracture or traumatic malalignment  Workstation performed: [id number]         XR trauma multiple   Final Result by Robert Guillen MD (09/25 0487)      No acute cardiopulmonary disease within limitations of supine imaging  Workstation performed: [id number]         XR chest 1 view    (Results Pending)     Recent Cultures (last 7 days):       Last 24 Hours Medication List:   Current Facility-Administered Medications   Medication Dose Route Frequency Provider Last Rate    acetaminophen  650 mg Oral Q6H PRN Valencia Fagan MD      atorvastatin  80 mg Oral Daily Valencia Fagan MD      clopidogrel  75 mg Oral Daily Valencia Fagan MD      diltiazem  120 mg Oral Daily Chick MD Gavino      docusate sodium  100 mg Oral BID PRN Valencia Fagan MD      pantoprazole  40 mg Oral Early Morning Valencia Fagan MD      polyethylene glycol  17 g Oral Daily PRN Valencia Fagan MD      tamsulosin  0 4 mg Oral Daily With Austen Johnson MD        Today, Patient Was Seen By: Brooke San MD    **Please Note: This note may have been constructed using a voice recognition system  **

## 2021-09-26 NOTE — ASSESSMENT & PLAN NOTE
· Status post TAVR on 09/16  · Surgery performed at [facility] by Dr Tyra Yang   · Patient developed left bundle-branch block postop-reverted back to narrow complex  · One brief run of nonsustained VT which was monomorphic per Cardiology note  · Cardiology/electrophysiology did not recommend pacemaker for patient       Plan:   F/u with EP outpatient

## 2021-09-26 NOTE — DISCHARGE SUMMARY
[facility]  Discharge- [facility] End 1950, 80 y o  male MRN: [id number]  Unit/Bed#: S -01 Encounter: [id number]  Primary Care Provider: Charlotte Valente MD   Date and time admitted to hospital: 9/25/2021  2:03 AM    * Urinary retention  Assessment & Plan  · Patient underwent TAVR on 9/16/21  · Post op patient developed acute urinary retention and was discharged with gifford cath  · Patient was seen in Urology office 9/23/21 for a void trial and catheter was removed  Unfortunatley, patient was unable to void and came in to the ED where he was straight catheterized  · Bladder scan showed 500-trace CT  · UA unremarkable  · Placed gifford on 9/25 and continued on Flomax  On exam on 9/27/21, urine no longer blood-tinged  No clots  Patient reports no pain  Plan, Urology recommendations appreciated:    · Continue Flomax  · F/u outpatient with urology   · Discharge with Gifford     SVT (supraventricular tachycardia) ([address] )  Assessment & Plan  · Heart rate  on admission  · Patient is asymptomatic  · EKG in ED - SVT with significant ST changes on 09/17  · He had palpitations in the ED associated with SVT   He has been having palpitations at home as well  Unclear if his dizziness/lightheadedness is associated  · INR 3 75 (9/25) - held Coumadin temporarily for supratherapeutic levels    Lab Results   Component Value Date    INR 2 37 (H) 09/27/2021    INR 2 83 (H) 09/26/2021    INR 3 60 (H) 09/25/2021    PROTIME 25 5 (H) 09/27/2021    PROTIME 29 2 (H) 09/26/2021    PROTIME 35 1 (H) 09/25/2021     · Trops x3 negative  · Telemetry- bradycardia (50s) w/ PVCs  · Appears to have nonsustained slow VT  GA remains elevated 330 ms  ( EKG yesterday 292ms)   · Patient was not given Cardizem last 2 doses on 9/25 due to HR in 50s       Plan, Cardiology recomendations appreciated:   · Cardizem 120mg QD  · Restarted Warfarin to follow INR outpatient  · Heart rate is not persistently low  · Two week event recorder     · Follow-up is scheduled- Oct 6th  Should also followup with EP  Elevated troponin  Assessment & Plan  · Minimally elevated troponin 0 06 in the setting of tachyarrhythmia   Non MI troponin elevation   He did have pronounced ST changes without any chest pain or pressure    · Repeat EKG- ST depression resolved  · Recent coronary angiogram- no occlusive CAD    Plan, appreciate Cardiology recommendations:   · Continue Plavix , on no BB d/t baseline bradycardia  FAMILIA (acute kidney injury) ([facility] [address] )  Assessment & Plan  · Secondary to urinary retention  · Baseline creatinine 1 2-1 4  · Monitor creatinine    Plan:   · Urinary retention protocol  · Encourage good oral intake  · Replete potassium 20 mEq as needed    Leukocytosis  Assessment & Plan  · Wbc's 16 K on admission--last 3 CBC showed elevated WBC  · Absolute neutrophil count 13 68    Lab Results   Component Value Date    WBC 13 19 (H) 09/26/2021    HGB 13 6 09/26/2021    HCT 39 8 09/26/2021    MCV 92 09/26/2021     09/26/2021     Plan:   · Trend CBC    S/P TAVR (transcatheter aortic valve replacement)  Assessment & Plan  · Status post TAVR on 09/16  · Surgery performed at [facility] by Dr Francesca Mix   · Patient developed left bundle-branch block postop-reverted back to narrow complex  · One brief run of nonsustained VT which was monomorphic per Cardiology note  · Cardiology/electrophysiology did not recommend pacemaker for patient       Plan:   F/u with EP outpatient    AAA (abdominal aortic aneurysm) without rupture ([facility])  Assessment & Plan  · Follow vascular surgery for AAA  · Ultrasound in 6 months    Plan:   · Continue statin    Protein C deficiency ([facility] [address] [facility] )  Assessment & Plan  · History of protein C deficiency  · Currently on warfarin 5 mg daily and Plavix  · INR 3 75, repeat INR 2 37  · Warfarin was held for bleeding and initial supratherapeutic    Embolism and thrombosis of arteries of the lower extremities ([address] )  Assessment & Plan  · Follows with Vascular Dr Belem Noriega   · Per vascular note:   · CT angiogram shows minimal posterior plaque in the common femoral artery an adequate ileofemoral and iliac systems     Essential hypertension  Assessment & Plan  · Outpatient blood pressure medications include amlodipine 5 mg and hydrochlorothiazide 25 mg which he only takes p r n  When blood pressure is elevated <140/90  · BP today 121/69  · Follow with cardiologist Dr Meg Meraz  Medical Problems     Resolved Problems  Date Reviewed: 9/18/2021    None              Discharging Resident: Carmelo Alexander DO  Discharging Attending: No att  providers found  PCP: Benjamin Lopes MD  Admission Date:   Admission Orders (From admission, onward)     Ordered        09/25/21 0328  Inpatient Admission  Once                   Discharge Date: 09/27/21    Consultations During Hospital Stay:  · Cardiology - recommended Cardizem with 2 week event recorder on discharge and f/u Oct 6  · Urology: placed gifford catheter with recommendation to d/c on catheter and f/u 11/10/21 and continue of flomax    Procedures Performed:   - Central Line Insertion 9/16  - Arterial Line Insertion 9/16    Significant Findings / Test Results:   · BUN peak of 42 discharged on 30  · Creatinine peaked 1 76 discharged at 1 38  · INR peaked at 3 75 and coumadin was held due to supratherapeutic INR and on discharge was 2 37 and restarted  · SVT in the emergency room that broke spontaneously, none appreciated on telemetry    Incidental Findings:   · n/a    Test Results Pending at Discharge (will require follow up): · None     Outpatient Tests Requested:  · 2 week event recorder  · To follow up with cardiology at appointment scheduled Oct 6    Complications:  Hematuria likely secondary to gifford insertion  Urine color cleared the following day  Reason for Admission: Urinary Retention and SVT       Hospital Course:   Pradeep Cruz is a 80 y o  male patient who originally presented to the hospital on 9/25/2021 due to urinary retention and SVT  Patient underwent TAVR on 9/16/21  Post op patient developed acute urinary retention and was discharged with gifford cath  Patient was seen in Urology office 9/23/21 for a void trial and catheter was removed  Unfortunatley, patient was unable to void and came in to the ED where he was straight catheterized  Patient endorses that he has been having palpitations since Tuesday and was found to be in SVT in the ED and admitted for further work up  Pt was catheterized during his stay with concerns of hematuria likely due to gifford insertion  On discharge, urine was yellow likely confirming trauma on gifford insertion  Pt denied any concerns with being discharged and is aware of his followups  Please see above list of diagnoses and related plan for additional information  Condition at Discharge: stable    Discharge Day Visit / Exam:   Subjective:  Pt reports that he has not had any general pains and with the help of the catheter has not had any issues  He denies any symptoms overnight and resolution of palpitations that previously occurred  Vitals: Blood Pressure: 121/69 (09/27/21 0656)  Pulse: (!) 52 (09/27/21 0656)  Temperature: 97 6 °F (36 4 °C) (09/27/21 0656)  Temp Source: Axillary (09/26/21 1453)  Respirations: 16 (09/27/21 0656)  Height: 5' 7" (170 2 cm) (09/25/21 0829)  Weight - Scale: 81 4 kg (179 lb 7 3 oz) (09/27/21 0600)  SpO2: 95 % (09/27/21 0656)  Exam:   Physical Exam  Eyes:      General: No scleral icterus  Cardiovascular:      Rate and Rhythm: Regular rhythm  Bradycardia present  Heart sounds: No murmur heard  No friction rub  No gallop  Pulmonary:      Effort: No respiratory distress  Breath sounds: Normal breath sounds  No stridor  No wheezing, rhonchi or rales  Chest:      Chest wall: No tenderness  Abdominal:      General: Bowel sounds are normal  There is no distension  Tenderness: There is no abdominal tenderness  There is no guarding or rebound  Skin:     Coloration: Skin is not jaundiced or pale  Findings: No erythema  Neurological:      Mental Status: He is alert and oriented to person, place, and time  Psychiatric:         Mood and Affect: Mood normal           Discussion with Family: Updated  (wife) at bedside  Discharge instructions/Information to patient and family:   See after visit summary for information provided to patient and family  Provisions for Follow-Up Care:  See after visit summary for information related to follow-up care and any pertinent home health orders  Disposition:   Home    Planned Readmission: no    Discharge Medications:  See after visit summary for reconciled discharge medications provided to patient and/or family        **Please Note: This note may have been constructed using a voice recognition system**

## 2021-09-27 VITALS
HEART RATE: 52 BPM | SYSTOLIC BLOOD PRESSURE: 121 MMHG | HEIGHT: 67 IN | DIASTOLIC BLOOD PRESSURE: 69 MMHG | BODY MASS INDEX: 28.17 KG/M2 | TEMPERATURE: 97.6 F | OXYGEN SATURATION: 95 % | RESPIRATION RATE: 16 BRPM | WEIGHT: 179.45 LBS

## 2021-09-27 LAB
ALBUMIN SERPL BCP-MCNC: 2.8 G/DL (ref 3.5–5)
ALP SERPL-CCNC: 152 U/L (ref 46–116)
ALT SERPL W P-5'-P-CCNC: 39 U/L (ref 12–78)
ANION GAP SERPL CALCULATED.3IONS-SCNC: 6 MMOL/L (ref 4–13)
AST SERPL W P-5'-P-CCNC: 23 U/L (ref 5–45)
BILIRUB SERPL-MCNC: 0.73 MG/DL (ref 0.2–1)
BUN SERPL-MCNC: 30 MG/DL (ref 5–25)
CALCIUM ALBUM COR SERPL-MCNC: 9.1 MG/DL (ref 8.3–10.1)
CALCIUM SERPL-MCNC: 8.1 MG/DL (ref 8.3–10.1)
CHLORIDE SERPL-SCNC: 105 MMOL/L (ref 100–108)
CO2 SERPL-SCNC: 28 MMOL/L (ref 21–32)
CREAT SERPL-MCNC: 1.38 MG/DL (ref 0.6–1.3)
GFR SERPL CREATININE-BSD FRML MDRD: 51 ML/MIN/1.73SQ M
GLUCOSE SERPL-MCNC: 98 MG/DL (ref 65–140)
INR PPP: 2.37 (ref 0.84–1.19)
POTASSIUM SERPL-SCNC: 3.8 MMOL/L (ref 3.5–5.3)
PROT SERPL-MCNC: 5.5 G/DL (ref 6.4–8.2)
PROTHROMBIN TIME: 25.5 SECONDS (ref 11.6–14.5)
SODIUM SERPL-SCNC: 139 MMOL/L (ref 136–145)

## 2021-09-27 PROCEDURE — 85610 PROTHROMBIN TIME: CPT

## 2021-09-27 PROCEDURE — 99239 HOSP IP/OBS DSCHRG MGMT >30: CPT | Performed by: GENERAL PRACTICE

## 2021-09-27 PROCEDURE — 99232 SBSQ HOSP IP/OBS MODERATE 35: CPT | Performed by: INTERNAL MEDICINE

## 2021-09-27 PROCEDURE — 80053 COMPREHEN METABOLIC PANEL: CPT

## 2021-09-27 RX ORDER — DILTIAZEM HYDROCHLORIDE 120 MG/1
120 CAPSULE, COATED, EXTENDED RELEASE ORAL DAILY
Qty: 30 CAPSULE | Refills: 0 | Status: CANCELLED | OUTPATIENT
Start: 2021-09-28 | End: 2021-10-28

## 2021-09-27 RX ORDER — DILTIAZEM HYDROCHLORIDE 120 MG/1
120 CAPSULE, COATED, EXTENDED RELEASE ORAL DAILY
Qty: 30 CAPSULE | Refills: 0 | Status: SHIPPED | OUTPATIENT
Start: 2021-09-28 | End: 2021-10-06 | Stop reason: SDUPTHER

## 2021-09-27 RX ADMIN — DILTIAZEM HYDROCHLORIDE 120 MG: 120 CAPSULE, COATED, EXTENDED RELEASE ORAL at 08:59

## 2021-09-27 RX ADMIN — ATORVASTATIN CALCIUM 80 MG: 40 TABLET, FILM COATED ORAL at 08:59

## 2021-09-27 RX ADMIN — PANTOPRAZOLE SODIUM 40 MG: 40 TABLET, DELAYED RELEASE ORAL at 06:26

## 2021-09-27 RX ADMIN — CLOPIDOGREL BISULFATE 75 MG: 75 TABLET ORAL at 09:00

## 2021-09-27 NOTE — ASSESSMENT & PLAN NOTE
· History of protein C deficiency  · Currently on warfarin 5 mg daily and Plavix  · INR 3 75, repeat INR 2 37  · Warfarin was held for bleeding and initial supratherapeutic

## 2021-09-27 NOTE — DISCHARGE INSTR - AVS FIRST PAGE
Dear Angelica Marquis,     It was our pleasure to care for you here at [facility]  It is our hope that we were always able to exceed the expected standards for your care during your stay  You were hospitalized due to urinary Retention requiring gifford and SVT  You were cared for on the 4th floor by Aicha Rizzo DO under the service of Romana Cure, DO with the [facility] who covers for your primary care physician (PCP), Sammy Vazquez MD, while you were hospitalized  If you have any questions or concerns related to this hospitalization, you may contact us at [phone]  For follow up as well as any medication refills, we recommend that you follow up with your primary care physician  A registered nurse will reach out to you by phone within a few days after your discharge to answer any additional questions that you may have after going home  However, at this time we provide for you here, the most important instructions / recommendations at discharge:     · Notable Medication Adjustments -   · Please take Cardizem 120 daily in addition to your home meds  · Testing Required after Discharge -   · INR as per managing provider   · Important follow up information -   · Follow up with Primary Care physician in 1 week  · Follow up with oct 6th with the electrophysiologist   · Follow up with urology on 11/10  · Other Instructions -   · Please do not remove gifford  Follow up with Urology  · Please review this entire after visit summary as additional general instructions including medication list, appointments, activity, diet, any pertinent wound care, and other additional recommendations from your care team that may be provided for you        Sincerely,     Aicha Rizzo DO

## 2021-09-27 NOTE — ASSESSMENT & PLAN NOTE
· Secondary to urinary retention  · Baseline creatinine 1 2-1 4  · Monitor creatinine    Plan:   · Urinary retention protocol  · Encourage good oral intake  · Replete potassium 20 mEq as needed

## 2021-09-27 NOTE — PLAN OF CARE
Problem: Potential for Falls  Goal: Patient will remain free of falls  Description: INTERVENTIONS:  - Educate patient/family on patient safety including physical limitations  - Instruct patient to call for assistance with activity   - Consult OT/PT to assist with strengthening/mobility   - Keep Call bell within reach  - Keep bed low and locked with side rails adjusted as appropriate  - Keep care items and personal belongings within reach  - Initiate and maintain comfort rounds  - Make Fall Risk Sign visible to staff  - Offer Toileting every 2 Hours, in advance of need  - Initiate/Maintain bed alarm  - Obtain necessary fall risk management equipment:  bed alarm  - Apply yellow socks and bracelet for high fall risk patients  - Consider moving patient to room near nurses station  Outcome: Progressing     Problem: PAIN - ADULT  Goal: Verbalizes/displays adequate comfort level or baseline comfort level  Description: Interventions:  - Encourage patient to monitor pain and request assistance  - Assess pain using appropriate pain scale  - Administer analgesics based on type and severity of pain and evaluate response  - Implement non-pharmacological measures as appropriate and evaluate response  - Consider cultural and social influences on pain and pain management  - Notify physician/advanced practitioner if interventions unsuccessful or patient reports new pain  Outcome: Progressing     Problem: INFECTION - ADULT  Goal: Absence or prevention of progression during hospitalization  Description: INTERVENTIONS:  - Assess and monitor for signs and symptoms of infection  - Monitor lab/diagnostic results  - Monitor all insertion sites, i e  indwelling lines, tubes, and drains  - Monitor endotracheal if appropriate and nasal secretions for changes in amount and color  - [place] appropriate cooling/warming therapies per order  - Administer medications as ordered  - Instruct and encourage patient and family to use good hand hygiene technique  - Identify and instruct in appropriate isolation precautions for identified infection/condition  Outcome: Progressing  Goal: Absence of fever/infection during neutropenic period  Description: INTERVENTIONS:  - Monitor WBC    Outcome: Progressing     Problem: DISCHARGE PLANNING  Goal: Discharge to home or other facility with appropriate resources  Description: INTERVENTIONS:  - Identify barriers to discharge w/patient and caregiver  - Arrange for needed discharge resources and transportation as appropriate  - Identify discharge learning needs (meds, wound care, etc )  - Arrange for interpretive services to assist at discharge as needed  - Refer to Case Management Department for coordinating discharge planning if the patient needs post-hospital services based on physician/advanced practitioner order or complex needs related to functional status, cognitive ability, or social support system  Outcome: Progressing     Problem: CARDIOVASCULAR - ADULT  Goal: Maintains optimal cardiac output and hemodynamic stability  Description: INTERVENTIONS:  - Monitor I/O, vital signs and rhythm  - Monitor for S/S and trends of decreased cardiac output  - Administer and titrate ordered vasoactive medications to optimize hemodynamic stability  - Assess quality of pulses, skin color and temperature  - Assess for signs of decreased coronary artery perfusion  - Instruct patient to report change in severity of symptoms  Outcome: Progressing  Goal: Absence of cardiac dysrhythmias or at baseline rhythm  Description: INTERVENTIONS:  - Continuous cardiac monitoring, vital signs, obtain 12 lead EKG if ordered  - Administer antiarrhythmic and heart rate control medications as ordered  - Monitor electrolytes and administer replacement therapy as ordered  Outcome: Progressing     Problem: GENITOURINARY - ADULT  Goal: Maintains or returns to baseline urinary function  Description: INTERVENTIONS:  - Assess urinary function  - Encourage oral fluids to ensure adequate hydration if ordered  - Administer IV fluids as ordered to ensure adequate hydration  - Administer ordered medications as needed  - Offer frequent toileting  - Follow urinary retention protocol if ordered  Outcome: Progressing  Goal: Absence of urinary retention  Description: INTERVENTIONS:  - Assess patients ability to void and empty bladder  - Monitor I/O  - Bladder scan as needed  - Discuss with physician/AP medications to alleviate retention as needed  - Discuss catheterization for long term situations as appropriate  Outcome: Progressing  Goal: Urinary catheter remains patent  Description: INTERVENTIONS:  - Assess patency of urinary catheter  - If patient has a chronic gifford, consider changing catheter if non-functioning  - Follow guidelines for intermittent irrigation of non-functioning urinary catheter  Outcome: Progressing     Problem: MOBILITY - ADULT  Goal: Maintain or return to baseline ADL function  Description: INTERVENTIONS:  -  Assess patient's ability to carry out ADLs; assess patient's baseline for ADL function and identify physical deficits which impact ability to perform ADLs (bathing, care of mouth/teeth, toileting, grooming, dressing, etc )  - Assess/evaluate cause of self-care deficits   - Assess range of motion  - Assess patient's mobility; develop plan if impaired  - Assess patient's need for assistive devices and provide as appropriate  - Encourage maximum independence but intervene and supervise when necessary  - Involve family in performance of ADLs  - Assess for home care needs following discharge   - Consider OT consult to assist with ADL evaluation and planning for discharge  - Provide patient education as appropriate  Outcome: Progressing  Goal: Maintains/Returns to pre admission functional level  Description: INTERVENTIONS:  - Perform BMAT or MOVE assessment daily    - Set and communicate daily mobility goal to care team and patient/family/caregiver  - Collaborate with rehabilitation services on mobility goals if consulted  - Perform Range of Motion 4 times a day  - Reposition patient every 2 hours    - Dangle patient 4 times a day  - Stand patient 4 times a day  - Ambulate patient 4 times a day  - Out of bed to chair 4 times a day   - Out of bed for meals 4 times a day  - Out of bed for toileting  - Record patient progress and toleration of activity level   Outcome: Progressing

## 2021-09-27 NOTE — ASSESSMENT & PLAN NOTE
· Patient underwent TAVR on 9/16/21  · Post op patient developed acute urinary retention and was discharged with giffrod cath  · Patient was seen in Urology office 9/23/21 for a void trial and catheter was removed  Unfortunatley, patient was unable to void and came in to the ED where he was straight catheterized  · Bladder scan showed 500-trace CT  · UA unremarkable  · Placed gifford on 9/25 and continued on Flomax  On exam on 9/27/21, urine no longer blood-tinged  No clots  Patient reports no pain       Plan, Urology recommendations appreciated:    · Continue Flomax  · F/u outpatient with urology   · Discharge with Gifford

## 2021-09-27 NOTE — ASSESSMENT & PLAN NOTE
· Outpatient blood pressure medications include amlodipine 5 mg and hydrochlorothiazide 25 mg which he only takes p r n  When blood pressure is elevated <140/90  · BP today 121/69  · Follow with cardiologist Dr Dany Mac

## 2021-09-27 NOTE — ASSESSMENT & PLAN NOTE
· Follows with Vascular Dr Stephania Mathias   · Per vascular note:   · CT angiogram shows minimal posterior plaque in the common femoral artery an adequate ileofemoral and iliac systems

## 2021-09-27 NOTE — ASSESSMENT & PLAN NOTE
· Heart rate  on admission  · Patient is asymptomatic  · EKG in ED - SVT with significant ST changes on 09/17  · He had palpitations in the ED associated with SVT   He has been having palpitations at home as well  Unclear if his dizziness/lightheadedness is associated  · INR 3 75 (9/25) - held Coumadin temporarily for supratherapeutic levels    Lab Results   Component Value Date    INR 2 37 (H) 09/27/2021    INR 2 83 (H) 09/26/2021    INR 3 60 (H) 09/25/2021    PROTIME 25 5 (H) 09/27/2021    PROTIME 29 2 (H) 09/26/2021    PROTIME 35 1 (H) 09/25/2021     · Trops x3 negative  · Telemetry- bradycardia (50s) w/ PVCs  · Appears to have nonsustained slow VT  NH remains elevated 330 ms  ( EKG yesterday 292ms)   · Patient was not given Cardizem last 2 doses on 9/25 due to HR in 50s  Plan, Cardiology recomendations appreciated:   · Cardizem 120mg QD  · Restarted Warfarin to follow INR outpatient  · Heart rate is not persistently low  · Two week event recorder     · Follow-up is scheduled- Oct 6th  Should also followup with EP

## 2021-09-27 NOTE — ASSESSMENT & PLAN NOTE
· Status post TAVR on 09/16  · Surgery performed at [place] by Dr Kimmie Marcelo   · Patient developed left bundle-branch block postop-reverted back to narrow complex  · One brief run of nonsustained VT which was monomorphic per Cardiology note  · Cardiology/electrophysiology did not recommend pacemaker for patient       Plan:   F/u with EP outpatient

## 2021-09-27 NOTE — PROGRESS NOTES
Cardiology Progress Note - Truman End 80 y o  male MRN: [id number]    Unit/Bed#: S -01 Encounter: [id number]        Assessment/Recommendations:    Principal Problem:    Urinary retention  Active Problems:    Essential hypertension    Embolism and thrombosis of arteries of the lower extremities (HCC)    Protein C deficiency ([address] )    AAA (abdominal aortic aneurysm) without rupture (HCC)    S/P TAVR (transcatheter aortic valve replacement)    Leukocytosis    SVT (supraventricular tachycardia) (HCC)    FAMILIA (acute kidney injury) (HCC)    Elevated troponin    SVT: No recurrence in cardizem  Still has asymptomatic bradycardia with intermittent AIVR  Tolerating low-dose of Cardizem  Two week event recorder was previously planned by EP  Follow-up is scheduled- Oct 6th  Severe AS status post TAVR: Postop echo normal LV function, RV function, mild concentric LVH   No AS/AI  Post TAVR transient LBBB  Continue Plavix  Nonocclusive CAD:  No angina  Continue Plavix and statins  Anticoagulation management for Protein C deficiency as per primary team in light of hematuria  Thank you for allowing us to participate in the care of this patient  If there are any further questions regarding this patient please feel free to contact us  Subjective:     Overnight events reviewed  Denies dizziness or syncope  Denies chest pain  Hematuria improving  Review of systems otherwise negative    Telemetry Review:  Periods of sinus bradycardia, first-degree AV block, frequent PVCs and accelerated idioventricular rhythm      Cardiac testing:       Current Facility-Administered Medications:     acetaminophen (TYLENOL) tablet 650 mg, 650 mg, Oral, Q6H PRN, Michael Steward MD    atorvastatin (LIPITOR) tablet 80 mg, 80 mg, Oral, Daily, Michael Steward MD, 80 mg at 09/26/21 0802    clopidogrel (PLAVIX) tablet 75 mg, 75 mg, Oral, Daily, Michael Steward MD, 75 mg at 09/26/21 0803    diltiazem (CARDIZEM CD) 24 hr capsule 120 mg, 120 mg, Oral, Daily, Thompson Ahumada, MD, 120 mg at 09/26/21 1457    docusate sodium (COLACE) capsule 100 mg, 100 mg, Oral, BID PRN, Jone Jose MD    pantoprazole (PROTONIX) EC tablet 40 mg, 40 mg, Oral, Early Morning, Jone Jose MD, 40 mg at 09/27/21 3700    polyethylene glycol (MIRALAX) packet 17 g, 17 g, Oral, Daily PRN, Jone Jose MD    [facility]) capsule 0 4 mg, 0 4 mg, Oral, Daily With Joaquín Watts MD, 0 4 mg at 09/26/21 1716     Objective:     Vitals:   Blood pressure 121/69, pulse (!) 52, temperature 97 6 °F (36 4 °C), resp  rate 16, height 5' 7" (1 702 m), weight 81 4 kg (179 lb 7 3 oz), SpO2 95 %  Body mass index is 28 11 kg/m²  Orthostatic Blood Pressures      Most Recent Value   Blood Pressure  121/69 filed at 09/27/2021 3239   Patient Position - Orthostatic VS  Lying filed at 09/26/2021 8106         Systolic (68RGC), YCP:601 , Min:121 , VDK:087     Diastolic (96QZF), PAL:82, Min:65, Max:77      Intake/Output Summary (Last 24 hours) at 9/27/2021 0819  Last data filed at 9/27/2021 8476  Gross per 24 hour   Intake 840 ml   Output 1525 ml   Net -685 ml     Weight (last 2 days)     Date/Time   Weight    09/27/21 0600   81 4 (179 45)    09/26/21 0600   79 9 (176 15)    09/25/21 02:28:37   83 9 (184 97)    09/25/21 0208   83 9 (185)              Physical Exam  HENT:      Head: Normocephalic  Cardiovascular:      Rate and Rhythm: Frequent extrasystoles are present  Heart sounds: Normal heart sounds, S1 normal and S2 normal    Pulmonary:      Effort: Pulmonary effort is normal       Breath sounds: Normal breath sounds  No wheezing  Abdominal:      Tenderness: There is no abdominal tenderness  Musculoskeletal:      Right lower leg: No edema  Left lower leg: No edema  Skin:     General: Skin is warm  Neurological:      General: No focal deficit present  Mental Status: He is alert  Labs & Results:    Results from last 7 days   Lab Units 09/25/21  0956 09/25/21  0637 09/25/21  0318   TROPONIN I ng/mL 0 06* 0 06* 0 06*     Results from last 7 days   Lab Units 09/26/21 0437 09/25/21 0619 09/25/21  0313   WBC Thousand/uL 13 19* 16 26* 16 84*   HEMOGLOBIN g/dL 13 6 14 5 14 7   HEMATOCRIT % 39 8 41 8 42 6   PLATELETS Thousands/uL 237 235 258         Results from last 7 days   Lab Units 09/27/21 0449 09/26/21 0437 09/25/21 0619 09/25/21  0234   POTASSIUM mmol/L 3 8 3 8 3 2*  --    CHLORIDE mmol/L 105 105 99*  --    CO2 mmol/L 28 29 32  --    CO2, I-STAT mmol/L  --   --   --  34*   BUN mg/dL 30* 34* 38*  --    CREATININE mg/dL 1 38* 1 55* 1 64*  --    CALCIUM mg/dL 8 1* 8 3 8 5  --    ALK PHOS U/L 152*  --  163*  --    ALT U/L 39  --  52  --    AST U/L 23  --  32  --    GLUCOSE, ISTAT mg/dl  --   --   --  131     Results from last 7 days   Lab Units 09/27/21 0449 09/26/21 0437 09/25/21  0619   INR  2 37* 2 83* 3 60*   PTT seconds  --   --  37     Results from last 7 days   Lab Units 09/25/21  0313   MAGNESIUM mg/dL 2 1     No results for input(s): NTBNP in the last 72 hours  Available cardiology studies, imaging and lab results independently reviewed

## 2021-09-27 NOTE — CASE MANAGEMENT
Case Management Discharge Planning Note    Patient name Nicole MARTE /S -08 MRN [id number]  : 1950 Date 2021       Current Admission Date: 2021  Current Admission Diagnosis:  Urinary retention  Previous Admission - Discharge Date:21   LOS (days): 2  Geometric Mean LOS (GMLOS) (days): 3 10  Days to GMLOS:0 7 Previous Discharge Diagnosis:  There are no discharge diagnoses documented for the most recent discharge  OBJECTIVE:  Risk of Unplanned Readmission Score: 18   Bundle(if applicable):    Current admission status: Inpatient  Preferred Pharmacy:   [id number]- [place], [place] - [address] [facility]  [address] [address]  Phone: [phone] Fax: [phone]    Primary Care Provider: Rivera Gil MD    Primary Insurance: MEDICARE  Secondary Insurance: AARP    DISCHARGE DETAILS:    Discharge planning discussed with[de-identified] Patient and Spouse  Freedom of Choice: Yes  Comments - Freedom of Choice: CM introduced self and role  Patient and spouse deny any CM needs at this time, including VNA services  Patient reports that he feels capable of caring for his gifford and does not feel that he will need VNA services  CM reviewed to contact is PCP if he feels that he needfs VNA services upon dc  Patient and spouse verbalized their understanding  Contacts  Patient Contacts: Spouse  Relationship to Patient[de-identified] Family  Contact Method:  In Person  Reason/Outcome: Continuity of Care, Discharge [address]         Is the patient interested in [facility] at discharge?: No    DME Referral Provided  Referral made for DME?: No

## 2021-09-28 ENCOUNTER — TRANSITIONAL CARE MANAGEMENT (OUTPATIENT)
Dept: FAMILY MEDICINE CLINIC | Facility: CLINIC | Age: 71
End: 2021-09-28

## 2021-10-06 ENCOUNTER — OFFICE VISIT (OUTPATIENT)
Dept: CARDIOLOGY CLINIC | Facility: CLINIC | Age: 71
End: 2021-10-06
Payer: MEDICARE

## 2021-10-06 VITALS
WEIGHT: 188 LBS | HEIGHT: 67 IN | SYSTOLIC BLOOD PRESSURE: 128 MMHG | DIASTOLIC BLOOD PRESSURE: 62 MMHG | OXYGEN SATURATION: 98 % | BODY MASS INDEX: 29.51 KG/M2 | HEART RATE: 74 BPM

## 2021-10-06 DIAGNOSIS — Z79.01 ANTICOAGULATED ON COUMADIN: ICD-10-CM

## 2021-10-06 DIAGNOSIS — I35.0 NONRHEUMATIC AORTIC VALVE STENOSIS: ICD-10-CM

## 2021-10-06 DIAGNOSIS — I73.9 CLAUDICATION OF LEFT LOWER EXTREMITY (HCC): ICD-10-CM

## 2021-10-06 DIAGNOSIS — Z51.81 ANTICOAGULATION GOAL OF INR 2 TO 3: ICD-10-CM

## 2021-10-06 DIAGNOSIS — E78.5 DYSLIPIDEMIA: ICD-10-CM

## 2021-10-06 DIAGNOSIS — I73.9 PERIPHERAL ARTERY DISEASE (HCC): ICD-10-CM

## 2021-10-06 DIAGNOSIS — I44.7 LBBB (LEFT BUNDLE BRANCH BLOCK): ICD-10-CM

## 2021-10-06 DIAGNOSIS — I47.1 SVT (SUPRAVENTRICULAR TACHYCARDIA) (HCC): ICD-10-CM

## 2021-10-06 DIAGNOSIS — Z79.01 ANTICOAGULATION GOAL OF INR 2 TO 3: ICD-10-CM

## 2021-10-06 DIAGNOSIS — Z95.2 S/P TAVR (TRANSCATHETER AORTIC VALVE REPLACEMENT): ICD-10-CM

## 2021-10-06 DIAGNOSIS — I10 ESSENTIAL HYPERTENSION: Primary | ICD-10-CM

## 2021-10-06 PROCEDURE — 93000 ELECTROCARDIOGRAM COMPLETE: CPT | Performed by: INTERNAL MEDICINE

## 2021-10-06 PROCEDURE — 99214 OFFICE O/P EST MOD 30 MIN: CPT | Performed by: INTERNAL MEDICINE

## 2021-10-06 RX ORDER — DILTIAZEM HYDROCHLORIDE 120 MG/1
120 CAPSULE, COATED, EXTENDED RELEASE ORAL DAILY
Qty: 30 CAPSULE | Refills: 2 | Status: SHIPPED | OUTPATIENT
Start: 2021-10-06 | End: 2022-04-06 | Stop reason: SDUPTHER

## 2021-10-06 RX ORDER — TORSEMIDE 20 MG/1
20 TABLET ORAL DAILY
COMMUNITY
End: 2021-10-06 | Stop reason: ALTCHOICE

## 2021-10-06 RX ORDER — AMLODIPINE BESYLATE 5 MG/1
5 TABLET ORAL DAILY
COMMUNITY
End: 2021-10-06 | Stop reason: ALTCHOICE

## 2021-10-07 ENCOUNTER — OFFICE VISIT (OUTPATIENT)
Dept: FAMILY MEDICINE CLINIC | Facility: CLINIC | Age: 71
End: 2021-10-07
Payer: MEDICARE

## 2021-10-07 ENCOUNTER — IMMUNIZATIONS (OUTPATIENT)
Dept: FAMILY MEDICINE CLINIC | Facility: CLINIC | Age: 71
End: 2021-10-07
Payer: MEDICARE

## 2021-10-07 ENCOUNTER — APPOINTMENT (OUTPATIENT)
Dept: LAB | Facility: MEDICAL CENTER | Age: 71
End: 2021-10-07
Payer: MEDICARE

## 2021-10-07 VITALS
SYSTOLIC BLOOD PRESSURE: 124 MMHG | HEIGHT: 67 IN | OXYGEN SATURATION: 97 % | HEART RATE: 66 BPM | TEMPERATURE: 97.4 F | BODY MASS INDEX: 29.73 KG/M2 | WEIGHT: 189.4 LBS | DIASTOLIC BLOOD PRESSURE: 68 MMHG

## 2021-10-07 DIAGNOSIS — Z79.01 ANTICOAGULATION GOAL OF INR 2 TO 3: ICD-10-CM

## 2021-10-07 DIAGNOSIS — I10 ESSENTIAL HYPERTENSION: ICD-10-CM

## 2021-10-07 DIAGNOSIS — Z23 ENCOUNTER FOR IMMUNIZATION: Primary | ICD-10-CM

## 2021-10-07 DIAGNOSIS — I73.9 INTERMITTENT CLAUDICATION (HCC): ICD-10-CM

## 2021-10-07 DIAGNOSIS — I73.9 PERIPHERAL ARTERY DISEASE (HCC): ICD-10-CM

## 2021-10-07 DIAGNOSIS — I71.4 AAA (ABDOMINAL AORTIC ANEURYSM) WITHOUT RUPTURE (HCC): Primary | ICD-10-CM

## 2021-10-07 DIAGNOSIS — G47.09 OTHER INSOMNIA: ICD-10-CM

## 2021-10-07 DIAGNOSIS — Z51.81 ANTICOAGULATION GOAL OF INR 2 TO 3: ICD-10-CM

## 2021-10-07 DIAGNOSIS — I47.1 SVT (SUPRAVENTRICULAR TACHYCARDIA) (HCC): ICD-10-CM

## 2021-10-07 DIAGNOSIS — R97.20 ELEVATED PSA, BETWEEN 10 AND LESS THAN 20 NG/ML: ICD-10-CM

## 2021-10-07 DIAGNOSIS — I35.0 NONRHEUMATIC AORTIC VALVE STENOSIS: ICD-10-CM

## 2021-10-07 DIAGNOSIS — E78.5 DYSLIPIDEMIA: ICD-10-CM

## 2021-10-07 DIAGNOSIS — R33.9 URINARY RETENTION: ICD-10-CM

## 2021-10-07 LAB
ALBUMIN SERPL BCP-MCNC: 3 G/DL (ref 3.5–5)
ALP SERPL-CCNC: 221 U/L (ref 46–116)
ALT SERPL W P-5'-P-CCNC: 31 U/L (ref 12–78)
ANION GAP SERPL CALCULATED.3IONS-SCNC: 4 MMOL/L (ref 4–13)
AST SERPL W P-5'-P-CCNC: 22 U/L (ref 5–45)
BASOPHILS # BLD AUTO: 0.04 THOUSANDS/ΜL (ref 0–0.1)
BASOPHILS NFR BLD AUTO: 1 % (ref 0–1)
BILIRUB SERPL-MCNC: 0.39 MG/DL (ref 0.2–1)
BUN SERPL-MCNC: 21 MG/DL (ref 5–25)
CALCIUM ALBUM COR SERPL-MCNC: 10.1 MG/DL (ref 8.3–10.1)
CALCIUM SERPL-MCNC: 9.3 MG/DL (ref 8.3–10.1)
CHLORIDE SERPL-SCNC: 110 MMOL/L (ref 100–108)
CO2 SERPL-SCNC: 28 MMOL/L (ref 21–32)
CREAT SERPL-MCNC: 1.49 MG/DL (ref 0.6–1.3)
EOSINOPHIL # BLD AUTO: 0.28 THOUSAND/ΜL (ref 0–0.61)
EOSINOPHIL NFR BLD AUTO: 3 % (ref 0–6)
ERYTHROCYTE [DISTWIDTH] IN BLOOD BY AUTOMATED COUNT: 12.8 % (ref 11.6–15.1)
GFR SERPL CREATININE-BSD FRML MDRD: 47 ML/MIN/1.73SQ M
GLUCOSE SERPL-MCNC: 96 MG/DL (ref 65–140)
HCT VFR BLD AUTO: 44.4 % (ref 36.5–49.3)
HGB BLD-MCNC: 13.8 G/DL (ref 12–17)
IMM GRANULOCYTES # BLD AUTO: 0.02 THOUSAND/UL (ref 0–0.2)
IMM GRANULOCYTES NFR BLD AUTO: 0 % (ref 0–2)
INR PPP: 4.06 (ref 0.84–1.19)
LYMPHOCYTES # BLD AUTO: 1.64 THOUSANDS/ΜL (ref 0.6–4.47)
LYMPHOCYTES NFR BLD AUTO: 19 % (ref 14–44)
MCH RBC QN AUTO: 30.1 PG (ref 26.8–34.3)
MCHC RBC AUTO-ENTMCNC: 31.1 G/DL (ref 31.4–37.4)
MCV RBC AUTO: 97 FL (ref 82–98)
MONOCYTES # BLD AUTO: 0.87 THOUSAND/ΜL (ref 0.17–1.22)
MONOCYTES NFR BLD AUTO: 10 % (ref 4–12)
NEUTROPHILS # BLD AUTO: 6.03 THOUSANDS/ΜL (ref 1.85–7.62)
NEUTS SEG NFR BLD AUTO: 67 % (ref 43–75)
NRBC BLD AUTO-RTO: 0 /100 WBCS
PLATELET # BLD AUTO: 253 THOUSANDS/UL (ref 149–390)
PMV BLD AUTO: 9.1 FL (ref 8.9–12.7)
POTASSIUM SERPL-SCNC: 4.5 MMOL/L (ref 3.5–5.3)
PROT SERPL-MCNC: 7.1 G/DL (ref 6.4–8.2)
PROTHROMBIN TIME: 37.3 SECONDS (ref 11.6–14.5)
PSA SERPL-MCNC: 13.5 NG/ML (ref 0–4)
RBC # BLD AUTO: 4.58 MILLION/UL (ref 3.88–5.62)
SODIUM SERPL-SCNC: 142 MMOL/L (ref 136–145)
WBC # BLD AUTO: 8.88 THOUSAND/UL (ref 4.31–10.16)

## 2021-10-07 PROCEDURE — 36415 COLL VENOUS BLD VENIPUNCTURE: CPT

## 2021-10-07 PROCEDURE — 90670 PCV13 VACCINE IM: CPT

## 2021-10-07 PROCEDURE — 80053 COMPREHEN METABOLIC PANEL: CPT

## 2021-10-07 PROCEDURE — 85025 COMPLETE CBC W/AUTO DIFF WBC: CPT

## 2021-10-07 PROCEDURE — G0009 ADMIN PNEUMOCOCCAL VACCINE: HCPCS

## 2021-10-07 PROCEDURE — 85610 PROTHROMBIN TIME: CPT

## 2021-10-07 PROCEDURE — 99496 TRANSJ CARE MGMT HIGH F2F 7D: CPT | Performed by: FAMILY MEDICINE

## 2021-10-07 PROCEDURE — 90662 IIV NO PRSV INCREASED AG IM: CPT

## 2021-10-07 PROCEDURE — G0008 ADMIN INFLUENZA VIRUS VAC: HCPCS

## 2021-10-07 PROCEDURE — 84153 ASSAY OF PSA TOTAL: CPT

## 2021-10-07 RX ORDER — ATORVASTATIN CALCIUM 80 MG/1
80 TABLET, FILM COATED ORAL DAILY
Qty: 90 TABLET | Refills: 1 | Status: SHIPPED | OUTPATIENT
Start: 2021-10-07 | End: 2021-10-13 | Stop reason: SDUPTHER

## 2021-10-08 ENCOUNTER — HOSPITAL ENCOUNTER (EMERGENCY)
Facility: HOSPITAL | Age: 71
Discharge: HOME | End: 2021-10-08
Attending: EMERGENCY MEDICINE | Admitting: EMERGENCY MEDICINE
Payer: MEDICARE

## 2021-10-08 VITALS
TEMPERATURE: 97.8 F | OXYGEN SATURATION: 99 % | RESPIRATION RATE: 16 BRPM | DIASTOLIC BLOOD PRESSURE: 70 MMHG | SYSTOLIC BLOOD PRESSURE: 136 MMHG | HEART RATE: 55 BPM

## 2021-10-08 DIAGNOSIS — R31.9 HEMATURIA: Primary | ICD-10-CM

## 2021-10-08 DIAGNOSIS — R79.1 ELEVATED INR: ICD-10-CM

## 2021-10-08 LAB
APTT PPP: 46 SECONDS (ref 23–37)
BACTERIA UR QL AUTO: ABNORMAL /HPF
BASOPHILS # BLD AUTO: 0.04 THOUSANDS/ΜL (ref 0–0.1)
BASOPHILS NFR BLD AUTO: 1 % (ref 0–1)
BILIRUB UR QL STRIP: ABNORMAL
CLARITY UR: ABNORMAL
COLOR UR: ABNORMAL
EOSINOPHIL # BLD AUTO: 0.2 THOUSAND/ΜL (ref 0–0.61)
EOSINOPHIL NFR BLD AUTO: 3 % (ref 0–6)
ERYTHROCYTE [DISTWIDTH] IN BLOOD BY AUTOMATED COUNT: 12.9 % (ref 11.6–15.1)
GLUCOSE UR STRIP-MCNC: ABNORMAL MG/DL
HCT VFR BLD AUTO: 39.7 % (ref 36.5–49.3)
HGB BLD-MCNC: 12.8 G/DL (ref 12–17)
HGB UR QL STRIP.AUTO: ABNORMAL
IMM GRANULOCYTES # BLD AUTO: 0.03 THOUSAND/UL (ref 0–0.2)
IMM GRANULOCYTES NFR BLD AUTO: 0 % (ref 0–2)
INR PPP: 3.98 (ref 0.84–1.19)
KETONES UR STRIP-MCNC: ABNORMAL MG/DL
LEUKOCYTE ESTERASE UR QL STRIP: ABNORMAL
LYMPHOCYTES # BLD AUTO: 1.04 THOUSANDS/ΜL (ref 0.6–4.47)
LYMPHOCYTES NFR BLD AUTO: 13 % (ref 14–44)
MCH RBC QN AUTO: 30.3 PG (ref 26.8–34.3)
MCHC RBC AUTO-ENTMCNC: 32.2 G/DL (ref 31.4–37.4)
MCV RBC AUTO: 94 FL (ref 82–98)
MONOCYTES # BLD AUTO: 0.87 THOUSAND/ΜL (ref 0.17–1.22)
MONOCYTES NFR BLD AUTO: 11 % (ref 4–12)
NEUTROPHILS # BLD AUTO: 5.94 THOUSANDS/ΜL (ref 1.85–7.62)
NEUTS SEG NFR BLD AUTO: 72 % (ref 43–75)
NITRITE UR QL STRIP: ABNORMAL
NON-SQ EPI CELLS URNS QL MICRO: ABNORMAL /HPF
NRBC BLD AUTO-RTO: 0 /100 WBCS
PH UR STRIP.AUTO: ABNORMAL [PH]
PLATELET # BLD AUTO: 208 THOUSANDS/UL (ref 149–390)
PMV BLD AUTO: 8.6 FL (ref 8.9–12.7)
PROT UR STRIP-MCNC: ABNORMAL MG/DL
PROTHROMBIN TIME: 37.9 SECONDS (ref 11.6–14.5)
RBC # BLD AUTO: 4.23 MILLION/UL (ref 3.88–5.62)
RBC #/AREA URNS AUTO: ABNORMAL /HPF
UROBILINOGEN UR QL STRIP.AUTO: ABNORMAL E.U./DL
WBC # BLD AUTO: 8.12 THOUSAND/UL (ref 4.31–10.16)
WBC #/AREA URNS AUTO: ABNORMAL /HPF

## 2021-10-08 PROCEDURE — 85610 PROTHROMBIN TIME: CPT

## 2021-10-08 PROCEDURE — 85025 COMPLETE CBC W/AUTO DIFF WBC: CPT

## 2021-10-08 PROCEDURE — 36415 COLL VENOUS BLD VENIPUNCTURE: CPT

## 2021-10-08 PROCEDURE — 99284 EMERGENCY DEPT VISIT MOD MDM: CPT

## 2021-10-08 PROCEDURE — 85730 THROMBOPLASTIN TIME PARTIAL: CPT

## 2021-10-08 PROCEDURE — 99284 EMERGENCY DEPT VISIT MOD MDM: CPT | Performed by: EMERGENCY MEDICINE

## 2021-10-08 PROCEDURE — 81001 URINALYSIS AUTO W/SCOPE: CPT

## 2021-10-11 ENCOUNTER — PROCEDURE VISIT (OUTPATIENT)
Dept: UROLOGY | Facility: CLINIC | Age: 71
End: 2021-10-11
Payer: MEDICARE

## 2021-10-11 VITALS
HEART RATE: 86 BPM | WEIGHT: 187 LBS | SYSTOLIC BLOOD PRESSURE: 142 MMHG | BODY MASS INDEX: 29.35 KG/M2 | HEIGHT: 67 IN | DIASTOLIC BLOOD PRESSURE: 86 MMHG

## 2021-10-11 DIAGNOSIS — R33.9 URINARY RETENTION: Primary | ICD-10-CM

## 2021-10-11 LAB — POST-VOID RESIDUAL VOLUME, ML POC: 0 ML

## 2021-10-11 PROCEDURE — 99211 OFF/OP EST MAY X REQ PHY/QHP: CPT

## 2021-10-11 PROCEDURE — 51798 US URINE CAPACITY MEASURE: CPT

## 2021-10-13 ENCOUNTER — OFFICE VISIT (OUTPATIENT)
Dept: FAMILY MEDICINE CLINIC | Facility: CLINIC | Age: 71
End: 2021-10-13
Payer: MEDICARE

## 2021-10-13 ENCOUNTER — APPOINTMENT (OUTPATIENT)
Dept: LAB | Facility: MEDICAL CENTER | Age: 71
End: 2021-10-13
Payer: MEDICARE

## 2021-10-13 VITALS
DIASTOLIC BLOOD PRESSURE: 84 MMHG | TEMPERATURE: 97.5 F | BODY MASS INDEX: 29.44 KG/M2 | OXYGEN SATURATION: 98 % | WEIGHT: 187.6 LBS | HEART RATE: 88 BPM | SYSTOLIC BLOOD PRESSURE: 142 MMHG | HEIGHT: 67 IN

## 2021-10-13 DIAGNOSIS — D68.59 PROTEIN C DEFICIENCY (HCC): Primary | ICD-10-CM

## 2021-10-13 DIAGNOSIS — I73.9 PERIPHERAL ARTERY DISEASE (HCC): ICD-10-CM

## 2021-10-13 DIAGNOSIS — Z51.81 ANTICOAGULATION GOAL OF INR 2 TO 3: ICD-10-CM

## 2021-10-13 DIAGNOSIS — R97.20 ELEVATED PSA: ICD-10-CM

## 2021-10-13 DIAGNOSIS — I73.9 INTERMITTENT CLAUDICATION (HCC): ICD-10-CM

## 2021-10-13 DIAGNOSIS — Z79.01 ANTICOAGULATION GOAL OF INR 2 TO 3: ICD-10-CM

## 2021-10-13 LAB
INR PPP: 2.06 (ref 0.84–1.19)
PROTHROMBIN TIME: 22.2 SECONDS (ref 11.6–14.5)

## 2021-10-13 PROCEDURE — 99214 OFFICE O/P EST MOD 30 MIN: CPT | Performed by: FAMILY MEDICINE

## 2021-10-13 PROCEDURE — 85610 PROTHROMBIN TIME: CPT | Performed by: FAMILY MEDICINE

## 2021-10-13 PROCEDURE — 36415 COLL VENOUS BLD VENIPUNCTURE: CPT | Performed by: FAMILY MEDICINE

## 2021-10-13 RX ORDER — ATORVASTATIN CALCIUM 80 MG/1
80 TABLET, FILM COATED ORAL DAILY
Qty: 30 TABLET | Refills: 3 | Status: SHIPPED | OUTPATIENT
Start: 2021-10-13 | End: 2022-02-07

## 2021-10-14 ENCOUNTER — APPOINTMENT (OUTPATIENT)
Dept: LAB | Facility: MEDICAL CENTER | Age: 71
End: 2021-10-14
Payer: MEDICARE

## 2021-10-14 PROCEDURE — 36415 COLL VENOUS BLD VENIPUNCTURE: CPT

## 2021-10-15 ENCOUNTER — APPOINTMENT (OUTPATIENT)
Dept: LAB | Facility: MEDICAL CENTER | Age: 71
End: 2021-10-15
Payer: MEDICARE

## 2021-10-18 ENCOUNTER — APPOINTMENT (OUTPATIENT)
Dept: LAB | Facility: MEDICAL CENTER | Age: 71
End: 2021-10-18
Payer: MEDICARE

## 2021-10-19 ENCOUNTER — APPOINTMENT (OUTPATIENT)
Dept: LAB | Facility: MEDICAL CENTER | Age: 71
End: 2021-10-19
Payer: MEDICARE

## 2021-10-20 ENCOUNTER — OFFICE VISIT (OUTPATIENT)
Dept: CARDIAC SURGERY | Facility: CLINIC | Age: 71
End: 2021-10-20
Payer: MEDICARE

## 2021-10-20 ENCOUNTER — APPOINTMENT (OUTPATIENT)
Dept: LAB | Facility: HOSPITAL | Age: 71
End: 2021-10-20
Attending: FAMILY MEDICINE
Payer: MEDICARE

## 2021-10-20 ENCOUNTER — HOSPITAL ENCOUNTER (OUTPATIENT)
Dept: NON INVASIVE DIAGNOSTICS | Facility: HOSPITAL | Age: 71
Discharge: HOME | End: 2021-10-20
Payer: MEDICARE

## 2021-10-20 ENCOUNTER — HOSPITAL ENCOUNTER (OUTPATIENT)
Dept: NON INVASIVE DIAGNOSTICS | Facility: HOSPITAL | Age: 71
Discharge: HOME | End: 2021-10-20
Attending: THORACIC SURGERY (CARDIOTHORACIC VASCULAR SURGERY)
Payer: MEDICARE

## 2021-10-20 ENCOUNTER — CLINICAL SUPPORT (OUTPATIENT)
Dept: CARDIOLOGY CLINIC | Facility: CLINIC | Age: 71
End: 2021-10-20
Payer: MEDICARE

## 2021-10-20 VITALS
DIASTOLIC BLOOD PRESSURE: 64 MMHG | HEIGHT: 67 IN | RESPIRATION RATE: 18 BRPM | WEIGHT: 188.3 LBS | BODY MASS INDEX: 29.55 KG/M2 | HEART RATE: 72 BPM | SYSTOLIC BLOOD PRESSURE: 142 MMHG | TEMPERATURE: 97.8 F | OXYGEN SATURATION: 97 %

## 2021-10-20 VITALS
SYSTOLIC BLOOD PRESSURE: 142 MMHG | DIASTOLIC BLOOD PRESSURE: 84 MMHG | BODY MASS INDEX: 29.35 KG/M2 | HEIGHT: 67 IN | HEART RATE: 88 BPM | WEIGHT: 187 LBS

## 2021-10-20 DIAGNOSIS — Z48.89 ENCOUNTER FOR POSTOPERATIVE CARE: ICD-10-CM

## 2021-10-20 DIAGNOSIS — I44.7 NEW ONSET LEFT BUNDLE BRANCH BLOCK (LBBB): ICD-10-CM

## 2021-10-20 DIAGNOSIS — I47.2 NSVT (NONSUSTAINED VENTRICULAR TACHYCARDIA) (HCC): ICD-10-CM

## 2021-10-20 DIAGNOSIS — I35.0 NONRHEUMATIC AORTIC VALVE STENOSIS: ICD-10-CM

## 2021-10-20 DIAGNOSIS — Z95.2 S/P TAVR (TRANSCATHETER AORTIC VALVE REPLACEMENT): Primary | ICD-10-CM

## 2021-10-20 DIAGNOSIS — R00.1 SINUS BRADYCARDIA: ICD-10-CM

## 2021-10-20 LAB
AORTIC ROOT: 3.1 CM
AORTIC VALVE MEAN VELOCITY: 13.5 M/S
APICAL FOUR CHAMBER EJECTION FRACTION: 54 %
ASCENDING AORTA: 3.4 CM
ATRIAL RATE: 67 BPM
AV AREA BY CONTINUOUS VTI: 1.7 CM2
AV AREA PEAK VELOCITY: 1.4 CM2
AV LVOT MEAN GRADIENT: 2 MMHG
AV LVOT PEAK GRADIENT: 4 MMHG
AV MEAN GRADIENT: 10 MMHG
AV PEAK GRADIENT: 23 MMHG
AV VALVE AREA: 1.69 CM2
DOP CALC AO VTI: 40.31 CM
DOP CALC LVOT AREA: 3.46 CM2
DOP CALC LVOT DIAMETER: 2.1 CM
DOP CALC LVOT PEAK VEL VTI: 19.66 CM
DOP CALC LVOT PEAK VEL: 0.94 M/S
DOP CALC LVOT STROKE INDEX: 36 ML/M2
DOP CALC LVOT STROKE VOLUME: 68.06 CM3
E WAVE DECELERATION TIME: 253 MS
FRACTIONAL SHORTENING: 31 % (ref 28–44)
INTERVENTRICULAR SEPTUM IN DIASTOLE (PARASTERNAL SHORT AXIS VIEW): 1.2 CM
LEFT INTERNAL DIMENSION IN SYSTOLE: 3.6 CM (ref 2.1–4)
LEFT VENTRICULAR INTERNAL DIMENSION IN DIASTOLE: 5.2 CM (ref 4.93–7.34)
LEFT VENTRICULAR POSTERIOR WALL IN END DIASTOLE: 0.9 CM
LEFT VENTRICULAR STROKE VOLUME: 73 ML
LV EF: 52 %
MV E'TISSUE VEL-SEP: 9 CM/S
MV PEAK A VEL: 1.44 M/S
MV PEAK E VEL: 123 CM/S
MV STENOSIS PRESSURE HALF TIME: 0 MS
P AXIS: 51 DEGREES
PR INTERVAL: 218 MS
QRS AXIS: -19 DEGREES
QRSD INTERVAL: 84 MS
QT INTERVAL: 402 MS
QTC INTERVAL: 424 MS
RIGHT VENTRICLE ID DIMENSION: 2.9 CM
SL CV LV EF: 55
SL CV PED ECHO LEFT VENTRICLE DIASTOLIC VOLUME (MOD BIPLANE) 2D: 130 ML
SL CV PED ECHO LEFT VENTRICLE SYSTOLIC VOLUME (MOD BIPLANE) 2D: 56 ML
T WAVE AXIS: 51 DEGREES
TRICUSPID VALVE S': 0.9 CM/S
VENTRICULAR RATE: 67 BPM
Z-SCORE OF LEFT VENTRICULAR DIMENSION IN END SYSTOLE: -1.46

## 2021-10-20 PROCEDURE — 93010 ELECTROCARDIOGRAM REPORT: CPT | Performed by: INTERNAL MEDICINE

## 2021-10-20 PROCEDURE — 93248 EXT ECG>7D<15D REV&INTERPJ: CPT | Performed by: INTERNAL MEDICINE

## 2021-10-20 PROCEDURE — 93306 TTE W/DOPPLER COMPLETE: CPT | Performed by: INTERNAL MEDICINE

## 2021-10-20 PROCEDURE — 99215 OFFICE O/P EST HI 40 MIN: CPT | Performed by: NURSE PRACTITIONER

## 2021-10-20 PROCEDURE — 93005 ELECTROCARDIOGRAM TRACING: CPT | Performed by: THORACIC SURGERY (CARDIOTHORACIC VASCULAR SURGERY)

## 2021-10-20 PROCEDURE — 93306 TTE W/DOPPLER COMPLETE: CPT

## 2021-10-21 ENCOUNTER — APPOINTMENT (OUTPATIENT)
Dept: LAB | Facility: MEDICAL CENTER | Age: 71
End: 2021-10-21
Payer: MEDICARE

## 2021-10-22 ENCOUNTER — OFFICE VISIT (OUTPATIENT)
Dept: FAMILY MEDICINE CLINIC | Facility: CLINIC | Age: 71
End: 2021-10-22
Payer: MEDICARE

## 2021-10-22 VITALS
SYSTOLIC BLOOD PRESSURE: 124 MMHG | HEART RATE: 60 BPM | TEMPERATURE: 97.8 F | OXYGEN SATURATION: 98 % | BODY MASS INDEX: 29.54 KG/M2 | WEIGHT: 188.2 LBS | DIASTOLIC BLOOD PRESSURE: 70 MMHG | HEIGHT: 67 IN

## 2021-10-22 DIAGNOSIS — Z79.01 ANTICOAGULATION GOAL OF INR 2 TO 3: Primary | ICD-10-CM

## 2021-10-22 DIAGNOSIS — Z51.81 ANTICOAGULATION GOAL OF INR 2 TO 3: Primary | ICD-10-CM

## 2021-10-22 PROCEDURE — 99214 OFFICE O/P EST MOD 30 MIN: CPT | Performed by: FAMILY MEDICINE

## 2021-10-25 ENCOUNTER — APPOINTMENT (OUTPATIENT)
Dept: LAB | Facility: MEDICAL CENTER | Age: 71
End: 2021-10-25
Payer: MEDICARE

## 2021-10-25 DIAGNOSIS — Z51.81 ANTICOAGULATION GOAL OF INR 2 TO 3: ICD-10-CM

## 2021-10-25 DIAGNOSIS — Z79.01 ANTICOAGULATION GOAL OF INR 2 TO 3: ICD-10-CM

## 2021-10-25 LAB
INR PPP: 1.48 (ref 0.84–1.19)
PROTHROMBIN TIME: 17.3 SECONDS (ref 11.6–14.5)

## 2021-10-25 PROCEDURE — 36415 COLL VENOUS BLD VENIPUNCTURE: CPT

## 2021-10-25 PROCEDURE — 85610 PROTHROMBIN TIME: CPT

## 2021-10-28 ENCOUNTER — APPOINTMENT (OUTPATIENT)
Dept: LAB | Facility: MEDICAL CENTER | Age: 71
End: 2021-10-28
Payer: MEDICARE

## 2021-10-28 DIAGNOSIS — Z51.81 ANTICOAGULATION GOAL OF INR 2 TO 3: ICD-10-CM

## 2021-10-28 DIAGNOSIS — Z79.01 ANTICOAGULATION GOAL OF INR 2 TO 3: ICD-10-CM

## 2021-10-28 LAB
INR PPP: 1.49 (ref 0.84–1.19)
PROTHROMBIN TIME: 17.3 SECONDS (ref 11.6–14.5)

## 2021-10-28 PROCEDURE — 36415 COLL VENOUS BLD VENIPUNCTURE: CPT

## 2021-10-28 PROCEDURE — 85610 PROTHROMBIN TIME: CPT

## 2021-10-29 DIAGNOSIS — Z79.01 ANTICOAGULATION GOAL OF INR 2 TO 3: Primary | ICD-10-CM

## 2021-10-29 DIAGNOSIS — Z51.81 ANTICOAGULATION GOAL OF INR 2 TO 3: Primary | ICD-10-CM

## 2021-11-02 ENCOUNTER — APPOINTMENT (OUTPATIENT)
Dept: LAB | Facility: MEDICAL CENTER | Age: 71
End: 2021-11-02
Payer: MEDICARE

## 2021-11-02 DIAGNOSIS — Z51.81 ANTICOAGULATION GOAL OF INR 2 TO 3: ICD-10-CM

## 2021-11-02 DIAGNOSIS — Z79.01 ANTICOAGULATION GOAL OF INR 2 TO 3: ICD-10-CM

## 2021-11-02 LAB
CHOLEST SERPL-MCNC: 132 MG/DL (ref 50–200)
HDLC SERPL-MCNC: 52 MG/DL
INR PPP: 2.6 (ref 0.84–1.19)
LDLC SERPL CALC-MCNC: 53 MG/DL (ref 0–100)
NONHDLC SERPL-MCNC: 80 MG/DL
PROTHROMBIN TIME: 26.6 SECONDS (ref 11.6–14.5)
TRIGL SERPL-MCNC: 135 MG/DL

## 2021-11-02 PROCEDURE — 85610 PROTHROMBIN TIME: CPT

## 2021-11-02 PROCEDURE — 36415 COLL VENOUS BLD VENIPUNCTURE: CPT

## 2021-11-02 PROCEDURE — 80061 LIPID PANEL: CPT

## 2021-11-03 ENCOUNTER — TELEPHONE (OUTPATIENT)
Dept: FAMILY MEDICINE CLINIC | Facility: CLINIC | Age: 71
End: 2021-11-03

## 2021-11-05 ENCOUNTER — APPOINTMENT (OUTPATIENT)
Dept: LAB | Facility: MEDICAL CENTER | Age: 71
End: 2021-11-05
Payer: MEDICARE

## 2021-11-05 DIAGNOSIS — Z79.01 ANTICOAGULATION GOAL OF INR 2 TO 3: ICD-10-CM

## 2021-11-05 DIAGNOSIS — Z51.81 ANTICOAGULATION GOAL OF INR 2 TO 3: ICD-10-CM

## 2021-11-10 ENCOUNTER — TELEPHONE (OUTPATIENT)
Dept: FAMILY MEDICINE CLINIC | Facility: CLINIC | Age: 71
End: 2021-11-10

## 2021-11-10 ENCOUNTER — OFFICE VISIT (OUTPATIENT)
Dept: UROLOGY | Facility: CLINIC | Age: 71
End: 2021-11-10
Payer: MEDICARE

## 2021-11-10 VITALS
HEIGHT: 67 IN | SYSTOLIC BLOOD PRESSURE: 134 MMHG | HEART RATE: 63 BPM | BODY MASS INDEX: 29.51 KG/M2 | WEIGHT: 188 LBS | DIASTOLIC BLOOD PRESSURE: 78 MMHG

## 2021-11-10 DIAGNOSIS — R97.20 ELEVATED PSA: ICD-10-CM

## 2021-11-10 DIAGNOSIS — R33.9 URINARY RETENTION: Primary | ICD-10-CM

## 2021-11-10 LAB — POST-VOID RESIDUAL VOLUME, ML POC: 13 ML

## 2021-11-10 PROCEDURE — 99213 OFFICE O/P EST LOW 20 MIN: CPT | Performed by: PHYSICIAN ASSISTANT

## 2021-11-10 PROCEDURE — 51798 US URINE CAPACITY MEASURE: CPT | Performed by: PHYSICIAN ASSISTANT

## 2021-11-12 ENCOUNTER — APPOINTMENT (OUTPATIENT)
Dept: LAB | Facility: MEDICAL CENTER | Age: 71
End: 2021-11-12
Payer: MEDICARE

## 2021-11-12 DIAGNOSIS — Z79.01 ANTICOAGULATION GOAL OF INR 2 TO 3: ICD-10-CM

## 2021-11-12 DIAGNOSIS — Z51.81 ANTICOAGULATION GOAL OF INR 2 TO 3: ICD-10-CM

## 2021-11-12 LAB
INR PPP: 2.5 (ref 0.84–1.19)
PROTHROMBIN TIME: 25.8 SECONDS (ref 11.6–14.5)

## 2021-11-12 PROCEDURE — 36415 COLL VENOUS BLD VENIPUNCTURE: CPT

## 2021-11-12 PROCEDURE — 85610 PROTHROMBIN TIME: CPT

## 2021-11-16 ENCOUNTER — TELEPHONE (OUTPATIENT)
Dept: FAMILY MEDICINE CLINIC | Facility: CLINIC | Age: 71
End: 2021-11-16

## 2021-11-19 ENCOUNTER — APPOINTMENT (OUTPATIENT)
Dept: LAB | Facility: MEDICAL CENTER | Age: 71
End: 2021-11-19
Payer: MEDICARE

## 2021-11-19 DIAGNOSIS — I73.9 PERIPHERAL ARTERY DISEASE (HCC): ICD-10-CM

## 2021-11-19 LAB
INR PPP: 3.42 (ref 0.84–1.19)
PROTHROMBIN TIME: 32.8 SECONDS (ref 11.6–14.5)

## 2021-11-19 PROCEDURE — 36415 COLL VENOUS BLD VENIPUNCTURE: CPT

## 2021-11-19 PROCEDURE — 85610 PROTHROMBIN TIME: CPT

## 2021-11-22 ENCOUNTER — OFFICE VISIT (OUTPATIENT)
Dept: CARDIOLOGY CLINIC | Facility: MEDICAL CENTER | Age: 71
End: 2021-11-22
Payer: MEDICARE

## 2021-11-22 VITALS
OXYGEN SATURATION: 97 % | HEART RATE: 78 BPM | DIASTOLIC BLOOD PRESSURE: 66 MMHG | BODY MASS INDEX: 29.82 KG/M2 | HEIGHT: 67 IN | SYSTOLIC BLOOD PRESSURE: 124 MMHG | WEIGHT: 190 LBS

## 2021-11-22 DIAGNOSIS — I35.0 NONRHEUMATIC AORTIC VALVE STENOSIS: ICD-10-CM

## 2021-11-22 DIAGNOSIS — I47.1 SVT (SUPRAVENTRICULAR TACHYCARDIA) (HCC): ICD-10-CM

## 2021-11-22 DIAGNOSIS — I25.10 ASCVD (ARTERIOSCLEROTIC CARDIOVASCULAR DISEASE): Primary | ICD-10-CM

## 2021-11-22 DIAGNOSIS — E78.5 DYSLIPIDEMIA: ICD-10-CM

## 2021-11-22 DIAGNOSIS — Z95.2 S/P TAVR (TRANSCATHETER AORTIC VALVE REPLACEMENT): ICD-10-CM

## 2021-11-22 DIAGNOSIS — I10 ESSENTIAL HYPERTENSION: ICD-10-CM

## 2021-11-22 DIAGNOSIS — I73.9 PERIPHERAL ARTERY DISEASE (HCC): ICD-10-CM

## 2021-11-22 PROCEDURE — 99214 OFFICE O/P EST MOD 30 MIN: CPT | Performed by: INTERNAL MEDICINE

## 2021-11-24 ENCOUNTER — OFFICE VISIT (OUTPATIENT)
Dept: FAMILY MEDICINE CLINIC | Facility: CLINIC | Age: 71
End: 2021-11-24
Payer: MEDICARE

## 2021-11-24 VITALS
OXYGEN SATURATION: 98 % | HEIGHT: 67 IN | WEIGHT: 191.6 LBS | DIASTOLIC BLOOD PRESSURE: 78 MMHG | SYSTOLIC BLOOD PRESSURE: 122 MMHG | BODY MASS INDEX: 30.07 KG/M2 | HEART RATE: 67 BPM | TEMPERATURE: 98 F

## 2021-11-24 DIAGNOSIS — Z13.820 SCREENING FOR OSTEOPOROSIS: ICD-10-CM

## 2021-11-24 DIAGNOSIS — Z51.81 ANTICOAGULATION GOAL OF INR 2 TO 3: ICD-10-CM

## 2021-11-24 DIAGNOSIS — Z12.12 ENCOUNTER FOR COLORECTAL CANCER SCREENING: ICD-10-CM

## 2021-11-24 DIAGNOSIS — Z12.11 ENCOUNTER FOR COLORECTAL CANCER SCREENING: ICD-10-CM

## 2021-11-24 DIAGNOSIS — Z79.01 ANTICOAGULATION GOAL OF INR 2 TO 3: ICD-10-CM

## 2021-11-24 DIAGNOSIS — Z00.00 MEDICARE ANNUAL WELLNESS VISIT, SUBSEQUENT: Primary | ICD-10-CM

## 2021-11-24 PROCEDURE — G0439 PPPS, SUBSEQ VISIT: HCPCS | Performed by: FAMILY MEDICINE

## 2021-12-01 ENCOUNTER — APPOINTMENT (OUTPATIENT)
Dept: LAB | Facility: MEDICAL CENTER | Age: 71
End: 2021-12-01
Payer: MEDICARE

## 2021-12-01 DIAGNOSIS — Z79.01 ANTICOAGULATION GOAL OF INR 2 TO 3: ICD-10-CM

## 2021-12-01 DIAGNOSIS — Z51.81 ANTICOAGULATION GOAL OF INR 2 TO 3: Primary | ICD-10-CM

## 2021-12-01 DIAGNOSIS — Z79.01 ANTICOAGULATION GOAL OF INR 2 TO 3: Primary | ICD-10-CM

## 2021-12-01 DIAGNOSIS — Z00.00 MEDICARE ANNUAL WELLNESS VISIT, SUBSEQUENT: ICD-10-CM

## 2021-12-01 DIAGNOSIS — Z51.81 ANTICOAGULATION GOAL OF INR 2 TO 3: ICD-10-CM

## 2021-12-01 LAB
HCV AB SER QL: NORMAL
INR PPP: 3.28 (ref 0.84–1.19)
PROTHROMBIN TIME: 31.7 SECONDS (ref 11.6–14.5)

## 2021-12-01 PROCEDURE — 36415 COLL VENOUS BLD VENIPUNCTURE: CPT

## 2021-12-01 PROCEDURE — 86803 HEPATITIS C AB TEST: CPT

## 2021-12-01 PROCEDURE — 85610 PROTHROMBIN TIME: CPT

## 2021-12-09 ENCOUNTER — TELEPHONE (OUTPATIENT)
Dept: UROLOGY | Facility: AMBULATORY SURGERY CENTER | Age: 71
End: 2021-12-09

## 2021-12-13 ENCOUNTER — APPOINTMENT (OUTPATIENT)
Dept: LAB | Facility: MEDICAL CENTER | Age: 71
End: 2021-12-13
Payer: MEDICARE

## 2021-12-13 DIAGNOSIS — Z51.81 ANTICOAGULATION GOAL OF INR 2 TO 3: ICD-10-CM

## 2021-12-13 DIAGNOSIS — Z79.01 ANTICOAGULATION GOAL OF INR 2 TO 3: ICD-10-CM

## 2021-12-13 LAB
INR PPP: 3.26 (ref 0.84–1.19)
PROTHROMBIN TIME: 31.6 SECONDS (ref 11.6–14.5)

## 2021-12-13 PROCEDURE — 36415 COLL VENOUS BLD VENIPUNCTURE: CPT

## 2021-12-13 PROCEDURE — 85610 PROTHROMBIN TIME: CPT

## 2021-12-15 ENCOUNTER — TELEPHONE (OUTPATIENT)
Dept: FAMILY MEDICINE CLINIC | Facility: CLINIC | Age: 71
End: 2021-12-15

## 2021-12-24 DIAGNOSIS — I73.9 CLAUDICATION OF LEFT LOWER EXTREMITY (HCC): ICD-10-CM

## 2021-12-27 RX ORDER — CLOPIDOGREL BISULFATE 75 MG/1
TABLET ORAL
Qty: 30 TABLET | Refills: 4 | Status: SHIPPED | OUTPATIENT
Start: 2021-12-27 | End: 2022-03-29 | Stop reason: SDUPTHER

## 2021-12-29 ENCOUNTER — APPOINTMENT (OUTPATIENT)
Dept: LAB | Facility: MEDICAL CENTER | Age: 71
End: 2021-12-29
Payer: MEDICARE

## 2021-12-29 DIAGNOSIS — I73.9 PERIPHERAL ARTERY DISEASE (HCC): ICD-10-CM

## 2021-12-29 LAB
INR PPP: 2.8 (ref 0.84–1.19)
PROTHROMBIN TIME: 28.1 SECONDS (ref 11.6–14.5)

## 2021-12-29 PROCEDURE — 36415 COLL VENOUS BLD VENIPUNCTURE: CPT

## 2021-12-29 PROCEDURE — 85610 PROTHROMBIN TIME: CPT

## 2021-12-30 DIAGNOSIS — Z95.2 S/P TAVR (TRANSCATHETER AORTIC VALVE REPLACEMENT): ICD-10-CM

## 2021-12-30 DIAGNOSIS — R33.9 URINARY RETENTION: ICD-10-CM

## 2022-01-03 RX ORDER — TAMSULOSIN HYDROCHLORIDE 0.4 MG/1
0.4 CAPSULE ORAL
Qty: 30 CAPSULE | Refills: 11 | Status: SHIPPED | OUTPATIENT
Start: 2022-01-03

## 2022-02-05 DIAGNOSIS — I73.9 INTERMITTENT CLAUDICATION (HCC): ICD-10-CM

## 2022-02-05 DIAGNOSIS — I73.9 PERIPHERAL ARTERY DISEASE (HCC): ICD-10-CM

## 2022-02-07 RX ORDER — ATORVASTATIN CALCIUM 80 MG/1
TABLET, FILM COATED ORAL
Qty: 30 TABLET | Refills: 3 | Status: SHIPPED | OUTPATIENT
Start: 2022-02-07 | End: 2022-06-14

## 2022-02-08 ENCOUNTER — TELEPHONE (OUTPATIENT)
Dept: FAMILY MEDICINE CLINIC | Facility: CLINIC | Age: 72
End: 2022-02-08

## 2022-02-08 NOTE — TELEPHONE ENCOUNTER
LMOM of phone # on file for pt to call our office    Please confirm # when he calls in as the VM states " You have reached the home of Blanka "  Pt needs to be reminded to have lab work done for INR before his 2/24 appt

## 2022-02-08 NOTE — TELEPHONE ENCOUNTER
----- Message from Yung Brasher MD sent at 2/8/2022  8:07 AM EST -----  Regarding: call patient to remind him to complete INR  Pt has outstanding INR to be completed on 1/28/22   Please call and remind patient    Elida Walters

## 2022-02-10 ENCOUNTER — APPOINTMENT (OUTPATIENT)
Dept: LAB | Facility: MEDICAL CENTER | Age: 72
End: 2022-02-10
Payer: MEDICARE

## 2022-02-10 DIAGNOSIS — Z51.81 ANTICOAGULATION GOAL OF INR 2 TO 3: ICD-10-CM

## 2022-02-10 DIAGNOSIS — Z79.01 ANTICOAGULATION GOAL OF INR 2 TO 3: ICD-10-CM

## 2022-02-24 ENCOUNTER — OFFICE VISIT (OUTPATIENT)
Dept: FAMILY MEDICINE CLINIC | Facility: CLINIC | Age: 72
End: 2022-02-24
Payer: MEDICARE

## 2022-02-24 VITALS
HEART RATE: 65 BPM | SYSTOLIC BLOOD PRESSURE: 140 MMHG | OXYGEN SATURATION: 98 % | WEIGHT: 195 LBS | BODY MASS INDEX: 30.61 KG/M2 | HEIGHT: 67 IN | TEMPERATURE: 97.3 F | RESPIRATION RATE: 16 BRPM | DIASTOLIC BLOOD PRESSURE: 75 MMHG

## 2022-02-24 DIAGNOSIS — Z51.81 ANTICOAGULATION GOAL OF INR 2 TO 3: Primary | ICD-10-CM

## 2022-02-24 DIAGNOSIS — Z79.01 ANTICOAGULATION GOAL OF INR 2 TO 3: Primary | ICD-10-CM

## 2022-02-24 DIAGNOSIS — H61.22 IMPACTED CERUMEN OF LEFT EAR: ICD-10-CM

## 2022-02-24 DIAGNOSIS — I71.4 AAA (ABDOMINAL AORTIC ANEURYSM) WITHOUT RUPTURE (HCC): ICD-10-CM

## 2022-02-24 DIAGNOSIS — I10 ESSENTIAL HYPERTENSION: ICD-10-CM

## 2022-02-24 PROCEDURE — 99214 OFFICE O/P EST MOD 30 MIN: CPT | Performed by: FAMILY MEDICINE

## 2022-02-24 NOTE — ASSESSMENT & PLAN NOTE
Continue current dosage of warfarin  Recheck every 4 weeks  If you notice there is site of abnormal bleeding such as abnormal colored stool, vomiting with blood, increased bruising, please return for evaluation

## 2022-02-24 NOTE — ASSESSMENT & PLAN NOTE
Blood pressure today initially 142/82  Goal blood pressure in setting of triple a should be less than 140/80  Reviewed blood pressure in office at the end of the visit 140/75  Please log your blood pressure once a day for the next 2 weeks and return for evaluation

## 2022-02-24 NOTE — ASSESSMENT & PLAN NOTE
Patient denies any recent pain in the abdomen  In setting of AAA, recommended blood pressure should be less than 140/80  Patient is close to this goal but is mildly elevated and patient's wife reports episodes of elevated blood pressure in the 170s  Will have patient record blood pressure once a day for the next 2 weeks and return with log for evaluation

## 2022-02-24 NOTE — PROGRESS NOTES
Assessment/Plan:    Anticoagulation goal of INR 2 to 3  Continue current dosage of warfarin  Recheck every 4 weeks  If you notice there is site of abnormal bleeding such as abnormal colored stool, vomiting with blood, increased bruising, please return for evaluation    AAA (abdominal aortic aneurysm) without rupture ([address] )  Patient denies any recent pain in the abdomen  In setting of AAA, recommended blood pressure should be less than 140/80  Patient is close to this goal but is mildly elevated and patient's wife reports episodes of elevated blood pressure in the 170s  Will have patient record blood pressure once a day for the next 2 weeks and return with log for evaluation      Essential hypertension  Blood pressure today initially 142/82  Goal blood pressure in setting of triple a should be less than 140/80  Reviewed blood pressure in office at the end of the visit 140/75  Please log your blood pressure once a day for the next 2 weeks and return for evaluation    Impacted cerumen of left ear  Patient has some hearing loss at baseline, had repeat question multiple times in the office today  It physical exam revealed cerumen impaction left ear  Debrox prescribed, return in 2 weeks for washout      Subjective:      Patient ID: Reuben Walters is a 80 y o  male  HPI    71-year-old male patient here for follow-up regarding his chronic medical condition  Patient was last seen for Medicare annual wellness on 11/24/2021  Patient reports overall, he has been do well  However there was a few episode of elevated blood pressure in the 170s  Patient continues take diltiazem 120 mg daily  Is not on any other medication for blood pressure  Patient has been taking warfarin 5 mg for day 1, 2 and than 2 5 for days 3 and repeat  His most recent INR from 02/10/2022 is 3 14    Patient denies any sign of abnormal bleeding such as bruising, abnormal blood in the bowel movement or urine    History obtained from both patient and wife     Discussed colonoscopy and alternative for colorectal cancer screening with patient, patient refuses at this time  Review of Systems   Constitutional: Negative for chills and fever  HENT: Negative for congestion, rhinorrhea and sore throat  Baseline decrease hearing, had to repeat questions multiple time at office today   Respiratory: Negative for shortness of breath  Cardiovascular: Negative for chest pain  Gastrointestinal: Negative for abdominal pain, blood in stool, constipation, diarrhea, nausea and vomiting  Genitourinary: Negative for dysuria and hematuria  Skin: Negative for wound  Neurological: Negative for dizziness, light-headedness and headaches  Hematological: Does not bruise/bleed easily  Psychiatric/Behavioral: Negative for sleep disturbance  Objective:    /75   Pulse 65   Temp (!) 97 3 °F (36 3 °C) (Temporal)   Resp 16   Ht 5' 7" (1 702 m)   Wt 88 5 kg (195 lb)   SpO2 98%   BMI 30 54 kg/m²     Physical Exam  Vitals reviewed  Constitutional:       General: He is not in acute distress  Appearance: Normal appearance  He is obese  He is not ill-appearing, toxic-appearing or diaphoretic  HENT:      Head: Normocephalic  Right Ear: Tympanic membrane, ear canal and external ear normal       Left Ear: There is impacted cerumen  Ears:      Comments: Mild cerumen buildup on the right ear, complete obstruction of the left ear unable to visualize the tympanic membrane in the left ear  Cardiovascular:      Rate and Rhythm: Normal rate and regular rhythm  Pulses: Normal pulses  Heart sounds: Normal heart sounds  Pulmonary:      Effort: Pulmonary effort is normal       Breath sounds: Normal breath sounds  Abdominal:      General: Abdomen is flat  Musculoskeletal:         General: Normal range of motion  Right lower leg: No edema  Left lower leg: No edema  Lymphadenopathy:      Cervical: No cervical adenopathy  Skin:     General: Skin is warm and dry  Capillary Refill: Capillary refill takes less than 2 seconds  Neurological:      General: No focal deficit present  Mental Status: He is alert and oriented to person, place, and time  Psychiatric:         Mood and Affect: Mood normal              HIMA Fowler  Family Medicine    Please excuse any "sound-alike" errors that may have ocurred during the process of dictation  Parts of this note have been dictated and there may be errors present in the transcription process  Thank you

## 2022-02-24 NOTE — ASSESSMENT & PLAN NOTE
Patient has some hearing loss at baseline, had repeat question multiple times in the office today  It physical exam revealed cerumen impaction left ear    Debrox prescribed, return in 2 weeks for washout

## 2022-02-28 DIAGNOSIS — I73.9 PERIPHERAL VASCULAR DISEASE (HCC): ICD-10-CM

## 2022-02-28 RX ORDER — WARFARIN SODIUM 5 MG/1
TABLET ORAL
Qty: 30 TABLET | Refills: 1 | Status: SHIPPED | OUTPATIENT
Start: 2022-02-28 | End: 2022-05-11

## 2022-03-22 ENCOUNTER — TELEPHONE (OUTPATIENT)
Dept: VASCULAR SURGERY | Facility: CLINIC | Age: 72
End: 2022-03-22

## 2022-03-22 DIAGNOSIS — Z98.890 S/P FEMORAL-TIBIAL BYPASS: ICD-10-CM

## 2022-03-22 DIAGNOSIS — I73.9 CLAUDICATION OF LEFT LOWER EXTREMITY (HCC): ICD-10-CM

## 2022-03-22 DIAGNOSIS — I71.4 AAA (ABDOMINAL AORTIC ANEURYSM) WITHOUT RUPTURE (HCC): Primary | ICD-10-CM

## 2022-03-22 NOTE — TELEPHONE ENCOUNTER
Orders placed, wife notified of same, transf to [facility] in call center to scheduled dopplers and f/u ov

## 2022-03-22 NOTE — TELEPHONE ENCOUNTER
Pt's wife was transf to me from call center  She was calling to schedule apt for pt for c/o pain LLE and R groin  She states for the past few days he has been having pain on and off in his L calf and popliteal area dn R groin  Re: groin pain he notes it when he first gets out of chair and it goes away "eventually" w/ ambulation  He denies any lump or discoloration in area  Re: LLE he has pain in L calf and behind knee  I asked to speak directly w/ pt and wife states he is very [place] and would not be able to hear me   ? If pain is all the time, at night or just w/ ambulation  Pt's response was "it just hurts after I'm on it awhile"  She denies pt has rest pain but notes he has neuropathy  Feet are normal temp and color, no wounds/ulcers at present  Pt was last evaluated by Dr Kanwal Dumont 9/8/21 and hat that time he recommended he f/u w/ alil doppler for AAA which has not been done  Advised I would route concerns to our triage provider to see if any additional testing needed prior to scheduling ov and we would get back to her  Please advise

## 2022-03-24 ENCOUNTER — APPOINTMENT (OUTPATIENT)
Dept: LAB | Facility: MEDICAL CENTER | Age: 72
End: 2022-03-24
Payer: MEDICARE

## 2022-03-24 ENCOUNTER — OFFICE VISIT (OUTPATIENT)
Dept: FAMILY MEDICINE CLINIC | Facility: CLINIC | Age: 72
End: 2022-03-24
Payer: MEDICARE

## 2022-03-24 VITALS
BODY MASS INDEX: 31.23 KG/M2 | DIASTOLIC BLOOD PRESSURE: 76 MMHG | SYSTOLIC BLOOD PRESSURE: 150 MMHG | OXYGEN SATURATION: 98 % | RESPIRATION RATE: 16 BRPM | TEMPERATURE: 98 F | HEART RATE: 72 BPM | WEIGHT: 199 LBS | HEIGHT: 67 IN

## 2022-03-24 DIAGNOSIS — I74.3 EMBOLISM AND THROMBOSIS OF ARTERIES OF THE LOWER EXTREMITIES (HCC): ICD-10-CM

## 2022-03-24 DIAGNOSIS — H61.22 IMPACTED CERUMEN OF LEFT EAR: ICD-10-CM

## 2022-03-24 DIAGNOSIS — I10 ESSENTIAL HYPERTENSION: Primary | ICD-10-CM

## 2022-03-24 DIAGNOSIS — I49.9 IRREGULAR CARDIAC RHYTHM: ICD-10-CM

## 2022-03-24 DIAGNOSIS — N18.30 STAGE 3 CHRONIC KIDNEY DISEASE, UNSPECIFIED WHETHER STAGE 3A OR 3B CKD (HCC): ICD-10-CM

## 2022-03-24 LAB
ANION GAP SERPL CALCULATED.3IONS-SCNC: 4 MMOL/L (ref 4–13)
BUN SERPL-MCNC: 14 MG/DL (ref 5–25)
CALCIUM SERPL-MCNC: 9.2 MG/DL (ref 8.3–10.1)
CHLORIDE SERPL-SCNC: 106 MMOL/L (ref 100–108)
CO2 SERPL-SCNC: 29 MMOL/L (ref 21–32)
CREAT SERPL-MCNC: 1.46 MG/DL (ref 0.6–1.3)
GFR SERPL CREATININE-BSD FRML MDRD: 48 ML/MIN/1.73SQ M
GLUCOSE P FAST SERPL-MCNC: 106 MG/DL (ref 65–99)
MAGNESIUM SERPL-MCNC: 2.3 MG/DL (ref 1.6–2.6)
POTASSIUM SERPL-SCNC: 4.2 MMOL/L (ref 3.5–5.3)
SODIUM SERPL-SCNC: 139 MMOL/L (ref 136–145)

## 2022-03-24 PROCEDURE — 80048 BASIC METABOLIC PNL TOTAL CA: CPT

## 2022-03-24 PROCEDURE — 93000 ELECTROCARDIOGRAM COMPLETE: CPT | Performed by: FAMILY MEDICINE

## 2022-03-24 PROCEDURE — 83735 ASSAY OF MAGNESIUM: CPT

## 2022-03-24 PROCEDURE — 99214 OFFICE O/P EST MOD 30 MIN: CPT | Performed by: FAMILY MEDICINE

## 2022-03-24 RX ORDER — LISINOPRIL 5 MG/1
5 TABLET ORAL DAILY
Qty: 30 TABLET | Refills: 1 | Status: SHIPPED | OUTPATIENT
Start: 2022-03-24 | End: 2022-05-10 | Stop reason: ALTCHOICE

## 2022-03-24 NOTE — ASSESSMENT & PLAN NOTE
Irregular heart rhythm noted on exam today  Obtain BMP and magnesium for evaluation  EKG shows "frequent multiform ectopic ventricular beats"  Do use ectopic ventricular beats are non consecutive  Patient is asymptomatic at this time

## 2022-03-24 NOTE — ASSESSMENT & PLAN NOTE
Lab Results   Component Value Date    EGFR 47 10/07/2021    EGFR 51 09/27/2021    EGFR 45 09/26/2021    CREATININE 1 49 (H) 10/07/2021    CREATININE 1 38 (H) 09/27/2021    CREATININE 1 55 (H) 09/26/2021       Patient has baseline CKD stage 3, stable based on most recent blood work  Will start patient on lisinopril for renal protection and hypertension

## 2022-03-24 NOTE — PROGRESS NOTES
Assessment/Plan:    Essential hypertension  Patient's blood pressure 150/76  Majority of patient's blood pressure home is above the goal of 140/80  After discussion with patient, will start patient on a new blood pressure medication, lisinopril 5 mg daily  Explained to patient the side effects of medication including dizziness, lightheadedness as well as cough  Stop the medication and call your PCP's office if symptoms occur  Continue checking blood pressure once daily    Embolism and thrombosis of arteries of the lower extremities (HCC)  Please complete your INR at this time    Stage 3 chronic kidney disease  Lab Results   Component Value Date    EGFR 47 10/07/2021    EGFR 51 09/27/2021    EGFR 45 09/26/2021    CREATININE 1 49 (H) 10/07/2021    CREATININE 1 38 (H) 09/27/2021    CREATININE 1 55 (H) 09/26/2021       Patient has baseline CKD stage 3, stable based on most recent blood work  Will start patient on lisinopril for renal protection and hypertension    Irregular cardiac rhythm  Irregular heart rhythm noted on exam today  Obtain BMP and magnesium for evaluation  EKG shows "frequent multiform ectopic ventricular beats"  Do use ectopic ventricular beats are non consecutive  Patient is asymptomatic at this time    Impacted cerumen of left ear  Will hold off on washout at this time        Subjective:      Patient ID: Felix Cortez is a 80 y o  male  HPI    73-year-old patient here for blood pressure recheck  Blood pressure in office was 150/76 today  Patient was last seen on 02/24/2022  Patient recorded blood pressure, ranges from 131-173/67 to 86  Patient's goal blood pressure should be less than 140/80  More than 50% his blood pressure has been about this goal   Of note, patient is on Coumadin and Plavix, has AAA  Last INR from 02/10/2022, patient planning on going today    Patient does have baseline stage III CKD, fluctuate between stage a and B    Review of Systems   Constitutional: Negative for chills and fever  HENT: Negative for congestion, ear pain, rhinorrhea and sore throat  Respiratory: Negative for shortness of breath  Cardiovascular: Negative for chest pain  Gastrointestinal: Negative for abdominal pain  Neurological: Negative for headaches  Objective:    /76 (BP Location: Right arm, Patient Position: Sitting, Cuff Size: Large)   Pulse 72   Temp 98 °F (36 7 °C) (Temporal)   Resp 16   Ht 5' 7" (1 702 m)   Wt 90 3 kg (199 lb)   SpO2 98%   BMI 31 17 kg/m²        Physical Exam  Vitals reviewed  HENT:      Right Ear: Tympanic membrane, ear canal and external ear normal  There is no impacted cerumen  Left Ear: External ear normal  There is impacted cerumen  Ears:      Comments: Cerumen impaction of the left ear  Cardiovascular:      Rate and Rhythm: Normal rate  Rhythm irregular  Pulses: Normal pulses  Heart sounds: Murmur heard  Comments: Irregular rhythm and systolic murmur appreciated today  Pulmonary:      Effort: Pulmonary effort is normal  No respiratory distress  Breath sounds: Normal breath sounds  Abdominal:      General: Abdomen is flat  Bowel sounds are normal  There is no distension  Palpations: Abdomen is soft  Musculoskeletal:         General: No swelling or tenderness  Normal range of motion  Skin:     General: Skin is warm and dry  Capillary Refill: Capillary refill takes less than 2 seconds  Coloration: Skin is not jaundiced  Neurological:      General: No focal deficit present  Mental Status: He is oriented to person, place, and time  Psychiatric:         Mood and Affect: Mood normal            Corrinne Maples, M D  Family Medicine    Please excuse any "sound-alike" errors that may have ocurred during the process of dictation  Parts of this note have been dictated and there may be errors present in the transcription process  Thank you

## 2022-03-24 NOTE — ASSESSMENT & PLAN NOTE
Patient's blood pressure 150/76  Majority of patient's blood pressure home is above the goal of 140/80  After discussion with patient, will start patient on a new blood pressure medication, lisinopril 5 mg daily  Explained to patient the side effects of medication including dizziness, lightheadedness as well as cough  Stop the medication and call your PCP's office if symptoms occur  Continue checking blood pressure once daily

## 2022-03-25 ENCOUNTER — TELEPHONE (OUTPATIENT)
Dept: FAMILY MEDICINE CLINIC | Facility: CLINIC | Age: 72
End: 2022-03-25

## 2022-03-25 NOTE — TELEPHONE ENCOUNTER
Patient's wife called to inform PCP that patient spoke with Dr Prince Mancuso, Cardiologist in regards to his EKG completed yesterday  Dr Prince Mancuso stated that there are no significant changes compared to the EKG completed in September  Patient has an appointment scheduled with Dr Prince Mancuso for 3/30/22

## 2022-03-29 DIAGNOSIS — I73.9 CLAUDICATION OF LEFT LOWER EXTREMITY (HCC): ICD-10-CM

## 2022-03-29 RX ORDER — CLOPIDOGREL BISULFATE 75 MG/1
75 TABLET ORAL DAILY
Qty: 30 TABLET | Refills: 4 | Status: SHIPPED | OUTPATIENT
Start: 2022-03-29

## 2022-03-30 ENCOUNTER — HOSPITAL ENCOUNTER (OUTPATIENT)
Dept: NON INVASIVE DIAGNOSTICS | Facility: CLINIC | Age: 72
Discharge: HOME | End: 2022-03-30
Payer: MEDICARE

## 2022-03-30 DIAGNOSIS — Z98.890 S/P FEMORAL-TIBIAL BYPASS: ICD-10-CM

## 2022-03-30 DIAGNOSIS — I73.9 CLAUDICATION OF LEFT LOWER EXTREMITY (HCC): ICD-10-CM

## 2022-03-30 DIAGNOSIS — I71.4 AAA (ABDOMINAL AORTIC ANEURYSM) WITHOUT RUPTURE (HCC): ICD-10-CM

## 2022-03-30 PROCEDURE — 93978 VASCULAR STUDY: CPT | Performed by: SURGERY

## 2022-03-30 PROCEDURE — 93923 UPR/LXTR ART STDY 3+ LVLS: CPT

## 2022-03-30 PROCEDURE — 93978 VASCULAR STUDY: CPT

## 2022-03-30 PROCEDURE — 93925 LOWER EXTREMITY STUDY: CPT

## 2022-03-31 ENCOUNTER — OFFICE VISIT (OUTPATIENT)
Dept: FAMILY MEDICINE CLINIC | Facility: CLINIC | Age: 72
End: 2022-03-31
Payer: MEDICARE

## 2022-03-31 ENCOUNTER — APPOINTMENT (OUTPATIENT)
Dept: LAB | Facility: MEDICAL CENTER | Age: 72
End: 2022-03-31
Payer: MEDICARE

## 2022-03-31 VITALS
RESPIRATION RATE: 16 BRPM | DIASTOLIC BLOOD PRESSURE: 70 MMHG | BODY MASS INDEX: 31.23 KG/M2 | HEART RATE: 70 BPM | OXYGEN SATURATION: 98 % | TEMPERATURE: 97.7 F | HEIGHT: 67 IN | WEIGHT: 199 LBS | SYSTOLIC BLOOD PRESSURE: 152 MMHG

## 2022-03-31 DIAGNOSIS — H61.22 IMPACTED CERUMEN OF LEFT EAR: Primary | ICD-10-CM

## 2022-03-31 PROCEDURE — 93925 LOWER EXTREMITY STUDY: CPT | Performed by: SURGERY

## 2022-03-31 PROCEDURE — 69210 REMOVE IMPACTED EAR WAX UNI: CPT | Performed by: FAMILY MEDICINE

## 2022-03-31 PROCEDURE — 93922 UPR/L XTREMITY ART 2 LEVELS: CPT | Performed by: SURGERY

## 2022-03-31 NOTE — PROGRESS NOTES
Ear cerumen removal    Date/Time: 3/31/2022 2:05 PM  Performed by: Leonid Villanueva MD  Authorized by: Leonid Villanueva MD   Universal Protocol:  Consent: Verbal consent obtained  Consent given by: patient  Timeout called at: 3/31/2022 1:40 PM   Patient understanding: patient states understanding of the procedure being performed  Patient consent: the patient's understanding of the procedure matches consent given  Patient identity confirmed: verbally with patient      Patient location:  Clinic  Procedure details:     Location:  L ear    Procedure type: irrigation with instrumentation      Instrumentation: curette      Approach:  External  Post-procedure details:     Complication:  None    Hearing quality:  Improved    Patient tolerance of procedure: Tolerated well, no immediate complications      HIMA Kellogg  Family Medicine    Please excuse any "sound-alike" errors that may have ocurred during the process of dictation  Parts of this note have been dictated and there may be errors present in the transcription process  Thank you

## 2022-04-06 DIAGNOSIS — I47.1 SVT (SUPRAVENTRICULAR TACHYCARDIA) (HCC): ICD-10-CM

## 2022-04-06 RX ORDER — DILTIAZEM HYDROCHLORIDE 120 MG/1
120 CAPSULE, COATED, EXTENDED RELEASE ORAL DAILY
Qty: 30 CAPSULE | Refills: 2 | Status: SHIPPED | OUTPATIENT
Start: 2022-04-06 | End: 2022-05-10 | Stop reason: SDUPTHER

## 2022-04-06 NOTE — TELEPHONE ENCOUNTER
Requested medication(s) are due for refill today: Yes  Patient has already received a courtesy refill: No  Other reason request has been forwarded to provider:  Cr in normal range and within 360 days

## 2022-04-12 ENCOUNTER — OFFICE VISIT (OUTPATIENT)
Dept: VASCULAR SURGERY | Facility: CLINIC | Age: 72
End: 2022-04-12
Payer: MEDICARE

## 2022-04-12 VITALS
DIASTOLIC BLOOD PRESSURE: 82 MMHG | BODY MASS INDEX: 30.61 KG/M2 | OXYGEN SATURATION: 98 % | WEIGHT: 195 LBS | RESPIRATION RATE: 16 BRPM | TEMPERATURE: 98.3 F | HEART RATE: 82 BPM | HEIGHT: 67 IN | SYSTOLIC BLOOD PRESSURE: 140 MMHG

## 2022-04-12 DIAGNOSIS — I73.9 CLAUDICATION OF LEFT LOWER EXTREMITY (HCC): ICD-10-CM

## 2022-04-12 DIAGNOSIS — I71.4 AAA (ABDOMINAL AORTIC ANEURYSM) WITHOUT RUPTURE (HCC): Primary | ICD-10-CM

## 2022-04-12 PROCEDURE — 99214 OFFICE O/P EST MOD 30 MIN: CPT | Performed by: SURGERY

## 2022-04-12 NOTE — PATIENT INSTRUCTIONS
Presents with stable 4 1 centimeter infrarenal AAA has developed left calf claudication and was noted to have a left external iliac artery stenosis  Is also complaining of right groin pain likely due to musculoskeletal etiology verses status post open bypass right lower extremity  He is on Plavix and warfarin  He is not having ischemic rest pain or tissue loss  Long discussion with the patient his wife in the office today I am somewhat concerned intervening on the external iliac artery as and occlusion would limit our opportunity for endovascular aneurysm repair down the road  They are both in agreement and I am recommending that he continue to push through some of the discomfort to tried to develop collateral circulation so that is claudication symptoms with decreased      Plan:  Follow-up AAA and peripheral arterial disease Doppler in 6 months

## 2022-04-12 NOTE — PROGRESS NOTES
Assessment/Plan:      Presents with stable 4 1 centimeter infrarenal AAA has developed left calf claudication and was noted to have a left external iliac artery stenosis  Is also complaining of right groin pain likely due to musculoskeletal etiology verses status post open bypass right lower extremity  He is on Plavix and warfarin  He is not having ischemic rest pain or tissue loss  Long discussion with the patient his wife in the office today I am somewhat concerned intervening on the external iliac artery as and occlusion would limit our opportunity for endovascular aneurysm repair down the road  They are both in agreement and I am recommending that he continue to push through some of the discomfort to tried to develop collateral circulation so that is claudication symptoms with decreased  Plan:  Follow-up AAA and peripheral arterial disease Doppler in 6 months     Diagnoses and all orders for this visit:    AAA (abdominal aortic aneurysm) without rupture ([address] )  -     VAS abdominal aorta/iliacs; complete study; Future    Claudication of left lower extremity (HCC)  -     VAS lower limb arterial duplex, complete bilateral; Future        Subjective:      Patient ID: Reji Hill is a 80 y o  male  Patient presents in office to review the results of his AOIL and LOYD done on 3/30/2022  Patient reports some L calf pain when walking and is not specific to distance  Patient reports some R groin pain that comes and goes randomly  Patient denies any wounds or ulcers  Patient is a former smoker  Patient is taking atorvastatin, plavix, and warfarin  HPI    The following portions of the patient's history were reviewed and updated as appropriate: allergies, current medications, past family history, past medical history, past social history, past surgical history and problem list     Review of Systems   Constitutional: Negative  Negative for appetite change, chills and fever  HENT: Positive for hearing loss  Negative for trouble swallowing  Eyes: Negative  Respiratory: Negative  Negative for cough, chest tightness and shortness of breath  Cardiovascular: Negative  Negative for leg swelling  Gastrointestinal: Negative  Negative for abdominal pain, diarrhea, nausea and vomiting  Endocrine: Negative  Genitourinary: Negative  Negative for flank pain  Musculoskeletal: Positive for gait problem  Negative for back pain  L calf pain with walking   Skin: Negative  Negative for wound  Allergic/Immunologic: Negative  Neurological: Negative for dizziness, speech difficulty, weakness, numbness and headaches  Hematological: Bruises/bleeds easily  Psychiatric/Behavioral: Negative  Negative for self-injury and suicidal ideas  Objective: There were no vitals taken for this visit  Physical Exam      Oriented x3 no evidence of clinical depression  Eyes:  Sclera non-icteric    Skin: normal without evidence of inflammation    Neck is supple carotid pulses equal bilaterally no bruits heard    Chest lungs clear, heart regular rhythm  Abdomen soft nontender  evidence of pulsatile mass  Pulses are palpable bilateral Femoral      Neurological exam intact cranial nerves 2-12 grossly intact no gross motor sensory deficits detected  Imaging viewed and reviewed with Patient    I have reviewed and made appropriate changes to the review of systems input by the medical assistant      Vitals:    04/12/22 1406   BP: 140/82   BP Location: Right arm   Patient Position: Sitting   Cuff Size: Adult   Pulse: 82   Resp: 16   Temp: 98 3 °F (36 8 °C)   TempSrc: Tympanic   SpO2: 98%   Weight: 88 5 kg (195 lb)   Height: 5' 7" (1 702 m)       Patient Active Problem List   Diagnosis    Peripheral artery disease (HCC)    Claudication of left lower extremity (HCC)    S/P RIGHT Femoral- PT bypass 2011    Dyslipidemia    ASCVD (arteriosclerotic cardiovascular disease)    Stage 3 chronic kidney disease    Nonrheumatic aortic valve stenosis    Essential hypertension    Elevated PSA    Embolism and thrombosis of arteries of the lower extremities (HCC)    Protein C deficiency ([address] )    Former heavy tobacco smoker    AAA (abdominal aortic aneurysm) without rupture (HCC)    S/P TAVR (transcatheter aortic valve replacement)    Thrombocytopenia (HCC)    Anticoagulation goal of INR 2 to 3    Anticoagulated on Coumadin    LBBB (left bundle branch block)    Leukocytosis    Hypocalcemia    Hypokalemia    Urinary retention    SVT (supraventricular tachycardia) (HCC)    FAMILIA (acute kidney injury) (HCC)    Elevated troponin    Other insomnia    Encounter for postoperative care    Impacted cerumen of left ear    Irregular cardiac rhythm       Past Surgical History:   Procedure Laterality Date    BACK SURGERY      BYPASS FEMORAL-TIBIAL Right 2011    [place] ECHO TRANSESOPHAG R-T 2D W/PRB IMG ACQUISJ I&R N/A 9/16/2021    Procedure: TRANSESOPHAGEAL ECHOCARDIOGRAM (RADHA); Surgeon: Monica Velasquez DO;  [facility];  Service: Cardiac Surgery    [place] REPLACE AORTIC VALVE OPENFEMORAL ARTERY APPROACH N/A 9/16/2021    Procedure: REPLACEMENT AORTIC VALVE TRANSCATHETER (TAVR) TRANSFEMORAL W/ 29 MM COLÓN BREEZY S3 ULTRA VALVE (ACCESS ON LEFT);   Surgeon: Monica Velasquez DO;  [facility];  Service: Cardiac Surgery    VEIN LIGATION         Family History   Problem Relation Age of Onset    Cancer Mother 50    Heart attack Father 46    Hypertension Father     Prostate cancer Brother     Arrhythmia Neg Hx     Clotting disorder Neg Hx     Fainting Neg Hx     Heart disease Neg Hx     Anuerysm Neg Hx     Stroke Neg Hx        Social History     Socioeconomic History    Marital status: /Civil Union     Spouse name: Not on file    Number of children: Not on file    Years of education: Not on file    Highest education level: Not on file   Occupational History    Not on file Tobacco Use    Smoking status: Former Smoker     Types: Cigarettes     Quit date: 9/28/2018     Years since quitting: 3 5    Smokeless tobacco: Never Used    Tobacco comment: 1 pk every 2days , currently on patches to quit  Vaping Use    Vaping Use: Never used   Substance and Sexual Activity    Alcohol use: No    Drug use: No    Sexual activity: Not on file   Other Topics Concern    Not on file   Social History Narrative    Not on file     Social Determinants of Health     Financial Resource Strain: Not on file   Food Insecurity: Not on file   Transportation Needs: Not on file   Physical Activity: Not on file   Stress: Not on file   Social Connections: Not on file   Intimate Partner Violence: Not on file   Housing Stability: Not on file       No Known Allergies      Current Outpatient Medications:     acetaminophen (TYLENOL) 325 mg tablet, Take 1-2 tablets Q4-6 hours PRN pain  Do not take more than 4 grams in 24 hours  , Disp: , Rfl: 0    atorvastatin (LIPITOR) 80 mg tablet, TAKE 1 TABLET BY MOUTH EVERY DAY, Disp: 30 tablet, Rfl: 3    carbamide peroxide (DEBROX) 6 5 % otic solution, Administer 5 drops into the left ear 2 (two) times a day, Disp: 15 mL, Rfl: 0    clopidogrel (PLAVIX) 75 mg tablet, Take 1 tablet (75 mg total) by mouth daily, Disp: 30 tablet, Rfl: 4    diltiazem (CARDIZEM CD) 120 mg 24 hr capsule, Take 1 capsule (120 mg total) by mouth daily, Disp: 30 capsule, Rfl: 2    lisinopril (ZESTRIL) 5 mg tablet, Take 1 tablet (5 mg total) by mouth daily, Disp: 30 tablet, Rfl: 1    tamsulosin (FLOMAX) 0 4 mg, Take 1 capsule (0 4 mg total) by mouth daily with dinner, Disp: 30 capsule, Rfl: 11    warfarin (COUMADIN) 5 mg tablet, TAKE 1 TABLET BY MOUTH EVERY DAY, Disp: 30 tablet, Rfl: 1    docusate sodium (COLACE) 100 mg capsule, Take 1 capsule (100 mg total) by mouth 2 (two) times a day as needed for constipation Hold for soft stools   (Patient not taking: Reported on 4/12/2022 ), Disp: 60 capsule, Rfl: 0    polyethylene glycol (MIRALAX) 17 g packet, Take 17 g by mouth daily as needed (constipation) Hold for soft stools   (Patient not taking: Reported on 3/24/2022 ), Disp: , Rfl: 0

## 2022-04-12 NOTE — LETTER
April 12, 2022     Candis Barnes, [address] [facility]   [address] [facility] [address]    Patient: Katie Kendall   YOB: 1950   Date of Visit: 4/12/2022       Dear Dr Priscila Bailye: Thank you for referring Sanjuana Doan to me for evaluation  Below are my notes for this consultation  If you have questions, please do not hesitate to call me  I look forward to following your patient along with you           Sincerely,        Gregory Ly MD        CC: No Recipients

## 2022-04-27 ENCOUNTER — APPOINTMENT (OUTPATIENT)
Dept: LAB | Facility: MEDICAL CENTER | Age: 72
End: 2022-04-27
Payer: MEDICARE

## 2022-05-10 ENCOUNTER — OFFICE VISIT (OUTPATIENT)
Dept: CARDIOLOGY CLINIC | Facility: MEDICAL CENTER | Age: 72
End: 2022-05-10
Payer: MEDICARE

## 2022-05-10 VITALS
BODY MASS INDEX: 31.23 KG/M2 | HEART RATE: 81 BPM | SYSTOLIC BLOOD PRESSURE: 130 MMHG | OXYGEN SATURATION: 96 % | DIASTOLIC BLOOD PRESSURE: 76 MMHG | HEIGHT: 67 IN | WEIGHT: 199 LBS

## 2022-05-10 DIAGNOSIS — I35.0 NONRHEUMATIC AORTIC VALVE STENOSIS: ICD-10-CM

## 2022-05-10 DIAGNOSIS — I73.9 PERIPHERAL ARTERY DISEASE (HCC): ICD-10-CM

## 2022-05-10 DIAGNOSIS — Z95.2 S/P TAVR (TRANSCATHETER AORTIC VALVE REPLACEMENT): ICD-10-CM

## 2022-05-10 DIAGNOSIS — I47.1 SVT (SUPRAVENTRICULAR TACHYCARDIA) (HCC): Primary | ICD-10-CM

## 2022-05-10 DIAGNOSIS — Z98.890 S/P FEMORAL-TIBIAL BYPASS: ICD-10-CM

## 2022-05-10 DIAGNOSIS — E78.5 DYSLIPIDEMIA: ICD-10-CM

## 2022-05-10 DIAGNOSIS — I44.7 LBBB (LEFT BUNDLE BRANCH BLOCK): ICD-10-CM

## 2022-05-10 DIAGNOSIS — I10 ESSENTIAL HYPERTENSION: ICD-10-CM

## 2022-05-10 PROCEDURE — 99214 OFFICE O/P EST MOD 30 MIN: CPT | Performed by: INTERNAL MEDICINE

## 2022-05-10 RX ORDER — DILTIAZEM HYDROCHLORIDE 180 MG/1
180 CAPSULE, COATED, EXTENDED RELEASE ORAL DAILY
Qty: 90 CAPSULE | Refills: 3 | Status: SHIPPED | OUTPATIENT
Start: 2022-05-10 | End: 2022-06-02 | Stop reason: SDUPTHER

## 2022-05-10 NOTE — PROGRESS NOTES
Cardiology Follow Up    Castro Vargas  1950  [id number]  [facility] [address] [phone]    1  SVT (supraventricular tachycardia) (HCC)  diltiazem (CARDIZEM CD) 180 mg 24 hr capsule   2  Peripheral artery disease ([address] )     3  Nonrheumatic aortic valve stenosis     4  LBBB (left bundle branch block)     5  Essential hypertension     6  S/P TAVR (transcatheter aortic valve replacement)     7  S/P RIGHT Femoral- PT bypass 2011     8  Dyslipidemia       Chief Complaint   Patient presents with    Follow-up     6 month f/up       Interval History: Patient feels well, without complaints  No reported chest pain, shortness of breath, palpitations, lightheadedness, syncope, LE edema, orthopnea, PND, or significant weight changes  Patient remains active without any increased fatigue out of the ordinary         Patient Active Problem List   Diagnosis    Peripheral artery disease ([address] )    Claudication of left lower extremity (HCC)    S/P RIGHT Femoral- PT bypass 2011    Dyslipidemia    ASCVD (arteriosclerotic cardiovascular disease)    Stage 3 chronic kidney disease    Nonrheumatic aortic valve stenosis    Essential hypertension    Elevated PSA    Embolism and thrombosis of arteries of the lower extremities ([address] )    Protein C deficiency ([address] )    Former heavy tobacco smoker    AAA (abdominal aortic aneurysm) without rupture ([address] )    S/P TAVR (transcatheter aortic valve replacement)    Thrombocytopenia (HCC)    Anticoagulation goal of INR 2 to 3    Anticoagulated on Coumadin    LBBB (left bundle branch block)    Leukocytosis    Hypocalcemia    Hypokalemia    Urinary retention    SVT (supraventricular tachycardia) (HCC)    FAMILIA (acute kidney injury) ([address] )    Elevated troponin    Other insomnia    Encounter for postoperative care    Impacted cerumen of left ear    Irregular cardiac rhythm     Past Medical History:   Diagnosis Date    DVT (deep venous thrombosis) (HCC)     Protein C deficiency (HCC)     Pulmonary embolus (HCC)      Social History     Socioeconomic History    Marital status: /Civil Union     Spouse name: Not on file    Number of children: Not on file    Years of education: Not on file    Highest education level: Not on file   Occupational History    Not on file   Tobacco Use    Smoking status: Former Smoker     Types: Cigarettes     Quit date: 9/28/2018     Years since quitting: 3 6    Smokeless tobacco: Never Used    Tobacco comment: 1 pk every 2days , currently on patches to quit  Vaping Use    Vaping Use: Never used   Substance and Sexual Activity    Alcohol use: No    Drug use: No    Sexual activity: Not on file   Other Topics Concern    Not on file   Social History Narrative    Not on file     Social Determinants of Health     Financial Resource Strain: Not on file   Food Insecurity: Not on file   Transportation Needs: Not on file   Physical Activity: Not on file   Stress: Not on file   Social Connections: Not on file   Intimate Partner Violence: Not on file   Housing Stability: Not on file      Family History   Problem Relation Age of Onset    Cancer Mother 50    Heart attack Father 46    Hypertension Father     Prostate cancer Brother     Arrhythmia Neg Hx     Clotting disorder Neg Hx     Fainting Neg Hx     Heart disease Neg Hx     Anuerysm Neg Hx     Stroke Neg Hx      Past Surgical History:   Procedure Laterality Date    BACK SURGERY      BYPASS FEMORAL-TIBIAL Right 2011    [place] ECHO TRANSESOPHAG R-T 2D W/PRB IMG ACQUISJ I&R N/A 9/16/2021    Procedure: TRANSESOPHAGEAL ECHOCARDIOGRAM (RADHA);   Surgeon: Maya Finn DO;  [facility];  Service: Cardiac Surgery    [place] REPLACE AORTIC VALVE OPENFEMORAL ARTERY APPROACH N/A 9/16/2021    Procedure: REPLACEMENT AORTIC VALVE TRANSCATHETER (TAVR) TRANSFEMORAL W/ 29 MM COLÓN BREEZY S3 ULTRA VALVE (ACCESS ON LEFT); Surgeon: Enid Frederick DO;  [facility];  Service: Cardiac Surgery    VEIN LIGATION         Current Outpatient Medications:     acetaminophen (TYLENOL) 325 mg tablet, Take 1-2 tablets Q4-6 hours PRN pain  Do not take more than 4 grams in 24 hours  , Disp: , Rfl: 0    atorvastatin (LIPITOR) 80 mg tablet, TAKE 1 TABLET BY MOUTH EVERY DAY, Disp: 30 tablet, Rfl: 3    carbamide peroxide (DEBROX) 6 5 % otic solution, Administer 5 drops into the left ear 2 (two) times a day, Disp: 15 mL, Rfl: 0    clopidogrel (PLAVIX) 75 mg tablet, Take 1 tablet (75 mg total) by mouth daily, Disp: 30 tablet, Rfl: 4    docusate sodium (COLACE) 100 mg capsule, Take 1 capsule (100 mg total) by mouth 2 (two) times a day as needed for constipation Hold for soft stools  , Disp: 60 capsule, Rfl: 0    polyethylene glycol (MIRALAX) 17 g packet, Take 17 g by mouth daily as needed (constipation) Hold for soft stools  , Disp: , Rfl: 0    tamsulosin (FLOMAX) 0 4 mg, Take 1 capsule (0 4 mg total) by mouth daily with dinner, Disp: 30 capsule, Rfl: 11    warfarin (COUMADIN) 5 mg tablet, TAKE 1 TABLET BY MOUTH EVERY DAY, Disp: 30 tablet, Rfl: 1    diltiazem (CARDIZEM CD) 180 mg 24 hr capsule, Take 1 capsule (180 mg total) by mouth daily, Disp: 90 capsule, Rfl: 3  No Known Allergies    Labs:   Ancillary Orders on 04/22/2022   Component Date Value    Protime 04/27/2022 29 4*    INR 04/27/2022 2 97*   Ancillary Orders on 03/25/2022   Component Date Value    Protime 03/31/2022 21 5*    INR 03/31/2022 1 97*   Appointment on 03/24/2022   Component Date Value    Sodium 03/24/2022 139     Potassium 03/24/2022 4 2     Chloride 03/24/2022 106     CO2 03/24/2022 29     ANION GAP 03/24/2022 4     BUN 03/24/2022 14     Creatinine 03/24/2022 1 46*    Glucose, Fasting 03/24/2022 106*    Calcium 03/24/2022 9 2     eGFR 03/24/2022 48     Magnesium 03/24/2022 2 3    Ancillary Orders on 02/25/2022   Component Date Value    Protime 03/24/2022 32 7*    INR 03/24/2022 3 42*   Ancillary Orders on 01/28/2022   Component Date Value    Protime 02/10/2022 30 7*    INR 02/10/2022 3 14*   Appointment on 12/29/2021   Component Date Value    Protime 12/29/2021 28 1*    INR 12/29/2021 2 80*   Appointment on 12/13/2021   Component Date Value    Protime 12/13/2021 31 6*    INR 12/13/2021 3 26*   Appointment on 12/01/2021   Component Date Value    Protime 12/01/2021 31 7*    INR 12/01/2021 3 28*    Hepatitis C Ab 12/01/2021 Non-reactive    Appointment on 11/19/2021   Component Date Value    Protime 11/19/2021 32 8*    INR 11/19/2021 3 42*   Appointment on 11/12/2021   Component Date Value    Protime 11/12/2021 25 8*    INR 11/12/2021 2 50*   There may be more visits with results that are not included  Lab Results   Component Value Date    TRIG 135 11/02/2021    HDL 52 11/02/2021     Imaging: No results found  Review of Systems:  Review of Systems   Constitutional: Negative for activity change, appetite change, fatigue and fever  HENT: Negative for nosebleeds and sore throat  Eyes: Negative for photophobia and visual disturbance  Respiratory: Negative for cough, chest tightness, shortness of breath and wheezing  Cardiovascular: Negative for chest pain, palpitations and leg swelling  Gastrointestinal: Negative for abdominal pain, diarrhea, nausea and vomiting  Endocrine: Negative for polyuria  Genitourinary: Negative for dysuria, frequency and hematuria  Musculoskeletal: Negative for arthralgias, back pain and gait problem  Skin: Negative for pallor and rash  Neurological: Negative for dizziness, syncope, speech difficulty and light-headedness  Hematological: Does not bruise/bleed easily  Psychiatric/Behavioral: Negative for agitation, behavioral problems and confusion  Physical Exam:  Physical Exam  Vitals reviewed     Constitutional:       General: He is not in acute distress  Appearance: He is well-developed  He is not diaphoretic  HENT:      Head: Normocephalic and atraumatic  Nose: Nose normal    Eyes:      General: No scleral icterus  Pupils: Pupils are equal, round, and reactive to light  Neck:      Vascular: No JVD  Cardiovascular:      Rate and Rhythm: Normal rate and regular rhythm  Heart sounds: S1 normal and S2 normal  Heart sounds not distant  Murmur heard  Systolic murmur is present with a grade of 2/6  No friction rub  No gallop  No S3 sounds  Pulmonary:      Effort: Pulmonary effort is normal  No respiratory distress  Breath sounds: Normal breath sounds  No wheezing or rales  Abdominal:      General: Bowel sounds are normal  There is no distension  Palpations: Abdomen is soft  Musculoskeletal:         General: No deformity  Cervical back: Normal range of motion and neck supple  Skin:     General: Skin is warm and dry  Findings: No erythema  Neurological:      Mental Status: He is alert and oriented to person, place, and time  Cranial Nerves: No cranial nerve deficit  Psychiatric:         Behavior: Behavior normal        Blood pressure 130/76, pulse 81, height 5' 7" (1 702 m), weight 90 3 kg (199 lb), SpO2 96 %  EKG:  Sinus bradycardia with occasional premature ventricular contractions  Otherwise normal ECG    Discussion/Summary:  1  ASCVD  Nonobstructive CAD by cath 5/11 at [facility]  On aspirin, statin  No current exertional cardiac symptoms  Doing well and continued on maintenance meds  Continue current regimen and without symptoms  No changes made today      2  HL  Lipid panel 9/18 showed total cholesterol 121, , HDL 35, LDL 54  On Atorvastatin 40  Lipid panel 9/19 showed total cholesterol 131, , HDL 38, LDL 55  Advised him to try taking some fish oil to help lower TG    Repeat levels in April 2021 revealed LDL of 143 - which is very high - advised to increase atorvastatin to  80mg daily and repeat levels in Nov 2021 LDL reduced down to 53  Repeat for this year due after next visit      3  PAD  On Plavix, statin  s/p RLE fem-PT bypass  Following with Regi Roberto/Olga Lay/Dr Joana Ulloa  Still has some claudication symptoms with walking, but stable to improved       4  Moderate AS  Echo 8/2018 showed moderate AS  Not having any current symptoms with exertion  Explained need to monitor for exertional symptoms  Repeat echo to assess for progression in June 2021 revealed normal LV function, G1DD, moderate AR, severe AS with mean gradient of 39 mmHg and peak velocity of 4 m/s - now s/p TAVR and recovering well, 30 day echo stable  He had post-op LBBB on EKG along with asymptomatic bradycardia and AIVR  Now doing well and remains asymptomatic  Advised to watch for syncope - no need for PPM implant after Zio patch in Oct 2021 revealed normal heart rate variation  Repeat echo after next visit for 1 year follow up      5  HTN  In the past was taking Metoprolol, but stopped taking it after he stopped following with Dr Parviz Moctezuma around 2012  Started Amlodipine 5 mg daily 10/18 due to elevated BP, as well as HCTZ 25 mg daily  PCP has changed diltiazem to lisinopril with elevated BP and seen to have increased PVCs on EKG  He was on the lowest dose of diltiazem, so I would prefer for him to be on an AV obie blocking agent with a higher dose to help suppress ectopy and improve BP control rather than change to a completely different agent altogether       6  Prior DVT  On Coumadin, monitored by PCP  7   SVT: episode seen after TAVR, which broke spontaneously, continued on cardizem  No further episodes noted

## 2022-05-10 NOTE — PATIENT INSTRUCTIONS
Hyperlipidemia   AMBULATORY CARE:   Hyperlipidemia  is a high level of lipids (fats) in your blood  These lipids include cholesterol or triglycerides  Lipids are made by your body  They also come from the foods you eat  Your body needs lipids to work properly, but high levels increase your risk for heart disease, heart attack, and stroke  Call your local emergency number [phone] in the [address] [facility] [facility]) or have someone call if:   · You have any of the following signs of a heart attack:      ? Squeezing, pressure, or pain in your chest    ? You may  also have any of the following:     § Discomfort or pain in your back, neck, jaw, stomach, or arm    § Shortness of breath    § Nausea or vomiting    § Lightheadedness or a sudden cold sweat    · You have any of the following signs of a stroke:      ? Numbness or drooping on one side of your face     ? Weakness in an arm or leg    ? Confusion or difficulty speaking    ? Dizziness, a severe headache, or vision loss    Call your doctor if:   · You have questions or concerns about your condition or care  Treatment  may first include lifestyle changes to help decrease your lipid levels  Your provider may recommend you work with a team to manage hyperlipidemia  The team may include medical experts such as a dietitian, an exercise or physical therapist, and a behavior therapist  Your family members may be included in helping you create lifestyle changes  You may also need to take medicine to lower your lipid levels  Some of the lifestyle changes you may need to make include the following:  · Maintain a healthy weight  Ask your healthcare provider what a healthy weight is for you  Ask him or her to help you create a weight loss plan if you are overweight  Weight loss can decrease your cholesterol and triglyceride levels  · Be physically active throughout the day  Physical activity, such as exercise, lowers your cholesterol levels and helps you maintain a healthy weight   Get 30 minutes or more of aerobic exercise 4 to 6 days each week  You can split your exercise into four 10-minute workouts instead of 30 minutes at one time  Examples of aerobic exercises include walking briskly, swimming, or riding a bike  Work with your healthcare provider to plan the best exercise program for you  Also include strength training at least 2 times each week  Your healthcare providers can help you create a physical activity plan  · Do not smoke  Nicotine and other chemicals in cigarettes and cigars can increase your risk for a heart attack and stroke  Ask your healthcare provider for information if you currently smoke and need help to quit  E-cigarettes or smokeless tobacco still contain nicotine  Talk to your healthcare provider before you use these products  · Eat heart-healthy foods  A dietitian or your provider can give you more information on low-sodium plans or the DASH (Dietary Approaches to Stop Hypertension) eating plan  The DASH plan is low in sodium, processed sugar, unhealthy fats, and total fat  It is high in potassium, calcium, and fiber  It is high in potassium, calcium, and fiber  These can be found in vegetables, fruit, and whole-grain foods  The following are ways to get more heart-healthy foods:         ? Decrease the total amount of fat you eat  Choose lean meats, fat-free or 1% fat milk, and low-fat dairy products, such as yogurt and cheese  Limit or do not eat red meat  Red meats are high in fat and cholesterol  ? Replace unhealthy fats with healthy fats  Unhealthy fats include saturated fat, trans fat, and cholesterol  Choose soft margarines that are low in saturated fat and have little or no trans fat  Monounsaturated fats are healthy fats  These are found in olive oil, canola oil, avocado, and nuts  Polyunsaturated fats are also healthy  These are found in fish, flaxseed, walnuts, and soybeans  ? Eat 5 or more servings of fruits and vegetables every day    They are low in calories and fat and a good source of essential vitamins  Include dark green, red, and orange vegetables  Examples include spinach, kale, broccoli, and carrots  ? Eat foods high in fiber  Fiber can help lower your cholesterol levels  Choose whole grain, high-fiber foods  Good choices include whole-wheat breads or cereals, beans, peas, fruits, and vegetables  ? Limit sodium (salt) as directed  Too much sodium can affect your fluid balance and blood pressure  Your healthcare provider will tell you how much sodium and potassium are safe for you to have in a day  He or she may recommend that you limit sodium to 2,300 mg a day  Your provider or a dietitian can help you find ways to limit sodium  For example, if you add salt while you cook, do not add more salt at the table  Check labels to find low-sodium or no-salt-added foods  Some low-sodium foods use potassium salts for flavor  Too much potassium can also cause health problems  · Ask your healthcare provider if it is okay for you to drink alcohol  Alcohol can increase your cholesterol and triglyceride levels  Your provider can tell you how many drinks are okay to have within 24 hours and within 1 week  A drink of alcohol is 12 ounces of beer, 5 ounces of wine, or 1½ ounces of liquor  Follow up with your doctor as directed: You may need to return for more tests  Your healthcare provider may refer you to a dietitian  Write down your questions so you remember to ask them during your visits  © Copyright [employer] 2022 Information is for End User's use only and may not be sold, redistributed or otherwise used for commercial purposes  All illustrations and images included in CareNotes® are the copyrighted property of [facility] [employer]  or [facility] Preeti Beck   The above information is an  only  It is not intended as medical advice for individual conditions or treatments   Talk to your doctor, nurse or pharmacist before following any medical regimen to see if it is safe and effective for you

## 2022-05-11 DIAGNOSIS — I73.9 PERIPHERAL VASCULAR DISEASE (HCC): ICD-10-CM

## 2022-05-11 RX ORDER — WARFARIN SODIUM 5 MG/1
TABLET ORAL
Qty: 30 TABLET | Refills: 1 | Status: SHIPPED | OUTPATIENT
Start: 2022-05-11 | End: 2022-07-28

## 2022-06-02 ENCOUNTER — TELEPHONE (OUTPATIENT)
Dept: CARDIOLOGY CLINIC | Facility: MEDICAL CENTER | Age: 72
End: 2022-06-02

## 2022-06-02 DIAGNOSIS — I47.1 SVT (SUPRAVENTRICULAR TACHYCARDIA) (HCC): ICD-10-CM

## 2022-06-02 RX ORDER — DILTIAZEM HYDROCHLORIDE 240 MG/1
240 CAPSULE, COATED, EXTENDED RELEASE ORAL DAILY
Qty: 30 CAPSULE | Refills: 3 | Status: SHIPPED | OUTPATIENT
Start: 2022-06-02

## 2022-06-13 DIAGNOSIS — I73.9 PERIPHERAL ARTERY DISEASE (HCC): ICD-10-CM

## 2022-06-13 DIAGNOSIS — I73.9 INTERMITTENT CLAUDICATION (HCC): ICD-10-CM

## 2022-06-14 RX ORDER — ATORVASTATIN CALCIUM 80 MG/1
TABLET, FILM COATED ORAL
Qty: 30 TABLET | Refills: 3 | Status: SHIPPED | OUTPATIENT
Start: 2022-06-14

## 2022-07-07 ENCOUNTER — APPOINTMENT (OUTPATIENT)
Dept: LAB | Facility: MEDICAL CENTER | Age: 72
End: 2022-07-07
Payer: MEDICARE

## 2022-07-25 NOTE — ASSESSMENT & PLAN NOTE
I discussed with the patient the discovery of the PSA molecule and its original use in determining the return of prostate cancer after definitive therapy  I described the normal function of the PSA molecule in the reproductive process and also discussed the detection of PSA in the blood  We discussed the controversial history of PSA screening for prostate cancer in the [place] as well as the risk of over detection and over treatment of prostate cancer by way of PSA screening  The patient understands that PSA blood testing is an imperfect way to screen for prostate cancer and that elevated PSA levels in the blood may also be caused by infection, inflammation, prostatic trauma or manipulation, urological procedures, or by benign prostatic enlargement  The role of the digital rectal examination in prostate cancer screening was also discussed and I discussed with him that there is large interobserver variability in the findings of digital rectal examination  We discussed the continued workup of elevated PSA or abnormal digital rectal examination in the form of the performance of a prostate biopsy  The preparation for, and steps of, an office-based transrectal ultrasound of prostate biopsy were described to the patient  Benefits of obtaining tissue for pathologic analysis were discussed with him and the risks of prostate biopsy were also discussed at length  These risks include but are not limited to infection, bleeding, pain, sepsis with need for admission to hospital, risk of change in sexual function, and risk of diagnosis with prostate cancer  Alternatives to prostate biopsy in the form of continued PSA and GILMA surveillance were also offered to him  All of his questions and concerns were answered and addressed with regard to that detailed above      Plan:  The patient's prostate is 70-80 grams and smooth without nodules, he does have a positive family history of prostate cancer in both of his brothers  Unfortunately, he does have multiple medical problems, and need for long-term anticoagulation given 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

## 2022-08-10 ENCOUNTER — OFFICE VISIT (OUTPATIENT)
Dept: FAMILY MEDICINE CLINIC | Facility: CLINIC | Age: 72
End: 2022-08-10
Payer: MEDICARE

## 2022-08-10 ENCOUNTER — APPOINTMENT (OUTPATIENT)
Dept: LAB | Facility: MEDICAL CENTER | Age: 72
End: 2022-08-10
Payer: MEDICARE

## 2022-08-10 VITALS
WEIGHT: 197 LBS | HEART RATE: 88 BPM | SYSTOLIC BLOOD PRESSURE: 154 MMHG | BODY MASS INDEX: 30.92 KG/M2 | HEIGHT: 67 IN | TEMPERATURE: 98.4 F | DIASTOLIC BLOOD PRESSURE: 82 MMHG | OXYGEN SATURATION: 96 %

## 2022-08-10 DIAGNOSIS — M79.652 LEFT THIGH PAIN: Primary | ICD-10-CM

## 2022-08-10 DIAGNOSIS — I74.3 EMBOLISM AND THROMBOSIS OF ARTERIES OF THE LOWER EXTREMITIES (HCC): Primary | ICD-10-CM

## 2022-08-10 LAB
INR PPP: 4.09 (ref 0.84–1.19)
PROTHROMBIN TIME: 39.9 SECONDS (ref 11.6–14.5)

## 2022-08-10 PROCEDURE — 36415 COLL VENOUS BLD VENIPUNCTURE: CPT

## 2022-08-10 PROCEDURE — 85610 PROTHROMBIN TIME: CPT

## 2022-08-10 PROCEDURE — 99214 OFFICE O/P EST MOD 30 MIN: CPT | Performed by: FAMILY MEDICINE

## 2022-08-10 RX ORDER — TIZANIDINE 2 MG/1
2 TABLET ORAL
Qty: 30 TABLET | Refills: 0 | Status: SHIPPED | OUTPATIENT
Start: 2022-08-10 | End: 2022-08-23

## 2022-08-10 NOTE — ASSESSMENT & PLAN NOTE
Likely secondary to strain of the hamstrings  Patient may use tizanidine 2 mg at night for sleep and pain relief  Patient may use Tylenol 3 tablets up to every 8 hours    Offer physical therapy, patient declines at this time    At this time my suspicion for a DVT in the femoral vein is low as patient has been compliant with medication and that his most recent INR is supratherapeutic

## 2022-08-10 NOTE — PROGRESS NOTES
Assessment/Plan:    Left thigh pain  Likely secondary to strain of the hamstrings  Patient may use tizanidine 2 mg at night for sleep and pain relief  Patient may use Tylenol 3 tablets up to every 8 hours    Offer physical therapy, patient declines at this time    At this time my suspicion for a DVT in the femoral vein is low as patient has been compliant with medication and that his most recent INR is supratherapeutic      Subjective:      Patient ID: Jose G Schultz is a 71 y o  male  HPI    78-year-old male patient presents for follow-up regarding his acute left-sided hip pain  Patient report pain started when he was bending down to  a leaf  Patient denies any history of similar pain the past   Reports the pain started about 1 week ago and is progressively getting worse  Patient is accompanied by his wife today  According to patient's wife, patient's pain became so much that he decide to go to emergency room today  However, patient was not seen and decided to leave on his own  While at a neutral sitting position, patient denies any significant pain  With standing, patient reports about 5/10 pain  Bending forward cause the pain to be significantly worse, up to 7 to 8/10  Patient is currently on warfarin, last protime INR completed on 07/07/2022, 2 85  Patient has been compliant with medication  Pt has being using ice and heat on the area  Review of Systems   Constitutional: Negative for chills and fever  HENT: Negative for congestion, rhinorrhea and sore throat  Respiratory: Negative for shortness of breath  Cardiovascular: Negative for chest pain  Gastrointestinal: Negative for abdominal pain  Musculoskeletal: Positive for arthralgias  Negative for back pain  Pain in the left hip   Neurological: Negative for headaches  Psychiatric/Behavioral: Negative for sleep disturbance         Objective:    /82 (BP Location: Left arm, Patient Position: Sitting, Cuff Size: Standard)   Pulse 88   Temp 98 4 °F (36 9 °C) (Temporal)   Ht 5' 7" (1 702 m)   Wt 89 4 kg (197 lb)   SpO2 96%   BMI 30 85 kg/m²     Physical Exam  Vitals reviewed  Constitutional:       General: He is not in acute distress  Appearance: Normal appearance  He is not ill-appearing, toxic-appearing or diaphoretic  Cardiovascular:      Rate and Rhythm: Normal rate  Pulses: Normal pulses  Heart sounds: Normal heart sounds  No murmur heard  Pulmonary:      Effort: Pulmonary effort is normal  No respiratory distress  Breath sounds: Normal breath sounds  Abdominal:      General: Abdomen is flat  Musculoskeletal:         General: Tenderness present  No swelling, deformity or signs of injury  Normal range of motion  Comments: Tenderness at the superior aspect of hamstring on the left lower extremity  Patient has pain with ANDRE and FADIR test of the left lower extremity    Negative Homans sign on the left  Negative straight leg raise test  This no temperature discrepancy, no swelling, no erythema of the left lower extremity compared to the right   Skin:     General: Skin is warm and dry  Capillary Refill: Capillary refill takes less than 2 seconds  Coloration: Skin is not jaundiced  Neurological:      General: No focal deficit present  Mental Status: He is alert and oriented to person, place, and time  Psychiatric:         Mood and Affect: Mood normal           HIMA Moreira  Family Medicine    Please excuse any "sound-alike" errors that may have ocurred during the process of dictation  Parts of this note have been dictated and there may be errors present in the transcription process  Thank you

## 2022-08-15 ENCOUNTER — TELEPHONE (OUTPATIENT)
Dept: CARDIAC SURGERY | Facility: CLINIC | Age: 72
End: 2022-08-15

## 2022-08-18 ENCOUNTER — APPOINTMENT (OUTPATIENT)
Dept: LAB | Facility: MEDICAL CENTER | Age: 72
End: 2022-08-18
Payer: MEDICARE

## 2022-08-18 ENCOUNTER — OFFICE VISIT (OUTPATIENT)
Dept: FAMILY MEDICINE CLINIC | Facility: CLINIC | Age: 72
End: 2022-08-18
Payer: MEDICARE

## 2022-08-18 VITALS
OXYGEN SATURATION: 98 % | HEART RATE: 75 BPM | BODY MASS INDEX: 30.38 KG/M2 | TEMPERATURE: 97.7 F | DIASTOLIC BLOOD PRESSURE: 60 MMHG | RESPIRATION RATE: 16 BRPM | WEIGHT: 194 LBS | SYSTOLIC BLOOD PRESSURE: 146 MMHG

## 2022-08-18 DIAGNOSIS — Z79.01 ANTICOAGULATION GOAL OF INR 2 TO 3: Primary | ICD-10-CM

## 2022-08-18 DIAGNOSIS — Z51.81 ANTICOAGULATION GOAL OF INR 2 TO 3: Primary | ICD-10-CM

## 2022-08-18 DIAGNOSIS — Z79.01 ANTICOAGULATION GOAL OF INR 2 TO 3: ICD-10-CM

## 2022-08-18 DIAGNOSIS — Z51.81 ANTICOAGULATION GOAL OF INR 2 TO 3: ICD-10-CM

## 2022-08-18 PROCEDURE — 99213 OFFICE O/P EST LOW 20 MIN: CPT | Performed by: FAMILY MEDICINE

## 2022-08-18 NOTE — PROGRESS NOTES
Assessment/Plan:    Anticoagulation goal of INR 2 to 3  Patient had supratherapeutic INR on 08/10/2022  Has completed iron today  Will manage based on most recent INR values  At this time please continue the previous treatment plan of holding 1 dose of 5 mg warfarin once a week for the next 4 weeks  No significant symptoms of internal blood loss  Vital signs stable        Subjective:      Patient ID: Viktoriya [place] is a 71 y o  male  HPI    43-year-old male patient presents for bruising in setting of elevated INR  He is accompanied by his wife today  Patient is currently using warfarin for anticoagulation  Patient's goal INR is 2-3, most recent INR from 08/10/2022 is 4 09  Patient has noticed some bruise, 1 on the upper arm, 1 on the posterior left knee  Patient has not noticed any sign of internal bleeding such as melena, blood in the stool, coffee-ground emesis etcetera  Vital signs stable today  Of note, he does have a history of supratherapeutic INR requiring vitamin K treatment  Denies any recent injury  Review of Systems   Constitutional: Negative for chills and fatigue  HENT: Negative for congestion, rhinorrhea and sore throat  Respiratory: Negative for shortness of breath  Cardiovascular: Negative for chest pain  Gastrointestinal: Negative for abdominal pain  Genitourinary:        Nocturia   Musculoskeletal: Positive for arthralgias and myalgias  Hip pain   Neurological: Negative for headaches  Hematological: Bruises/bleeds easily  Objective:    /60 (BP Location: Left arm, Patient Position: Sitting, Cuff Size: Large)   Pulse 75   Temp 97 7 °F (36 5 °C) (Temporal)   Resp 16   Wt 88 kg (194 lb)   SpO2 98%   BMI 30 38 kg/m²        Physical Exam  Vitals reviewed  Constitutional:       General: He is not in acute distress  Appearance: Normal appearance  He is obese  He is not ill-appearing or toxic-appearing     Cardiovascular:      Rate and Rhythm: Normal rate and regular rhythm  Pulses: Normal pulses  Heart sounds: Normal heart sounds  Pulmonary:      Effort: Pulmonary effort is normal    Abdominal:      General: Abdomen is flat  Musculoskeletal:         General: No swelling, tenderness, deformity or signs of injury  Normal range of motion  Skin:     General: Skin is warm and dry  Capillary Refill: Capillary refill takes less than 2 seconds  Coloration: Skin is not jaundiced  Findings: Bruising present  Comments: Small bruise noted on right upper arm, left posterior knee   Neurological:      General: No focal deficit present  Mental Status: He is alert     Psychiatric:         Mood and Affect: Mood normal

## 2022-08-18 NOTE — ASSESSMENT & PLAN NOTE
Patient had supratherapeutic INR on 08/10/2022  Has completed iron today  Will manage based on most recent INR values  At this time please continue the previous treatment plan of holding 1 dose of 5 mg warfarin once a week for the next 4 weeks      No significant symptoms of internal blood loss  Vital signs stable

## 2022-08-21 DIAGNOSIS — M79.652 LEFT THIGH PAIN: ICD-10-CM

## 2022-08-23 ENCOUNTER — APPOINTMENT (OUTPATIENT)
Dept: LAB | Facility: MEDICAL CENTER | Age: 72
End: 2022-08-23
Payer: MEDICARE

## 2022-08-23 ENCOUNTER — TELEPHONE (OUTPATIENT)
Dept: FAMILY MEDICINE CLINIC | Facility: CLINIC | Age: 72
End: 2022-08-23

## 2022-08-23 RX ORDER — TIZANIDINE 2 MG/1
TABLET ORAL
Qty: 30 TABLET | Refills: 0 | Status: SHIPPED | OUTPATIENT
Start: 2022-08-23 | End: 2022-08-24

## 2022-08-23 NOTE — TELEPHONE ENCOUNTER
Patient's wife called  She said that as per your orders, patient has been taking his tizanidine up to 3 times per day because of his pain level  The script is only written for at bedtime and therefore the patient is unable to get his next refill until September 6th  Please rewrite the script to use 3 times a day and send to [facility]

## 2022-08-24 DIAGNOSIS — M79.652 LEFT THIGH PAIN: ICD-10-CM

## 2022-08-24 RX ORDER — TIZANIDINE 2 MG/1
2 TABLET ORAL EVERY 8 HOURS PRN
Qty: 90 TABLET | Refills: 1 | Status: SHIPPED | OUTPATIENT
Start: 2022-08-24

## 2022-08-24 NOTE — PROGRESS NOTES
Called and left instruction regarding warfarin dosage and rechecking INR  Refill tizanidine per patient request

## 2022-08-31 ENCOUNTER — APPOINTMENT (OUTPATIENT)
Dept: LAB | Facility: MEDICAL CENTER | Age: 72
End: 2022-08-31
Payer: MEDICARE

## 2022-08-31 DIAGNOSIS — I73.9 CLAUDICATION OF LEFT LOWER EXTREMITY (HCC): ICD-10-CM

## 2022-08-31 RX ORDER — CLOPIDOGREL BISULFATE 75 MG/1
TABLET ORAL
Qty: 30 TABLET | Refills: 4 | Status: SHIPPED | OUTPATIENT
Start: 2022-08-31

## 2022-09-01 ENCOUNTER — TELEPHONE (OUTPATIENT)
Dept: FAMILY MEDICINE CLINIC | Facility: CLINIC | Age: 72
End: 2022-09-01

## 2022-09-01 DIAGNOSIS — Z79.01 ANTICOAGULATION GOAL OF INR 2 TO 3: Primary | ICD-10-CM

## 2022-09-01 DIAGNOSIS — Z51.81 ANTICOAGULATION GOAL OF INR 2 TO 3: Primary | ICD-10-CM

## 2022-09-01 RX ORDER — WARFARIN SODIUM 3 MG/1
3 TABLET ORAL
Qty: 30 TABLET | Refills: 1 | Status: SHIPPED | OUTPATIENT
Start: 2022-09-01 | End: 2022-10-27

## 2022-09-01 NOTE — PROGRESS NOTES
Patient's INR is 1 53 after 1 week on Coumadin 2 5 mg daily  Will increase to 3 mg daily  Recheck in 1 week

## 2022-09-01 NOTE — TELEPHONE ENCOUNTER
Patient wife called to see if the medication needs to be adjusted cause his INR was a little low for the patient  Dr Bee Hernandez was consulted and he is going to send a new script in for patient to take 3mg daily,  Patients wife aware

## 2022-09-08 ENCOUNTER — APPOINTMENT (OUTPATIENT)
Dept: LAB | Facility: MEDICAL CENTER | Age: 72
End: 2022-09-08
Payer: MEDICARE

## 2022-09-12 ENCOUNTER — TELEPHONE (OUTPATIENT)
Dept: CARDIAC SURGERY | Facility: CLINIC | Age: 72
End: 2022-09-12

## 2022-09-15 ENCOUNTER — APPOINTMENT (OUTPATIENT)
Dept: LAB | Facility: MEDICAL CENTER | Age: 72
End: 2022-09-15
Payer: MEDICARE

## 2022-10-11 DIAGNOSIS — Z95.2 S/P TAVR (TRANSCATHETER AORTIC VALVE REPLACEMENT): Primary | ICD-10-CM

## 2022-10-11 DIAGNOSIS — I35.0 NONRHEUMATIC AORTIC VALVE STENOSIS: ICD-10-CM

## 2022-10-11 DIAGNOSIS — I73.9 PERIPHERAL ARTERY DISEASE (HCC): ICD-10-CM

## 2022-10-11 DIAGNOSIS — I73.9 INTERMITTENT CLAUDICATION (HCC): ICD-10-CM

## 2022-10-11 PROBLEM — H61.22 IMPACTED CERUMEN OF LEFT EAR: Status: RESOLVED | Noted: 2022-02-24 | Resolved: 2022-10-11

## 2022-10-11 RX ORDER — ATORVASTATIN CALCIUM 80 MG/1
TABLET, FILM COATED ORAL
Qty: 30 TABLET | Refills: 3 | Status: SHIPPED | OUTPATIENT
Start: 2022-10-11

## 2022-10-12 DIAGNOSIS — I47.1 SVT (SUPRAVENTRICULAR TACHYCARDIA) (HCC): ICD-10-CM

## 2022-10-12 RX ORDER — DILTIAZEM HYDROCHLORIDE 240 MG/1
CAPSULE, COATED, EXTENDED RELEASE ORAL
Qty: 30 CAPSULE | Refills: 3 | Status: SHIPPED | OUTPATIENT
Start: 2022-10-12

## 2022-10-17 ENCOUNTER — HOSPITAL ENCOUNTER (OUTPATIENT)
Dept: NON INVASIVE DIAGNOSTICS | Facility: CLINIC | Age: 72
Discharge: HOME | End: 2022-10-17
Payer: MEDICARE

## 2022-10-17 VITALS
BODY MASS INDEX: 30.45 KG/M2 | SYSTOLIC BLOOD PRESSURE: 146 MMHG | HEIGHT: 67 IN | HEART RATE: 75 BPM | WEIGHT: 194 LBS | DIASTOLIC BLOOD PRESSURE: 60 MMHG

## 2022-10-17 DIAGNOSIS — I35.0 NONRHEUMATIC AORTIC VALVE STENOSIS: ICD-10-CM

## 2022-10-17 DIAGNOSIS — Z95.2 S/P TAVR (TRANSCATHETER AORTIC VALVE REPLACEMENT): ICD-10-CM

## 2022-10-17 LAB
AORTIC VALVE MEAN VELOCITY: 13.6 M/S
APICAL FOUR CHAMBER EJECTION FRACTION: 63 %
ASCENDING AORTA: 3.4 CM
AV AREA BY CONTINUOUS VTI: 1.8 CM2
AV AREA PEAK VELOCITY: 1.7 CM2
AV LVOT MEAN GRADIENT: 2 MMHG
AV LVOT PEAK GRADIENT: 3 MMHG
AV MEAN GRADIENT: 8 MMHG
AV PEAK GRADIENT: 13 MMHG
AV VALVE AREA: 1.83 CM2
AV VELOCITY RATIO: 0.48
DOP CALC AO PEAK VEL: 1.84 M/S
DOP CALC AO VTI: 39.37 CM
DOP CALC LVOT AREA: 3.46 CM2
DOP CALC LVOT DIAMETER: 2.1 CM
DOP CALC LVOT PEAK VEL VTI: 20.77 CM
DOP CALC LVOT PEAK VEL: 0.88 M/S
DOP CALC LVOT STROKE INDEX: 37 ML/M2
DOP CALC LVOT STROKE VOLUME: 71.9 CM3
E WAVE DECELERATION TIME: 240 MS
FRACTIONAL SHORTENING: 31 % (ref 28–44)
INTERVENTRICULAR SEPTUM IN DIASTOLE (PARASTERNAL SHORT AXIS VIEW): 1 CM
INTERVENTRICULAR SEPTUM: 1 CM (ref 0.6–1.1)
LAAS-AP2: 20.5 CM2
LAAS-AP4: 22.6 CM2
LEFT ATRIUM SIZE: 4 CM
LEFT INTERNAL DIMENSION IN SYSTOLE: 3.5 CM (ref 2.1–4)
LEFT VENTRICULAR INTERNAL DIMENSION IN DIASTOLE: 5.1 CM (ref 3.5–6)
LEFT VENTRICULAR POSTERIOR WALL IN END DIASTOLE: 1 CM
LEFT VENTRICULAR STROKE VOLUME: 75 ML
LVSV (TEICH): 75 ML
MV E'TISSUE VEL-SEP: 7 CM/S
MV PEAK A VEL: 1.09 M/S
MV PEAK E VEL: 88 CM/S
MV STENOSIS PRESSURE HALF TIME: 70 MS
MV VALVE AREA P 1/2 METHOD: 3.14 CM2
RIGHT ATRIUM AREA SYSTOLE A4C: 20.4 CM2
RIGHT VENTRICLE ID DIMENSION: 4.2 CM
SL CV LEFT ATRIUM LENGTH A2C: 5.5 CM
SL CV LV EF: 60
SL CV PED ECHO LEFT VENTRICLE DIASTOLIC VOLUME (MOD BIPLANE) 2D: 126 ML
SL CV PED ECHO LEFT VENTRICLE SYSTOLIC VOLUME (MOD BIPLANE) 2D: 51 ML
TR MAX PG: 30 MMHG
TR PEAK VELOCITY: 2.7 M/S
TRICUSPID VALVE PEAK REGURGITATION VELOCITY: 2.72 M/S

## 2022-10-17 PROCEDURE — 93306 TTE W/DOPPLER COMPLETE: CPT

## 2022-10-17 PROCEDURE — 93306 TTE W/DOPPLER COMPLETE: CPT | Performed by: INTERNAL MEDICINE

## 2022-10-25 ENCOUNTER — APPOINTMENT (OUTPATIENT)
Dept: LAB | Facility: MEDICAL CENTER | Age: 72
End: 2022-10-25
Payer: MEDICARE

## 2022-10-25 ENCOUNTER — OFFICE VISIT (OUTPATIENT)
Dept: CARDIAC SURGERY | Facility: CLINIC | Age: 72
End: 2022-10-25
Payer: MEDICARE

## 2022-10-25 VITALS
HEART RATE: 70 BPM | SYSTOLIC BLOOD PRESSURE: 130 MMHG | DIASTOLIC BLOOD PRESSURE: 68 MMHG | BODY MASS INDEX: 30.26 KG/M2 | WEIGHT: 193.2 LBS

## 2022-10-25 DIAGNOSIS — Z95.2 S/P TAVR (TRANSCATHETER AORTIC VALVE REPLACEMENT): Primary | ICD-10-CM

## 2022-10-25 DIAGNOSIS — I35.0 NONRHEUMATIC AORTIC VALVE STENOSIS: ICD-10-CM

## 2022-10-25 DIAGNOSIS — Z95.2 S/P TAVR (TRANSCATHETER AORTIC VALVE REPLACEMENT): ICD-10-CM

## 2022-10-25 LAB
ANION GAP SERPL CALCULATED.3IONS-SCNC: 4 MMOL/L (ref 4–13)
BASOPHILS # BLD AUTO: 0.06 THOUSANDS/ÂΜL (ref 0–0.1)
BASOPHILS NFR BLD AUTO: 0 % (ref 0–1)
BUN SERPL-MCNC: 21 MG/DL (ref 5–25)
CALCIUM SERPL-MCNC: 9.9 MG/DL (ref 8.3–10.1)
CHLORIDE SERPL-SCNC: 107 MMOL/L (ref 96–108)
CO2 SERPL-SCNC: 28 MMOL/L (ref 21–32)
CREAT SERPL-MCNC: 1.55 MG/DL (ref 0.6–1.3)
EOSINOPHIL # BLD AUTO: 0.09 THOUSAND/ÂΜL (ref 0–0.61)
EOSINOPHIL NFR BLD AUTO: 1 % (ref 0–6)
ERYTHROCYTE [DISTWIDTH] IN BLOOD BY AUTOMATED COUNT: 12.9 % (ref 11.6–15.1)
GFR SERPL CREATININE-BSD FRML MDRD: 44 ML/MIN/1.73SQ M
GLUCOSE SERPL-MCNC: 105 MG/DL (ref 65–140)
HCT VFR BLD AUTO: 50.1 % (ref 36.5–49.3)
HGB BLD-MCNC: 16.2 G/DL (ref 12–17)
IMM GRANULOCYTES # BLD AUTO: 0.06 THOUSAND/UL (ref 0–0.2)
IMM GRANULOCYTES NFR BLD AUTO: 0 % (ref 0–2)
LYMPHOCYTES # BLD AUTO: 1.58 THOUSANDS/ÂΜL (ref 0.6–4.47)
LYMPHOCYTES NFR BLD AUTO: 11 % (ref 14–44)
MCH RBC QN AUTO: 30.1 PG (ref 26.8–34.3)
MCHC RBC AUTO-ENTMCNC: 32.3 G/DL (ref 31.4–37.4)
MCV RBC AUTO: 93 FL (ref 82–98)
MONOCYTES # BLD AUTO: 1.38 THOUSAND/ÂΜL (ref 0.17–1.22)
MONOCYTES NFR BLD AUTO: 10 % (ref 4–12)
NEUTROPHILS # BLD AUTO: 10.63 THOUSANDS/ÂΜL (ref 1.85–7.62)
NEUTS SEG NFR BLD AUTO: 78 % (ref 43–75)
NRBC BLD AUTO-RTO: 0 /100 WBCS
PLATELET # BLD AUTO: 244 THOUSANDS/UL (ref 149–390)
PMV BLD AUTO: 9.3 FL (ref 8.9–12.7)
POTASSIUM SERPL-SCNC: 5.2 MMOL/L (ref 3.5–5.3)
RBC # BLD AUTO: 5.38 MILLION/UL (ref 3.88–5.62)
SODIUM SERPL-SCNC: 139 MMOL/L (ref 135–147)
WBC # BLD AUTO: 13.8 THOUSAND/UL (ref 4.31–10.16)

## 2022-10-25 PROCEDURE — 93000 ELECTROCARDIOGRAM COMPLETE: CPT | Performed by: THORACIC SURGERY (CARDIOTHORACIC VASCULAR SURGERY)

## 2022-10-25 PROCEDURE — 80048 BASIC METABOLIC PNL TOTAL CA: CPT

## 2022-10-25 PROCEDURE — 99214 OFFICE O/P EST MOD 30 MIN: CPT | Performed by: THORACIC SURGERY (CARDIOTHORACIC VASCULAR SURGERY)

## 2022-10-25 PROCEDURE — 85025 COMPLETE CBC W/AUTO DIFF WBC: CPT

## 2022-10-25 NOTE — PROGRESS NOTES
1 YEAR FOLLOW UP VISIT S/P TAVR    Procedure: S/P transfemoral transcatheter aortic valve replacement, performed on 9/16/22    History of Present Illness: Rex Templeton is a 71y o  year old male who presents to our office today for one year follow up care from transfemoral transcatheter aortic valve replacement  He was evaluated by Dr Dylon Zacarias after 30 days, and completed cardiac rehab without issue  He has been doing very well since his TAVR  He continues to take Plavix for LE claudication and Warfarin for Protein C deficiency  He denies fatigue, lightheadedness, LE edema, palpitations, RAMOS, SOB, chest pain  He has resumed his normal activities and is overall happy with his results from the TAVR  Review of Systems:  Review of Systems - History obtained from the patient  General ROS: negative  Psychological ROS: negative  Ophthalmic ROS: negative  ENT ROS: negative  Allergy and Immunology ROS: negative  Hematological and Lymphatic ROS: negative  Endocrine ROS: negative  Respiratory ROS: no cough, shortness of breath, or wheezing  Cardiovascular ROS: no chest pain or dyspnea on exertion  Gastrointestinal ROS: no abdominal pain, change in bowel habits, or black or bloody stools  Genito-Urinary ROS: no dysuria, trouble voiding, or hematuria  Musculoskeletal ROS: negative  Neurological ROS: no TIA or stroke symptoms  Dermatological ROS: negative     Past Medical History:    Past Medical History:   Diagnosis Date   • DVT (deep venous thrombosis) (HCC)    • Protein C deficiency (HCC)    • Pulmonary embolus (HCC)        Past Surgical History:    Past Surgical History:   Procedure Laterality Date   • BACK SURGERY     • BYPASS FEMORAL-TIBIAL Right 2011   • [place] ECHO TRANSESOPHAG R-T 2D W/PRB IMG ACQUISJ I&R N/A 9/16/2021    Procedure: TRANSESOPHAGEAL ECHOCARDIOGRAM (RADHA);   Surgeon: Nai Payne DO;  Location: [facility];  Service: Cardiac Surgery   • [place] REPLACE AORTIC VALVE OPENFEMORAL ARTERY APPROACH N/A 9/16/2021    Procedure: REPLACEMENT AORTIC VALVE TRANSCATHETER (TAVR) TRANSFEMORAL W/ 29 MM COLÓN BREEZY S3 ULTRA VALVE (ACCESS ON LEFT); Surgeon: Michelle Liriano DO;  Location: [facility];  Service: Cardiac Surgery   • VEIN LIGATION         Vital Signs:     Vitals:    10/25/22 1419   BP: 130/68   BP Location: Left arm   Patient Position: Sitting   Cuff Size: Standard   Pulse: 70   Weight: 87 6 kg (193 lb 3 2 oz)         Home Medications:     Prior to Admission medications    Medication Sig Start Date End Date Taking? Authorizing Provider   acetaminophen (TYLENOL) 325 mg tablet Take 1-2 tablets Q4-6 hours PRN pain  Do not take more than 4 grams in 24 hours  9/18/21  Yes Roxanne Medina PA-C   atorvastatin (LIPITOR) 80 mg tablet TAKE 1 TABLET BY MOUTH EVERY DAY 10/11/22  Yes Brad Trivedi MD   clopidogrel (PLAVIX) 75 mg tablet TAKE 1 TABLET BY MOUTH EVERY DAY 8/31/22  Yes Celso Santiago MD   diltiazem (CARDIZEM CD) 240 mg 24 hr capsule TAKE 1 CAPSULE BY MOUTH EVERY DAY 10/12/22  Yes Celso Santiago MD   tamsulosin [facility]) 0 4 mg Take 1 capsule (0 4 mg total) by mouth daily with dinner 1/3/22  Yes Anni Tuttle PA-C   tiZANidine (ZANAFLEX) 2 mg tablet Take 1 tablet (2 mg total) by mouth every 8 (eight) hours as needed for muscle spasms 8/24/22  Yes Sophie Bhat MD   warfarin (Coumadin) 3 mg tablet Take 1 tablet (3 mg total) by mouth daily 9/1/22  Yes Sophie Bhat MD   warfarin (COUMADIN) 5 mg tablet TAKE 1 TABLET BY MOUTH EVERY DAY 7/28/22  Yes Brad Trivedi MD   carbamide peroxide (DEBROX) 6 5 % otic solution Administer 5 drops into the left ear 2 (two) times a day  Patient not taking: Reported on 10/25/2022 2/24/22   Sophie Bhat MD   docusate sodium (COLACE) 100 mg capsule Take 1 capsule (100 mg total) by mouth 2 (two) times a day as needed for constipation Hold for soft stools   9/18/21 5/10/22  Roxanne Medina PA-C   polyethylene glycol (MIRALAX) 17 g packet Take 17 g by mouth daily as needed (constipation) Hold for soft stools  9/18/21 5/10/22  Roxanne Medina PA-C       Allergies:    [unfilled]    Physical Exam:    General: alert, oriented, NAD  HEENT/NECK:  PERRL  No jugular venous distention  Cardiac:Regular rate and rhythm  Pulmonary:Breath sounds clear bilaterally  Abdomen:  Non-tender, Non-distended  Positive bowel sounds  Upper extremities: 2+ radial pulses; brisk capillary refill  Lower extremities: Extremities warm/dry  PT/DP pulses 2+ bilaterally  No edema B/L  Musculoskeletal: VENCES   Neuro: Alert and oriented X 3  Sensation is grossly intact  No focal deficits  Skin: Warm/Dry, without rashes or lesions  Lab Results:   Lab Results   Component Value Date    WBC 8 12 10/08/2021    HGB 12 8 10/08/2021    HCT 39 7 10/08/2021    MCV 94 10/08/2021     10/08/2021     Lab Results   Component Value Date    GLUCOSE 131 09/25/2021    CALCIUM 9 2 03/24/2022    K 4 2 03/24/2022    CO2 29 03/24/2022     03/24/2022    BUN 14 03/24/2022    CREATININE 1 46 (H) 03/24/2022       Imaging Studies:     Echocardiogram:10/17/22  Study Details    This transthoracic echocardiogram was performed in the echo lab  This was a routine, outpatient study  This was a technically difficult study due to decreased penetration and lung interference  A complete 2D, color flow Doppler, spectral Doppler, 2D, color flow Doppler and spectral Doppler transthoracic echocardiogram was performed  The apical, parasternal, subcostal and suprasternal views were obtained  Indications  Priority: Routine  Dx: S/P TAVR (transcatheter aortic valve replacement) [Z95 2 (ICD-10-CM)]; Nonrheumatic aortic valve stenosis [I35 0 (ICD-10-CM)]       History    Abdominal aortic aneurysm, CKD, Left bundle branch block, hypertension, former smoker, SVT  Interpretation Summary       •  Left Ventricle: Diastolic function is mildly abnormal, consistent with grade I (abnormal) relaxation   Left ventricular cavity size is normal  The left ventricular ejection fraction is 60%  Systolic function is normal  Wall motion is normal  Wall thickness is mildly increased  There is mild concentric hypertrophy  •  Left Atrium: The atrium is mildly dilated  •  Aortic Valve: There is an Dukes BREEZY 3 Ultra 29 mm TAVR bioprosthetic valve  The prosthetic valve appears to be functioning normally  Prosthetic valve leaflet motion is normal  There is no evidence of regurgitation  There is no evidence of paravalvular regurgitation  The aortic valve has no significant stenosis  The aortic valve mean gradient is 8 mmHg  The aortic valve area is 1 83 cm2  •  Mitral Valve: There is mild annular calcification  There is trace regurgitation          Findings    Left Ventricle Left ventricular cavity size is normal  Wall thickness is mildly increased  There is mild concentric hypertrophy  The left ventricular ejection fraction is 60%  Systolic function is normal   Wall motion is normal  Diastolic function is mildly abnormal, consistent with grade I (abnormal) relaxation  Right Ventricle Right ventricular cavity size is normal  Systolic function is normal  Wall thickness is normal    Left Atrium The atrium is mildly dilated  Right Atrium The atrium is normal in size  Aortic Valve There is an Dukes BREEZY 3 Ultra 29 mm TAVR bioprosthetic valve  The prosthetic valve appears to be functioning normally  Prosthetic valve leaflet motion is normal  There is no evidence of regurgitation  There is no evidence of paravalvular regurgitation  The aortic valve has no significant stenosis  The aortic valve mean gradient is 8 mmHg  The aortic valve area is 1 83 cm2  Mitral Valve There is mild diffuse thickening  The leaflets are not calcified  The leaflets exhibit normal mobility  There is mild annular calcification  There is trace regurgitation  There is no evidence of stenosis  The valve has normal function     Tricuspid Valve Tricuspid valve structure is normal  There is trace regurgitation  There is no evidence of stenosis  Pulmonic Valve Pulmonic valve structure is normal  There is trace regurgitation  There is no evidence of stenosis  Ascending Aorta The aortic root is normal in size  IVC/SVC The inferior vena cava is normal in size  Pericardium There is no pericardial effusion  The pericardium is normal in appearance  Pulmonary Veins The pulmonary veins have normal venous flow   Flow pattern is normal                Left Ventricle Measurements    Function/Volumes   A4C EF 63 %         LVOT stroke volume 71 9 cm3         LVOT stroke volume index 37 ml/m2         Dimensions   LVIDd 5 1 cm         LVIDS 3 5 cm         IVSd 1 cm         LVPWd 1 cm         LVOT area 3 46 cm2         FS 31 %         Diastolic Filling   MV E' Tissue Velocity Septal 7 cm/s         E wave deceleration time 240 ms         MV Peak E Nicolas 88 cm/s         MV Peak A Nicolas 1 09 m/s          Report Measurements   AV LVOT peak gradient 3 mmHg              Interventricular Septum Measurements    Shunt Ratio   LVOT peak VTI 20 77 cm         LVOT peak nicolas 0 88 m/s              Right Ventricle Measurements    Dimensions   RVID d 4 2 cm               Left Atrium Measurements    Dimensions   LA size 4 cm         LA length (A2C) 5 5 cm               Right Atrium Measurements    Dimensions   RAA A4C 20 4 cm2               Atrial Septum Measurements    Shunt Ratio   LVOT peak VTI 20 77 cm         LVOT peak nicolas 0 88 m/s               Aortic Valve Measurements    Stenosis   Aortic valve peak velocity 1 84 m/s         LVOT peak nicolas 0 88 m/s         Ao VTI 39 37 cm         LVOT peak VTI 20 77 cm         AV mean gradient 8 mmHg         LVOT mn grad 2 mmHg         AV peak gradient 13 mmHg         AV LVOT peak gradient 3 mmHg         Dimensionless velociy index 0 48          Area/Dimensions   AV valve area 1 83 cm2         AV area by cont VTI 1 8 cm2         AV area peak nicolas 1 7 cm2         LVOT diameter 2 1 cm         LVOT area 3 46 cm2               Mitral Valve Measurements    Stenosis   MV stenosis pressure 1/2 time 70 ms         MV valve area p 1/2 method 3 14 cm2               Tricuspid Valve Measurements    RVSP Parameters   TR Peak Nicolas 2 7 m/s         Triscuspid Valve Regurgitation Peak Gradient 30 mmHg               Aorta Measurements    Aortic Dimensions   Asc Ao 3 4 cm                  EKG: 10/25/22  NSR with premature supraventricular complexes     I have personally reviewed pertinent reports  TAVR evaluation Assessment:     Yadi Doll 122: I    [id number] completed     Assessment: Aortic stenosis, Non-Rheumatic  S/P transfemoral transcatheter aortic valve replacement;    Plan:     Guerrero Mccain returns to our office today for 1 year routine follow up s/p  transfemoral transcatheter aortic valve replacement   Their NYHA functional status is class I  They are asymptomatic with activities  Weight and VS are stable  Recent echocardiogram demonstrates well-seated valve, no paravalvular leak  ECG, CBC and BMP are pending  I reviewed medications and made no changes  We recommend continued aspirin and low dose statin therapy as tolerated  Guerrero Mccain does not need to return to our office for follow up in the future but will continue to be managed by their primary care physician and cardiologist for ongoing medical care  We recommend yearly echocardiograms which can be ordered and monitored by their cardiologist   Guerrero Mccain was comfortable with our recommendations and their questions were answered to their satisfaction  Patient has an active referral in place from PCP for colonocopy screening       Celina Ribeiro  [unfilled]  2:29 PM

## 2022-11-15 ENCOUNTER — APPOINTMENT (OUTPATIENT)
Dept: LAB | Facility: MEDICAL CENTER | Age: 72
End: 2022-11-15

## 2022-11-15 DIAGNOSIS — I74.3 EMBOLISM AND THROMBOSIS OF ARTERIES OF THE LOWER EXTREMITIES (HCC): Primary | ICD-10-CM

## 2022-11-15 DIAGNOSIS — Z79.01 ANTICOAGULATED ON COUMADIN: ICD-10-CM

## 2022-11-15 LAB
INR PPP: 1.64 (ref 0.84–1.19)
PROTHROMBIN TIME: 19.6 SECONDS (ref 11.6–14.5)

## 2022-11-17 ENCOUNTER — TELEPHONE (OUTPATIENT)
Dept: FAMILY MEDICINE CLINIC | Facility: CLINIC | Age: 72
End: 2022-11-17

## 2022-11-17 DIAGNOSIS — I74.3 EMBOLISM AND THROMBOSIS OF ARTERIES OF THE LOWER EXTREMITIES (HCC): Primary | ICD-10-CM

## 2022-11-17 RX ORDER — WARFARIN SODIUM 4 MG/1
TABLET ORAL
Qty: 30 TABLET | Refills: 1 | Status: SHIPPED | OUTPATIENT
Start: 2022-11-17

## 2022-11-17 NOTE — TELEPHONE ENCOUNTER
----- Message from Felix Boyle MA sent at 11/17/2022 11:53 AM EST -----  Regarding: FW: coumadin  Spoke to pt wife  He is currently on Coumadin 3 mg   ----- Message -----  From: Lee Ann Barraza PA-C  Sent: 11/17/2022  11:27 AM EST  To: Felix Boyle MA  Subject: coumadin                                         Please  call  patnt  What  is  his  current  dose  of  coumadin    Pt/inr  is  low  ----- Message -----  From: Lab, Background User  Sent: 11/15/2022   4:11 PM EST  To: Markel Higginbotham MD

## 2022-11-17 NOTE — TELEPHONE ENCOUNTER
Left  Message  Pt/inr  Is  Low  Take  Coumadin  3 mg a alternating  With  4  Mg  Script  Called  To  Pharmacy    Pt/inr in 1-2  weeks

## 2022-11-28 ENCOUNTER — APPOINTMENT (OUTPATIENT)
Dept: LAB | Facility: MEDICAL CENTER | Age: 72
End: 2022-11-28

## 2022-11-28 ENCOUNTER — IMMUNIZATIONS (OUTPATIENT)
Dept: FAMILY MEDICINE CLINIC | Facility: CLINIC | Age: 72
End: 2022-11-28

## 2022-11-28 DIAGNOSIS — Z23 ENCOUNTER FOR IMMUNIZATION: Primary | ICD-10-CM

## 2022-12-20 ENCOUNTER — APPOINTMENT (OUTPATIENT)
Dept: LAB | Facility: MEDICAL CENTER | Age: 72
End: 2022-12-20

## 2022-12-22 ENCOUNTER — TELEPHONE (OUTPATIENT)
Dept: FAMILY MEDICINE CLINIC | Facility: CLINIC | Age: 72
End: 2022-12-22

## 2022-12-22 ENCOUNTER — TELEPHONE (OUTPATIENT)
Dept: VASCULAR SURGERY | Facility: CLINIC | Age: 72
End: 2022-12-22

## 2022-12-22 DIAGNOSIS — Z51.81 ANTICOAGULATION GOAL OF INR 2 TO 3: Primary | ICD-10-CM

## 2022-12-22 DIAGNOSIS — I65.21 STENOSIS OF RIGHT CAROTID ARTERY: ICD-10-CM

## 2022-12-22 DIAGNOSIS — I65.29 STENOSIS OF CAROTID ARTERY, UNSPECIFIED LATERALITY: Primary | ICD-10-CM

## 2022-12-22 DIAGNOSIS — Z79.01 ANTICOAGULATION GOAL OF INR 2 TO 3: Primary | ICD-10-CM

## 2022-12-22 NOTE — TELEPHONE ENCOUNTER
Chart reviewed  Patient is overdue for carotid duplex  Please schedule updated  CV duplex (possibly with or around AOIL and LEAD?) and can schedule OV w/ Dr Bell Reas to review all three  Recommend he also be seen and/or discuss reported symptoms with PCP  These are not "typical" findings of carotid stenosis and may be r/t other etiology    If he were to develop TIA or stroke like symptoms as discussed, advise to go directly to ED

## 2022-12-22 NOTE — TELEPHONE ENCOUNTER
Spoke with pt's wife, Carleen Kolb  Made her aware of all of provider's recommendations  Carleen Kolb verbalized understanding of all  Transferred to the Call Center to schedule the CV and OV with Dr Lucy Rojo

## 2022-12-22 NOTE — TELEPHONE ENCOUNTER
Pt's wife Georgie Fitch called questioning why pt is to keep his warfarin dosage the same with a PT INR of 4    He is to stay between 2 and 3 so normally pt would have a decrease in warfarin for being at 4

## 2022-12-29 ENCOUNTER — APPOINTMENT (OUTPATIENT)
Dept: LAB | Facility: MEDICAL CENTER | Age: 72
End: 2022-12-29

## 2023-01-04 ENCOUNTER — HOSPITAL ENCOUNTER (OUTPATIENT)
Dept: NON INVASIVE DIAGNOSTICS | Facility: CLINIC | Age: 73
Discharge: HOME | End: 2023-01-04

## 2023-01-04 DIAGNOSIS — I65.21 STENOSIS OF RIGHT CAROTID ARTERY: ICD-10-CM

## 2023-01-04 DIAGNOSIS — I73.9 CLAUDICATION OF LEFT LOWER EXTREMITY (HCC): ICD-10-CM

## 2023-01-04 DIAGNOSIS — I71.40 AAA (ABDOMINAL AORTIC ANEURYSM) WITHOUT RUPTURE: ICD-10-CM

## 2023-01-11 ENCOUNTER — APPOINTMENT (OUTPATIENT)
Dept: LAB | Facility: MEDICAL CENTER | Age: 73
End: 2023-01-11

## 2023-01-11 ENCOUNTER — OFFICE VISIT (OUTPATIENT)
Dept: CARDIOLOGY CLINIC | Facility: MEDICAL CENTER | Age: 73
End: 2023-01-11

## 2023-01-11 VITALS
HEIGHT: 67 IN | SYSTOLIC BLOOD PRESSURE: 130 MMHG | OXYGEN SATURATION: 98 % | HEART RATE: 71 BPM | WEIGHT: 196 LBS | DIASTOLIC BLOOD PRESSURE: 70 MMHG | BODY MASS INDEX: 30.76 KG/M2

## 2023-01-11 DIAGNOSIS — Z95.2 S/P TAVR (TRANSCATHETER AORTIC VALVE REPLACEMENT): ICD-10-CM

## 2023-01-11 DIAGNOSIS — I73.9 PERIPHERAL ARTERY DISEASE (HCC): ICD-10-CM

## 2023-01-11 DIAGNOSIS — Z79.01 ANTICOAGULATION GOAL OF INR 2 TO 3: ICD-10-CM

## 2023-01-11 DIAGNOSIS — I35.0 NONRHEUMATIC AORTIC VALVE STENOSIS: ICD-10-CM

## 2023-01-11 DIAGNOSIS — I44.7 LBBB (LEFT BUNDLE BRANCH BLOCK): ICD-10-CM

## 2023-01-11 DIAGNOSIS — Z51.81 ANTICOAGULATION GOAL OF INR 2 TO 3: ICD-10-CM

## 2023-01-11 DIAGNOSIS — I47.1 SVT (SUPRAVENTRICULAR TACHYCARDIA) (HCC): Primary | ICD-10-CM

## 2023-01-11 DIAGNOSIS — I10 ESSENTIAL HYPERTENSION: ICD-10-CM

## 2023-01-11 DIAGNOSIS — Z98.890 S/P FEMORAL-TIBIAL BYPASS: ICD-10-CM

## 2023-01-11 DIAGNOSIS — I25.10 ASCVD (ARTERIOSCLEROTIC CARDIOVASCULAR DISEASE): ICD-10-CM

## 2023-01-11 DIAGNOSIS — E78.5 DYSLIPIDEMIA: ICD-10-CM

## 2023-01-11 LAB
INR PPP: 1.72 (ref 0.84–1.19)
PROTHROMBIN TIME: 20.4 SECONDS (ref 11.6–14.5)

## 2023-01-11 RX ORDER — DILTIAZEM HYDROCHLORIDE 240 MG/1
240 CAPSULE, COATED, EXTENDED RELEASE ORAL DAILY
Qty: 30 CAPSULE | Refills: 11 | Status: SHIPPED | OUTPATIENT
Start: 2023-01-11

## 2023-01-11 NOTE — PROGRESS NOTES
Cardiology Follow Up    Reuben Walters  1950  [id number]  [facility] [place]  JAIR [address] [address] [phone]    1  SVT (supraventricular tachycardia) ([facility] )        2  Peripheral artery disease ([facility] )        3  Nonrheumatic aortic valve stenosis        4  LBBB (left bundle branch block)        5  Essential hypertension        6  ASCVD (arteriosclerotic cardiovascular disease)        7  S/P TAVR (transcatheter aortic valve replacement)        8  S/P RIGHT Femoral- PT bypass 2011        9  Dyslipidemia          Chief Complaint   Patient presents with   • Follow-up     6 month f/up       Interval History: Patient feels well, without complaints  No reported chest pain, shortness of breath, palpitations, lightheadedness, syncope, LE edema, orthopnea, PND, or significant weight changes  Patient remains active without any increased fatigue out of the ordinary        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 ([facility])   • Protein C deficiency ([facility] )   • Former heavy tobacco smoker   • AAA (abdominal aortic aneurysm) without rupture   • S/P TAVR (transcatheter aortic valve replacement)   • Thrombocytopenia (HCC)   • Anticoagulation goal of INR 2 to 3   • Anticoagulated on Coumadin   • LBBB (left bundle branch block)   • Leukocytosis   • Hypocalcemia   • Hypokalemia   • Urinary retention   • SVT (supraventricular tachycardia) ([facility])   • FAMILIA (acute kidney injury) ([facility] )   • Elevated troponin   • Other insomnia   • Encounter for postoperative care   • Irregular cardiac rhythm   • Left thigh pain     Past Medical History:   Diagnosis Date   • DVT (deep venous thrombosis) (HCC)    • Protein C deficiency (HCC)    • Pulmonary embolus (HCC)      Social History     Socioeconomic History   • Marital status: /Civil Union     Spouse name: Not on file   • Number of children: Not on file   • Years of education: Not on file   • Highest education level: Not on file   Occupational History   • Not on file   Tobacco Use   • Smoking status: Former     Types: Cigarettes     Quit date: 2018     Years since quittin 2   • Smokeless tobacco: Never   • Tobacco comments:     1 pk every 2days , currently on patches to quit  Vaping Use   • Vaping Use: Never used   Substance and Sexual Activity   • Alcohol use: No   • Drug use: No   • Sexual activity: Not on file   Other Topics Concern   • Not on file   Social History Narrative   • Not on file     Social Determinants of Health     Financial Resource Strain: Not on file   Food Insecurity: Not on file   Transportation Needs: Not on file   Physical Activity: Not on file   Stress: Not on file   Social Connections: Not on file   Intimate Partner Violence: Not on file   Housing Stability: Not on file      Family History   Problem Relation Age of Onset   • Cancer Mother 50   • Heart attack Father 46   • Hypertension Father    • Prostate cancer Brother    • Arrhythmia Neg Hx    • Clotting disorder Neg Hx    • Fainting Neg Hx    • Heart disease Neg Hx    • Anuerysm Neg Hx    • Stroke Neg Hx      Past Surgical History:   Procedure Laterality Date   • BACK SURGERY     • BYPASS FEMORAL-TIBIAL Right    • WI ECHO TRANSESOPHAG R-T 2D W/PRB IMG ACQUISJ I&R N/A 2021    Procedure: TRANSESOPHAGEAL ECHOCARDIOGRAM (RADHA);   Surgeon: Angel Patel DO;  [facility];  Service: Cardiac Surgery   • WI REPLACE AORTIC VALVE OPENFEMORAL ARTERY APPROACH N/A 2021    Procedure: REPLACEMENT AORTIC VALVE TRANSCATHETER (TAVR) TRANSFEMORAL W/ 29 MM COLÓN BREEZY S3 ULTRA VALVE (ACCESS ON LEFT); Surgeon: Aniyah Villa DO;  Location[facility];  Service: Cardiac Surgery   • VEIN LIGATION         Current Outpatient Medications:   •  acetaminophen (TYLENOL) 325 mg tablet, Take 1-2 tablets Q4-6 hours PRN pain  Do not take more than 4 grams in 24 hours  , Disp: , Rfl: 0  •  atorvastatin (LIPITOR) 80 mg tablet, TAKE 1 TABLET BY MOUTH EVERY DAY, Disp: 30 tablet, Rfl: 3  •  carbamide peroxide (DEBROX) 6 5 % otic solution, Administer 5 drops into the left ear 2 (two) times a day (Patient not taking: Reported on 10/25/2022), Disp: 15 mL, Rfl: 0  •  clopidogrel (PLAVIX) 75 mg tablet, TAKE 1 TABLET BY MOUTH EVERY DAY, Disp: 30 tablet, Rfl: 4  •  diltiazem (CARDIZEM CD) 240 mg 24 hr capsule, TAKE 1 CAPSULE BY MOUTH EVERY DAY, Disp: 30 capsule, Rfl: 3  •  docusate sodium (COLACE) 100 mg capsule, Take 1 capsule (100 mg total) by mouth 2 (two) times a day as needed for constipation Hold for soft stools  , Disp: 60 capsule, Rfl: 0  •  polyethylene glycol (MIRALAX) 17 g packet, Take 17 g by mouth daily as needed (constipation) Hold for soft stools  , Disp: , Rfl: 0  •  tamsulosin (FLOMAX) 0 4 mg, Take 1 capsule (0 4 mg total) by mouth daily with dinner, Disp: 30 capsule, Rfl: 11  •  tiZANidine (ZANAFLEX) 2 mg tablet, Take 1 tablet (2 mg total) by mouth every 8 (eight) hours as needed for muscle spasms, Disp: 90 tablet, Rfl: 1  •  warfarin (COUMADIN) 3 mg tablet, TAKE 1 TABLET BY MOUTH DAILY, Disp: 90 tablet, Rfl: 1  •  warfarin (Coumadin) 4 mg tablet, Take   4mg  Every  Other  Day alternating  With  3 mg, Disp: 30 tablet, Rfl: 1  •  warfarin (COUMADIN) 5 mg tablet, TAKE 1 TABLET BY MOUTH EVERY DAY, Disp: 30 tablet, Rfl: 3  No Known Allergies    Labs:   Ancillary Orders on 12/23/2022   Component Date Value   • Protime 12/29/2022 31 3 (H)    • INR 12/29/2022 2 99 (H)    Ancillary Orders on 12/09/2022   Component Date Value   • Protime 12/20/2022 40 0 (H) • INR 12/20/2022 4 10 (H)    Ancillary Orders on 11/25/2022   Component Date Value   • Protime 11/28/2022 26 8 (H)    • INR 11/28/2022 2 45 (H)    Orders Only on 11/15/2022   Component Date Value   • Protime 11/15/2022 19 6 (H)    • INR 11/15/2022 1 64 (H)    Appointment on 10/25/2022   Component Date Value   • Sodium 10/25/2022 139    • Potassium 10/25/2022 5 2    • Chloride 10/25/2022 107    • CO2 10/25/2022 28    • ANION GAP 10/25/2022 4    • BUN 10/25/2022 21    • Creatinine 10/25/2022 1 55 (H)    • Glucose 10/25/2022 105    • Calcium 10/25/2022 9 9    • eGFR 10/25/2022 44    • WBC 10/25/2022 13 80 (H)    • RBC 10/25/2022 5 38    • Hemoglobin 10/25/2022 16 2    • Hematocrit 10/25/2022 50 1 (H)    • MCV 10/25/2022 93    • MCH 10/25/2022 30 1    • MCHC 10/25/2022 32 3    • RDW 10/25/2022 12 9    • MPV 10/25/2022 9 3    • Platelets 84/79/0924 244    • nRBC 10/25/2022 0    • Neutrophils Relative 10/25/2022 78 (H)    • Immat GRANS % 10/25/2022 0    • Lymphocytes Relative 10/25/2022 11 (L)    • Monocytes Relative 10/25/2022 10    • Eosinophils Relative 10/25/2022 1    • Basophils Relative 10/25/2022 0    • Neutrophils Absolute 10/25/2022 10 63 (H)    • Immature Grans Absolute 10/25/2022 0 06    • Lymphocytes Absolute 10/25/2022 1 58    • Monocytes Absolute 10/25/2022 1 38 (H)    • Eosinophils Absolute 10/25/2022 0 09    • Basophils Absolute 10/25/2022 0 06    Hospital Outpatient Visit on 10/17/2022   Component Date Value   • AV area peak nicolas 10/17/2022 1 7    • LA size 10/17/2022 4    • Aortic valve mean veloci* 10/17/2022 13 60    • Triscuspid Valve Regurgi* 10/17/2022 30 0    • Tricuspid valve peak reg* 10/17/2022 2 72    • LVPWd 10/17/2022 1 00    • Left Atrium Area-systoli* 10/17/2022 20 5    • Left Atrium Area-systoli* 10/17/2022 22 6    • MV E' Tissue Velocity Se* 10/17/2022 7    • TR Peak Nicolas 10/17/2022 2 7    • IVSd 10/17/2022 9 61    • LV DIASTOLIC VOLUME (MOD* 39/39/9381 126    • LEFT VENTRICLE SYSTOLIC * 10/17/2022 51    • Left ventricular stroke * 10/17/2022 75 00    • A4C EF 10/17/2022 63    • LA length (A2C) 10/17/2022 5 50    • LVIDd 10/17/2022 5 10    • IVS 10/17/2022 1    • LVIDS 10/17/2022 3 50    • FS 10/17/2022 31    • Asc Ao 10/17/2022 3 4    • RVID d 10/17/2022 4 2    • LVOT mn grad 10/17/2022 2 0    • AV area by cont VTI 10/17/2022 1 8    • AV mean gradient 10/17/2022 8    • AV LVOT peak gradient 10/17/2022 3    • MV valve area p 1/2 meth* 10/17/2022 3 14    • E wave deceleration time 10/17/2022 240    • LVOT diameter 10/17/2022 2 1    • LVOT peak nicolas 10/17/2022 0 88    • LVOT peak VTI 10/17/2022 20 77    • Aortic valve peak veloci* 10/17/2022 1 84    • Ao VTI 10/17/2022 39 37    • LVOT stroke volume 10/17/2022 71 90    • AV peak gradient 10/17/2022 13    • MV Peak E Nicolas 10/17/2022 88    • MV Peak A Nicolas 10/17/2022 1 09    • RAA A4C 10/17/2022 20 4    • MV stenosis pressure 1/2* 10/17/2022 70    • LVOT stroke volume index 10/17/2022 37 00    • LVSV, 2D 10/17/2022 75    • LVOT area 10/17/2022 3 46    • Dimensionless velociy in* 10/17/2022 0 48    • AV valve area 10/17/2022 1 83    • LV EF 10/17/2022 60    Ancillary Orders on 09/16/2022   Component Date Value   • Protime 10/25/2022 21 5 (H)    • INR 10/25/2022 1 84 (H)    Ancillary Orders on 09/09/2022   Component Date Value   • Protime 09/15/2022 26 5 (H)    • INR 09/15/2022 2 41 (H)    Ancillary Orders on 09/02/2022   Component Date Value   • Protime 09/08/2022 25 9 (H)    • INR 09/08/2022 2 34 (H)    Ancillary Orders on 08/26/2022   Component Date Value   • Protime 08/31/2022 18 6 (H)    • INR 08/31/2022 1 53 (H)    There may be more visits with results that are not included  Lab Results   Component Value Date    TRIG 135 11/02/2021    HDL 52 11/02/2021     Imaging: No results found  Review of Systems:  Review of Systems   Constitutional: Negative for activity change, appetite change, fatigue and fever     HENT: Negative for nosebleeds and sore throat  Eyes: Negative for photophobia and visual disturbance  Respiratory: Negative for cough, chest tightness, shortness of breath and wheezing  Cardiovascular: Negative for chest pain, palpitations and leg swelling  Gastrointestinal: Negative for abdominal pain, diarrhea, nausea and vomiting  Endocrine: Negative for polyuria  Genitourinary: Negative for dysuria, frequency and hematuria  Musculoskeletal: Negative for arthralgias, back pain and gait problem  Skin: Negative for pallor and rash  Neurological: Negative for dizziness, syncope, speech difficulty and light-headedness  Hematological: Does not bruise/bleed easily  Psychiatric/Behavioral: Negative for agitation, behavioral problems and confusion  Physical Exam:  Physical Exam  Vitals reviewed  Constitutional:       General: He is not in acute distress  Appearance: He is well-developed  He is not diaphoretic  HENT:      Head: Normocephalic and atraumatic  Nose: Nose normal    Eyes:      General: No scleral icterus  Pupils: Pupils are equal, round, and reactive to light  Neck:      Vascular: No JVD  Cardiovascular:      Rate and Rhythm: Normal rate and regular rhythm  Heart sounds: S1 normal and S2 normal  Heart sounds not distant  Murmur heard  Systolic murmur is present with a grade of 2/6  No friction rub  No gallop  No S3 sounds  Pulmonary:      Effort: Pulmonary effort is normal  No respiratory distress  Breath sounds: Normal breath sounds  No wheezing or rales  Abdominal:      General: Bowel sounds are normal  There is no distension  Palpations: Abdomen is soft  Musculoskeletal:         General: No deformity  Cervical back: Normal range of motion and neck supple  Skin:     General: Skin is warm and dry  Findings: No erythema  Neurological:      Mental Status: He is alert and oriented to person, place, and time  Cranial Nerves: No cranial nerve deficit  Psychiatric:         Behavior: Behavior normal        Blood pressure 130/70, pulse 71, height 5' 7" (1 702 m), weight 88 9 kg (196 lb), SpO2 98 %  EKG:  Sinus bradycardia with occasional premature ventricular contractions  Otherwise normal ECG    Discussion/Summary:  1  ASCVD  Nonobstructive CAD by cath 5/11 at [facility]  On aspirin, statin  No current exertional cardiac symptoms  Doing well and continued on maintenance meds  Remains asymptomatic and tolerating maintenance medication well      2  HL  Lipid panel 9/18 showed total cholesterol 121, , HDL 35, LDL 54  On Atorvastatin 40  Lipid panel 9/19 showed total cholesterol 131, , HDL 38, LDL 55  Advised him to try taking some fish oil to help lower TG  Repeat levels in April 2021 revealed LDL of 143 - which is very high - advised to increase atorvastatin to  80mg daily and repeat levels in Nov 2021 LDL reduced down to 53  Repeat for this year due now, will order      3  PAD  On Plavix, statin  s/p RLE fem-PT bypass  Following with Regi Roberto/Olga Lay/Dr Mónica Almodovar  No further claudication symptoms with walking,  stable and improved  Continue vascular follow up and maintenance medication with ASA and statin      4  Moderate AS  Echo 8/2018 showed moderate AS  Not having any current symptoms with exertion  Explained need to monitor for exertional symptoms  Repeat echo to assess for progression in June 2021 revealed normal LV function, G1DD, moderate AR, severe AS with mean gradient of 39 mmHg and peak velocity of 4 m/s - now s/p TAVR and recovering well, 30 day echo stable  He had post-op LBBB on EKG along with asymptomatic bradycardia and AIVR  Now doing well and remains asymptomatic  Advised to watch for syncope - no need for PPM implant after Zio patch in Oct 2021 revealed normal heart rate variation  Repeat echo in Oct 2022 revealed stable valve with mean gradient of 8 mmHg and normal LV function      5  HTN   In the past was taking Metoprolol, but stopped taking it after he stopped following with Dr Chiquita Oviedo around 2012  Started Amlodipine 5 mg daily 10/18 due to elevated BP, as well as HCTZ 25 mg daily  PCP has changed diltiazem to lisinopril with elevated BP and seen to have increased PVCs on EKG  He was on the lowest dose of diltiazem, so I would prefer for him to be on an AV obie blocking agent with a higher dose to help suppress ectopy and improve BP control rather than change to a completely different agent altogether  Continues on stable regimen with good BP control       6  Prior DVT  On Coumadin, monitored by PCP  7   SVT: episode seen after TAVR, which broke spontaneously, continued on cardizem  No further episodes noted by symptoms

## 2023-01-12 ENCOUNTER — OFFICE VISIT (OUTPATIENT)
Dept: VASCULAR SURGERY | Facility: CLINIC | Age: 73
End: 2023-01-12

## 2023-01-12 VITALS
WEIGHT: 196 LBS | DIASTOLIC BLOOD PRESSURE: 80 MMHG | BODY MASS INDEX: 30.76 KG/M2 | HEART RATE: 56 BPM | HEIGHT: 67 IN | SYSTOLIC BLOOD PRESSURE: 126 MMHG

## 2023-01-12 DIAGNOSIS — I71.43 INFRARENAL ABDOMINAL AORTIC ANEURYSM (AAA) WITHOUT RUPTURE: Primary | ICD-10-CM

## 2023-01-12 DIAGNOSIS — I65.29 STENOSIS OF CAROTID ARTERY, UNSPECIFIED LATERALITY: ICD-10-CM

## 2023-01-12 DIAGNOSIS — R41.3 MEMORY LOSS OF UNKNOWN CAUSE: ICD-10-CM

## 2023-01-12 DIAGNOSIS — I65.21 OCCLUSION AND STENOSIS OF RIGHT CAROTID ARTERY: ICD-10-CM

## 2023-01-12 NOTE — LETTER
January 12, 2023     Allison Dashana, [address] [address] [facility]   [address] [place] [address]    Patient: Rajat Delgadillo   YOB: 1950   Date of Visit: 1/12/2023       Dear Dr Crook Million: Thank you for referring Pallavi Ford to me for evaluation  Below are my notes for this consultation  If you have questions, please do not hesitate to call me  I look forward to following your patient along with you           Sincerely,        Mable Plata MD        CC: No Recipients

## 2023-01-12 NOTE — PATIENT INSTRUCTIONS
Presents with history of AAA which is now 4 4 cm, chronic right carotid occlusion for at least several years with a normal left carotid system  His main complaint is memory loss which started about a year ago and his wife who is with him today in the office said that this is becoming a more difficult situation as his memory is worsening  They were questioning whether the carotid occlusion could be producing this however that is not the case  He remains asymptomatic with no upper or lower extremity weakness no amaurosis fugax no problems with speech or swallowing  Plan: Follow-up AAA duplex and carotid duplex in 6 months, I am ordering amatory consultation with neurology to evaluate him with regard to his memory loss

## 2023-01-12 NOTE — PROGRESS NOTES
Assessment/Plan:    Presents with history of AAA which is now 4 4 cm, chronic right carotid occlusion for at least several years with a normal left carotid system  His main complaint is memory loss which started about a year ago and his wife who is with him today in the office said that this is becoming a more difficult situation as his memory is worsening  They were questioning whether the carotid occlusion could be producing this however that is not the case  He remains asymptomatic with no upper or lower extremity weakness no amaurosis fugax no problems with speech or swallowing  Plan: Follow-up AAA duplex and carotid duplex in 6 months, I am ordering amatory consultation with neurology to evaluate him with regard to his memory loss  Diagnoses and all orders for this visit:    Infrarenal abdominal aortic aneurysm (AAA) without rupture  -     VAS abdominal aorta/iliacs; complete study; Future    Memory loss of unknown cause  -     Ambulatory Referral to Neurology; Future        Subjective:      Patient ID: Yuri Mullins is a 71 y o  male  Pt presents to rev CV/ LOYD/ AOIL 1/04/23  Pt denies claudication, rest pain, or tissue loss  Pt denies TIA or CVA symptoms  Pt denies abd/ back pain  Pt has known AAA, h/o RLE bypass and TIA  Pt is taking atorvastatin, Plavix, and warfarin  Pt is a former smoker  HPI    The following portions of the patient's history were reviewed and updated as appropriate: allergies, current medications, past family history, past medical history, past social history, past surgical history and problem list     Review of Systems   Constitutional: Negative  HENT: Negative  Eyes: Negative  Respiratory: Negative  Cardiovascular: Negative  Gastrointestinal: Negative  Endocrine: Negative  Genitourinary: Negative  Musculoskeletal: Negative  Skin: Negative  Allergic/Immunologic: Negative  Neurological: Negative  Hematological: Negative  Psychiatric/Behavioral: Negative  Objective: There were no vitals taken for this visit  Physical Exam      Oriented x3 no evidence of clinical depression  Gradual memory loss    Eyes:  Sclera non-icteric    Skin: normal without evidence of inflammation    Neck is supple carotid pulses equal bilaterally no bruits heard    Chest lungs clear, heart regular rhythm  Abdomen soft nontender no evidence of pulsatile masses  Globus abdomen    Pulses are palpable Femoral  Popliteal, PT  on the right, femoral only on the left    Neurological exam intact cranial nerves 2-12 grossly intact no gross motor sensory deficits detected  Imaging viewed and reviewed with Patient  I have reviewed and made appropriate changes to the review of systems input by the medical assistant      Vitals:    01/12/23 1300   BP: 126/80   BP Location: Right arm   Patient Position: Sitting   Cuff Size: Standard   Pulse: 56   Weight: 88 9 kg (196 lb)   Height: 5' 7" (1 702 m)       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 ([facility])   • Protein C deficiency ([facility])   • Former heavy tobacco smoker   • AAA (abdominal aortic aneurysm) without rupture   • S/P TAVR (transcatheter aortic valve replacement)   • Thrombocytopenia ([facility])   • Anticoagulation goal of INR 2 to 3   • Anticoagulated on Coumadin   • LBBB (left bundle branch block)   • Leukocytosis   • Hypocalcemia   • Hypokalemia   • Urinary retention   • SVT (supraventricular tachycardia) ([facility])   • FAMILAI (acute kidney injury) ([facility] )   • Elevated troponin   • Other insomnia   • Encounter for postoperative care   • Irregular cardiac rhythm   • Left thigh pain   • Memory loss of unknown cause       Past Surgical History:   Procedure Laterality Date   • BACK SURGERY     • BYPASS FEMORAL-TIBIAL Right    • CT ECHO TRANSESOPHAG R-T 2D W/PRB IMG ACQUZAFARJ I&R N/A 2021    Procedure: TRANSESOPHAGEAL ECHOCARDIOGRAM (RADHA); Surgeon: Beth Wheeler DO;  [facility];  Service: Cardiac Surgery   • CT REPLACE AORTIC VALVE OPENFEMORAL ARTERY APPROACH N/A 2021    Procedure: REPLACEMENT AORTIC VALVE TRANSCATHETER (TAVR) TRANSFEMORAL W/ 29 MM COLÓN BREEZY S3 ULTRA VALVE (ACCESS ON LEFT); Surgeon: Beth Wheeler DO;  [facility];  Service: Cardiac Surgery   • VEIN LIGATION         Family History   Problem Relation Age of Onset   • Cancer Mother 50   • Heart attack Father 46   • Hypertension Father    • Prostate cancer Brother    • Arrhythmia Neg Hx    • Clotting disorder Neg Hx    • Fainting Neg Hx    • Heart disease Neg Hx    • Anuerysm Neg Hx    • Stroke Neg Hx        Social History     Socioeconomic History   • Marital status: /Civil Union     Spouse name: Not on file   • Number of children: Not on file   • Years of education: Not on file   • Highest education level: Not on file   Occupational History   • Not on file   Tobacco Use   • Smoking status: Former     Types: Cigarettes     Quit date: 2018     Years since quittin 2   • Smokeless tobacco: Never   • Tobacco comments:     1 pk every 2days , currently on patches to quit      Vaping Use   • Vaping Use: Never used   Substance and Sexual Activity   • Alcohol use: No   • Drug use: No   • Sexual activity: Not on file   Other Topics Concern   • Not on file   Social History Narrative   • Not on file     Social Determinants of Health     Financial Resource Strain: Not on file   Food Insecurity: Not on file   Transportation Needs: Not on file   Physical Activity: Not on file   Stress: Not on file   Social Connections: Not on file   Intimate Partner Violence: Not on file   Housing Stability: Not on file       No Known Allergies      Current Outpatient Medications:   •  acetaminophen (TYLENOL) 325 mg tablet, Take 1-2 tablets Q4-6 hours PRN pain  Do not take more than 4 grams in 24 hours  , Disp: , Rfl: 0  •  atorvastatin (LIPITOR) 80 mg tablet, TAKE 1 TABLET BY MOUTH EVERY DAY, Disp: 30 tablet, Rfl: 3  •  carbamide peroxide (DEBROX) 6 5 % otic solution, Administer 5 drops into the left ear 2 (two) times a day, Disp: 15 mL, Rfl: 0  •  clopidogrel (PLAVIX) 75 mg tablet, TAKE 1 TABLET BY MOUTH EVERY DAY, Disp: 30 tablet, Rfl: 4  •  diltiazem (CARDIZEM CD) 240 mg 24 hr capsule, Take 1 capsule (240 mg total) by mouth daily, Disp: 30 capsule, Rfl: 11  •  warfarin (COUMADIN) 3 mg tablet, TAKE 1 TABLET BY MOUTH DAILY, Disp: 90 tablet, Rfl: 1  •  warfarin (Coumadin) 4 mg tablet, Take   4mg  Every  Other  Day alternating  With  3 mg, Disp: 30 tablet, Rfl: 1  •  warfarin (COUMADIN) 5 mg tablet, TAKE 1 TABLET BY MOUTH EVERY DAY, Disp: 30 tablet, Rfl: 3  •  docusate sodium (COLACE) 100 mg capsule, Take 1 capsule (100 mg total) by mouth 2 (two) times a day as needed for constipation Hold for soft stools  (Patient not taking: Reported on 1/12/2023), Disp: 60 capsule, Rfl: 0  •  polyethylene glycol (MIRALAX) 17 g packet, Take 17 g by mouth daily as needed (constipation) Hold for soft stools   (Patient not taking: Reported on 1/12/2023), Disp: , Rfl: 0  •  tamsulosin (FLOMAX) 0 4 mg, Take 1 capsule (0 4 mg total) by mouth daily with dinner (Patient not taking: Reported on 1/12/2023), Disp: 30 capsule, Rfl: 11  •  tiZANidine (ZANAFLEX) 2 mg tablet, Take 1 tablet (2 mg total) by mouth every 8 (eight) hours as needed for muscle spasms (Patient not taking: Reported on 1/12/2023), Disp: 90 tablet, Rfl: 1

## 2023-01-21 NOTE — TELEPHONE ENCOUNTER
Patients pt/inr results flowchart is in media   And will need refills on medication
Pt needs f/u appt with me  Renew all his meds?
unknown

## 2023-02-01 ENCOUNTER — APPOINTMENT (OUTPATIENT)
Dept: LAB | Facility: MEDICAL CENTER | Age: 73
End: 2023-02-01

## 2023-02-01 LAB
CHOLEST SERPL-MCNC: 136 MG/DL
HDLC SERPL-MCNC: 52 MG/DL
LDLC SERPL CALC-MCNC: 58 MG/DL (ref 0–100)
NONHDLC SERPL-MCNC: 84 MG/DL
TRIGL SERPL-MCNC: 128 MG/DL

## 2023-02-15 ENCOUNTER — APPOINTMENT (OUTPATIENT)
Dept: LAB | Facility: MEDICAL CENTER | Age: 73
End: 2023-02-15

## 2023-02-22 DIAGNOSIS — I73.9 PERIPHERAL ARTERY DISEASE (HCC): ICD-10-CM

## 2023-02-22 DIAGNOSIS — I73.9 INTERMITTENT CLAUDICATION (HCC): ICD-10-CM

## 2023-02-23 RX ORDER — ATORVASTATIN CALCIUM 80 MG/1
TABLET, FILM COATED ORAL
Qty: 30 TABLET | Refills: 3 | Status: SHIPPED | OUTPATIENT
Start: 2023-02-23

## 2023-03-03 ENCOUNTER — APPOINTMENT (OUTPATIENT)
Dept: LAB | Facility: MEDICAL CENTER | Age: 73
End: 2023-03-03

## 2023-03-07 ENCOUNTER — TELEPHONE (OUTPATIENT)
Dept: FAMILY MEDICINE CLINIC | Facility: CLINIC | Age: 73
End: 2023-03-07

## 2023-03-08 NOTE — TELEPHONE ENCOUNTER
Spoke with patients wife, aware of the results, patient currently taking 4 mg daily  Coumadin directions states alternate 4mg with 3mg but patient hasnt done that since 10/2022  Please clarfiyf if patient should just continue with his current 4mg daily

## 2023-03-09 DIAGNOSIS — I74.3 EMBOLISM AND THROMBOSIS OF ARTERIES OF THE LOWER EXTREMITIES (HCC): ICD-10-CM

## 2023-03-09 RX ORDER — WARFARIN SODIUM 4 MG/1
TABLET ORAL
Qty: 30 TABLET | Refills: 1 | Status: SHIPPED | OUTPATIENT
Start: 2023-03-09

## 2023-03-16 ENCOUNTER — RA CDI HCC (OUTPATIENT)
Dept: OTHER | Facility: HOSPITAL | Age: 73
End: 2023-03-16

## 2023-03-23 ENCOUNTER — OFFICE VISIT (OUTPATIENT)
Dept: FAMILY MEDICINE CLINIC | Facility: CLINIC | Age: 73
End: 2023-03-23

## 2023-03-23 VITALS
BODY MASS INDEX: 31.86 KG/M2 | HEART RATE: 80 BPM | DIASTOLIC BLOOD PRESSURE: 86 MMHG | HEIGHT: 67 IN | OXYGEN SATURATION: 96 % | WEIGHT: 203 LBS | SYSTOLIC BLOOD PRESSURE: 134 MMHG | TEMPERATURE: 98.2 F

## 2023-03-23 DIAGNOSIS — I74.3 EMBOLISM AND THROMBOSIS OF ARTERIES OF THE LOWER EXTREMITIES (HCC): ICD-10-CM

## 2023-03-23 DIAGNOSIS — Z00.00 MEDICARE ANNUAL WELLNESS VISIT, SUBSEQUENT: ICD-10-CM

## 2023-03-23 DIAGNOSIS — R41.3 MEMORY PROBLEM: Primary | ICD-10-CM

## 2023-03-23 DIAGNOSIS — R97.20 ELEVATED PSA: ICD-10-CM

## 2023-03-23 DIAGNOSIS — R41.89 COGNITIVE DECLINE: ICD-10-CM

## 2023-03-23 NOTE — PROGRESS NOTES
Name: Yuri Mullins      : 1950      MRN: [id number]  Encounter Provider: Tianna Grimes MD  Encounter Date: 3/23/2023   Encounter department: [address] [place] [address]     1  Elevated PSA  -     PSA, total and free; Future    2  Embolism and thrombosis of arteries of the lower extremities (HCC)    3  Cognitive decline  -     Ambulatory Referral to Senior Care; Future    4  Memory problem  -     Ambulatory Referral to Senior Care; Future    5  Medicare annual wellness visit, subsequent      Patient have evidence of significant dementia and memory problem  Will refer to geriatrics for evaluation  Rule out reversible causes we will obtain a blood work  Patient may benefit from evaluation via MRI of the head       Subjective      HPI     23-year-old male patient here for Medicare annual wellness visit  There was concern for decreasing cognitive function and memory issue  Patient reports his vascular doctor, Dr Andrew Ko has brought up this  Patient does have a history of seizure the past   No alcohol use  Patient does not currently drive  (Not driving the last 2 years)  States the patient did have symptoms of dementia of at least 3 years ago but is significantly worse over the course of the last year  Review of Systems   Unable to perform ROS: Dementia   Constitutional: Negative for chills and fever  HENT: Negative for congestion, rhinorrhea and sore throat  Cardiovascular: Negative for chest pain  Gastrointestinal: Negative for abdominal pain  Neurological: Positive for headaches  Hematological: Bruises/bleeds easily  Psychiatric/Behavioral: Negative for sleep disturbance  Memory problem         Current Outpatient Medications on File Prior to Visit   Medication Sig   • acetaminophen (TYLENOL) 325 mg tablet Take 1-2 tablets Q4-6 hours PRN pain  Do not take more than 4 grams in 24 hours     • atorvastatin (LIPITOR) 80 mg tablet TAKE 1 TABLET BY MOUTH EVERY DAY   • clopidogrel (PLAVIX) 75 mg tablet TAKE 1 TABLET BY MOUTH EVERY DAY   • diltiazem (CARDIZEM CD) 240 mg 24 hr capsule Take 1 capsule (240 mg total) by mouth daily   • warfarin (COUMADIN) 3 mg tablet TAKE 1 TABLET BY MOUTH DAILY   • warfarin (COUMADIN) 4 mg tablet TAKE 4MG EVERY OTHER DAY ALTERNATING WITH 3 MG   • warfarin (COUMADIN) 5 mg tablet TAKE 1 TABLET BY MOUTH EVERY DAY   • carbamide peroxide (DEBROX) 6 5 % otic solution Administer 5 drops into the left ear 2 (two) times a day (Patient not taking: Reported on 3/23/2023)   • docusate sodium (COLACE) 100 mg capsule Take 1 capsule (100 mg total) by mouth 2 (two) times a day as needed for constipation Hold for soft stools  (Patient not taking: Reported on 1/12/2023)   • polyethylene glycol (MIRALAX) 17 g packet Take 17 g by mouth daily as needed (constipation) Hold for soft stools  (Patient not taking: Reported on 1/12/2023)   • tamsulosin (FLOMAX) 0 4 mg Take 1 capsule (0 4 mg total) by mouth daily with dinner (Patient not taking: Reported on 1/12/2023)   • tiZANidine (ZANAFLEX) 2 mg tablet Take 1 tablet (2 mg total) by mouth every 8 (eight) hours as needed for muscle spasms (Patient not taking: Reported on 1/12/2023)       Objective     /86 (BP Location: Right arm, Patient Position: Sitting, Cuff Size: Standard)   Pulse 80   Temp 98 2 °F (36 8 °C) (Temporal)   Ht 5' 7" (1 702 m)   Wt 92 1 kg (203 lb)   SpO2 96%   BMI 31 79 kg/m²     Physical Exam  Vitals reviewed  Constitutional:       Appearance: Normal appearance  He is obese  Cardiovascular:      Rate and Rhythm: Normal rate and regular rhythm  Pulses: Normal pulses  Heart sounds: Normal heart sounds  Pulmonary:      Effort: Pulmonary effort is normal       Breath sounds: Normal breath sounds  Abdominal:      General: Abdomen is flat  Musculoskeletal:         General: Normal range of motion  Skin:     General: Skin is warm        Capillary Refill: Capillary refill takes less than 2 seconds  Comments: Clubbing of the finger nails   Neurological:      Mental Status: He is alert  Jack cognitive assessment, scores of 11 out of 30, patient does have a high school education with 1 year college education      Maureen Bryan MD

## 2023-03-23 NOTE — PATIENT INSTRUCTIONS
Medicare Preventive Visit Patient Instructions  Thank you for completing your Welcome to Medicare Visit or Medicare Annual Wellness Visit today  Your next wellness visit will be due in one year (3/23/2024)  The screening/preventive services that you may require over the next 5-10 years are detailed below  Some tests may not apply to you based off risk factors and/or age  Screening tests ordered at today's visit but not completed yet may show as past due  Also, please note that scanned in results may not display below  Preventive Screenings:  Service Recommendations Previous Testing/Comments   Colorectal Cancer Screening  · Colonoscopy    · Fecal Occult Blood Test (FOBT)/Fecal Immunochemical Test (FIT)  · Fecal DNA/Cologuard Test  · Flexible Sigmoidoscopy Age: 39-70 years old   Colonoscopy: every 10 years (May be performed more frequently if at higher risk)  OR  FOBT/FIT: every 1 year  OR  Cologuard: every 3 years  OR  Sigmoidoscopy: every 5 years  Screening may be recommended earlier than age 39 if at higher risk for colorectal cancer  Also, an individualized decision between you and your healthcare provider will decide whether screening between the ages of 74-80 would be appropriate   Colonoscopy: Not on file  FOBT/FIT: Not on file  Cologuard: Not on file  Sigmoidoscopy: Not on file          Prostate Cancer Screening Individualized decision between patient and health care provider in men between ages of 53-78   Medicare will cover every 12 months beginning on the day after your 50th birthday PSA: 13 5 ng/mL           Hepatitis C Screening Once for adults born between 1945 and 1965  More frequently in patients at high risk for Hepatitis C Hep C Antibody: 12/01/2021    Screening Current   Diabetes Screening 1-2 times per year if you're at risk for diabetes or have pre-diabetes Fasting glucose: 106 mg/dL (3/24/2022)  A1C: No results in last 5 years (No results in last 5 years)  Screening Current   Cholesterol Screening Once every 5 years if you don't have a lipid disorder  May order more often based on risk factors  Lipid panel: 02/01/2023  Screening Current      Other Preventive Screenings Covered by Medicare:  1  Abdominal Aortic Aneurysm (AAA) Screening: covered once if your at risk  You're considered to be at risk if you have a family history of AAA or a male between the age of 73-68 who smoking at least 100 cigarettes in your lifetime  2  Lung Cancer Screening: covers low dose CT scan once per year if you meet all of the following conditions: (1) Age 50-69; (2) No signs or symptoms of lung cancer; (3) Current smoker or have quit smoking within the last 15 years; (4) You have a tobacco smoking history of at least 20 pack years (packs per day x number of years you smoked); (5) You get a written order from a healthcare provider  3  Glaucoma Screening: covered annually if you're considered high risk: (1) You have diabetes OR (2) Family history of glaucoma OR (3)  aged 48 and older OR (3)  American aged 72 and older  3  Osteoporosis Screening: covered every 2 years if you meet one of the following conditions: (1) Have a vertebral abnormality; (2) On glucocorticoid therapy for more than 3 months; (3) Have primary hyperparathyroidism; (4) On osteoporosis medications and need to assess response to drug therapy  5  HIV Screening: covered annually if you're between the age of 12-76  Also covered annually if you are younger than 13 and older than 72 with risk factors for HIV infection  For pregnant patients, it is covered up to 3 times per pregnancy      Immunizations:  Immunization Recommendations   Influenza Vaccine Annual influenza vaccination during flu season is recommended for all persons aged >= 6 months who do not have contraindications   Pneumococcal Vaccine   * Pneumococcal conjugate vaccine = PCV13 (Prevnar 13), PCV15 (Vaxneuvance), PCV20 (Prevnar 20)  * Pneumococcal polysaccharide vaccine = PPSV23 (Pneumovax) Adults 2364 years old: 1-3 doses may be recommended based on certain risk factors  Adults 72 years old: 1-2 doses may be recommended based off what pneumonia vaccine you previously received   Hepatitis B Vaccine 3 dose series if at intermediate or high risk (ex: diabetes, end stage renal disease, liver disease)   Tetanus (Td) Vaccine - COST NOT COVERED BY MEDICARE PART B Following completion of primary series, a booster dose should be given every 10 years to maintain immunity against tetanus  Td may also be given as tetanus wound prophylaxis  Tdap Vaccine - COST NOT COVERED BY MEDICARE PART B Recommended at least once for all adults  For pregnant patients, recommended with each pregnancy  Shingles Vaccine (Shingrix) - COST NOT COVERED BY MEDICARE PART B  2 shot series recommended in those aged 48 and above     Health Maintenance Due:      Topic Date Due   • Colorectal Cancer Screening  Never done   • Hepatitis C Screening  Completed     Immunizations Due:      Topic Date Due   • COVID-19 Vaccine (4 - Booster for Stapleton Sheerer series) 01/26/2022     Advance Directives   What are advance directives? Advance directives are legal documents that state your wishes and plans for medical care  These plans are made ahead of time in case you lose your ability to make decisions for yourself  Advance directives can apply to any medical decision, such as the treatments you want, and if you want to donate organs  What are the types of advance directives? There are many types of advance directives, and each state has rules about how to use them  You may choose a combination of any of the following:  · Living will: This is a written record of the treatment you want  You can also choose which treatments you do not want, which to limit, and which to stop at a certain time  This includes surgery, medicine, IV fluid, and tube feedings  · Durable power of  for healthcare [facility]):   This is a written record that states who you want to make healthcare choices for you when you are unable to make them for yourself  This person, called a proxy, is usually a family member or a friend  You may choose more than 1 proxy  · Do not resuscitate (DNR) order:  A DNR order is used in case your heart stops beating or you stop breathing  It is a request not to have certain forms of treatment, such as CPR  A DNR order may be included in other types of advance directives  · Medical directive: This covers the care that you want if you are in a coma, near death, or unable to make decisions for yourself  You can list the treatments you want for each condition  Treatment may include pain medicine, surgery, blood transfusions, dialysis, IV or tube feedings, and a ventilator (breathing machine)  · Values history: This document has questions about your views, beliefs, and how you feel and think about life  This information can help others choose the care that you would choose  Why are advance directives important? An advance directive helps you control your care  Although spoken wishes may be used, it is better to have your wishes written down  Spoken wishes can be misunderstood, or not followed  Treatments may be given even if you do not want them  An advance directive may make it easier for your family to make difficult choices about your care  Weight Management   Why it is important to manage your weight:  Being overweight increases your risk of health conditions such as heart disease, high blood pressure, type 2 diabetes, and certain types of cancer  It can also increase your risk for osteoarthritis, sleep apnea, and other respiratory problems  Aim for a slow, steady weight loss  Even a small amount of weight loss can lower your risk of health problems  How to lose weight safely:  A safe and healthy way to lose weight is to eat fewer calories and get regular exercise   You can lose up about 1 pound a week by decreasing the number of calories you eat by 500 calories each day  Healthy meal plan for weight management:  A healthy meal plan includes a variety of foods, contains fewer calories, and helps you stay healthy  A healthy meal plan includes the following:  · Eat whole-grain foods more often  A healthy meal plan should contain fiber  Fiber is the part of grains, fruits, and vegetables that is not broken down by your body  Whole-grain foods are healthy and provide extra fiber in your diet  Some examples of whole-grain foods are whole-wheat breads and pastas, oatmeal, brown rice, and bulgur  · Eat a variety of vegetables every day  Include dark, leafy greens such as spinach, kale, aishwarya greens, and mustard greens  Eat yellow and orange vegetables such as carrots, sweet potatoes, and winter squash  · Eat a variety of fruits every day  Choose fresh or canned fruit (canned in its own juice or light syrup) instead of juice  Fruit juice has very little or no fiber  · Eat low-fat dairy foods  Drink fat-free (skim) milk or 1% milk  Eat fat-free yogurt and low-fat cottage cheese  Try low-fat cheeses such as mozzarella and other reduced-fat cheeses  · Choose meat and other protein foods that are low in fat  Choose beans or other legumes such as split peas or lentils  Choose fish, skinless poultry (chicken or turkey), or lean cuts of red meat (beef or pork)  Before you cook meat or poultry, cut off any visible fat  · Use less fat and oil  Try baking foods instead of frying them  Add less fat, such as margarine, sour cream, regular salad dressing and mayonnaise to foods  Eat fewer high-fat foods  Some examples of high-fat foods include french fries, doughnuts, ice cream, and cakes  · Eat fewer sweets  Limit foods and drinks that are high in sugar  This includes candy, cookies, regular soda, and sweetened drinks  Exercise:  Exercise at least 30 minutes per day on most days of the week   Some examples of exercise include walking, biking, dancing, and swimming  You can also fit in more physical activity by taking the stairs instead of the elevator or parking farther away from stores  Ask your healthcare provider about the best exercise plan for you  © Copyright [employer] 2018 Information is for End User's use only and may not be sold, redistributed or otherwise used for commercial purposes   All illustrations and images included in CareNotes® are the copyrighted property of A D A M [employer]  or [address] [place] [address]

## 2023-03-23 NOTE — PROGRESS NOTES
Assessment and Plan:     Problem List Items Addressed This Visit        Cardiovascular and Mediastinum    Embolism and thrombosis of arteries of the lower extremities (HCC)       Other    Elevated PSA - Primary    Relevant Orders    PSA, total and free   Other Visit Diagnoses     Cognitive decline        Relevant Orders    Ambulatory Referral to [facility]    Memory problem        Relevant Orders    Ambulatory Referral to [employer] Medicare annual wellness visit, subsequent               Preventive health issues were discussed with patient, and age appropriate screening tests were ordered as noted in patient's After Visit Summary  Personalized health advice and appropriate referrals for health education or preventive services given if needed, as noted in patient's After Visit Summary  BMI Counseling: Body mass index is 31 79 kg/m²  The BMI is above normal  Nutrition recommendations include reducing portion sizes, 3-5 servings of fruits/vegetables daily and consuming healthier snacks  Exercise recommendations include moderate aerobic physical activity for 150 minutes/week  History of Present Illness:     Patient presents for a Medicare Wellness Visit    HPI     Pt here for annual medicare wellness visit  Patient's wife expressed concern regarding worsening cognitive function  [place] cognitive assessment completed at this time, conservatively patient scores a 11 which would indicate a very likely that patient is experiencing forms of dementia    Note patient does have a history of seizures    Patient Care Team:  Meet Brown MD as PCP - General (Family Medicine)  Linda Kern MD (Cardiology)  Trish Smith DPM (Podiatry)  Suzanne Santiago MD (Urology)  Felicia Vallejo MD (Vascular Surgery)     Review of Systems:     Review of Systems     Problem List:     Patient Active Problem List   Diagnosis   • Peripheral artery disease [facility])   • Claudication of left lower extremity ([facility] [address] ) • S/P RIGHT Femoral- PT bypass 2011   • Dyslipidemia   • ASCVD (arteriosclerotic cardiovascular disease)   • Stage 3 chronic kidney disease   • Nonrheumatic aortic valve stenosis   • Essential hypertension   • Elevated PSA   • Embolism and thrombosis of arteries of the lower extremities (HCC)   • Protein C deficiency (HCC)   • Former heavy tobacco smoker   • AAA (abdominal aortic aneurysm) without rupture   • S/P TAVR (transcatheter aortic valve replacement)   • Thrombocytopenia (HCC)   • Anticoagulation goal of INR 2 to 3   • Anticoagulated on Coumadin   • LBBB (left bundle branch block)   • Leukocytosis   • Hypocalcemia   • Hypokalemia   • Urinary retention   • SVT (supraventricular tachycardia) (HCC)   • FAMILIA (acute kidney injury) ([facility] [address] )   • Elevated troponin   • Other insomnia   • Encounter for postoperative care   • Irregular cardiac rhythm   • Left thigh pain   • Memory loss of unknown cause      Past Medical and Surgical History:     Past Medical History:   Diagnosis Date   • DVT (deep venous thrombosis) (HCC)    • Protein C deficiency (HCC)    • Pulmonary embolus (HCC)      Past Surgical History:   Procedure Laterality Date   • BACK SURGERY     • BYPASS FEMORAL-TIBIAL Right 2011   • MO ECHO TRANSESOPHAG R-T 2D W/PRB IMG ACQUISJ I&R N/A 9/16/2021    Procedure: TRANSESOPHAGEAL ECHOCARDIOGRAM (RADHA); Surgeon: Katie Jimenes DO;  Location: [facility];  Service: Cardiac Surgery   • MO REPLACE AORTIC VALVE OPENFEMORAL ARTERY APPROACH N/A 9/16/2021    Procedure: REPLACEMENT AORTIC VALVE TRANSCATHETER (TAVR) TRANSFEMORAL W/ 29 MM COLÓN BREEZY S3 ULTRA VALVE (ACCESS ON LEFT);   Surgeon: Katie Jimenes DO;  Location: [facility];  Service: Cardiac Surgery   • VEIN LIGATION        Family History:     Family History   Problem Relation Age of Onset   • Cancer Mother 50   • Heart attack Father 46   • Hypertension Father    • Prostate cancer Brother    • Arrhythmia Neg Hx    • Clotting disorder Neg Hx    • Fainting Neg Hx    • Heart disease Neg Hx    • Anuerysm Neg Hx    • Stroke Neg Hx       Social History:     Social History     Socioeconomic History   • Marital status: /Civil Union     Spouse name: None   • Number of children: None   • Years of education: None   • Highest education level: None   Occupational History   • None   Tobacco Use   • Smoking status: Former     Types: Cigarettes     Quit date: 2018     Years since quittin 4   • Smokeless tobacco: Never   • Tobacco comments:     1 pk every 2days , currently on patches to quit  Vaping Use   • Vaping Use: Never used   Substance and Sexual Activity   • Alcohol use: No   • Drug use: No   • Sexual activity: None   Other Topics Concern   • None   Social History Narrative   • None     Social Determinants of Health     Financial Resource Strain: Low Risk    • Difficulty of Paying Living Expenses: Not very hard   Food Insecurity: Not on file   Transportation Needs: No Transportation Needs   • Lack of Transportation (Medical): No   • Lack of Transportation (Non-Medical): No   Physical Activity: Not on file   Stress: Not on file   Social Connections: Not on file   Intimate Partner Violence: Not on file   Housing Stability: Not on file      Medications and Allergies:     Current Outpatient Medications   Medication Sig Dispense Refill   • acetaminophen (TYLENOL) 325 mg tablet Take 1-2 tablets Q4-6 hours PRN pain  Do not take more than 4 grams in 24 hours    0   • atorvastatin (LIPITOR) 80 mg tablet TAKE 1 TABLET BY MOUTH EVERY DAY 30 tablet 3   • clopidogrel (PLAVIX) 75 mg tablet TAKE 1 TABLET BY MOUTH EVERY DAY 30 tablet 4   • diltiazem (CARDIZEM CD) 240 mg 24 hr capsule Take 1 capsule (240 mg total) by mouth daily 30 capsule 11   • warfarin (COUMADIN) 3 mg tablet TAKE 1 TABLET BY MOUTH DAILY 90 tablet 1   • warfarin (COUMADIN) 4 mg tablet TAKE 4MG EVERY OTHER DAY ALTERNATING WITH 3 MG 30 tablet 1   • warfarin (COUMADIN) 5 mg tablet TAKE 1 TABLET BY MOUTH EVERY DAY 30 tablet 3   • carbamide peroxide (DEBROX) 6 5 % otic solution Administer 5 drops into the left ear 2 (two) times a day (Patient not taking: Reported on 3/23/2023) 15 mL 0   • docusate sodium (COLACE) 100 mg capsule Take 1 capsule (100 mg total) by mouth 2 (two) times a day as needed for constipation Hold for soft stools  (Patient not taking: Reported on 1/12/2023) 60 capsule 0   • polyethylene glycol (MIRALAX) 17 g packet Take 17 g by mouth daily as needed (constipation) Hold for soft stools  (Patient not taking: Reported on 1/12/2023)  0   • tamsulosin (FLOMAX) 0 4 mg Take 1 capsule (0 4 mg total) by mouth daily with dinner (Patient not taking: Reported on 1/12/2023) 30 capsule 11   • tiZANidine (ZANAFLEX) 2 mg tablet Take 1 tablet (2 mg total) by mouth every 8 (eight) hours as needed for muscle spasms (Patient not taking: Reported on 1/12/2023) 90 tablet 1     No current facility-administered medications for this visit  No Known Allergies   Immunizations:     Immunization History   Administered Date(s) Administered   • COVID-19 MODERNA VACC 0 5 ML IM 03/17/2021, 04/14/2021, 12/01/2021   • Influenza, high dose seasonal 0 7 mL 09/17/2020, 10/07/2021, 11/28/2022   • Pneumococcal Conjugate 13-Valent 10/07/2021   • Pneumococcal Polysaccharide PPV23 09/17/2020      Health Maintenance:         Topic Date Due   • Colorectal Cancer Screening  Never done   • Hepatitis C Screening  Completed         Topic Date Due   • COVID-19 Vaccine (4 - Booster for [place] series) 01/26/2022      Medicare Screening Tests and Risk Assessments:     Alisa Nunez is here for his Subsequent Wellness visit  Health Risk Assessment:   Patient rates overall health as good  Patient feels that their physical health rating is same  Patient is satisfied with their life  Eyesight was rated as same  Hearing was rated as same  Patient feels that their emotional and mental health rating is same  Patients states they are never, rarely angry   Patient states they are never, rarely unusually tired/fatigued  Pain experienced in the last 7 days has been none  Patient states that he has experienced no weight loss or gain in last 6 months  Depression Screening:   PHQ-2 Score: 0      Fall Risk Screening: In the past year, patient has experienced: no history of falling in past year      Home Safety:  Patient does not have trouble with stairs inside or outside of their home  Patient has working smoke alarms and has working carbon monoxide detector  Home safety hazards include: none  Nutrition:   Current diet is Regular  Medications:   Patient is currently taking over-the-counter supplements  OTC medications include: see medication list  Patient is able to manage medications  Activities of Daily Living (ADLs)/Instrumental Activities of Daily Living (IADLs):   Walk and transfer into and out of bed and chair?: Yes  Dress and groom yourself?: Yes    Bathe or shower yourself?: Yes    Feed yourself? Yes  Do your laundry/housekeeping?: No  Manage your money, pay your bills and track your expenses?: No  Make your own meals?: No    Do your own shopping?: No    ADL comments: Wife assists    Advance Care Planning:   Living will: No    Advanced directive: No    Five wishes given: Yes      Cognitive Screening:   Provider or family/friend/caregiver concerned regarding cognition?: Yes  [place] Cognitive Assessment (MoCA) Score: 11  Interpretation:  MoCA Score < 25: abnormal/possible cognitive impairment    Cognition Comments: Concern for memory loss  Pt starting to have difficulty finishing a sentence    PREVENTIVE SCREENINGS      Cardiovascular Screening:    General: Screening Current      Diabetes Screening:     General: Screening Current      Colorectal Cancer Screening:     General: Patient Declines and Risks and Benefits Discussed      Prostate Cancer Screening:    General: Risks and Benefits Discussed and Patient Declines      Osteoporosis Screening:    General: Screening Not Indicated      Abdominal Aortic Aneurysm (AAA) Screening:    Risk factors include: age between 73-67 yo and tobacco use        General: Screening Not Indicated and History AAA      Lung Cancer Screening:     General: Screening Not Indicated      Hepatitis C Screening:    General: Screening Current    Screening, Brief Intervention, and Referral to Treatment (SBIRT)    Screening  Typical number of drinks in a day: 0  Typical number of drinks in a week: 0  Interpretation: Low risk drinking behavior  Single Item Drug Screening:  How often have you used an illegal drug (including marijuana) or a prescription medication for non-medical reasons in the past year? never    Single Item Drug Screen Score: 0  Interpretation: Negative screen for possible drug use disorder    Other Counseling Topics:   Car/seat belt/driving safety, skin self-exam, sunscreen and calcium and vitamin D intake and regular weightbearing exercise  No results found       Physical Exam:     /86 (BP Location: Right arm, Patient Position: Sitting, Cuff Size: Standard)   Pulse 80   Temp 98 2 °F (36 8 °C) (Temporal)   Ht 5' 7" (1 702 m)   Wt 92 1 kg (203 lb)   SpO2 96%   BMI 31 79 kg/m²         Johnny Johnson MD

## 2023-03-24 ENCOUNTER — TELEPHONE (OUTPATIENT)
Age: 73
End: 2023-03-24

## 2023-03-24 NOTE — TELEPHONE ENCOUNTER
Chris Winston [address], [address] [place] [address]    [phone]    Telephone Intake: Geriatric Assessment     Referral source: Dr Ariane Natarajan MD    Caller who is scheduling/relationship to pt: Vic Carr, spouse  Caller's phone number: [phone]      Reason for referral: Patient concerns , Family member concerns and Provider concerns regarding memory concerns, behavior changes/concerns, home safety concerns and mood concerns  If there are behavioral concerns, is the pt prescribed medications to manage these? no   If so, how many? none   Has the patient ever had an inpatient psychiatric hospitalization? No,   What is the goal of the visit? Initial  Assessment & Diagnosis      Has the patient been seen by a Neurologist or Geriatrician? Lucia Perry, Spouse advised Dr Ariane Natarajan suggest patient see us before Neurology    If yes, is this appointment for a second opinion? No  Has the patient ever been diagnosed with dementia? Yes Dr Oksana Bell thinks so       Preferred language? English   Highest education level? 1 yr  college  Does the patient wear glasses? Yes   Does the patient use hearing aids? Yes      Is there a living will/healthcare POA in place/If so, who? No, will - POA Peng Earing Spouse     Does the pt/caregiver have access for a virtual visit (computer/smart phone with audio/video)? No    Caller was informed:   • Please make sure the pt is accompanied by someone who knows them well / caregiver / family member to participate in this appointment  Who will accompany the pt (name and relationship)? Vic Carr , Spouse   Phone number of person accompanying pt: [phone]  • Please make sure the pt attends all appointments, including the assessment, care conference, follow-up, whether in-person or virtual   • For virtual visits, pt must be physically present in [facility]  Office packet mailed out to: [address] [facility]  Added to wait list for sooner appointments?  Yes :  Has this patient been seen by our department in the last 3 years?  No    Please route to provider for chart review prior to scheduling and let the caller know that this phone intake will be reviewed IF -  • Pt was recently hospitalized  • Pt is prescribed medications for behavior management or has a history of psychiatric hospitalization  • Pt plans to attend alone

## 2023-03-27 DIAGNOSIS — I73.9 CLAUDICATION OF LEFT LOWER EXTREMITY (HCC): ICD-10-CM

## 2023-03-28 RX ORDER — CLOPIDOGREL BISULFATE 75 MG/1
TABLET ORAL
Qty: 30 TABLET | Refills: 4 | Status: SHIPPED | OUTPATIENT
Start: 2023-03-28

## 2023-03-31 ENCOUNTER — APPOINTMENT (OUTPATIENT)
Dept: LAB | Facility: MEDICAL CENTER | Age: 73
End: 2023-03-31

## 2023-03-31 DIAGNOSIS — R97.20 ELEVATED PSA: ICD-10-CM

## 2023-03-31 DIAGNOSIS — R41.3 MEMORY PROBLEM: ICD-10-CM

## 2023-03-31 LAB
FOLATE SERPL-MCNC: 12.7 NG/ML (ref 3.1–17.5)
VIT B12 SERPL-MCNC: 674 PG/ML (ref 100–900)

## 2023-04-01 LAB
PSA FREE MFR SERPL: 23.9 %
PSA FREE SERPL-MCNC: 3.52 NG/ML
PSA SERPL-MCNC: 14.7 NG/ML (ref 0–4)

## 2023-04-03 ENCOUNTER — TELEPHONE (OUTPATIENT)
Dept: FAMILY MEDICINE CLINIC | Facility: CLINIC | Age: 73
End: 2023-04-03

## 2023-04-03 NOTE — TELEPHONE ENCOUNTER
Patients wife called is the B-12 and folate something you wanted ordered over the counter or were you going to send that to the pharmacy?

## 2023-04-26 ENCOUNTER — APPOINTMENT (OUTPATIENT)
Dept: LAB | Facility: MEDICAL CENTER | Age: 73
End: 2023-04-26

## 2023-04-28 DIAGNOSIS — I74.3 EMBOLISM AND THROMBOSIS OF ARTERIES OF THE LOWER EXTREMITIES (HCC): ICD-10-CM

## 2023-05-01 RX ORDER — WARFARIN SODIUM 4 MG/1
TABLET ORAL
Qty: 45 TABLET | Refills: 1 | Status: SHIPPED | OUTPATIENT
Start: 2023-05-01 | End: 2023-05-03 | Stop reason: SDUPTHER

## 2023-05-02 ENCOUNTER — TELEPHONE (OUTPATIENT)
Dept: OTHER | Facility: OTHER | Age: 73
End: 2023-05-02

## 2023-05-02 NOTE — TELEPHONE ENCOUNTER
Patient's wife is calling to report that their pharmacy is saying they did not receive the prescription for warfarin (COUMADIN) 4 mg tablet [51264]  Please resend immediately to the pharmacy on file

## 2023-05-03 DIAGNOSIS — I74.3 EMBOLISM AND THROMBOSIS OF ARTERIES OF THE LOWER EXTREMITIES (HCC): ICD-10-CM

## 2023-05-03 RX ORDER — WARFARIN SODIUM 4 MG/1
TABLET ORAL
Qty: 45 TABLET | Refills: 1 | Status: SHIPPED | OUTPATIENT
Start: 2023-05-03 | End: 2023-05-05 | Stop reason: SDUPTHER

## 2023-05-04 RX ORDER — WARFARIN SODIUM 4 MG/1
4 TABLET ORAL
Qty: 30 TABLET | Refills: 1
Start: 2023-05-04

## 2023-05-04 NOTE — TELEPHONE ENCOUNTER
Patient out of warfarin, needs to be sent over as warfarin 4mg 1 daily    Patient does not alternate with a 3mg dose   Please resend to [facility] ASAP

## 2023-05-05 DIAGNOSIS — I74.3 EMBOLISM AND THROMBOSIS OF ARTERIES OF THE LOWER EXTREMITIES (HCC): ICD-10-CM

## 2023-05-05 RX ORDER — WARFARIN SODIUM 4 MG/1
4 TABLET ORAL
Qty: 90 TABLET | Refills: 1 | Status: SHIPPED | OUTPATIENT
Start: 2023-05-05

## 2023-05-22 ENCOUNTER — TELEPHONE (OUTPATIENT)
Dept: FAMILY MEDICINE CLINIC | Facility: CLINIC | Age: 73
End: 2023-05-22

## 2023-05-22 NOTE — TELEPHONE ENCOUNTER
Patient's wife Kassidy Elizabeth called inquiring about labs does patient need any she said it seems like its been a while

## 2023-05-25 ENCOUNTER — APPOINTMENT (OUTPATIENT)
Dept: LAB | Facility: MEDICAL CENTER | Age: 73
End: 2023-05-25

## 2023-05-25 DIAGNOSIS — Z79.01 ANTICOAGULATED ON COUMADIN: Primary | ICD-10-CM

## 2023-05-25 LAB
INR PPP: 2.53 (ref 0.84–1.19)
PROTHROMBIN TIME: 27.5 SECONDS (ref 11.6–14.5)

## 2023-05-26 DIAGNOSIS — Z51.81 ANTICOAGULATION GOAL OF INR 2 TO 3: Primary | ICD-10-CM

## 2023-05-26 DIAGNOSIS — Z79.01 ANTICOAGULATION GOAL OF INR 2 TO 3: Primary | ICD-10-CM

## 2023-06-21 DIAGNOSIS — I73.9 INTERMITTENT CLAUDICATION (HCC): ICD-10-CM

## 2023-06-21 DIAGNOSIS — I73.9 PERIPHERAL ARTERY DISEASE (HCC): ICD-10-CM

## 2023-06-21 RX ORDER — ATORVASTATIN CALCIUM 80 MG/1
TABLET, FILM COATED ORAL
Qty: 30 TABLET | Refills: 3 | Status: SHIPPED | OUTPATIENT
Start: 2023-06-21

## 2023-06-22 ENCOUNTER — APPOINTMENT (OUTPATIENT)
Dept: LAB | Facility: MEDICAL CENTER | Age: 73
End: 2023-06-22
Payer: MEDICARE

## 2023-06-22 LAB
INR PPP: 2.36 (ref 0.84–1.19)
PROTHROMBIN TIME: 26.1 SECONDS (ref 11.6–14.5)

## 2023-06-26 ENCOUNTER — TELEPHONE (OUTPATIENT)
Dept: NEUROLOGY | Facility: CLINIC | Age: 73
End: 2023-06-26

## 2023-06-27 ENCOUNTER — CONSULT (OUTPATIENT)
Dept: NEUROLOGY | Facility: CLINIC | Age: 73
End: 2023-06-27
Payer: MEDICARE

## 2023-06-27 VITALS
HEART RATE: 71 BPM | BODY MASS INDEX: 32.8 KG/M2 | WEIGHT: 209 LBS | SYSTOLIC BLOOD PRESSURE: 130 MMHG | HEIGHT: 67 IN | DIASTOLIC BLOOD PRESSURE: 70 MMHG

## 2023-06-27 DIAGNOSIS — R41.3 MEMORY LOSS OF UNKNOWN CAUSE: Primary | ICD-10-CM

## 2023-06-27 DIAGNOSIS — I65.21 CAROTID STENOSIS, RIGHT: ICD-10-CM

## 2023-06-27 PROCEDURE — 99205 OFFICE O/P NEW HI 60 MIN: CPT | Performed by: PSYCHIATRY & NEUROLOGY

## 2023-06-27 NOTE — PATIENT INSTRUCTIONS
- labs to get: TSH, MMA, Vit B1, Folate, Lyme,   - MRI brain neuroquant   - will get CTA of H/N for R ICA stenosis

## 2023-06-27 NOTE — ASSESSMENT & PLAN NOTE
R ICA stenosis  01/2023 Carotid Duplex:   R ICA: Evaluation shows an occlusion of the internal carotid artery  Vertebral artery flow is antegrade  There is no significant subclavian artery  disease  L ICA: There is <50% stenosis noted in the internal carotid artery  Plaque is  heterogenous and irregular        Plan   - will get CTA of H/N for R ICA stenosis to further re-evaluate  Unlikely to be contributing to patient's noted memory issues
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
## 2023-06-27 NOTE — ASSESSMENT & PLAN NOTE
Protein C deficiency   Patient has noted continued patient see as per chart review and has had multiple thromboembolic events in the past   Patient is currently on warfarin and follows with his PCP in regards to dosage and ensuring appropriate INR goals are met    Lab Results   Component Value Date    INR 2 36 (H) 06/22/2023    INR 2 53 (H) 05/25/2023    INR 2 45 (H) 04/26/2023    INR 2 46 (H) 03/31/2023    INR 2 95 (H) 03/03/2023     Goal INR: 2-3      Plan   Continue warfarin as per coumadin clinic/PCP

## 2023-06-28 ENCOUNTER — TELEPHONE (OUTPATIENT)
Dept: NEUROLOGY | Facility: CLINIC | Age: 73
End: 2023-06-28

## 2023-06-28 NOTE — TELEPHONE ENCOUNTER
Received VM Transcription:    Hi  This is [employer]  I am calling for my  Antonella Morocho  His birthdate is [id number]  And my question is he supposed to schedule for an MRI of the brain NeuroQuant  And apparently they have it listed as sedation or a anesthesia  I'd like to know why that was listed that way  And can you have Dr Kamilah Bee give me a call back if this was not discussed about sedation or anesthesia? So I don't know why or what direction you're going in here  So just give me a call back  [phone]  Thank you  ___________________________________    Returned Marisela's call  Advised the order was for sedation, not anesthesia  Earlene Worthington was concerned that is was not discussed  Asked for clarification  Discussed pt's increased temper and frustration levels  Earlene Worthington stated someone from central scheduling told her the only place pt could have a NeuroQuant was in [facility] [place]  Advised this is not the case but called CS and confirmed same  Pt needs to have a BUN/Creat prior to CTA on 7-18-23    #[phone] [facility] to leave a detailed message      Dr Kamilah Bee, please advise  Thank you!

## 2023-06-28 NOTE — TELEPHONE ENCOUNTER
received vm at 1:04pm-A hi, this is Elida You and I am calling for my  behzad russo  Birthday is 1950  And this message is for dr Jorden Gaitan  I would like to have him call me back on, why For for an MRI for behzad that they're ordering sedation or anesthesia, that wasn't discussed  I'm just wondering why  And the other thing is, I wanted to see,  if I was told to  that the doctor needs to order a metabolic panel for blood work for him to have a CT of his head and neck because of his age  So that has, i guess that has to be done too, because I don't know if the labs include a metabolic panel or not  And he does have a appointment for July 19th at 1 o'clock for the CT in [place]  So if you give me a call back, dr Jorden Gaitan, I would appreciate that  Especially on sedation, or eric for an MRI did not make an appointment for that because we were questioning why that had to be done  Neelima Morton, telephone number here is [phone]  And thank you   I appreciate it   ----------------------------------------------  Dr Ty Russo see both message

## 2023-06-29 NOTE — TELEPHONE ENCOUNTER
Latasha Nunes 89, DO  You 18 minutes ago (2:13 PM)     BC  Hi! I ordered sedation, no anesthesia but mainly as patient was getting easily frustrated with tasks so an MRI of the brain is 45 minutes to an hour and he has to be able tolerate it for the entire time to get a good image  He had a hard time doing the [facility] during the last visit so it's best to get some sedation prior so he can toelrate the whole MRI  CMP put in to evaluate kidney function prior to CTA h/n     If have any other questions, please let me know but routine question I'll answer next week unless emergent

## 2023-07-06 ENCOUNTER — APPOINTMENT (OUTPATIENT)
Dept: LAB | Facility: MEDICAL CENTER | Age: 73
End: 2023-07-06
Payer: MEDICARE

## 2023-07-06 DIAGNOSIS — R41.3 MEMORY LOSS OF UNKNOWN CAUSE: ICD-10-CM

## 2023-07-06 LAB
B BURGDOR IGG+IGM SER-ACNC: 0.2 AI
FOLATE SERPL-MCNC: >22.3 NG/ML
TSH SERPL DL<=0.05 MIU/L-ACNC: 1.12 UIU/ML (ref 0.45–4.5)

## 2023-07-06 PROCEDURE — 84443 ASSAY THYROID STIM HORMONE: CPT

## 2023-07-06 PROCEDURE — 84425 ASSAY OF VITAMIN B-1: CPT

## 2023-07-06 PROCEDURE — 86618 LYME DISEASE ANTIBODY: CPT

## 2023-07-06 PROCEDURE — 82746 ASSAY OF FOLIC ACID SERUM: CPT

## 2023-07-06 PROCEDURE — 83918 ORGANIC ACIDS TOTAL QUANT: CPT

## 2023-07-07 ENCOUNTER — TELEPHONE (OUTPATIENT)
Dept: OTHER | Facility: HOSPITAL | Age: 73
End: 2023-07-07

## 2023-07-07 NOTE — TELEPHONE ENCOUNTER
Attempted to call x2 for patient's wife in regards to patient getting MRI brain w/ sedation and other imaging that has been ordered and further clarification has been requested. Calls rang and went to voicemail that gave last name residence but no confirmation in regards to patient or wife's name so did not leave a message. Requested clinical team to reach out to get exact questions patient's wife wants answered and will return call afterwards.

## 2023-07-09 LAB — METHYLMALONATE SERPL-SCNC: 250 NMOL/L (ref 0–378)

## 2023-07-10 ENCOUNTER — RA CDI HCC (OUTPATIENT)
Dept: OTHER | Facility: HOSPITAL | Age: 73
End: 2023-07-10

## 2023-07-10 NOTE — PROGRESS NOTES
[address] [facility] coding opportunities       Chart reviewed, no opportunity found: CHART REVIEWED, NO OPPORTUNITY FOUND        Patients Insurance     Medicare Insurance: Medicare

## 2023-07-11 LAB — VIT B1 BLD-SCNC: 140.3 NMOL/L (ref 66.5–200)

## 2023-07-12 ENCOUNTER — HOSPITAL ENCOUNTER (OUTPATIENT)
Dept: NON INVASIVE DIAGNOSTICS | Facility: CLINIC | Age: 73
Discharge: HOME | End: 2023-07-12
Payer: MEDICARE

## 2023-07-12 DIAGNOSIS — I65.21 OCCLUSION AND STENOSIS OF RIGHT CAROTID ARTERY: ICD-10-CM

## 2023-07-12 DIAGNOSIS — I71.43 INFRARENAL ABDOMINAL AORTIC ANEURYSM (AAA) WITHOUT RUPTURE (HCC): ICD-10-CM

## 2023-07-12 DIAGNOSIS — I65.29 STENOSIS OF CAROTID ARTERY, UNSPECIFIED LATERALITY: ICD-10-CM

## 2023-07-12 PROCEDURE — 93978 VASCULAR STUDY: CPT

## 2023-07-12 PROCEDURE — 93880 EXTRACRANIAL BILAT STUDY: CPT

## 2023-07-13 PROCEDURE — 93978 VASCULAR STUDY: CPT | Performed by: SURGERY

## 2023-07-13 PROCEDURE — 93880 EXTRACRANIAL BILAT STUDY: CPT | Performed by: SURGERY

## 2023-07-17 ENCOUNTER — OFFICE VISIT (OUTPATIENT)
Dept: FAMILY MEDICINE CLINIC | Facility: CLINIC | Age: 73
End: 2023-07-17
Payer: MEDICARE

## 2023-07-17 VITALS
WEIGHT: 208.6 LBS | OXYGEN SATURATION: 94 % | HEART RATE: 74 BPM | DIASTOLIC BLOOD PRESSURE: 88 MMHG | HEIGHT: 67 IN | TEMPERATURE: 97.4 F | SYSTOLIC BLOOD PRESSURE: 152 MMHG | BODY MASS INDEX: 32.74 KG/M2

## 2023-07-17 DIAGNOSIS — N18.30 STAGE 3 CHRONIC KIDNEY DISEASE, UNSPECIFIED WHETHER STAGE 3A OR 3B CKD (HCC): Primary | ICD-10-CM

## 2023-07-17 DIAGNOSIS — R41.3 MEMORY LOSS: ICD-10-CM

## 2023-07-17 DIAGNOSIS — I10 ESSENTIAL HYPERTENSION: ICD-10-CM

## 2023-07-17 PROCEDURE — 99214 OFFICE O/P EST MOD 30 MIN: CPT | Performed by: FAMILY MEDICINE

## 2023-08-17 ENCOUNTER — APPOINTMENT (OUTPATIENT)
Dept: LAB | Facility: MEDICAL CENTER | Age: 73
End: 2023-08-17
Payer: MEDICARE

## 2023-08-21 DIAGNOSIS — I73.9 CLAUDICATION OF LEFT LOWER EXTREMITY (HCC): Primary | ICD-10-CM

## 2023-08-23 ENCOUNTER — OFFICE VISIT (OUTPATIENT)
Dept: CARDIOLOGY CLINIC | Facility: MEDICAL CENTER | Age: 73
End: 2023-08-23
Payer: MEDICARE

## 2023-08-23 VITALS
SYSTOLIC BLOOD PRESSURE: 142 MMHG | HEIGHT: 67 IN | HEART RATE: 61 BPM | OXYGEN SATURATION: 98 % | DIASTOLIC BLOOD PRESSURE: 78 MMHG | BODY MASS INDEX: 33.59 KG/M2 | WEIGHT: 214 LBS

## 2023-08-23 DIAGNOSIS — Z98.890 S/P FEMORAL-TIBIAL BYPASS: ICD-10-CM

## 2023-08-23 DIAGNOSIS — I47.1 SVT (SUPRAVENTRICULAR TACHYCARDIA) (HCC): Primary | ICD-10-CM

## 2023-08-23 DIAGNOSIS — I44.7 LBBB (LEFT BUNDLE BRANCH BLOCK): ICD-10-CM

## 2023-08-23 DIAGNOSIS — I35.0 NONRHEUMATIC AORTIC VALVE STENOSIS: ICD-10-CM

## 2023-08-23 DIAGNOSIS — Z95.2 S/P TAVR (TRANSCATHETER AORTIC VALVE REPLACEMENT): ICD-10-CM

## 2023-08-23 DIAGNOSIS — I73.9 PERIPHERAL ARTERY DISEASE (HCC): ICD-10-CM

## 2023-08-23 DIAGNOSIS — I10 ESSENTIAL HYPERTENSION: ICD-10-CM

## 2023-08-23 DIAGNOSIS — I25.10 ASCVD (ARTERIOSCLEROTIC CARDIOVASCULAR DISEASE): ICD-10-CM

## 2023-08-23 DIAGNOSIS — E78.5 DYSLIPIDEMIA: ICD-10-CM

## 2023-08-23 PROCEDURE — 99214 OFFICE O/P EST MOD 30 MIN: CPT | Performed by: INTERNAL MEDICINE

## 2023-08-23 NOTE — PROGRESS NOTES
Cardiology Follow Up    Gilbert Martinez  1950  [id number]  [facility] [place]  [address] [facility] [address] [phone]    1. SVT (supraventricular tachycardia) ([address])        2. Peripheral artery disease ([address])        3. Nonrheumatic aortic valve stenosis        4. LBBB (left bundle branch block)        5. Essential hypertension        6. ASCVD (arteriosclerotic cardiovascular disease)        7. S/P TAVR (transcatheter aortic valve replacement)  Echo complete w/ contrast if indicated      8. S/P RIGHT Femoral- PT bypass 2011        9. Dyslipidemia          Chief Complaint   Patient presents with   • Follow-up     6 month f/up       Interval History: Patient feels well, without complaints. No reported chest pain, shortness of breath, palpitations, lightheadedness, syncope, LE edema, orthopnea, PND, or significant weight changes. Patient remains active without any increased fatigue out of the ordinary.       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 (HCC)   • Protein C deficiency (HCC)   • Former heavy tobacco smoker   • AAA (abdominal aortic aneurysm) without rupture (HCC)   • S/P TAVR (transcatheter aortic valve replacement)   • Thrombocytopenia (HCC)   • Anticoagulation goal of INR 2 to 3   • Anticoagulated on Coumadin   • LBBB (left bundle branch block)   • Leukocytosis   • Hypocalcemia   • Hypokalemia   • Urinary retention   • SVT (supraventricular tachycardia) (HCC)   • FAMILIA (acute kidney injury) ([address])   • Elevated troponin   • Other insomnia   • Encounter for postoperative care   • Irregular cardiac rhythm   • Left thigh pain   • Memory loss   • Carotid stenosis, right     Past Medical History:   Diagnosis Date   • DVT (deep venous thrombosis) (HCC)    • Protein C deficiency (HCC)    • Pulmonary embolus (HCC)      Social History     Socioeconomic History   • Marital status: /Civil Union     Spouse name: Not on file   • Number of children: Not on file   • Years of education: Not on file   • Highest education level: Not on file   Occupational History   • Not on file   Tobacco Use   • Smoking status: Former     Types: Cigarettes     Quit date: 2018     Years since quittin.9   • Smokeless tobacco: Never   • Tobacco comments:     1 pk every 2days , currently on patches to quit. Vaping Use   • Vaping Use: Never used   Substance and Sexual Activity   • Alcohol use: No   • Drug use: No   • Sexual activity: Not on file   Other Topics Concern   • Not on file   Social History Narrative   • Not on file     Social Determinants of Health     Financial Resource Strain: Low Risk  (3/23/2023)    Overall Financial Resource Strain (CARDIA)    • Difficulty of Paying Living Expenses: Not very hard   Food Insecurity: Not on file   Transportation Needs: No Transportation Needs (3/23/2023)    PRAPARE - Transportation    • Lack of Transportation (Medical): No    • Lack of Transportation (Non-Medical):  No   Physical Activity: Not on file   Stress: Not on file   Social Connections: Not on file   Intimate Partner Violence: Not on file   Housing Stability: Not on file      Family History   Problem Relation Age of Onset   • Cancer Mother 50   • Heart attack Father 46   • Hypertension Father    • Prostate cancer Brother    • Arrhythmia Neg Hx    • Clotting disorder Neg Hx    • Fainting Neg Hx    • Heart disease Neg Hx    • Anuerysm Neg Hx    • Stroke Neg Hx      Past Surgical History:   Procedure Laterality Date   • BACK SURGERY     • BYPASS FEMORAL-TIBIAL Right  • MI ECHO TRANSESOPHAG R-T 2D W/PRB IMG ACQUISJ I&R N/A 9/16/2021    Procedure: TRANSESOPHAGEAL ECHOCARDIOGRAM (RADHA); Surgeon: Allegra Rendon DO;  Location: [facility];  Service: Cardiac Surgery   • MI REPLACE AORTIC VALVE OPENFEMORAL ARTERY APPROACH N/A 9/16/2021    Procedure: REPLACEMENT AORTIC VALVE TRANSCATHETER (TAVR) TRANSFEMORAL W/ 29 MM COLÓN BREEZY S3 ULTRA VALVE (ACCESS ON LEFT); Surgeon: Allegra Rendon DO;  Location: [facility];  Service: Cardiac Surgery   • VEIN LIGATION         Current Outpatient Medications:   •  acetaminophen (TYLENOL) 325 mg tablet, Take 1-2 tablets Q4-6 hours PRN pain. Do not take more than 4 grams in 24 hours. , Disp: , Rfl: 0  •  atorvastatin (LIPITOR) 80 mg tablet, TAKE 1 TABLET BY MOUTH EVERY DAY, Disp: 30 tablet, Rfl: 3  •  carbamide peroxide (DEBROX) 6.5 % otic solution, Administer 5 drops into the left ear 2 (two) times a day, Disp: 15 mL, Rfl: 0  •  clopidogrel (PLAVIX) 75 mg tablet, TAKE 1 TABLET BY MOUTH EVERY DAY, Disp: 30 tablet, Rfl: 4  •  diltiazem (CARDIZEM CD) 240 mg 24 hr capsule, Take 1 capsule (240 mg total) by mouth daily, Disp: 30 capsule, Rfl: 11  •  tamsulosin (FLOMAX) 0.4 mg, Take 1 capsule (0.4 mg total) by mouth daily with dinner, Disp: 30 capsule, Rfl: 11  •  tiZANidine (ZANAFLEX) 2 mg tablet, Take 1 tablet (2 mg total) by mouth every 8 (eight) hours as needed for muscle spasms, Disp: 90 tablet, Rfl: 1  •  warfarin (COUMADIN) 3 mg tablet, TAKE 1 TABLET BY MOUTH DAILY, Disp: 90 tablet, Rfl: 1  •  warfarin (COUMADIN) 4 mg tablet, Take 1 tablet (4 mg total) by mouth daily, Disp: 90 tablet, Rfl: 1  •  warfarin (COUMADIN) 5 mg tablet, TAKE 1 TABLET BY MOUTH EVERY DAY, Disp: 30 tablet, Rfl: 3  •  docusate sodium (COLACE) 100 mg capsule, Take 1 capsule (100 mg total) by mouth 2 (two) times a day as needed for constipation Hold for soft stools.  (Patient not taking: Reported on 1/12/2023), Disp: 60 capsule, Rfl: 0  •  polyethylene glycol (MIRALAX) 17 g packet, Take 17 g by mouth daily as needed (constipation) Hold for soft stools. (Patient not taking: Reported on 1/12/2023), Disp: , Rfl: 0  No Known Allergies    Labs: Ancillary Orders on 07/21/2023   Component Date Value   • Protime 08/17/2023 30.0 (H)    • INR 08/17/2023 2.83 (H)    Appointment on 07/06/2023   Component Date Value   • Lyme Total Antibodies 07/06/2023 0.2    • Folate 07/06/2023 >22.3    • Vitamin B1, Whole Blood 07/06/2023 140.3    • Methylmalonic Acid, S 07/06/2023 250    • TSH 3RD GENERATON 07/06/2023 1.125    Ancillary Orders on 06/23/2023   Component Date Value   • Protime 07/06/2023 26.4 (H)    • INR 07/06/2023 2.40 (H)    Orders Only on 05/26/2023   Component Date Value   • Protime 06/22/2023 26.1 (H)    • INR 06/22/2023 2.36 (H)    Orders Only on 05/25/2023   Component Date Value   • Protime 05/25/2023 27.5 (H)    • INR 05/25/2023 2.53 (H)    Ancillary Orders on 04/14/2023   Component Date Value   • Protime 04/26/2023 26.8 (H)    • INR 04/26/2023 2.45 (H)    Appointment on 03/31/2023   Component Date Value   • Prostate Specific Antige* 03/31/2023 14.7 (H)    • PSA, Free 03/31/2023 3.52    • PSA, Free Pct 03/31/2023 23.9    • Vitamin B-12 03/31/2023 674    • Folate 03/31/2023 12.7    Ancillary Orders on 03/17/2023   Component Date Value   • Protime 03/31/2023 26.9 (H)    • INR 03/31/2023 2.46 (H)      Lab Results   Component Value Date    TRIG 128 02/01/2023    HDL 52 02/01/2023     Imaging: No results found. Review of Systems:  Review of Systems   Constitutional: Negative for activity change, appetite change, fatigue and fever. HENT: Negative for nosebleeds and sore throat. Eyes: Negative for photophobia and visual disturbance. Respiratory: Negative for cough, chest tightness, shortness of breath and wheezing. Cardiovascular: Negative for chest pain, palpitations and leg swelling. Gastrointestinal: Negative for abdominal pain, diarrhea, nausea and vomiting.    Endocrine: Negative for polyuria. Genitourinary: Negative for dysuria, frequency and hematuria. Musculoskeletal: Negative for arthralgias, back pain and gait problem. Skin: Negative for pallor and rash. Neurological: Negative for dizziness, syncope, speech difficulty and light-headedness. Hematological: Does not bruise/bleed easily. Psychiatric/Behavioral: Negative for agitation, behavioral problems and confusion. Physical Exam:  Physical Exam  Vitals reviewed. Constitutional:       General: He is not in acute distress. Appearance: He is well-developed. He is not diaphoretic. HENT:      Head: Normocephalic and atraumatic. Nose: Nose normal.   Eyes:      General: No scleral icterus. Pupils: Pupils are equal, round, and reactive to light. Neck:      Vascular: No JVD. Cardiovascular:      Rate and Rhythm: Normal rate and regular rhythm. Heart sounds: S1 normal and S2 normal. Heart sounds not distant. Murmur heard. Systolic murmur is present with a grade of 2/6. No friction rub. No gallop. No S3 sounds. Pulmonary:      Effort: Pulmonary effort is normal. No respiratory distress. Breath sounds: Normal breath sounds. No wheezing or rales. Abdominal:      General: Bowel sounds are normal. There is no distension. Palpations: Abdomen is soft. Musculoskeletal:         General: No deformity. Cervical back: Normal range of motion and neck supple. Skin:     General: Skin is warm and dry. Findings: No erythema. Neurological:      Mental Status: He is alert and oriented to person, place, and time. Cranial Nerves: No cranial nerve deficit. Psychiatric:         Behavior: Behavior normal.       Blood pressure 142/78, pulse 61, height 5' 7" (1.702 m), weight 97.1 kg (214 lb), SpO2 98 %. EKG:  Sinus bradycardia with occasional premature ventricular contractions  Otherwise normal ECG    Discussion/Summary:  1. ASCVD.  Nonobstructive CAD by cath 5/11 at [facility]. On aspirin, statin. No current exertional cardiac symptoms. Doing well and continued on maintenance meds. Continues to do well on maintenance medication regiment. 2. HL. Lipid panel 9/18 showed total cholesterol 121, , HDL 35, LDL 54. On Atorvastatin 40. Lipid panel 9/19 showed total cholesterol 131, , HDL 38, LDL 55. Advised him to try taking some fish oil to help lower TG. Repeat levels in April 2021 revealed LDL of 143 - which is very high - advised to increase atorvastatin to  80mg daily and repeat levels in Nov 2021 LDL reduced down to 53. Repeat levels in February 2023 revealed an LDL of 58.     3. PAD. On Plavix, statin. s/p RLE fem-PT bypass. Following with Regi Roberto/Olga Lay/Dr. Marianne Husain. No further claudication symptoms with walking,  stable and improved. Continue vascular follow up and maintenance medication with ASA and statin. Asymptomatic and doing well.     4. Moderate AS. Echo 8/2018 showed moderate AS. Not having any current symptoms with exertion. Explained need to monitor for exertional symptoms. Repeat echo to assess for progression in June 2021 revealed normal LV function, G1DD, moderate AR, severe AS with mean gradient of 39 mmHg and peak velocity of 4 m/s - now s/p TAVR and recovering well, 30 day echo stable. He had post-op LBBB on EKG along with asymptomatic bradycardia and AIVR. Now doing well and remains asymptomatic. Advised to watch for syncope - no need for PPM implant after Zio patch in Oct 2021 revealed normal heart rate variation. Repeat echo in Oct 2022 revealed stable valve with mean gradient of 8 mmHg and normal LV function. Repeat echocardiogram due in October 2023, will order today.     5. HTN. In the past was taking Metoprolol, but stopped taking it after he stopped following with Dr. Jonathan Nelson around 2012. Started Amlodipine 5 mg daily 10/18 due to elevated BP, as well as HCTZ 25 mg daily.  PCP has changed diltiazem to lisinopril with elevated BP and seen to have increased PVCs on EKG. He was on the lowest dose of diltiazem, so I would prefer for him to be on an AV obie blocking agent with a higher dose to help suppress ectopy and improve BP control rather than change to a completely different agent altogether. Blood pressure borderline today, usually relatively well controlled.      6. Prior DVT. On Coumadin, monitored by PCP. 7.  SVT: episode seen after TAVR, which broke spontaneously, continued on cardizem. No further episodes noted by symptoms. Continued on Cardizem.

## 2023-08-28 DIAGNOSIS — I73.9 CLAUDICATION OF LEFT LOWER EXTREMITY (HCC): ICD-10-CM

## 2023-08-28 RX ORDER — CLOPIDOGREL BISULFATE 75 MG/1
TABLET ORAL
Qty: 30 TABLET | Refills: 4 | Status: SHIPPED | OUTPATIENT
Start: 2023-08-28

## 2023-09-11 ENCOUNTER — APPOINTMENT (OUTPATIENT)
Dept: LAB | Facility: MEDICAL CENTER | Age: 73
End: 2023-09-11
Payer: MEDICARE

## 2023-09-11 DIAGNOSIS — N18.30 STAGE 3 CHRONIC KIDNEY DISEASE, UNSPECIFIED WHETHER STAGE 3A OR 3B CKD (HCC): ICD-10-CM

## 2023-09-11 LAB
ANION GAP SERPL CALCULATED.3IONS-SCNC: 7 MMOL/L
BUN SERPL-MCNC: 18 MG/DL (ref 5–25)
CALCIUM SERPL-MCNC: 9.7 MG/DL (ref 8.4–10.2)
CHLORIDE SERPL-SCNC: 104 MMOL/L (ref 96–108)
CO2 SERPL-SCNC: 31 MMOL/L (ref 21–32)
CREAT SERPL-MCNC: 1.42 MG/DL (ref 0.6–1.3)
GFR SERPL CREATININE-BSD FRML MDRD: 48 ML/MIN/1.73SQ M
GLUCOSE SERPL-MCNC: 79 MG/DL (ref 65–140)
POTASSIUM SERPL-SCNC: 4.7 MMOL/L (ref 3.5–5.3)
SODIUM SERPL-SCNC: 142 MMOL/L (ref 135–147)

## 2023-09-11 PROCEDURE — 36415 COLL VENOUS BLD VENIPUNCTURE: CPT

## 2023-09-11 PROCEDURE — 80048 BASIC METABOLIC PNL TOTAL CA: CPT

## 2023-10-04 ENCOUNTER — HOSPITAL ENCOUNTER (OUTPATIENT)
Dept: NON INVASIVE DIAGNOSTICS | Facility: CLINIC | Age: 73
Discharge: HOME | End: 2023-10-04
Payer: MEDICARE

## 2023-10-04 DIAGNOSIS — I73.9 CLAUDICATION OF LEFT LOWER EXTREMITY (HCC): ICD-10-CM

## 2023-10-04 PROCEDURE — 93923 UPR/LXTR ART STDY 3+ LVLS: CPT

## 2023-10-04 PROCEDURE — 93925 LOWER EXTREMITY STUDY: CPT

## 2023-10-05 PROCEDURE — 93925 LOWER EXTREMITY STUDY: CPT | Performed by: SURGERY

## 2023-10-05 PROCEDURE — 93922 UPR/L XTREMITY ART 2 LEVELS: CPT | Performed by: SURGERY

## 2023-10-12 ENCOUNTER — APPOINTMENT (OUTPATIENT)
Dept: LAB | Facility: MEDICAL CENTER | Age: 73
End: 2023-10-12
Payer: MEDICARE

## 2023-10-16 DIAGNOSIS — I74.3 EMBOLISM AND THROMBOSIS OF ARTERIES OF THE LOWER EXTREMITIES (HCC): ICD-10-CM

## 2023-10-16 RX ORDER — WARFARIN SODIUM 4 MG/1
4 TABLET ORAL DAILY
Qty: 90 TABLET | Refills: 1 | Status: SHIPPED | OUTPATIENT
Start: 2023-10-16

## 2023-11-01 DIAGNOSIS — I73.9 PERIPHERAL ARTERY DISEASE (HCC): ICD-10-CM

## 2023-11-01 DIAGNOSIS — I73.9 INTERMITTENT CLAUDICATION (HCC): ICD-10-CM

## 2023-11-01 RX ORDER — ATORVASTATIN CALCIUM 80 MG/1
TABLET, FILM COATED ORAL
Qty: 30 TABLET | Refills: 3 | Status: SHIPPED | OUTPATIENT
Start: 2023-11-01

## 2023-11-13 ENCOUNTER — TELEPHONE (OUTPATIENT)
Dept: VASCULAR SURGERY | Facility: CLINIC | Age: 73
End: 2023-11-13

## 2023-11-13 NOTE — TELEPHONE ENCOUNTER
Attempted to contact patient to schedule appointment(s) listed below. Requested patient call [phone] option 3 to schedule appointment(s). Patient's appointment(s) are due now.     Dopplers  [] Abdominal Aorta Iliac (AOIL)  [] Carotid (CV)   [] Celiac and/or Mesenteric  [] Endovascular Aortic Repair (EVAR)   [] Hemodialysis Access (HD)   [] Lower Limb Arterial (LOYD)  [] Lower Limb Venous (LEV)  [] Lower Limb Venous Duplex with Reflux (LEVDR)  [] Renal Artery  [] Upper Limb Arterial (UEA)    [] Upper Limb Venous (UEV)              [] MARLENY and Waveform analysis     Advanced Imaging   [] CTA head/neck    [] CTA abdomen    [] CTA abdomen & pelvis    [] CT abdomen with/ without contrast  [] CT abdomen with contrast  [] CT abdomen without contrast    [] CT abdomen & pelvis with/ without contrast  [] CT abdomen & pelvis with contrast  [] CT abdomen & pelvis without contrast    Office Visit   [] New patient, patient last seen over 3 years ago  [x] Risk factor modification (RFM)   [] Follow up   [] Lost to follow up (LTFU)

## 2023-11-16 ENCOUNTER — APPOINTMENT (OUTPATIENT)
Dept: LAB | Facility: MEDICAL CENTER | Age: 73
End: 2023-11-16
Payer: MEDICARE

## 2024-01-02 DIAGNOSIS — I47.10 SVT (SUPRAVENTRICULAR TACHYCARDIA): ICD-10-CM

## 2024-01-02 RX ORDER — DILTIAZEM HYDROCHLORIDE 240 MG/1
240 CAPSULE, COATED, EXTENDED RELEASE ORAL DAILY
Qty: 30 CAPSULE | Refills: 11 | Status: SHIPPED | OUTPATIENT
Start: 2024-01-02

## 2024-01-03 ENCOUNTER — APPOINTMENT (OUTPATIENT)
Dept: LAB | Facility: MEDICAL CENTER | Age: 74
End: 2024-01-03
Payer: MEDICARE

## 2024-01-04 DIAGNOSIS — Z51.81 ANTICOAGULATION GOAL OF INR 2 TO 3: Primary | ICD-10-CM

## 2024-01-04 DIAGNOSIS — Z79.01 ANTICOAGULATION GOAL OF INR 2 TO 3: Primary | ICD-10-CM

## 2024-01-15 ENCOUNTER — APPOINTMENT (OUTPATIENT)
Dept: LAB | Facility: MEDICAL CENTER | Age: 74
End: 2024-01-15
Payer: MEDICARE

## 2024-01-25 ENCOUNTER — HOSPITAL ENCOUNTER (OUTPATIENT)
Dept: NON INVASIVE DIAGNOSTICS | Facility: MEDICAL CENTER | Age: 74
Discharge: HOME | End: 2024-01-25
Payer: MEDICARE

## 2024-01-25 VITALS
HEIGHT: 67 IN | SYSTOLIC BLOOD PRESSURE: 142 MMHG | BODY MASS INDEX: 33.59 KG/M2 | WEIGHT: 214 LBS | DIASTOLIC BLOOD PRESSURE: 78 MMHG | HEART RATE: 61 BPM

## 2024-01-25 DIAGNOSIS — Z95.2 S/P TAVR (TRANSCATHETER AORTIC VALVE REPLACEMENT): ICD-10-CM

## 2024-01-25 LAB
AORTIC ROOT: 3.5 CM
AORTIC VALVE MEAN VELOCITY: 14.2 M/S
APICAL FOUR CHAMBER EJECTION FRACTION: 59 %
AV AREA BY CONTINUOUS VTI: 1.8 CM2
AV AREA PEAK VELOCITY: 1.4 CM2
AV LVOT MEAN GRADIENT: 2 MMHG
AV LVOT PEAK GRADIENT: 3 MMHG
AV MEAN GRADIENT: 10 MMHG
AV PEAK GRADIENT: 23 MMHG
AV VALVE AREA: 1.82 CM2
AV VELOCITY RATIO: 0.39
BSA FOR ECHO PROCEDURE: 2.08 M2
DOP CALC AO PEAK VEL: 2.38 M/S
DOP CALC AO VTI: 43.2 CM
DOP CALC LVOT AREA: 3.46 CM2
DOP CALC LVOT CARDIAC INDEX: 2.12 L/MIN/M2
DOP CALC LVOT CARDIAC OUTPUT: 4.4 L/MIN
DOP CALC LVOT DIAMETER: 2.1 CM
DOP CALC LVOT PEAK VEL VTI: 22.65 CM
DOP CALC LVOT PEAK VEL: 0.94 M/S
DOP CALC LVOT STROKE INDEX: 37.5 ML/M2
DOP CALC LVOT STROKE VOLUME: 78.41
E WAVE DECELERATION TIME: 319 MS
E/A RATIO: 0.85
FRACTIONAL SHORTENING: 33 (ref 28–44)
INTERVENTRICULAR SEPTUM IN DIASTOLE (PARASTERNAL SHORT AXIS VIEW): 1.1 CM
INTERVENTRICULAR SEPTUM: 1.1 CM (ref 0.6–1.1)
LAAS-AP2: 24.6 CM2
LAAS-AP4: 20.4 CM2
LEFT ATRIUM AREA SYSTOLE SINGLE PLANE A4C: 18.2 CM2
LEFT ATRIUM SIZE: 4.7 CM
LEFT ATRIUM VOLUME (MOD BIPLANE): 70 ML
LEFT ATRIUM VOLUME INDEX (MOD BIPLANE): 33.7 ML/M2
LEFT INTERNAL DIMENSION IN SYSTOLE: 3.9 CM (ref 2.1–4)
LEFT VENTRICULAR INTERNAL DIMENSION IN DIASTOLE: 5.8 CM (ref 3.5–6)
LEFT VENTRICULAR POSTERIOR WALL IN END DIASTOLE: 1 CM
LEFT VENTRICULAR STROKE VOLUME: 100 ML
LVSV (TEICH): 100 ML
MV E'TISSUE VEL-SEP: 7 CM/S
MV PEAK A VEL: 1.32 M/S
MV PEAK E VEL: 112 CM/S
MV STENOSIS PRESSURE HALF TIME: 93 MS
MV VALVE AREA P 1/2 METHOD: 2.37
RIGHT ATRIAL 2D VOLUME: 34 ML
RIGHT ATRIUM AREA SYSTOLE A4C: 16.4 CM2
RIGHT VENTRICLE ID DIMENSION: 3 CM
SL CV LEFT ATRIUM LENGTH A2C: 5.8 CM
SL CV LV EF: 60
SL CV PED ECHO LEFT VENTRICLE DIASTOLIC VOLUME (MOD BIPLANE) 2D: 164 ML
SL CV PED ECHO LEFT VENTRICLE SYSTOLIC VOLUME (MOD BIPLANE) 2D: 64 ML
TRICUSPID ANNULAR PLANE SYSTOLIC EXCURSION: 3.1 CM
TRICUSPID VALVE PEAK REGURGITATION VELOCITY: 2.67 M/S

## 2024-01-25 PROCEDURE — 93306 TTE W/DOPPLER COMPLETE: CPT

## 2024-01-25 PROCEDURE — 93306 TTE W/DOPPLER COMPLETE: CPT | Performed by: INTERNAL MEDICINE

## 2024-01-29 DIAGNOSIS — I73.9 CLAUDICATION OF LEFT LOWER EXTREMITY (HCC): ICD-10-CM

## 2024-01-29 RX ORDER — CLOPIDOGREL BISULFATE 75 MG/1
TABLET ORAL
Qty: 30 TABLET | Refills: 4 | Status: SHIPPED | OUTPATIENT
Start: 2024-01-29

## 2024-02-23 ENCOUNTER — APPOINTMENT (OUTPATIENT)
Dept: LAB | Facility: MEDICAL CENTER | Age: 74
End: 2024-02-23
Payer: MEDICARE

## 2024-02-28 ENCOUNTER — TELEPHONE (OUTPATIENT)
Age: 74
End: 2024-02-28

## 2024-02-28 DIAGNOSIS — I73.9 PERIPHERAL ARTERY DISEASE (HCC): ICD-10-CM

## 2024-02-28 DIAGNOSIS — I73.9 INTERMITTENT CLAUDICATION (HCC): ICD-10-CM

## 2024-02-28 RX ORDER — ATORVASTATIN CALCIUM 80 MG/1
TABLET, FILM COATED ORAL
Qty: 30 TABLET | Refills: 0 | Status: SHIPPED | OUTPATIENT
Start: 2024-02-28

## 2024-02-28 NOTE — TELEPHONE ENCOUNTER
Pt's wife Marisela returned Dr Martinez's missed call.    She reports pt is taking 4 mg Warfarin Daily    Please advise

## 2024-03-04 DIAGNOSIS — Z51.81 ANTICOAGULATION GOAL OF INR 2 TO 3: ICD-10-CM

## 2024-03-04 DIAGNOSIS — Z79.01 ANTICOAGULATION GOAL OF INR 2 TO 3: ICD-10-CM

## 2024-03-04 NOTE — TELEPHONE ENCOUNTER
Reason for call:   [x] Refill   [] Prior Auth  [x] Other: pt is to decrease to 3mg as per doctor due to INR results.    Office:   [x] PCP/Provider - Juan   [] Specialty/Provider -     Medication: Warfarin     Dose/Frequency: 3mg - daily     Quantity: 90    Pharmacy: [id number]    Does the patient have enough for 3 days?   [] Yes   [x] No - Send as HP to POD

## 2024-03-05 RX ORDER — WARFARIN SODIUM 3 MG/1
3 TABLET ORAL DAILY
Qty: 30 TABLET | Refills: 0 | Status: SHIPPED | OUTPATIENT
Start: 2024-03-05

## 2024-03-18 ENCOUNTER — APPOINTMENT (OUTPATIENT)
Dept: LAB | Facility: MEDICAL CENTER | Age: 74
End: 2024-03-18
Payer: MEDICARE

## 2024-03-29 ENCOUNTER — TELEPHONE (OUTPATIENT)
Age: 74
End: 2024-03-29

## 2024-03-29 NOTE — TELEPHONE ENCOUNTER
Marisela called in to reschedule an appointment for Chapo. I explained to her the schedule is pushing out to June after checking to see any open availability on her upcoming appointment on 4/29. She was not happy and hung up.

## 2024-04-02 ENCOUNTER — TELEPHONE (OUTPATIENT)
Dept: VASCULAR SURGERY | Facility: CLINIC | Age: 74
End: 2024-04-02

## 2024-04-02 DIAGNOSIS — I73.9 PERIPHERAL ARTERY DISEASE (HCC): ICD-10-CM

## 2024-04-02 DIAGNOSIS — I73.9 INTERMITTENT CLAUDICATION (HCC): ICD-10-CM

## 2024-04-02 DIAGNOSIS — Z79.01 ANTICOAGULATION GOAL OF INR 2 TO 3: ICD-10-CM

## 2024-04-02 DIAGNOSIS — Z51.81 ANTICOAGULATION GOAL OF INR 2 TO 3: ICD-10-CM

## 2024-04-02 RX ORDER — ATORVASTATIN CALCIUM 80 MG/1
TABLET, FILM COATED ORAL
Qty: 30 TABLET | Refills: 5 | Status: SHIPPED | OUTPATIENT
Start: 2024-04-02

## 2024-04-02 RX ORDER — WARFARIN SODIUM 3 MG/1
3 TABLET ORAL DAILY
Qty: 30 TABLET | Refills: 5 | Status: SHIPPED | OUTPATIENT
Start: 2024-04-02

## 2024-04-11 ENCOUNTER — APPOINTMENT (OUTPATIENT)
Dept: LAB | Facility: MEDICAL CENTER | Age: 74
End: 2024-04-11
Payer: MEDICARE

## 2024-04-11 ENCOUNTER — OFFICE VISIT (OUTPATIENT)
Dept: FAMILY MEDICINE CLINIC | Facility: CLINIC | Age: 74
End: 2024-04-11

## 2024-04-11 VITALS
SYSTOLIC BLOOD PRESSURE: 142 MMHG | TEMPERATURE: 97.4 F | BODY MASS INDEX: 33.62 KG/M2 | HEIGHT: 67 IN | HEART RATE: 63 BPM | OXYGEN SATURATION: 97 % | WEIGHT: 214.2 LBS | DIASTOLIC BLOOD PRESSURE: 80 MMHG

## 2024-04-11 DIAGNOSIS — N18.31 STAGE 3A CHRONIC KIDNEY DISEASE (HCC): ICD-10-CM

## 2024-04-11 DIAGNOSIS — I47.10 SVT (SUPRAVENTRICULAR TACHYCARDIA): ICD-10-CM

## 2024-04-11 DIAGNOSIS — D68.59 PROTEIN C DEFICIENCY (HCC): ICD-10-CM

## 2024-04-11 DIAGNOSIS — F03.B3 MODERATE DEMENTIA WITH MOOD DISTURBANCE, UNSPECIFIED DEMENTIA TYPE (HCC): ICD-10-CM

## 2024-04-11 DIAGNOSIS — I73.9 PERIPHERAL ARTERY DISEASE (HCC): ICD-10-CM

## 2024-04-11 DIAGNOSIS — Z51.81 ANTICOAGULATION GOAL OF INR 2 TO 3: Primary | ICD-10-CM

## 2024-04-11 DIAGNOSIS — Z79.01 ANTICOAGULATION GOAL OF INR 2 TO 3: Primary | ICD-10-CM

## 2024-04-11 DIAGNOSIS — I74.3 EMBOLISM AND THROMBOSIS OF ARTERIES OF THE LOWER EXTREMITIES (HCC): ICD-10-CM

## 2024-04-11 DIAGNOSIS — Z79.01 ANTICOAGULATED ON COUMADIN: ICD-10-CM

## 2024-04-11 NOTE — PROGRESS NOTES
Assessment and Plan:     Problem List Items Addressed This Visit          Cardiovascular and Mediastinum    Peripheral artery disease (HCC)    Relevant Orders    Protime-INR    CBC and differential    Comprehensive metabolic panel    Lipid panel    Embolism and thrombosis of arteries of the lower extremities (HCC)    SVT (supraventricular tachycardia)    Relevant Orders    CBC and differential    Comprehensive metabolic panel    Lipid panel       Genitourinary    Stage 3 chronic kidney disease    Relevant Orders    CBC and differential    Comprehensive metabolic panel    Lipid panel       Hematopoietic and Hemostatic    Protein C deficiency (HCC)       Blood    Anticoagulation goal of INR 2 to 3 - Primary    Relevant Orders    Protime-INR    Anticoagulated on Coumadin    Relevant Orders    Protime-INR     Other Visit Diagnoses       Moderate dementia with mood disturbance, unspecified dementia type (HCC)        Relevant Orders    Ambulatory Referral to [facility]    CBC and differential    Comprehensive metabolic panel    Lipid panel            Depression Screening and Follow-up Plan: Patient was screened for depression during today's encounter. They screened negative with a PHQ-2 score of 0.      Preventive health issues were discussed with patient, and age appropriate screening tests were ordered as noted in patient's After Visit Summary.  Personalized health advice and appropriate referrals for health education or preventive services given if needed, as noted in patient's After Visit Summary.     History of Present Illness:     Patient presents for a Medicare Wellness Visit    Here for awv and f/u, needs new INR order. Has been going once a month, now on 3mg, down from 4mg. Has seen neuro, but did not have good rapport, did not complete studies, just the , asks about geriatric medicine. Follows with vascular and urology. Watching PSA. BP adequate. Follows with cardiology.        Patient Care Team:  Maryann  MD Hubert as PCP - General (Family Medicine)  Grazyna Celaya MD (Cardiology)  Deepak Amador DPM (Podiatry)  Ron Lehman MD (Urology)  Kevin Petersen MD (Vascular Surgery)     Review of Systems:     Review of Systems   Constitutional:  Negative for fatigue and fever.   HENT:  Negative for congestion and nosebleeds.    Eyes:  Negative for visual disturbance.   Respiratory:  Negative for chest tightness and shortness of breath.    Cardiovascular:  Negative for chest pain and palpitations.   Gastrointestinal:  Negative for abdominal pain, anal bleeding and blood in stool.   Genitourinary:  Negative for difficulty urinating and hematuria.   Musculoskeletal:  Negative for arthralgias.   Neurological:  Negative for headaches.   Hematological:  Does not bruise/bleed easily.        Problem List:     Patient Active Problem List   Diagnosis    Peripheral artery disease (HCC)    Claudication of left lower extremity (HCC)    S/P RIGHT Femoral- PT bypass 2011    Dyslipidemia    ASCVD (arteriosclerotic cardiovascular disease)    Stage 3 chronic kidney disease    Nonrheumatic aortic valve stenosis    Essential hypertension    Elevated PSA    Embolism and thrombosis of arteries of the lower extremities (HCC)    Protein C deficiency (HCC)    Former heavy tobacco smoker    AAA (abdominal aortic aneurysm) without rupture ([facility])    S/P TAVR (transcatheter aortic valve replacement)    Thrombocytopenia (HCC)    Anticoagulation goal of INR 2 to 3    Anticoagulated on Coumadin    LBBB (left bundle branch block)    Leukocytosis    Hypocalcemia    Hypokalemia    Urinary retention    SVT (supraventricular tachycardia)    FAMILIA (acute kidney injury) (HCC)    Elevated troponin    Other insomnia    Encounter for postoperative care    Irregular cardiac rhythm    Left thigh pain    Memory loss    Carotid stenosis, right      Past Medical and Surgical History:     Past Medical History:   Diagnosis Date    DVT (deep venous  thrombosis) (HCC)     Protein C deficiency (HCC)     Pulmonary embolus (HCC)      Past Surgical History:   Procedure Laterality Date    BACK SURGERY      BYPASS FEMORAL-TIBIAL Right 2011    DC ECHO TRANSESOPHAG R-T 2D W/PRB IMG ACQUISJ I&R N/A 2021    Procedure: TRANSESOPHAGEAL ECHOCARDIOGRAM (RADHA);  Surgeon: Vahid Benitez DO;  Location: [facility];  Service: Cardiac Surgery    DC REPLACE AORTIC VALVE OPENFEMORAL ARTERY APPROACH N/A 2021    Procedure: REPLACEMENT AORTIC VALVE TRANSCATHETER (TAVR) TRANSFEMORAL W/ 29 MM COLÓN BREEZY S3 ULTRA VALVE (ACCESS ON LEFT);  Surgeon: Vahid Benitez DO;  Location: [facility];  Service: Cardiac Surgery    VEIN LIGATION        Family History:     Family History   Problem Relation Age of Onset    Cancer Mother 48    Heart attack Father 51    Hypertension Father     Prostate cancer Brother     Arrhythmia Neg Hx     Clotting disorder Neg Hx     Fainting Neg Hx     Heart disease Neg Hx     Anuerysm Neg Hx     Stroke Neg Hx       Social History:     Social History     Socioeconomic History    Marital status: /Civil Union     Spouse name: None    Number of children: None    Years of education: None    Highest education level: None   Occupational History    None   Tobacco Use    Smoking status: Former     Current packs/day: 0.00     Types: Cigarettes     Quit date: 2018     Years since quittin.5    Smokeless tobacco: Never    Tobacco comments:     1 pk every 2days , currently on patches to quit.    Vaping Use    Vaping status: Never Used   Substance and Sexual Activity    Alcohol use: No    Drug use: No    Sexual activity: None   Other Topics Concern    None   Social History Narrative    None     Social Determinants of Health     Financial Resource Strain: Low Risk  (3/23/2023)    Overall Financial Resource Strain (CARDIA)     Difficulty of Paying Living Expenses: Not very hard   Food Insecurity: No Food Insecurity (2024)    Hunger Vital Sign      Worried About Running Out of Food in the Last Year: Never true     Ran Out of Food in the Last Year: Never true   Transportation Needs: No Transportation Needs (4/11/2024)    PRAPARE - Transportation     Lack of Transportation (Medical): No     Lack of Transportation (Non-Medical): No   Physical Activity: Not on file   Stress: Not on file   Social Connections: Not on file   Intimate Partner Violence: Not on file   Housing Stability: Low Risk  (4/11/2024)    Housing Stability Vital Sign     Unable to Pay for Housing in the Last Year: No     Number of Places Lived in the Last Year: 1     Unstable Housing in the Last Year: No      Medications and Allergies:     Current Outpatient Medications   Medication Sig Dispense Refill    acetaminophen (TYLENOL) 325 mg tablet Take 1-2 tablets Q4-6 hours PRN pain. Do not take more than 4 grams in 24 hours.  0    atorvastatin (LIPITOR) 80 mg tablet TAKE 1 TABLET BY MOUTH EVERY DAY 30 tablet 5    clopidogrel (PLAVIX) 75 mg tablet TAKE 1 TABLET BY MOUTH EVERY DAY 30 tablet 4    diltiazem (CARDIZEM CD) 240 mg 24 hr capsule TAKE 1 CAPSULE BY MOUTH EVERY DAY 30 capsule 11    tamsulosin (FLOMAX) 0.4 mg Take 1 capsule (0.4 mg total) by mouth daily with dinner 30 capsule 11    tiZANidine (ZANAFLEX) 2 mg tablet Take 1 tablet (2 mg total) by mouth every 8 (eight) hours as needed for muscle spasms 90 tablet 1    warfarin (COUMADIN) 3 mg tablet TAKE 1 TABLET BY MOUTH DAILY 30 tablet 5    carbamide peroxide (DEBROX) 6.5 % otic solution Administer 5 drops into the left ear 2 (two) times a day (Patient not taking: Reported on 4/11/2024) 15 mL 0    docusate sodium (COLACE) 100 mg capsule Take 1 capsule (100 mg total) by mouth 2 (two) times a day as needed for constipation Hold for soft stools. (Patient not taking: Reported on 1/12/2023) 60 capsule 0    polyethylene glycol (MIRALAX) 17 g packet Take 17 g by mouth daily as needed (constipation) Hold for soft stools. (Patient not taking: Reported on  1/12/2023)  0    warfarin (COUMADIN) 4 mg tablet TAKE 1 TABLET BY MOUTH EVERY DAY (Patient not taking: Reported on 4/11/2024) 90 tablet 1    warfarin (COUMADIN) 5 mg tablet TAKE 1 TABLET BY MOUTH EVERY DAY (Patient not taking: Reported on 4/11/2024) 30 tablet 3     No current facility-administered medications for this visit.     No Known Allergies   Immunizations:     Immunization History   Administered Date(s) Administered    COVID-19 MODERNA VACC 0.5 ML IM 03/17/2021, 04/14/2021, 12/01/2021    COVID-19 Moderna mRNA Vaccine 12 Yr+ 50 mcg/0.5 mL (Spikevax) 10/18/2023    Influenza, high dose seasonal 0.7 mL 09/17/2020, 10/07/2021, 11/28/2022    Pneumococcal Conjugate 13-Valent 10/07/2021    Pneumococcal Polysaccharide PPV23 09/17/2020      Health Maintenance:         Topic Date Due    Colorectal Cancer Screening  07/17/2024 (Originally [birth year]/7/1995)    Hepatitis C Screening  Completed         Topic Date Due    COVID-19 Vaccine (5 - 2023-24 season) 12/13/2023      Medicare Screening Tests and Risk Assessments:     Chapo is here for his Subsequent Wellness visit. Last Medicare Wellness visit information reviewed, patient interviewed and updates made to the record today.      Health Risk Assessment:   Patient rates overall health as good. Patient feels that their physical health rating is same. Patient is satisfied with their life. Eyesight was rated as same. Hearing was rated as same. Patient feels that their emotional and mental health rating is same. Patients states they are never, rarely angry. Patient states they are never, rarely unusually tired/fatigued. Pain experienced in the last 7 days has been none. Patient states that he has experienced no weight loss or gain in last 6 months.     Depression Screening:   PHQ-2 Score: 0      Fall Risk Screening:   In the past year, patient has experienced: no history of falling in past year      Home Safety:  Patient does not have trouble with stairs inside or outside of their  home. Patient has working smoke alarms and has working carbon monoxide detector. Home safety hazards include: none.     Nutrition:   Current diet is Regular.     Medications:   Patient is currently taking over-the-counter supplements. OTC medications include: see medication list. Patient is able to manage medications.     Activities of Daily Living (ADLs)/Instrumental Activities of Daily Living (IADLs):   Walk and transfer into and out of bed and chair?: Yes  Dress and groom yourself?: Yes    Bathe or shower yourself?: Yes    Feed yourself? Yes  Do your laundry/housekeeping?: No  Manage your money, pay your bills and track your expenses?: No  Make your own meals?: No    Do your own shopping?: No    Previous Hospitalizations:   Any hospitalizations or ED visits within the last 12 months?: No      Advance Care Planning:   Living will: No    Advanced directive counseling given: Yes    ACP document given: Yes      Cognitive Screening:   Provider or family/friend/caregiver concerned regarding cognition?: Yes  Mini-Mental Status Exam (MMSE) Score: 15  Interpretation: MMSE Score 10-19: Moderate dementia    Cognition Comments: Has had testing     PREVENTIVE SCREENINGS      Cardiovascular Screening:    General: Screening Current      Diabetes Screening:     General: Screening Current      Colorectal Cancer Screening:     General: Patient Declines      Prostate Cancer Screening:    General: Screening Current      Osteoporosis Screening:    General: Screening Not Indicated      Abdominal Aortic Aneurysm (AAA) Screening:    Risk factors include: age between 65-74 yo and tobacco use        General: Screening Not Indicated and History AAA      Lung Cancer Screening:     General: Screening Not Indicated      Hepatitis C Screening:    General: Screening Current    Screening, Brief Intervention, and Referral to Treatment (SBIRT)    Screening      Single Item Drug Screening:  How often have you used an illegal drug (including  "marijuana) or a prescription medication for non-medical reasons in the past year? never    Single Item Drug Screen Score: 0  Interpretation: Negative screen for possible drug use disorder    No results found.     Physical Exam:     /80 (BP Location: Left arm, Patient Position: Sitting, Cuff Size: Large)   Pulse 63   Temp (!) 97.4 °F (36.3 °C) (Temporal)   Ht 5' 7\" (1.702 m)   Wt 97.2 kg (214 lb 3.2 oz)   SpO2 97%   BMI 33.55 kg/m²     Physical Exam  Vitals reviewed.   Constitutional:       Appearance: Normal appearance.   Cardiovascular:      Rate and Rhythm: Normal rate and regular rhythm.      Heart sounds: Normal heart sounds.   Pulmonary:      Effort: Pulmonary effort is normal.      Breath sounds: Normal breath sounds.   Musculoskeletal:      Right lower leg: No edema.      Left lower leg: No edema.   Neurological:      General: No focal deficit present.      Mental Status: He is alert and oriented to person, place, and time.   Psychiatric:         Mood and Affect: Mood normal.         Behavior: Behavior normal.         Thought Content: Thought content normal.         Judgment: Judgment normal.          Maryann Gaspar MD  "

## 2024-04-11 NOTE — PATIENT INSTRUCTIONS
Medicare Preventive Visit Patient Instructions  Thank you for completing your Welcome to Medicare Visit or Medicare Annual Wellness Visit today. Your next wellness visit will be due in one year (4/12/2025).  The screening/preventive services that you may require over the next 5-10 years are detailed below. Some tests may not apply to you based off risk factors and/or age. Screening tests ordered at today's visit but not completed yet may show as past due. Also, please note that scanned in results may not display below.  Preventive Screenings:  Service Recommendations Previous Testing/Comments   Colorectal Cancer Screening  Colonoscopy    Fecal Occult Blood Test (FOBT)/Fecal Immunochemical Test (FIT)  Fecal DNA/Cologuard Test  Flexible Sigmoidoscopy Age: 45-75 years old   Colonoscopy: every 10 years (May be performed more frequently if at higher risk)  OR  FOBT/FIT: every 1 year  OR  Cologuard: every 3 years  OR  Sigmoidoscopy: every 5 years  Screening may be recommended earlier than age 45 if at higher risk for colorectal cancer. Also, an individualized decision between you and your healthcare provider will decide whether screening between the ages of 76-85 would be appropriate. Colonoscopy: Not on file  FOBT/FIT: Not on file  Cologuard: Not on file  Sigmoidoscopy: Not on file          Prostate Cancer Screening Individualized decision between patient and health care provider in men between ages of 55-69   Medicare will cover every 12 months beginning on the day after your 50th birthday PSA: 14.7 ng/mL           Hepatitis C Screening Once for adults born between 1945 and 1965  More frequently in patients at high risk for Hepatitis C Hep C Antibody: 12/01/2021    Screening Current   Diabetes Screening 1-2 times per year if you're at risk for diabetes or have pre-diabetes Fasting glucose: 106 mg/dL (3/24/2022)  A1C: No results in last 5 years (No results in last 5 years)  Screening Current   Cholesterol Screening Once  every 5 years if you don't have a lipid disorder. May order more often based on risk factors. Lipid panel: 02/01/2023  Screening Current      Other Preventive Screenings Covered by Medicare:  Abdominal Aortic Aneurysm (AAA) Screening: covered once if your at risk. You're considered to be at risk if you have a family history of AAA or a male between the age of 65-75 who smoking at least 100 cigarettes in your lifetime.  Lung Cancer Screening: covers low dose CT scan once per year if you meet all of the following conditions: (1) Age 55-77; (2) No signs or symptoms of lung cancer; (3) Current smoker or have quit smoking within the last 15 years; (4) You have a tobacco smoking history of at least 20 pack years (packs per day x number of years you smoked); (5) You get a written order from a healthcare provider.  Glaucoma Screening: covered annually if you're considered high risk: (1) You have diabetes OR (2) Family history of glaucoma OR (3)  aged 50 and older OR (4)  American aged 65 and older  Osteoporosis Screening: covered every 2 years if you meet one of the following conditions: (1) Have a vertebral abnormality; (2) On glucocorticoid therapy for more than 3 months; (3) Have primary hyperparathyroidism; (4) On osteoporosis medications and need to assess response to drug therapy.  HIV Screening: covered annually if you're between the age of 15-65. Also covered annually if you are younger than 15 and older than 65 with risk factors for HIV infection. For pregnant patients, it is covered up to 3 times per pregnancy.    Immunizations:  Immunization Recommendations   Influenza Vaccine Annual influenza vaccination during flu season is recommended for all persons aged >= 6 months who do not have contraindications   Pneumococcal Vaccine   * Pneumococcal conjugate vaccine = PCV13 (Prevnar 13), PCV15 (Vaxneuvance), PCV20 (Prevnar 20)  * Pneumococcal polysaccharide vaccine = PPSV23 (Pneumovax) Adults  19-65 yo with certain risk factors or if 65+ yo  If never received any pneumonia vaccine: recommend Prevnar 20 (PCV20)  Give PCV20 if previously received 1 dose of PCV13 or PPSV23   Hepatitis B Vaccine 3 dose series if at intermediate or high risk (ex: diabetes, end stage renal disease, liver disease)   Respiratory syncytial virus (RSV) Vaccine - COVERED BY MEDICARE PART D  * RSVPreF3 (Arexvy) CDC recommends that adults 60 years of age and older may receive a single dose of RSV vaccine using shared clinical decision-making (SCDM)   Tetanus (Td) Vaccine - COST NOT COVERED BY MEDICARE PART B Following completion of primary series, a booster dose should be given every 10 years to maintain immunity against tetanus. Td may also be given as tetanus wound prophylaxis.   Tdap Vaccine - COST NOT COVERED BY MEDICARE PART B Recommended at least once for all adults. For pregnant patients, recommended with each pregnancy.   Shingles Vaccine (Shingrix) - COST NOT COVERED BY MEDICARE PART B  2 shot series recommended in those 19 years and older who have or will have weakened immune systems or those 50 years and older     Health Maintenance Due:      Topic Date Due   • Colorectal Cancer Screening  07/17/2024 (Originally 6/7/1995)   • Hepatitis C Screening  Completed     Immunizations Due:      Topic Date Due   • COVID-19 Vaccine (5 - 2023-24 season) 12/13/2023     Advance Directives   What are advance directives?  Advance directives are legal documents that state your wishes and plans for medical care. These plans are made ahead of time in case you lose your ability to make decisions for yourself. Advance directives can apply to any medical decision, such as the treatments you want, and if you want to donate organs.   What are the types of advance directives?  There are many types of advance directives, and each state has rules about how to use them. You may choose a combination of any of the following:  Living will:  This is a  written record of the treatment you want. You can also choose which treatments you do not want, which to limit, and which to stop at a certain time. This includes surgery, medicine, IV fluid, and tube feedings.   Durable power of  for healthcare (DPAHC):  This is a written record that states who you want to make healthcare choices for you when you are unable to make them for yourself. This person, called a proxy, is usually a family member or a friend. You may choose more than 1 proxy.  Do not resuscitate (DNR) order:  A DNR order is used in case your heart stops beating or you stop breathing. It is a request not to have certain forms of treatment, such as CPR. A DNR order may be included in other types of advance directives.  Medical directive:  This covers the care that you want if you are in a coma, near death, or unable to make decisions for yourself. You can list the treatments you want for each condition. Treatment may include pain medicine, surgery, blood transfusions, dialysis, IV or tube feedings, and a ventilator (breathing machine).  Values history:  This document has questions about your views, beliefs, and how you feel and think about life. This information can help others choose the care that you would choose.  Why are advance directives important?  An advance directive helps you control your care. Although spoken wishes may be used, it is better to have your wishes written down. Spoken wishes can be misunderstood, or not followed. Treatments may be given even if you do not want them. An advance directive may make it easier for your family to make difficult choices about your care.   Weight Management   Why it is important to manage your weight:  Being overweight increases your risk of health conditions such as heart disease, high blood pressure, type 2 diabetes, and certain types of cancer. It can also increase your risk for osteoarthritis, sleep apnea, and other respiratory problems. Aim for  a slow, steady weight loss. Even a small amount of weight loss can lower your risk of health problems.  How to lose weight safely:  A safe and healthy way to lose weight is to eat fewer calories and get regular exercise. You can lose up about 1 pound a week by decreasing the number of calories you eat by 500 calories each day.   Healthy meal plan for weight management:  A healthy meal plan includes a variety of foods, contains fewer calories, and helps you stay healthy. A healthy meal plan includes the following:  Eat whole-grain foods more often.  A healthy meal plan should contain fiber. Fiber is the part of grains, fruits, and vegetables that is not broken down by your body. Whole-grain foods are healthy and provide extra fiber in your diet. Some examples of whole-grain foods are whole-wheat breads and pastas, oatmeal, brown rice, and bulgur.  Eat a variety of vegetables every day.  Include dark, leafy greens such as spinach, kale, aishwarya greens, and mustard greens. Eat yellow and orange vegetables such as carrots, sweet potatoes, and winter squash.   Eat a variety of fruits every day.  Choose fresh or canned fruit (canned in its own juice or light syrup) instead of juice. Fruit juice has very little or no fiber.  Eat low-fat dairy foods.  Drink fat-free (skim) milk or 1% milk. Eat fat-free yogurt and low-fat cottage cheese. Try low-fat cheeses such as mozzarella and other reduced-fat cheeses.  Choose meat and other protein foods that are low in fat.  Choose beans or other legumes such as split peas or lentils. Choose fish, skinless poultry (chicken or turkey), or lean cuts of red meat (beef or pork). Before you cook meat or poultry, cut off any visible fat.   Use less fat and oil.  Try baking foods instead of frying them. Add less fat, such as margarine, sour cream, regular salad dressing and mayonnaise to foods. Eat fewer high-fat foods. Some examples of high-fat foods include french fries, doughnuts, ice  cream, and cakes.  Eat fewer sweets.  Limit foods and drinks that are high in sugar. This includes candy, cookies, regular soda, and sweetened drinks.  Exercise:  Exercise at least 30 minutes per day on most days of the week. Some examples of exercise include walking, biking, dancing, and swimming. You can also fit in more physical activity by taking the stairs instead of the elevator or parking farther away from stores. Ask your healthcare provider about the best exercise plan for you.      © Copyright [employer] 2018 Information is for End User's use only and may not be sold, redistributed or otherwise used for commercial purposes. All illustrations and images included in CareNotes® are the copyrighted property of [employer] or [employer]

## 2024-04-12 ENCOUNTER — TELEPHONE (OUTPATIENT)
Age: 74
End: 2024-04-12

## 2024-04-12 NOTE — TELEPHONE ENCOUNTER
[facility]  [address] [place] [address], [address]  [place] [address]    [phone]    Telephone Intake: Geriatric Assessment     - Chart Review  Has this patient been seen by our department in the last 3 years? No   Please route to provider for chart review prior to scheduling and let the caller know that this phone intake will be reviewed IF -  Pt was recently hospitalized  Pt is prescribed medications for behavior management or has a history of psychiatric hospitalization  Pt plans to attend alone    Referral source: Provider Hubert Godfrey who is scheduling/relationship to pt: Marisela Bryan    Caller's phone number: [phone]    Reason for referral: Patient concerns , Family member concerns, and Provider concerns regarding memory concerns, behavior changes/concerns, and mood concerns.  If there are behavioral concerns, is the pt prescribed medications to manage these? No    If so, how many? No   Has the patient ever had an inpatient psychiatric hospitalization? No,   What is the goal of the visit? Assessment and Evaluation      Has the patient been seen by a Neurologist or Geriatrician? Yes    If yes, is this appointment for a second opinion? Yes   Has the patient ever been diagnosed with dementia? No  Has the patient had an MRI NeuroQuant within the last 1 year? No (If so, please route to provider to determine if assessment + conference are needed or if only assessment should be scheduled)      Preferred language? English   Highest education level? High School   Does the patient wear glasses? Yes   Does the patient use hearing aids? Yes      Is there a living will/healthcare POA in place/If so, who? Yes ,     Does the pt/caregiver have access for a virtual visit (computer/smart phone with audio/video)? No    Caller was informed:   Please make sure the pt is accompanied by someone who knows them well / caregiver / family member to participate in this appointment.    Who will accompany the pt (name and relationship)? Marisela Spouse    Phone number of person accompanying pt: [phone]  Please make sure the pt attends all appointments, including the assessment, care conference, follow-up, whether in-person or virtual.  For virtual visits, pt must be physically present in [facility].     Office packet mailed out to: [address] [place] [id number]  Added to wait list for sooner appointments/notified that calls can be short notice (same day/day prior)? Yes

## 2024-04-18 ENCOUNTER — APPOINTMENT (OUTPATIENT)
Dept: LAB | Facility: MEDICAL CENTER | Age: 74
End: 2024-04-18
Payer: MEDICARE

## 2024-04-18 DIAGNOSIS — I47.10 SVT (SUPRAVENTRICULAR TACHYCARDIA): ICD-10-CM

## 2024-04-18 DIAGNOSIS — N18.31 STAGE 3A CHRONIC KIDNEY DISEASE (HCC): ICD-10-CM

## 2024-04-18 DIAGNOSIS — I73.9 PERIPHERAL ARTERY DISEASE (HCC): ICD-10-CM

## 2024-04-18 DIAGNOSIS — F03.B3 MODERATE DEMENTIA WITH MOOD DISTURBANCE, UNSPECIFIED DEMENTIA TYPE (HCC): ICD-10-CM

## 2024-04-18 LAB
ALBUMIN SERPL BCP-MCNC: 3.9 G/DL (ref 3.5–5)
ALP SERPL-CCNC: 152 U/L (ref 34–104)
ALT SERPL W P-5'-P-CCNC: 29 U/L (ref 7–52)
ANION GAP SERPL CALCULATED.3IONS-SCNC: 11 MMOL/L (ref 4–13)
AST SERPL W P-5'-P-CCNC: 27 U/L (ref 13–39)
BASOPHILS # BLD AUTO: 0.06 THOUSANDS/ÂΜL (ref 0–0.1)
BASOPHILS NFR BLD AUTO: 1 % (ref 0–1)
BILIRUB SERPL-MCNC: 0.76 MG/DL (ref 0.2–1)
BUN SERPL-MCNC: 16 MG/DL (ref 5–25)
CALCIUM SERPL-MCNC: 9 MG/DL (ref 8.4–10.2)
CHLORIDE SERPL-SCNC: 104 MMOL/L (ref 96–108)
CHOLEST SERPL-MCNC: 126 MG/DL
CO2 SERPL-SCNC: 26 MMOL/L (ref 21–32)
CREAT SERPL-MCNC: 1.34 MG/DL (ref 0.6–1.3)
EOSINOPHIL # BLD AUTO: 0.13 THOUSAND/ÂΜL (ref 0–0.61)
EOSINOPHIL NFR BLD AUTO: 1 % (ref 0–6)
ERYTHROCYTE [DISTWIDTH] IN BLOOD BY AUTOMATED COUNT: 13.2 % (ref 11.6–15.1)
GFR SERPL CREATININE-BSD FRML MDRD: 52 ML/MIN/1.73SQ M
GLUCOSE P FAST SERPL-MCNC: 91 MG/DL (ref 65–99)
HCT VFR BLD AUTO: 50.5 % (ref 36.5–49.3)
HDLC SERPL-MCNC: 37 MG/DL
HGB BLD-MCNC: 16.3 G/DL (ref 12–17)
IMM GRANULOCYTES # BLD AUTO: 0.03 THOUSAND/UL (ref 0–0.2)
IMM GRANULOCYTES NFR BLD AUTO: 0 % (ref 0–2)
LDLC SERPL CALC-MCNC: 60 MG/DL (ref 0–100)
LYMPHOCYTES # BLD AUTO: 1.66 THOUSANDS/ÂΜL (ref 0.6–4.47)
LYMPHOCYTES NFR BLD AUTO: 16 % (ref 14–44)
MCH RBC QN AUTO: 30.8 PG (ref 26.8–34.3)
MCHC RBC AUTO-ENTMCNC: 32.3 G/DL (ref 31.4–37.4)
MCV RBC AUTO: 95 FL (ref 82–98)
MONOCYTES # BLD AUTO: 0.98 THOUSAND/ÂΜL (ref 0.17–1.22)
MONOCYTES NFR BLD AUTO: 10 % (ref 4–12)
NEUTROPHILS # BLD AUTO: 7.51 THOUSANDS/ÂΜL (ref 1.85–7.62)
NEUTS SEG NFR BLD AUTO: 72 % (ref 43–75)
NONHDLC SERPL-MCNC: 89 MG/DL
NRBC BLD AUTO-RTO: 0 /100 WBCS
PLATELET # BLD AUTO: 203 THOUSANDS/UL (ref 149–390)
PMV BLD AUTO: 9.2 FL (ref 8.9–12.7)
POTASSIUM SERPL-SCNC: 4.1 MMOL/L (ref 3.5–5.3)
PROT SERPL-MCNC: 6.8 G/DL (ref 6.4–8.4)
RBC # BLD AUTO: 5.3 MILLION/UL (ref 3.88–5.62)
SODIUM SERPL-SCNC: 141 MMOL/L (ref 135–147)
TRIGL SERPL-MCNC: 143 MG/DL
WBC # BLD AUTO: 10.37 THOUSAND/UL (ref 4.31–10.16)

## 2024-04-18 PROCEDURE — 80061 LIPID PANEL: CPT

## 2024-04-18 PROCEDURE — 85025 COMPLETE CBC W/AUTO DIFF WBC: CPT

## 2024-04-18 PROCEDURE — 80053 COMPREHEN METABOLIC PANEL: CPT

## 2024-04-18 PROCEDURE — 36415 COLL VENOUS BLD VENIPUNCTURE: CPT

## 2024-04-22 ENCOUNTER — OFFICE VISIT (OUTPATIENT)
Dept: CARDIOLOGY CLINIC | Facility: MEDICAL CENTER | Age: 74
End: 2024-04-22
Payer: MEDICARE

## 2024-04-22 VITALS
OXYGEN SATURATION: 95 % | HEART RATE: 78 BPM | SYSTOLIC BLOOD PRESSURE: 146 MMHG | WEIGHT: 213 LBS | DIASTOLIC BLOOD PRESSURE: 92 MMHG | HEIGHT: 67 IN | BODY MASS INDEX: 33.43 KG/M2

## 2024-04-22 DIAGNOSIS — I35.0 NONRHEUMATIC AORTIC VALVE STENOSIS: ICD-10-CM

## 2024-04-22 DIAGNOSIS — I10 ESSENTIAL HYPERTENSION: ICD-10-CM

## 2024-04-22 DIAGNOSIS — I25.10 ASCVD (ARTERIOSCLEROTIC CARDIOVASCULAR DISEASE): Primary | ICD-10-CM

## 2024-04-22 DIAGNOSIS — I73.9 PERIPHERAL ARTERY DISEASE (HCC): ICD-10-CM

## 2024-04-22 DIAGNOSIS — Z98.890 S/P FEMORAL-TIBIAL BYPASS: ICD-10-CM

## 2024-04-22 DIAGNOSIS — Z95.2 S/P TAVR (TRANSCATHETER AORTIC VALVE REPLACEMENT): ICD-10-CM

## 2024-04-22 DIAGNOSIS — E78.5 DYSLIPIDEMIA: ICD-10-CM

## 2024-04-22 DIAGNOSIS — I47.10 SVT (SUPRAVENTRICULAR TACHYCARDIA): ICD-10-CM

## 2024-04-22 PROCEDURE — 99214 OFFICE O/P EST MOD 30 MIN: CPT | Performed by: INTERNAL MEDICINE

## 2024-04-22 PROCEDURE — 93000 ELECTROCARDIOGRAM COMPLETE: CPT | Performed by: INTERNAL MEDICINE

## 2024-04-22 RX ORDER — DILTIAZEM HYDROCHLORIDE 240 MG/1
240 CAPSULE, COATED, EXTENDED RELEASE ORAL DAILY
Qty: 30 CAPSULE | Refills: 11 | Status: SHIPPED | OUTPATIENT
Start: 2024-04-22

## 2024-04-22 NOTE — PROGRESS NOTES
Cardiology Follow Up    Chapo Cerna  1950  [id number]  [facility] [place]  [address]  [address]  [facility] [address] [phone]    1. ASCVD (arteriosclerotic cardiovascular disease)  POCT ECG      2. Essential hypertension        3. Nonrheumatic aortic valve stenosis        4. SVT (supraventricular tachycardia)  diltiazem (CARDIZEM CD) 240 mg 24 hr capsule      5. Peripheral artery disease (HCC)        6. S/P TAVR (transcatheter aortic valve replacement)        7. S/P RIGHT Femoral- PT bypass 2011        8. Dyslipidemia          Chief Complaint   Patient presents with    Follow-up     No concerns       Interval History: Patient feels well, without complaints.  No reported chest pain, shortness of breath, palpitations, lightheadedness, syncope, LE edema, orthopnea, PND, or significant weight changes.  Patient remains active without any increased fatigue out of the ordinary.      Patient Active Problem List   Diagnosis    Peripheral artery disease (HCC)    Claudication of left lower extremity (HCC)    S/P RIGHT Femoral- PT bypass 2011    Dyslipidemia    ASCVD (arteriosclerotic cardiovascular disease)    Stage 3 chronic kidney disease    Nonrheumatic aortic valve stenosis    Essential hypertension    Elevated PSA    Embolism and thrombosis of arteries of the lower extremities (HCC)    Protein C deficiency (HCC)    Former heavy tobacco smoker    AAA (abdominal aortic aneurysm) without rupture (HCC)    S/P TAVR (transcatheter aortic valve replacement)    Thrombocytopenia (HCC)    Anticoagulation goal of INR 2 to 3    Anticoagulated on Coumadin    LBBB (left bundle branch block)    Leukocytosis    Hypocalcemia    Hypokalemia    Urinary retention    SVT (supraventricular tachycardia)    FAMILIA (acute kidney injury) (HCC)    Elevated troponin    Other insomnia    Encounter for postoperative care    Irregular cardiac rhythm    Left  thigh pain    Memory loss    Carotid stenosis, right     Past Medical History:   Diagnosis Date    DVT (deep venous thrombosis) (HCC)     Protein C deficiency (HCC)     Pulmonary embolus (HCC)      Social History     Socioeconomic History    Marital status: /Civil Union     Spouse name: Not on file    Number of children: Not on file    Years of education: Not on file    Highest education level: Not on file   Occupational History    Not on file   Tobacco Use    Smoking status: Former     Current packs/day: 0.00     Types: Cigarettes     Quit date: 2018     Years since quittin.5    Smokeless tobacco: Never    Tobacco comments:     1 pk every 2days , currently on patches to quit.    Vaping Use    Vaping status: Never Used   Substance and Sexual Activity    Alcohol use: No    Drug use: No    Sexual activity: Not on file   Other Topics Concern    Not on file   Social History Narrative    Not on file     Social Determinants of Health     Financial Resource Strain: Low Risk  (3/23/2023)    Overall Financial Resource Strain (CARDIA)     Difficulty of Paying Living Expenses: Not very hard   Food Insecurity: No Food Insecurity (2024)    Hunger Vital Sign     Worried About Running Out of Food in the Last Year: Never true     Ran Out of Food in the Last Year: Never true   Transportation Needs: No Transportation Needs (2024)    PRAPARE - Transportation     Lack of Transportation (Medical): No     Lack of Transportation (Non-Medical): No   Physical Activity: Not on file   Stress: Not on file   Social Connections: Not on file   Intimate Partner Violence: Not on file   Housing Stability: Low Risk  (2024)    Housing Stability Vital Sign     Unable to Pay for Housing in the Last Year: No     Number of Places Lived in the Last Year: 1     Unstable Housing in the Last Year: No      Family History   Problem Relation Age of Onset    Cancer Mother 48    Heart attack Father 51    Hypertension Father      Prostate cancer Brother     Arrhythmia Neg Hx     Clotting disorder Neg Hx     Fainting Neg Hx     Heart disease Neg Hx     Anuerysm Neg Hx     Stroke Neg Hx      Past Surgical History:   Procedure Laterality Date    BACK SURGERY      BYPASS FEMORAL-TIBIAL Right 2011    TX ECHO TRANSESOPHAG R-T 2D W/PRB IMG ACQUISJ I&R N/A 9/16/2021    Procedure: TRANSESOPHAGEAL ECHOCARDIOGRAM (RADHA);  Surgeon: Vahid Benitez DO;  Location: [facility];  Service: Cardiac Surgery    TX REPLACE AORTIC VALVE OPENFEMORAL ARTERY APPROACH N/A 9/16/2021    Procedure: REPLACEMENT AORTIC VALVE TRANSCATHETER (TAVR) TRANSFEMORAL W/ 29 MM COLÓN BREEZY S3 ULTRA VALVE (ACCESS ON LEFT);  Surgeon: Vahid Benitez DO;  Location: [facility];  Service: Cardiac Surgery    VEIN LIGATION         Current Outpatient Medications:     acetaminophen (TYLENOL) 325 mg tablet, Take 1-2 tablets Q4-6 hours PRN pain. Do not take more than 4 grams in 24 hours., Disp: , Rfl: 0    atorvastatin (LIPITOR) 80 mg tablet, TAKE 1 TABLET BY MOUTH EVERY DAY, Disp: 30 tablet, Rfl: 5    clopidogrel (PLAVIX) 75 mg tablet, TAKE 1 TABLET BY MOUTH EVERY DAY, Disp: 30 tablet, Rfl: 4    diltiazem (CARDIZEM CD) 240 mg 24 hr capsule, Take 1 capsule (240 mg total) by mouth daily, Disp: 30 capsule, Rfl: 11    tamsulosin (FLOMAX) 0.4 mg, Take 1 capsule (0.4 mg total) by mouth daily with dinner, Disp: 30 capsule, Rfl: 11    tiZANidine (ZANAFLEX) 2 mg tablet, Take 1 tablet (2 mg total) by mouth every 8 (eight) hours as needed for muscle spasms, Disp: 90 tablet, Rfl: 1    warfarin (COUMADIN) 3 mg tablet, TAKE 1 TABLET BY MOUTH DAILY, Disp: 30 tablet, Rfl: 5    carbamide peroxide (DEBROX) 6.5 % otic solution, Administer 5 drops into the left ear 2 (two) times a day (Patient not taking: Reported on 4/11/2024), Disp: 15 mL, Rfl: 0    docusate sodium (COLACE) 100 mg capsule, Take 1 capsule (100 mg total) by mouth 2 (two) times a day as needed for constipation Hold for soft stools.  (Patient not taking: Reported on 4/22/2024), Disp: 60 capsule, Rfl: 0    polyethylene glycol (MIRALAX) 17 g packet, Take 17 g by mouth daily as needed (constipation) Hold for soft stools. (Patient not taking: Reported on 1/12/2023), Disp: , Rfl: 0    warfarin (COUMADIN) 4 mg tablet, TAKE 1 TABLET BY MOUTH EVERY DAY (Patient not taking: Reported on 4/11/2024), Disp: 90 tablet, Rfl: 1    warfarin (COUMADIN) 5 mg tablet, TAKE 1 TABLET BY MOUTH EVERY DAY (Patient not taking: Reported on 4/11/2024), Disp: 30 tablet, Rfl: 3  No Known Allergies    Labs:  Appointment on 04/18/2024   Component Date Value    WBC 04/18/2024 10.37 (H)     RBC 04/18/2024 5.30     Hemoglobin 04/18/2024 16.3     Hematocrit 04/18/2024 50.5 (H)     MCV 04/18/2024 95     MCH 04/18/2024 30.8     MCHC 04/18/2024 32.3     RDW 04/18/2024 13.2     MPV 04/18/2024 9.2     Platelets 04/18/2024 203     nRBC 04/18/2024 0     Segmented % 04/18/2024 72     Immature Grans % 04/18/2024 0     Lymphocytes % 04/18/2024 16     Monocytes % 04/18/2024 10     Eosinophils Relative 04/18/2024 1     Basophils Relative 04/18/2024 1     Absolute Neutrophils 04/18/2024 7.51     Absolute Immature Grans 04/18/2024 0.03     Absolute Lymphocytes 04/18/2024 1.66     Absolute Monocytes 04/18/2024 0.98     Eosinophils Absolute 04/18/2024 0.13     Basophils Absolute 04/18/2024 0.06     Sodium 04/18/2024 141     Potassium 04/18/2024 4.1     Chloride 04/18/2024 104     CO2 04/18/2024 26     ANION GAP 04/18/2024 11     BUN 04/18/2024 16     Creatinine 04/18/2024 1.34 (H)     Glucose, Fasting 04/18/2024 91     Calcium 04/18/2024 9.0     AST 04/18/2024 27     ALT 04/18/2024 29     Alkaline Phosphatase 04/18/2024 152 (H)     Total Protein 04/18/2024 6.8     Albumin 04/18/2024 3.9     Total Bilirubin 04/18/2024 0.76     eGFR 04/18/2024 52     Cholesterol 04/18/2024 126     Triglycerides 04/18/2024 143     HDL, Direct 04/18/2024 37 (L)     LDL Calculated 04/18/2024 60     Non-HDL-Chol  (CHOL-HDL) 04/18/2024 89    Ancillary Orders on 03/29/2024   Component Date Value    Protime 04/11/2024 25.2 (H)     INR 04/11/2024 2.35 (H)    Ancillary Orders on 03/01/2024   Component Date Value    Protime 03/18/2024 22.7 (H)     INR 03/18/2024 2.04 (H)    Ancillary Orders on 02/02/2024   Component Date Value    Protime 02/23/2024 31.8 (H)     INR 02/23/2024 3.17 (H)    Hospital Outpatient Visit on 01/25/2024   Component Date Value    RAA A4C 01/25/2024 16.4     STU A4C 01/25/2024 18.2     LA Volume Index (BP) 01/25/2024 33.7     MV Peak A Nicolas 01/25/2024 1.32     MV stenosis pressure 1/2* 01/25/2024 93     MV Peak E [place]ols[place] 01/25/2024 112     AV peak gradient 01/25/2024 23     LVOT stroke volume 01/25/2024 78.41     Ao VTI 01/25/2024 43.2     Aortic valve peak veloci* 01/25/2024 2.38     LVOT peak VTI 01/25/2024 22.65     LVOT peak nicolas 01/25/2024 0.94     LVOT diameter 01/25/2024 2.1     E wave deceleration time 01/25/2024 319     E/A ratio 01/25/2024 0.85     MV valve area p 1/2 meth* 01/25/2024 2.37     AV LVOT peak gradient 01/25/2024 3     AV mean gradient 01/25/2024 10     RA 2D Volume 01/25/2024 34.0     AV area peak nicolas 01/25/2024 1.4     AV area by cont VTI 01/25/2024 1.8     LVOT mn grad 01/25/2024 2.0     RVID d 01/25/2024 3.0     A4C EF 01/25/2024 59     Aortic valve mean veloci* 01/25/2024 14.20     Tricuspid valve peak reg* 01/25/2024 2.67     Left ventricular stroke * 01/25/2024 100.00     IVSd 01/25/2024 1.10     Tricuspid annular plane * 01/25/2024 3.10     Ao root 01/25/2024 3.50     LVPWd 01/25/2024 1.00     LA size 01/25/2024 4.7     LA volume (BP) 01/25/2024 70     FS 01/25/2024 33     LVIDS 01/25/2024 3.90     IVS 01/25/2024 1.1     LVIDd 01/25/2024 5.80     LA length (A2C) 01/25/2024 5.80     LEFT VENTRICLE SYSTOLIC * 01/25/2024 64     LV DIASTOLIC VOLUME (MOD* 01/25/2024 164     LVOT Cardiac Index 01/25/2024 2.12     LVOT stroke volume index 01/25/2024 37.50     LVOT Cardiac Output 01/25/2024  4.40     Left Atrium Area-systoli* 01/25/2024 20.4     Left Atrium Area-systoli* 01/25/2024 24.6     MV E' Tissue Velocity Se* 01/25/2024 7     LVSV, 2D 01/25/2024 100     BSA 01/25/2024 2.08     LVOT area 01/25/2024 3.46     DVI 01/25/2024 0.39     AV valve area 01/25/2024 1.82     LV EF 01/25/2024 60    Ancillary Orders on 01/05/2024   Component Date Value    Protime 01/15/2024 31.0 (H)     INR 01/15/2024 3.07 (H)    Ancillary Orders on 12/08/2023   Component Date Value    Protime 01/03/2024 34.8 (H)     INR 01/03/2024 3.57 (H)      Lab Results   Component Value Date    TRIG 143 04/18/2024    HDL 37 (L) 04/18/2024     Imaging: No results found.    Review of Systems:  Review of Systems   Constitutional:  Negative for activity change, appetite change, fatigue and fever.   HENT:  Negative for nosebleeds and sore throat.    Eyes:  Negative for photophobia and visual disturbance.   Respiratory:  Negative for cough, chest tightness, shortness of breath and wheezing.    Cardiovascular:  Negative for chest pain, palpitations and leg swelling.   Gastrointestinal:  Negative for abdominal pain, diarrhea, nausea and vomiting.   Endocrine: Negative for polyuria.   Genitourinary:  Negative for dysuria, frequency and hematuria.   Musculoskeletal:  Negative for arthralgias, back pain and gait problem.   Skin:  Negative for pallor and rash.   Neurological:  Negative for dizziness, syncope, speech difficulty and light-headedness.   Hematological:  Does not bruise/bleed easily.   Psychiatric/Behavioral:  Negative for agitation, behavioral problems and confusion.        Physical Exam:  Physical Exam  Vitals reviewed.   Constitutional:       General: He is not in acute distress.     Appearance: Normal appearance. He is well-developed. He is not diaphoretic.   HENT:      Head: Normocephalic and atraumatic.      Nose: Nose normal.   Eyes:      General: No scleral icterus.     Extraocular Movements: Extraocular movements intact.       "Pupils: Pupils are equal, round, and reactive to light.   Neck:      Vascular: No JVD.   Cardiovascular:      Rate and Rhythm: Normal rate and regular rhythm.      Heart sounds: S1 normal and S2 normal. Heart sounds not distant. Murmur heard.      Systolic murmur is present with a grade of 2/6.      No friction rub. No gallop. No S3 sounds.   Pulmonary:      Effort: Pulmonary effort is normal. No respiratory distress.      Breath sounds: Normal breath sounds. No wheezing or rales.   Abdominal:      General: Bowel sounds are normal. There is no distension.      Palpations: Abdomen is soft.   Musculoskeletal:         General: No deformity.      Cervical back: Normal range of motion and neck supple.      Right lower leg: No edema.      Left lower leg: No edema.   Skin:     General: Skin is warm and dry.      Findings: No erythema.   Neurological:      Mental Status: He is alert and oriented to person, place, and time.      Cranial Nerves: No cranial nerve deficit.   Psychiatric:         Mood and Affect: Mood normal.         Behavior: Behavior normal.       Blood pressure 146/92, pulse 78, height 5' 7\" (1.702 m), weight 96.6 kg (213 lb), SpO2 95%.    EKG:  Sinus rhythm with occasional premature ventricular contractions  Minimal voltage criteria for LVH, may be normal variant  Otherwise normal ECG    Discussion/Summary:  1. ASCVD. Nonobstructive CAD by cath 5/11 at [facility]. On aspirin, statin.  Remains asymptomatic on current medical regiment.    2. HL. Lipid panel 9/18 showed total cholesterol 121, , HDL 35, LDL 54. On Atorvastatin 40. Lipid panel 9/19 showed total cholesterol 131, , HDL 38, LDL 55.  Advised him to try taking some fish oil to help lower TG.  Repeat levels in April 2021 revealed LDL of 143 - which is very high - advised to increase atorvastatin to  80mg daily and repeat levels in Nov 2021 LDL reduced down to 53.  Repeat levels in February 2023 revealed an LDL of 58.  LDL remains " well-controlled at 60 in April 2024.     3. PAD. On Plavix, statin.  s/p RLE fem-PT bypass.  Following with Regi Roberto/Olga Lay/Dr. Petersen. No further claudication symptoms with walking,  stable and improved. Continue vascular follow up and maintenance medication with ASA and statin.  Remains asymptomatic and doing well.     4. Moderate AS. Echo 8/2018 showed moderate AS. Not having any current symptoms with exertion. Explained need to monitor for exertional symptoms.  Repeat echo to assess for progression in June 2021 revealed normal LV function, G1DD, moderate AR, severe AS with mean gradient of 39 mmHg and peak velocity of 4 m/s - now s/p TAVR and recovering well, 30 day echo stable.  He had post-op LBBB on EKG along with asymptomatic bradycardia and AIVR.  Now doing well and remains asymptomatic.  Advised to watch for syncope - no need for PPM implant after Zio patch in Oct 2021 revealed normal heart rate variation.  Repeat echo in Oct 2022 revealed stable valve with mean gradient of 8 mmHg and normal LV function.  Repeat echocardiogram in January 2024 revealed a normal LV size and function with grade 1 diastolic dysfunction, mild LVH, left atrial enlargement, stable TAVR bioprosthesis with a mean gradient of 10 mmHg, stable from prior values.     5. HTN. In the past was taking Metoprolol, but stopped taking it after he stopped following with Dr. Pollard around 2012. Started Amlodipine 5 mg daily 10/18 due to elevated BP, as well as HCTZ 25 mg daily. PCP has changed diltiazem to lisinopril with elevated BP and seen to have increased PVCs on EKG.  He was on the lowest dose of diltiazem, so I would prefer for him to be on an AV obie blocking agent with a higher dose to help suppress ectopy and improve BP control rather than change to a completely different agent altogether.  Blood pressure relatively well-controlled today, quired today.      6. Prior DVT. On Coumadin, monitored by PCP.     7.  SVT:  episode seen after TAVR, which broke spontaneously, continued on cardizem.  No further episodes noted by symptoms.  Continued on Cardizem.  No changes made today.

## 2024-05-08 ENCOUNTER — APPOINTMENT (OUTPATIENT)
Dept: LAB | Facility: MEDICAL CENTER | Age: 74
End: 2024-05-08
Payer: MEDICARE

## 2024-05-08 DIAGNOSIS — Z79.01 ANTICOAGULATED ON COUMADIN: ICD-10-CM

## 2024-05-08 DIAGNOSIS — Z79.01 ANTICOAGULATION GOAL OF INR 2 TO 3: ICD-10-CM

## 2024-05-08 DIAGNOSIS — I73.9 PERIPHERAL ARTERY DISEASE (HCC): ICD-10-CM

## 2024-05-08 DIAGNOSIS — Z51.81 ANTICOAGULATION GOAL OF INR 2 TO 3: ICD-10-CM

## 2024-05-08 DIAGNOSIS — I73.9 CLAUDICATION OF LEFT LOWER EXTREMITY (HCC): ICD-10-CM

## 2024-05-08 LAB
INR PPP: 2.6 (ref 0.84–1.19)
PROTHROMBIN TIME: 27.3 SECONDS (ref 11.6–14.5)

## 2024-05-08 PROCEDURE — 85610 PROTHROMBIN TIME: CPT

## 2024-05-08 PROCEDURE — 36415 COLL VENOUS BLD VENIPUNCTURE: CPT

## 2024-05-08 RX ORDER — CLOPIDOGREL BISULFATE 75 MG/1
75 TABLET ORAL DAILY
Qty: 30 TABLET | Refills: 5 | Status: SHIPPED | OUTPATIENT
Start: 2024-05-08

## 2024-05-09 ENCOUNTER — OFFICE VISIT (OUTPATIENT)
Dept: VASCULAR SURGERY | Facility: CLINIC | Age: 74
End: 2024-05-09
Payer: MEDICARE

## 2024-05-09 VITALS
SYSTOLIC BLOOD PRESSURE: 160 MMHG | DIASTOLIC BLOOD PRESSURE: 92 MMHG | BODY MASS INDEX: 34.12 KG/M2 | HEIGHT: 67 IN | HEART RATE: 73 BPM | WEIGHT: 217.4 LBS | OXYGEN SATURATION: 96 %

## 2024-05-09 DIAGNOSIS — I65.21 CAROTID STENOSIS, RIGHT: ICD-10-CM

## 2024-05-09 DIAGNOSIS — I73.9 PERIPHERAL ARTERY DISEASE (HCC): ICD-10-CM

## 2024-05-09 DIAGNOSIS — I71.43 INFRARENAL ABDOMINAL AORTIC ANEURYSM (AAA) WITHOUT RUPTURE (HCC): Primary | ICD-10-CM

## 2024-05-09 PROCEDURE — 99214 OFFICE O/P EST MOD 30 MIN: CPT | Performed by: NURSE PRACTITIONER

## 2024-05-09 NOTE — ASSESSMENT & PLAN NOTE
73-year-old male with HTN, CAD, AS s/p TAVR '21, protein C deficiency, DVT/PE '99 on long-term warfarin, AIOD/PAD s/p R femoral to distal PT bypass with L GSV '11, mesenteric stenosis, right carotid occlusion and mild left ICA stenosis, 4.5 cm AAA    Patient returns to the office for yearly visit to review carotid, lower extremity and aortoiliac duplex 10/4/2023      -AOIL showed infrarenal fusiform AAA 4.5 cm, diffuse iliac disease without focal stenosis, greater than 70% celiac and SMA stenosis, left renal artery greater than 60% stenosis  -Asymptomatic from aneurysm and mesenteric standpoint  -Recommended for recommended for surveillance duplex every 6 months  -Will repeat aortoiliac duplex now, I will call with results of the study  -Blood pressure control  -Follow-up in the office yearly

## 2024-05-09 NOTE — PROGRESS NOTES
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, AIOD/PAD s/p R femoral to distal PT bypass with L GSV '11, mesenteric stenosis, right carotid occlusion and mild left ICA stenosis, 4.5 cm AAA    Patient returns to the office for yearly visit to review carotid, lower extremity and aortoiliac duplex 10/4/2023      -AOIL showed infrarenal fusiform AAA 4.5 cm, diffuse iliac disease without focal stenosis, greater than 70% celiac and SMA stenosis, left renal artery greater than 60% stenosis  -Asymptomatic from aneurysm and mesenteric standpoint  -Recommended for recommended for surveillance duplex every 6 months  -Will repeat aortoiliac duplex now, I will call with results of the study  -Blood pressure control  -Follow-up in the office yearly    Peripheral artery disease (HCC)  -LEAD showed widely patent right femoral to PT bypass, right MARLENY 0.9 6/201/136 and left PFA and distal SFA 50 to 75% stenosis, mid to distal popliteal occlusion, left MARLENY 0 point 6/95/55  -Stable in comparison to prior duplex  -Palpable bypass pulse and distal pulse on the right, nonpalpable on the left  -No complaints of claudication symptoms  -Encouraged daily walking routine, increased exercise.  He is sedentary  -Continue antiplatelet with Plavix and continue atorvastatin  -He is due now for lower extremity arterial duplex, I will call with the results.  If stable may go to yearly. I  will call with results  -Follow-up in the office in 1 year    Carotid stenosis, right  -CV duplex showed right ICA occluded and left ICA less than 50% stenosis  -Stable in comparison to prior duplex  -Continue antiplatelet and statin therapy  -Repeat carotid duplex now  -If stable will move to yearly duplex  -I will call with results of duplex  -Follow-up the office in 1 year       Diagnoses and all orders for this  visit:    Infrarenal abdominal aortic aneurysm (AAA) without rupture (HCC)  -     VAS ARTERIAL DUPLEX- LOWER LIMB BILATERAL; Future  -     VAS abdominal aorta/iliac duplex; Future    Peripheral artery disease (HCC)  -     VAS ARTERIAL DUPLEX- LOWER LIMB BILATERAL; Future  -     VAS abdominal aorta/iliac duplex; Future    Carotid stenosis, right  -     VAS carotid complete study; Future      Subjective:      Patient ID: Chapo Cerna is a 73 y.o. male.    Pt presents to re view LOYD done 10/04/2023 and CV/AOIL done 07/12/2023. Pt denies claudicatio, rest pain and tissue loss. Pt denies all CVA/TIA symptoms including dizziness, headaches and visual disturbances. He is taking Atorvastatin, Plavix and Warfarin. He is a former smoker.     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 Patient returns to the office for yearly visit to review carotid, lower extremity and aortoiliac duplex 10/4/2023.  Last seen by Dr. Petersen last January.  He denies any lower back pain.  Blood pressure slightly elevated in office today.  He denies any lower extremity claudication symptoms.  He is sedentary.  His dog passed away last year and is walking less than previously  The following portions of the patient's history were reviewed and updated as appropriate: allergies, current medications, past family history, past medical history, past social history, past surgical history, and problem list.  ROS reviewed     Review of Systems   Constitutional: Negative.    HENT: Negative.     Eyes: Negative.    Respiratory: Negative.     Cardiovascular: Negative.    Gastrointestinal: Negative.    Endocrine: Negative.    Genitourinary: Negative.    Musculoskeletal: Negative.    Skin: Negative.    Allergic/Immunologic: Negative.    Neurological:  "Negative.    Hematological: Negative.    Psychiatric/Behavioral: Negative.           Objective:  I have reviewed and made appropriate changes to the review of systems input by the medical assistant.    Vitals:    05/09/24 1426   BP: 160/92   BP Location: Left arm   Patient Position: Sitting   Cuff Size: Large   Pulse: 73   SpO2: 96%   Weight: 98.6 kg (217 lb 6.4 oz)   Height: 5' 7\" (1.702 m)       Patient Active Problem List   Diagnosis    Peripheral artery disease (HCC)    Claudication of left lower extremity (HCC)    S/P RIGHT Femoral- PT bypass 2011    Dyslipidemia    ASCVD (arteriosclerotic cardiovascular disease)    Stage 3 chronic kidney disease    Nonrheumatic aortic valve stenosis    Essential hypertension    Elevated PSA    Embolism and thrombosis of arteries of the lower extremities (HCC)    Protein C deficiency (HCC)    Former heavy tobacco smoker    AAA (abdominal aortic aneurysm) without rupture ([facility])    S/P TAVR (transcatheter aortic valve replacement)    Thrombocytopenia (HCC)    Anticoagulation goal of INR 2 to 3    Anticoagulated on Coumadin    LBBB (left bundle branch block)    Leukocytosis    Hypocalcemia    Hypokalemia    Urinary retention    SVT (supraventricular tachycardia)    FAMILIA (acute kidney injury) ([facility])    Elevated troponin    Other insomnia    Encounter for postoperative care    Irregular cardiac rhythm    Left thigh pain    Memory loss    Carotid stenosis, right       Past Surgical History:   Procedure Laterality Date    BACK SURGERY      BYPASS FEMORAL-TIBIAL Right 2011    AL ECHO TRANSESOPHAG R-T 2D W/PRB IMG ACQUISJ I&R N/A 9/16/2021    Procedure: TRANSESOPHAGEAL ECHOCARDIOGRAM (RADHA);  Surgeon: Vahid Benitez DO;  Location: [facility];  Service: Cardiac Surgery    AL REPLACE AORTIC VALVE OPENFEMORAL ARTERY APPROACH N/A 9/16/2021    Procedure: REPLACEMENT AORTIC VALVE TRANSCATHETER (TAVR) TRANSFEMORAL W/ 29 MM COLÓN BREEZY S3 ULTRA VALVE (ACCESS ON LEFT);  Surgeon: Vahid" DO Emmanuel;  Location: [facility];  Service: Cardiac Surgery    VEIN LIGATION         Family History   Problem Relation Age of Onset    Cancer Mother 48    Heart attack Father 51    Hypertension Father     Prostate cancer Brother     Arrhythmia Neg Hx     Clotting disorder Neg Hx     Fainting Neg Hx     Heart disease Neg Hx     Anuerysm Neg Hx     Stroke Neg Hx        Social History     Socioeconomic History    Marital status: /Civil Union     Spouse name: Not on file    Number of children: Not on file    Years of education: Not on file    Highest education level: Not on file   Occupational History    Not on file   Tobacco Use    Smoking status: Former     Current packs/day: 0.00     Types: Cigarettes     Quit date: 2018     Years since quittin.6    Smokeless tobacco: Never    Tobacco comments:     1 pk every 2days , currently on patches to quit.    Vaping Use    Vaping status: Never Used   Substance and Sexual Activity    Alcohol use: No    Drug use: No    Sexual activity: Not on file   Other Topics Concern    Not on file   Social History Narrative    Not on file     Social Determinants of Health     Financial Resource Strain: Low Risk  (3/23/2023)    Overall Financial Resource Strain (CARDIA)     Difficulty of Paying Living Expenses: Not very hard   Food Insecurity: No Food Insecurity (2024)    Hunger Vital Sign     Worried About Running Out of Food in the Last Year: Never true     Ran Out of Food in the Last Year: Never true   Transportation Needs: No Transportation Needs (2024)    PRAPARE - Transportation     Lack of Transportation (Medical): No     Lack of Transportation (Non-Medical): No   Physical Activity: Not on file   Stress: Not on file   Social Connections: Not on file   Intimate Partner Violence: Not on file   Housing Stability: Low Risk  (2024)    Housing Stability Vital Sign     Unable to Pay for Housing in the Last Year: No     Number of Places Lived in the Last  "Year: 1     Unstable Housing in the Last Year: No       No Known Allergies      Current Outpatient Medications:     acetaminophen (TYLENOL) 325 mg tablet, Take 1-2 tablets Q4-6 hours PRN pain. Do not take more than 4 grams in 24 hours., Disp: , Rfl: 0    atorvastatin (LIPITOR) 80 mg tablet, TAKE 1 TABLET BY MOUTH EVERY DAY, Disp: 30 tablet, Rfl: 5    clopidogrel (PLAVIX) 75 mg tablet, TAKE ONE TABLET BY MOUTH DAILY, Disp: 30 tablet, Rfl: 5    diltiazem (CARDIZEM CD) 240 mg 24 hr capsule, Take 1 capsule (240 mg total) by mouth daily, Disp: 30 capsule, Rfl: 11    tamsulosin (FLOMAX) 0.4 mg, Take 1 capsule (0.4 mg total) by mouth daily with dinner, Disp: 30 capsule, Rfl: 11    tiZANidine (ZANAFLEX) 2 mg tablet, Take 1 tablet (2 mg total) by mouth every 8 (eight) hours as needed for muscle spasms, Disp: 90 tablet, Rfl: 1    warfarin (COUMADIN) 3 mg tablet, TAKE 1 TABLET BY MOUTH DAILY, Disp: 30 tablet, Rfl: 5    carbamide peroxide (DEBROX) 6.5 % otic solution, Administer 5 drops into the left ear 2 (two) times a day (Patient not taking: Reported on 4/11/2024), Disp: 15 mL, Rfl: 0    docusate sodium (COLACE) 100 mg capsule, Take 1 capsule (100 mg total) by mouth 2 (two) times a day as needed for constipation Hold for soft stools. (Patient not taking: Reported on 4/22/2024), Disp: 60 capsule, Rfl: 0    polyethylene glycol (MIRALAX) 17 g packet, Take 17 g by mouth daily as needed (constipation) Hold for soft stools. (Patient not taking: Reported on 1/12/2023), Disp: , Rfl: 0    warfarin (COUMADIN) 4 mg tablet, TAKE 1 TABLET BY MOUTH EVERY DAY (Patient not taking: Reported on 4/11/2024), Disp: 90 tablet, Rfl: 1    warfarin (COUMADIN) 5 mg tablet, TAKE 1 TABLET BY MOUTH EVERY DAY (Patient not taking: Reported on 4/11/2024), Disp: 30 tablet, Rfl: 3      /92 (BP Location: Left arm, Patient Position: Sitting, Cuff Size: Large)   Pulse 73   Ht 5' 7\" (1.702 m)   Wt 98.6 kg (217 lb 6.4 oz)   SpO2 96%   BMI 34.05 kg/m² "          Physical Exam  Vitals and nursing note reviewed.   Constitutional:       Appearance: He is well-developed.   HENT:      Head: Normocephalic and atraumatic.      Comments: Hearing aid  Eyes:      Extraocular Movements: Extraocular movements intact.   Neck:      Vascular: No carotid bruit.   Cardiovascular:      Pulses:           Femoral pulses are 2+ on the right side and 2+ on the left side.       Dorsalis pedis pulses are 2+ on the right side and 0 on the left side.        Posterior tibial pulses are 2+ on the right side and 0 on the left side.      Heart sounds: Normal heart sounds.      Comments: Palpable bypass graft right medial proximal calf   Pulmonary:      Effort: Pulmonary effort is normal.   Musculoskeletal:         General: Normal range of motion.      Cervical back: Neck supple.      Comments: Trace non pitting ankle swelling, chronic stasis changes    Skin:     General: Skin is warm.   Neurological:      Mental Status: He is alert and oriented to person, place, and time.   Psychiatric:         Mood and Affect: Mood normal.         Behavior: Behavior normal.

## 2024-05-09 NOTE — ASSESSMENT & PLAN NOTE
-CV duplex showed right ICA occluded and left ICA less than 50% stenosis  -Stable in comparison to prior duplex  -Continue antiplatelet and statin therapy  -Repeat carotid duplex now  -If stable will move to yearly duplex  -I will call with results of duplex  -Follow-up the office in 1 year

## 2024-05-09 NOTE — ASSESSMENT & PLAN NOTE
-LEAD showed widely patent right femoral to PT bypass, right MARLENY 0.9 6/201/136 and left PFA and distal SFA 50 to 75% stenosis, mid to distal popliteal occlusion, left MARLENY 0 point 6/95/55  -Stable in comparison to prior duplex  -Palpable bypass pulse and distal pulse on the right, nonpalpable on the left  -No complaints of claudication symptoms  -Encouraged daily walking routine, increased exercise.  He is sedentary  -Continue antiplatelet with Plavix and continue atorvastatin  -He is due now for lower extremity arterial duplex, I will call with the results.  If stable may go to yearly. I  will call with results  -Follow-up in the office in 1 year

## 2024-05-22 RX ORDER — LOSARTAN POTASSIUM 25 MG/1
25 TABLET ORAL DAILY
Qty: 30 TABLET | OUTPATIENT
Start: 2024-05-22

## 2024-05-24 ENCOUNTER — HOSPITAL ENCOUNTER (OUTPATIENT)
Dept: NON INVASIVE DIAGNOSTICS | Facility: CLINIC | Age: 74
Discharge: HOME | End: 2024-05-24
Payer: MEDICARE

## 2024-05-24 DIAGNOSIS — I71.43 INFRARENAL ABDOMINAL AORTIC ANEURYSM (AAA) WITHOUT RUPTURE (HCC): ICD-10-CM

## 2024-05-24 DIAGNOSIS — I65.21 CAROTID STENOSIS, RIGHT: ICD-10-CM

## 2024-05-24 DIAGNOSIS — I73.9 PERIPHERAL ARTERY DISEASE (HCC): ICD-10-CM

## 2024-05-24 PROCEDURE — 93880 EXTRACRANIAL BILAT STUDY: CPT | Performed by: SURGERY

## 2024-05-24 PROCEDURE — 93923 UPR/LXTR ART STDY 3+ LVLS: CPT

## 2024-05-24 PROCEDURE — 93880 EXTRACRANIAL BILAT STUDY: CPT

## 2024-05-24 PROCEDURE — 93925 LOWER EXTREMITY STUDY: CPT | Performed by: SURGERY

## 2024-05-24 PROCEDURE — 93922 UPR/L XTREMITY ART 2 LEVELS: CPT | Performed by: SURGERY

## 2024-05-24 PROCEDURE — 93925 LOWER EXTREMITY STUDY: CPT

## 2024-05-28 ENCOUNTER — TRANSCRIBE ORDERS (OUTPATIENT)
Dept: VASCULAR SURGERY | Facility: CLINIC | Age: 74
End: 2024-05-28

## 2024-05-28 DIAGNOSIS — I65.21 CAROTID STENOSIS, RIGHT: Primary | ICD-10-CM

## 2024-05-28 DIAGNOSIS — I73.9 PERIPHERAL ARTERY DISEASE (HCC): Primary | ICD-10-CM

## 2024-06-10 ENCOUNTER — APPOINTMENT (OUTPATIENT)
Dept: LAB | Facility: MEDICAL CENTER | Age: 74
End: 2024-06-10
Payer: MEDICARE

## 2024-06-10 DIAGNOSIS — Z79.01 ANTICOAGULATED ON COUMADIN: ICD-10-CM

## 2024-06-10 DIAGNOSIS — Z51.81 ANTICOAGULATION GOAL OF INR 2 TO 3: ICD-10-CM

## 2024-06-10 DIAGNOSIS — Z79.01 ANTICOAGULATION GOAL OF INR 2 TO 3: ICD-10-CM

## 2024-06-10 DIAGNOSIS — I73.9 PERIPHERAL ARTERY DISEASE (HCC): ICD-10-CM

## 2024-06-10 LAB
INR PPP: 2.78 (ref 0.84–1.19)
PROTHROMBIN TIME: 28.7 SECONDS (ref 11.6–14.5)

## 2024-06-10 PROCEDURE — 36415 COLL VENOUS BLD VENIPUNCTURE: CPT

## 2024-06-10 PROCEDURE — 85610 PROTHROMBIN TIME: CPT

## 2024-06-11 ENCOUNTER — TELEPHONE (OUTPATIENT)
Age: 74
End: 2024-06-11

## 2024-06-11 NOTE — TELEPHONE ENCOUNTER
Pt's spouse returns call.  Advised her of pt's lower limb arterial duplex results from [facility].  She verbalized understanding.

## 2024-06-12 ENCOUNTER — TELEPHONE (OUTPATIENT)
Dept: FAMILY MEDICINE CLINIC | Facility: CLINIC | Age: 74
End: 2024-06-12

## 2024-06-15 NOTE — INTERVAL H&P NOTE
H&P reviewed  After examining the patient I find no changes in the patients condition since the H&P had been written  Vitals:    09/16/21 0300   BP: 133/64   Pulse: (!) 54   Resp:    Temp: 97 8 °F (36 6 °C)   SpO2: 96%     Anticipated Length of Stay:  Patient will be admitted on an Inpatient basis with an anticipated length of stay of  greater than 2 midnights  Justification for Hospital Stay:  Post surgical recovery following open heart surgery  Took report from Louisa ROBLERO, and assumed care of pt at this time. Pt resting comfortably and independently repositioned in stretcher with bed locked in lowest position for safety. NAD noted at this time. Respirations even and unlabored and visible chest rise noted.  Patient offered bathroom assistance and denies need at this time. Pt instructed to call if assistance is needed. Pt on continuous cardiac, BP, and O2 monitoring. Call light within reach. Family at bedside. No needs at this time. Will continue to monitor.

## 2024-07-01 ENCOUNTER — CONSULT (OUTPATIENT)
Age: 74
End: 2024-07-01
Payer: MEDICARE

## 2024-07-01 VITALS
HEART RATE: 73 BPM | HEIGHT: 67 IN | DIASTOLIC BLOOD PRESSURE: 78 MMHG | TEMPERATURE: 98.2 F | WEIGHT: 209.8 LBS | BODY MASS INDEX: 32.93 KG/M2 | SYSTOLIC BLOOD PRESSURE: 142 MMHG | OXYGEN SATURATION: 97 %

## 2024-07-01 DIAGNOSIS — Z79.01 ANTICOAGULATED ON COUMADIN: ICD-10-CM

## 2024-07-01 DIAGNOSIS — R41.3 MEMORY CHANGES: ICD-10-CM

## 2024-07-01 DIAGNOSIS — F03.B3 MODERATE DEMENTIA WITH MOOD DISTURBANCE, UNSPECIFIED DEMENTIA TYPE (HCC): Primary | ICD-10-CM

## 2024-07-01 DIAGNOSIS — G47.09 OTHER INSOMNIA: ICD-10-CM

## 2024-07-01 PROCEDURE — G2211 COMPLEX E/M VISIT ADD ON: HCPCS | Performed by: NURSE PRACTITIONER

## 2024-07-01 PROCEDURE — 99214 OFFICE O/P EST MOD 30 MIN: CPT | Performed by: NURSE PRACTITIONER

## 2024-07-01 RX ORDER — LOSARTAN POTASSIUM 25 MG/1
25 TABLET ORAL DAILY
COMMUNITY
Start: 2024-04-25
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
## 2024-07-03 ENCOUNTER — TELEPHONE (OUTPATIENT)
Dept: FAMILY MEDICINE CLINIC | Facility: CLINIC | Age: 74
End: 2024-07-03

## 2024-07-05 ENCOUNTER — TELEPHONE (OUTPATIENT)
Age: 74
End: 2024-07-05

## 2024-07-05 NOTE — TELEPHONE ENCOUNTER
MSW received call from [facility] [place], this day.  Valencia requesting to review address and phone number for patient.  MSW reviewed and confirmed same.  Valencia informed that she has tried to get in touch with patient's spouse, Marisela, as the phone number is her's but she has been unable to reach at this time.  Valencia informed that additional attempts will be made.  MSW expressed understanding and will remain available as needed.

## 2024-07-09 ENCOUNTER — TELEPHONE (OUTPATIENT)
Age: 74
End: 2024-07-09

## 2024-07-09 NOTE — TELEPHONE ENCOUNTER
Patient's wife called, she was asking if a referral was sent to the [facility]? She has had [employer] call her and show up at their home.     She stated the person that showed up at their home wouldn't give her any information as to why she was there because of HIPAA.     Marisela is concerned, she doesn't feel like they need any help at home, but if the assessment will help her . She stated, she is willing to have it done. She would like a call back.     Please advise, thank you.

## 2024-07-10 ENCOUNTER — APPOINTMENT (OUTPATIENT)
Dept: LAB | Facility: MEDICAL CENTER | Age: 74
End: 2024-07-10
Payer: MEDICARE

## 2024-07-10 DIAGNOSIS — Z79.01 ANTICOAGULATED ON COUMADIN: ICD-10-CM

## 2024-07-10 DIAGNOSIS — R41.3 MEMORY CHANGES: ICD-10-CM

## 2024-07-10 DIAGNOSIS — Z51.81 ANTICOAGULATION GOAL OF INR 2 TO 3: ICD-10-CM

## 2024-07-10 DIAGNOSIS — I73.9 PERIPHERAL ARTERY DISEASE (HCC): ICD-10-CM

## 2024-07-10 DIAGNOSIS — Z79.01 ANTICOAGULATION GOAL OF INR 2 TO 3: ICD-10-CM

## 2024-07-10 LAB
INR PPP: 1.96 (ref 0.84–1.19)
PROTHROMBIN TIME: 21.9 SECONDS (ref 11.6–14.5)
VIT B12 SERPL-MCNC: 2424 PG/ML (ref 180–914)

## 2024-07-10 PROCEDURE — 82607 VITAMIN B-12: CPT

## 2024-07-10 PROCEDURE — 36415 COLL VENOUS BLD VENIPUNCTURE: CPT

## 2024-07-10 PROCEDURE — 85610 PROTHROMBIN TIME: CPT

## 2024-07-10 NOTE — TELEPHONE ENCOUNTER
MSW returned call to patient's spouse, Marisela, and call was placed with success.  MSW reviewed the referral that was placed to [employer] program and requested by family during visit on 7/1/24, but it is there decision as to whether or not they would like to proceed with support of same.  Marisela upset expressing that  came to the home with patient's information and wants to know where they got it from.  MSW reviewed that patient demographic information is provided for the referral when placed, as this is the information needed by [employer].  Marisela upset and expressed they would not disclose where the referral came from.  MSW expressed apologies and that the referral was placed by MSW following visit on 7/1/24 as requested by family during visit to support.  Marisela expressed understanding of same.  MSW will remain available as needed.

## 2024-07-10 NOTE — TELEPHONE ENCOUNTER
MSW outreached patient's spouse, Marisela, to follow-up on questions regarding referral to [facility].  MSW had placed referral to [facility], [employer] Program, on 7/1/24 with [employer] Representative, Dorinda, for patient to assess for additional services and supports as discussed and requested during intake on 7/1/24.  Call placed to Marisela, however Marisela was not available at time of call.  Voicemail left requesting a return call to follow-up on questions about [employer] referral.  MSW will remain available as needed.

## 2024-07-10 NOTE — TELEPHONE ENCOUNTER
Patient's spouse, Marisela, returned 's call.    Unable to stay on hold while call was being transferred.    Please call back.

## 2024-07-19 NOTE — PROGRESS NOTES
[facility]  [address], [address]  [place] [address]  [phone]    Care Conference    NAME: Chapo Cerna  AGE: 74 y.o. SEX: male  YOB: 1950  DATE OF ASSESSMENT: 07/01/24  DATE OF CONFERENCE: 07/29/24    Family Present: patient and wife, daughter and son in law  Staff Present: FILIPPO Wood    Patient / Family Goals of Care: Review cognitive decline and associated symptoms, discuss treatment options and care planning.     Medical Concerns (Current/Historical): Insomnia    Geriatric Syndromes/Age Related Syndromes: Moderate dementia    Neuropsychological  Moderate dementia, likely Alzheimer's disease/vascular mixed with agitation  [place] Cognitive Assessment: 5/30    History, physical, and cognitive screening are most consistent with:  moderate dementia, likely AD/vascular mixed w/agitation  Contributing factors:  hearing, mood  Memory meds:  wife and pt decline at this time after discussing potential risks and benefits of medications for memory support.  Cont supportive cares lives with wife  Caregiver stress concerns:  none today.  Pt's wife has met with [employer] - pt qualifies for services, however not financially.   Discussed safe environment  Wandering:  not at this time.  Some items to consider would be door video surveillance, ID bracelet, Tile GPS   Home:  no concerns per wife.  She is with pt 24/7. She does leave him in car for shopping.  We did discuss this is not advisable as he may get lost looking for her.  Patient requires following care when caregiver not home:  pt would need 24/7 cares via HHA or LTC if wife unablet to do for safety and assist with adls.  Driving safety:  no longer drives  Fall preventions:  fall history, will not go to PT or use AD  Medication management:  wife takes care of  Scam safety:  Do not answer suspicious mail, phone calls, email, or text message without having second person review d/t safety risk.  Do not give out  personal info to questionable sources- read company safety policies for how they might contact you.  Continue to engage in physical, cognitive, social activity.  Cont doing games, puzzles, trivia, current event discussions  Cont daily walks or exercise video  Cont outings with friends and family.  Avoid social isolation.  Referral to cognitive therapy:  family declines  Optimize chronic and acute conditions  Cont to f/u with providers and ensure medication compliance  Vascular risk factors: PAD, HLD, HTN, carotid stenosis, former smoker, arterial thrombosis on Coumadin, SP TAVR  Ensure good diet (ex Mediterranean diet) and adequate hydration  Monitor for changes in behavior/mood- if noted, notify provider for eval  Encourage mindfulness and positive socialization for general wellness.  Avoid medications that worsen cognition - check with provider or pharmacist before starting new or OTC meds  Do not overwhelm pt with info.    Do one task at a time. Use slower pace.  Keep to one conversation at a time.  Do not multitask.  Decision making:  At this point, recommend making decisions in conjunction w/poa / care partner..  In future, if capacity is of concern, would refer to neuropsychology for full eval.  Encourage independence as able.  Repeat cognitive assessment in 6 months    Diagnostic Studies  Review of bloodwork: 7/2023: TSH 1.125, folate>22.3    Review of previous imaging: CTh: 2021: No acute abnormality. MRI w/NQ pending    Physical Finding Impacting Function   Fall Risk: moderate   Activities of Daily Living: assisted   Instrumental Activities of Daily Living: Dependent    Encourage appropriate footwear at all times  Review fall risk prevention tips and adjust within the home environment as needed    Medications Reviewed   Medications seem appropriate for present conditions  Check with PCP before using over the counter medications  Avoid over the counter medications that can affect cognition (e.g., Benadryl,  Tylenol PM)   Avoid NSAIDs due to risk of GI bleed and renal impairment    Other Findings   Overall health  BMI: 32.54 kg/m2  Maintain well-balanced diet  Continue following with primary care physician regularly  Insomnia  Patient denied issues, wife reports sleep is doing ok    Recommended Health Maintenance   Immunizations, if not contraindicated:   Influenza vaccine yearly  Pneumo vaccine every 5 years (65 years and over)  Shingles vaccine  COVID-19 vaccine    Social / Safety Concerns  Consider an Adult Day Program for positive socialization, physical exercise, cognitive stimulation and family respite  Consider a home care aide to assist with daily care needs  Stay in touch with family and friends  Plan self-care activities for your mental well-being each week  Recommend review of fall risk prevention tips  Recommend use of fall precautions including fall alert device  For wandering prevention: consider use of fall alert with GPS, SafeReturn bracelet, Project LifeSaver bracelet, video surveillance, or alarm systems  Consider assistance for medication administration such as blister packaging or use of an automated pill dispenser  Recommend contacting your local [facility] Agency on Aging for possible eligible programs such as OPTIONS, Caregiver Support Program, or WAIVER    Long Term Care Issues/ACP  Maintain updated advance directives and provide a copy to your primary care provider  Consider caregiver support groups and educational resources through the Alzheimer's Association; access Alzheimer's Association 24/7 Helpline at 1-259.559.5299  [facility] does offer a monthly caregiver support group. If interested, please speak with a .  Utilize reorientation and redirection as needed (dependent on situation)  Educational information provided  Recommended literature: 36 Hour Day, Untangling Alzheimer's, Learning to Speak Alzheimer's    Patient and family verbalized understanding  of above care plan.    For care coordination purposes, this care plan will be shared with your primary care provider. With any questions, please contact our office at [phone].

## 2024-07-22 ENCOUNTER — APPOINTMENT (OUTPATIENT)
Dept: LAB | Facility: MEDICAL CENTER | Age: 74
End: 2024-07-22
Payer: MEDICARE

## 2024-07-22 DIAGNOSIS — I73.9 PERIPHERAL ARTERY DISEASE (HCC): ICD-10-CM

## 2024-07-22 DIAGNOSIS — Z79.01 ANTICOAGULATION GOAL OF INR 2 TO 3: ICD-10-CM

## 2024-07-22 DIAGNOSIS — Z79.01 ANTICOAGULATED ON COUMADIN: ICD-10-CM

## 2024-07-22 DIAGNOSIS — Z51.81 ANTICOAGULATION GOAL OF INR 2 TO 3: ICD-10-CM

## 2024-07-22 LAB
INR PPP: 2.84 (ref 0.84–1.19)
PROTHROMBIN TIME: 29.3 SECONDS (ref 11.6–14.5)

## 2024-07-22 PROCEDURE — 85610 PROTHROMBIN TIME: CPT

## 2024-07-22 PROCEDURE — 36415 COLL VENOUS BLD VENIPUNCTURE: CPT

## 2024-07-29 ENCOUNTER — OFFICE VISIT (OUTPATIENT)
Age: 74
End: 2024-07-29
Payer: MEDICARE

## 2024-07-29 VITALS
BODY MASS INDEX: 32.83 KG/M2 | DIASTOLIC BLOOD PRESSURE: 82 MMHG | WEIGHT: 209.2 LBS | OXYGEN SATURATION: 96 % | SYSTOLIC BLOOD PRESSURE: 150 MMHG | HEIGHT: 67 IN | HEART RATE: 96 BPM | TEMPERATURE: 98.4 F

## 2024-07-29 DIAGNOSIS — Z71.89 ACP (ADVANCE CARE PLANNING): ICD-10-CM

## 2024-07-29 DIAGNOSIS — F02.B3 MODERATE LATE ONSET ALZHEIMER'S DEMENTIA WITH MOOD DISTURBANCE (HCC): Primary | ICD-10-CM

## 2024-07-29 DIAGNOSIS — G30.1 MODERATE LATE ONSET ALZHEIMER'S DEMENTIA WITH MOOD DISTURBANCE (HCC): Primary | ICD-10-CM

## 2024-07-29 PROCEDURE — 99483 ASSMT & CARE PLN PT COG IMP: CPT | Performed by: NURSE PRACTITIONER

## 2024-07-29 NOTE — ASSESSMENT & PLAN NOTE
MOCA:  5/30.  assist adls/dependent iadls  Most consistent with moderate dementia, likely AD/vascular mixed, FAST 6b  Engage in regular social, physical, cognitive activity.  Optimize acute and chronic conditions  Frequent reminders and close monitoring for safety  Monitor for acute changes in behavior/personality (agitation or lethargy).  Notify pcp/ED for reversible causes eval.

## 2024-07-29 NOTE — ASSESSMENT & PLAN NOTE
No ACP docs on file per epic check  Recommend establishing care partner and goals of care.  Share goc with care partner, formalize as POA and living will as able  Once docs formalized, bring to  provider for upload to medical chart  Cont to discuss and update goc as needed.

## 2024-07-29 NOTE — PROGRESS NOTES
"[facility]  [address], [address]  [place] [address] [phone]    Care Conference: Resources Provided    MSW provided the following resources, along with a copy of care plan:  [address] [place] [address]    General Information  - 10 Ways to Love Your Brain  - Memory loss ladder  - Packet with Alzheimer's Association information including: definition of dementia, communication tips for different stages, common behaviors and response strategies  - NIH JEFFREY \"Now What? Next Steps After an Alzheimer's Diagnosis\"  - Vascular Dementia packet    Caregiver Support  - Flyer for [facility] Virtual Caregiver Support Group (meeting 2x per month by Zoom)  - Alzheimer's Association Rx Pad (guide to website and 24/7 helpline, 1-302.287.3422)    Safety  - CDC fall prevention / home safety checklist    Community Resources  - [facility] Aging in Place Resource Guide (contains information about higher levels of care, homecare, etc.)  - [employer] [facility] [employer] [facility]: What is Dementia & Where to Begin brochure & Resource Guide    Other  - [facility]: Caregiver Website QR code to scan for resources/education  "

## 2024-07-29 NOTE — PROGRESS NOTES
"Ambulatory Visit  Name: Chapo Cerna      : 1950      MRN: [id number]  Encounter Provider: FILIPPO Foster  Encounter Date: 2024   Encounter department: [facility]    Assessment & Plan   1. Moderate late onset Alzheimer's dementia with mood disturbance (HCC)  Assessment & Plan:  MOCA:  .  assist adls/dependent iadls  Most consistent with moderate dementia, likely AD/vascular mixed, FAST 6b  Engage in regular social, physical, cognitive activity.  Optimize acute and chronic conditions  Frequent reminders and close monitoring for safety  Monitor for acute changes in behavior/personality (agitation or lethargy).  Notify pcp/ED for reversible causes eval.    2. ACP (advance care planning)  Assessment & Plan:  No ACP docs on file per epic check  Recommend establishing care partner and goals of care.  Share goc with care partner, formalize as POA and living will as able  Once docs formalized, bring to  provider for upload to medical chart  Cont to discuss and update goc as needed.        History of Present Illness     Chapo Cerna is a 74 y.o. male who presents with his wife (Marisela), daughter (Cassie) and son in law (Beck)  Patient denies changes in medications or health condition, hospitalization or falls  Patient has completed the following recommendations:  labs, declined ST  Time spent today reviewing memory loss ladder, definition of dementia, types of dementia, contributing factors to cognitive changes, interventions to help delay progression of memory loss, safety concerns to be aware of, and acute changes to notify pcp of.  We also touched on some medications that can contribute to worsening cognition as well as available \"memory medications.\"     Wife reports that she met with [employer]:  she is not interested in assistance in the home.  She has visited [employer] which she likes, she declines memory meds as pt is doing well right now and she does not want to do anything " that might change the status quo.    Review of Systems   Unable to perform ROS: Dementia   Constitutional:  Negative for appetite change.   HENT:  Positive for hearing loss (wears HAs).    Eyes:  Negative for visual disturbance.   Musculoskeletal:  Negative for arthralgias.   Psychiatric/Behavioral:  Negative for agitation (chronic per wife, no concerning behaviors recently) and sleep disturbance. The patient is nervous/anxious.      Pertinent Medical History   Geriatric syndromes:  memory changes, anxiety, insomnia  Vascular risks:  PAD, htn, hld, ex smoker, clotting disorder (on coumadin and plavix)      Medical History Reviewed by provider this encounter:  Tobacco  Allergies  Meds  Problems  Med Hx  Surg Hx  Fam Hx       Past Medical History   Past Medical History:   Diagnosis Date    DVT (deep venous thrombosis) (HCC)     Protein C deficiency (HCC)     Pulmonary embolus (HCC)      Past Surgical History:   Procedure Laterality Date    BACK SURGERY      BYPASS FEMORAL-TIBIAL Right 2011    IA ECHO TRANSESOPHAG R-T 2D W/PRB IMG ACQUISJ I&R N/A 9/16/2021    Procedure: TRANSESOPHAGEAL ECHOCARDIOGRAM (RADHA);  Surgeon: Vahid Benitez DO;  [facility];  Service: Cardiac Surgery    IA REPLACE AORTIC VALVE OPENFEMORAL ARTERY APPROACH N/A 9/16/2021    Procedure: REPLACEMENT AORTIC VALVE TRANSCATHETER (TAVR) TRANSFEMORAL W/ 29 MM COLÓN BREEZY S3 ULTRA VALVE (ACCESS ON LEFT);  Surgeon: Vahid Benitez DO;  Location: [facility];  Service: Cardiac Surgery    VEIN LIGATION       Family History   Problem Relation Age of Onset    Cancer Mother 48    Heart attack Father 51    Hypertension Father     Prostate cancer Brother     Arrhythmia Neg Hx     Clotting disorder Neg Hx     Fainting Neg Hx     Heart disease Neg Hx     Anuerysm Neg Hx     Stroke Neg Hx      Current Outpatient Medications on File Prior to Visit   Medication Sig Dispense Refill    acetaminophen (TYLENOL) 325 mg tablet Take 1-2 tablets Q4-6  hours PRN pain. Do not take more than 4 grams in 24 hours.  0    atorvastatin (LIPITOR) 80 mg tablet TAKE 1 TABLET BY MOUTH EVERY DAY 30 tablet 5    clopidogrel (PLAVIX) 75 mg tablet TAKE ONE TABLET BY MOUTH DAILY 30 tablet 5    warfarin (COUMADIN) 3 mg tablet TAKE 1 TABLET BY MOUTH DAILY 30 tablet 5    diltiazem (CARDIZEM CD) 240 mg 24 hr capsule Take 1 capsule (240 mg total) by mouth daily (Patient not taking: Reported on 7/1/2024) 30 capsule 11    losartan (COZAAR) 25 mg tablet Take 25 mg by mouth daily      tamsulosin (FLOMAX) 0.4 mg Take 1 capsule (0.4 mg total) by mouth daily with dinner 30 capsule 11    warfarin (COUMADIN) 4 mg tablet TAKE 1 TABLET BY MOUTH EVERY DAY (Patient not taking: Reported on 4/11/2024) 90 tablet 1    warfarin (COUMADIN) 5 mg tablet TAKE 1 TABLET BY MOUTH EVERY DAY (Patient not taking: Reported on 4/11/2024) 30 tablet 3     No current facility-administered medications on file prior to visit.   No Known Allergies   Current Outpatient Medications on File Prior to Visit   Medication Sig Dispense Refill    acetaminophen (TYLENOL) 325 mg tablet Take 1-2 tablets Q4-6 hours PRN pain. Do not take more than 4 grams in 24 hours.  0    atorvastatin (LIPITOR) 80 mg tablet TAKE 1 TABLET BY MOUTH EVERY DAY 30 tablet 5    clopidogrel (PLAVIX) 75 mg tablet TAKE ONE TABLET BY MOUTH DAILY 30 tablet 5    warfarin (COUMADIN) 3 mg tablet TAKE 1 TABLET BY MOUTH DAILY 30 tablet 5    diltiazem (CARDIZEM CD) 240 mg 24 hr capsule Take 1 capsule (240 mg total) by mouth daily (Patient not taking: Reported on 7/1/2024) 30 capsule 11    losartan (COZAAR) 25 mg tablet Take 25 mg by mouth daily      tamsulosin (FLOMAX) 0.4 mg Take 1 capsule (0.4 mg total) by mouth daily with dinner 30 capsule 11    warfarin (COUMADIN) 4 mg tablet TAKE 1 TABLET BY MOUTH EVERY DAY (Patient not taking: Reported on 4/11/2024) 90 tablet 1    warfarin (COUMADIN) 5 mg tablet TAKE 1 TABLET BY MOUTH EVERY DAY (Patient not taking: Reported on  "2024) 30 tablet 3     No current facility-administered medications on file prior to visit.      Social History     Tobacco Use    Smoking status: Former     Current packs/day: 0.00     Types: Cigarettes     Quit date: 2018     Years since quittin.8    Smokeless tobacco: Never    Tobacco comments:     1 pk every 2days , currently on patches to quit.    Vaping Use    Vaping status: Never Used   Substance and Sexual Activity    Alcohol use: No    Drug use: No    Sexual activity: Not on file     Objective     /82 (BP Location: Left arm, Patient Position: Sitting, Cuff Size: Standard)   Pulse 96   Temp 98.4 °F (36.9 °C) (Temporal)   Ht 5' 7\" (1.702 m)   Wt 94.9 kg (209 lb 3.2 oz)   SpO2 96%   BMI 32.77 kg/m²     Physical Exam  Vitals and nursing note reviewed.   Constitutional:       General: He is not in acute distress.     Appearance: Normal appearance. He is not ill-appearing.   HENT:      Head: Normocephalic.   Pulmonary:      Effort: Pulmonary effort is normal. No respiratory distress.      Breath sounds: No wheezing.   Musculoskeletal:         General: Normal range of motion.   Skin:     General: Skin is warm and dry.   Neurological:      General: No focal deficit present.      Mental Status: He is alert. Mental status is at baseline.      Comments: Oriented to self, not tps  Pleasant, cooperative, forgetful  Participates in convo, but limited words   Psychiatric:         Mood and Affect: Mood normal.         Behavior: Behavior normal.       Administrative Statements           "

## 2024-07-31 ENCOUNTER — TELEPHONE (OUTPATIENT)
Age: 74
End: 2024-07-31

## 2024-07-31 NOTE — TELEPHONE ENCOUNTER
Reviewed B12 level with pt's wife:  he currently takes 1000mcg daily - he can decrease to 500mcg daily and we can recheck at next visit.

## 2024-07-31 NOTE — TELEPHONE ENCOUNTER
Wife , Marisela, called about 's lab work. She has some questions about the B12 results. Marisela would like someone to call them back to review.

## 2024-08-12 DIAGNOSIS — I73.9 PERIPHERAL ARTERY DISEASE (HCC): ICD-10-CM

## 2024-08-12 DIAGNOSIS — I73.9 INTERMITTENT CLAUDICATION (HCC): ICD-10-CM

## 2024-08-12 DIAGNOSIS — Z79.01 ANTICOAGULATION GOAL OF INR 2 TO 3: ICD-10-CM

## 2024-08-12 DIAGNOSIS — Z51.81 ANTICOAGULATION GOAL OF INR 2 TO 3: ICD-10-CM

## 2024-08-13 ENCOUNTER — APPOINTMENT (OUTPATIENT)
Dept: LAB | Facility: MEDICAL CENTER | Age: 74
End: 2024-08-13
Payer: MEDICARE

## 2024-08-13 DIAGNOSIS — I73.9 PERIPHERAL ARTERY DISEASE (HCC): ICD-10-CM

## 2024-08-13 DIAGNOSIS — Z79.01 ANTICOAGULATION GOAL OF INR 2 TO 3: ICD-10-CM

## 2024-08-13 DIAGNOSIS — Z79.01 ANTICOAGULATED ON COUMADIN: ICD-10-CM

## 2024-08-13 DIAGNOSIS — Z51.81 ANTICOAGULATION GOAL OF INR 2 TO 3: ICD-10-CM

## 2024-08-13 LAB
INR PPP: 2.59 (ref 0.85–1.19)
PROTHROMBIN TIME: 27.6 SECONDS (ref 12.3–15)

## 2024-08-13 PROCEDURE — 36415 COLL VENOUS BLD VENIPUNCTURE: CPT

## 2024-08-13 PROCEDURE — 85610 PROTHROMBIN TIME: CPT

## 2024-08-13 RX ORDER — WARFARIN SODIUM 3 MG/1
3 TABLET ORAL DAILY
Qty: 30 TABLET | Refills: 5 | Status: SHIPPED | OUTPATIENT
Start: 2024-08-13

## 2024-08-13 RX ORDER — ATORVASTATIN CALCIUM 80 MG/1
80 TABLET, FILM COATED ORAL DAILY
Qty: 30 TABLET | Refills: 5 | Status: SHIPPED | OUTPATIENT
Start: 2024-08-13

## 2024-08-20 ENCOUNTER — TELEPHONE (OUTPATIENT)
Age: 74
End: 2024-08-20

## 2024-08-20 NOTE — TELEPHONE ENCOUNTER
[facility] called to f/u on form that was sent over via fax on 7/2. Informed her it was faxed back to her on 7/10.    She's requesting it to be fax again along w [facility] list to [phone]       Media Information        Document Information    Forms   [facility] Human Services questionnaire - completed & faxed   04/11/2024   Attached To:   Office Visit on 4/11/24 with Maryann Gaspar MD   Source Information    Akilah Bruno  Pg Wendy Lassiter   Document History

## 2024-08-26 ENCOUNTER — OFFICE VISIT (OUTPATIENT)
Dept: FAMILY MEDICINE CLINIC | Facility: CLINIC | Age: 74
End: 2024-08-26
Payer: MEDICARE

## 2024-08-26 ENCOUNTER — APPOINTMENT (OUTPATIENT)
Dept: RADIOLOGY | Facility: MEDICAL CENTER | Age: 74
End: 2024-08-26
Payer: MEDICARE

## 2024-08-26 VITALS
SYSTOLIC BLOOD PRESSURE: 142 MMHG | DIASTOLIC BLOOD PRESSURE: 82 MMHG | HEART RATE: 74 BPM | BODY MASS INDEX: 32.65 KG/M2 | OXYGEN SATURATION: 96 % | TEMPERATURE: 97.8 F | WEIGHT: 208 LBS | HEIGHT: 67 IN

## 2024-08-26 DIAGNOSIS — S09.93XA FACIAL INJURY, INITIAL ENCOUNTER: Primary | ICD-10-CM
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
## 2024-08-26 DIAGNOSIS — T14.8XXA HEMATOMA: ICD-10-CM

## 2024-08-26 DIAGNOSIS — S09.93XA FACIAL INJURY, INITIAL ENCOUNTER: ICD-10-CM

## 2024-08-26 PROCEDURE — 99214 OFFICE O/P EST MOD 30 MIN: CPT | Performed by: FAMILY MEDICINE

## 2024-08-26 PROCEDURE — 70150 X-RAY EXAM OF FACIAL BONES: CPT

## 2024-08-26 PROCEDURE — G2211 COMPLEX E/M VISIT ADD ON: HCPCS | Performed by: FAMILY MEDICINE

## 2024-08-26 NOTE — PROGRESS NOTES
Ambulatory Visit  Name: Chapo Cerna      : 1950      MRN: [id number]  Encounter Provider: Shan Martinez MD  Encounter Date: 2024   Encounter department: [facility]    Assessment & Plan   1. Facial injury, initial encounter  -     XR facial bones 3+ vw; Future; Expected date: 2024  2. Hematoma  3. Fall, initial encounter    Encourage patient to avoid going outside in the dark in the future to prevent falls  Hematomas likely secondary to chronic anticoagulation, obtain x-ray due to mechanism of injury, rule out orbital fracture  Ice of the affected area, Tylenol as needed for pain control       History of Present Illness     HPI    Chapo is a 74-year-old male patient on chronic anticoagulation with recent fall and strike to the left brow against concrete.  This occurred Tuesday 1-2AM.   Pt had trash can out and saw it was getting blown away and went to get it then fell and struck his left brow again a concrete side wall.   Patient denies any LOC.   Concern for possible infection due to the significant swelling above the left eyebrow.  Patient denies any significant pain, no pustular discharge.    Review of Systems   Constitutional:  Negative for chills and fever.   HENT:  Negative for congestion.    Skin:  Positive for color change and wound.        Bruising around the left eye  Swelling around the left brow  Bruising can be noted on the palmar aspect of his ring and fifth digit on the left hand     Past Medical History:   Diagnosis Date    DVT (deep venous thrombosis) (HCC)     Protein C deficiency (HCC)     Pulmonary embolus (HCC)      Past Surgical History:   Procedure Laterality Date    BACK SURGERY      BYPASS FEMORAL-TIBIAL Right     [place] ECHO TRANSESOPHAG R-T 2D W/PRB IMG ACQUISJ I&R N/A 2021    Procedure: TRANSESOPHAGEAL ECHOCARDIOGRAM (RADHA);  Surgeon: Vahid Benitez DO;  [facility];  Service: Cardiac Surgery    WI REPLACE AORTIC VALVE OPENFEMORAL  ARTERY APPROACH N/A 2021    Procedure: REPLACEMENT AORTIC VALVE TRANSCATHETER (TAVR) TRANSFEMORAL W/ 29 MM COLÓN BREEZY S3 ULTRA VALVE (ACCESS ON LEFT);  Surgeon: Vahid Benitez DO;  [facility];  Service: Cardiac Surgery    VEIN LIGATION       Family History   Problem Relation Age of Onset    Cancer Mother 48    Heart attack Father 51    Hypertension Father     Prostate cancer Brother     Arrhythmia Neg Hx     Clotting disorder Neg Hx     Fainting Neg Hx     Heart disease Neg Hx     Anuerysm Neg Hx     Stroke Neg Hx      Social History     Tobacco Use    Smoking status: Former     Current packs/day: 0.00     Types: Cigarettes     Quit date: 2018     Years since quittin.9    Smokeless tobacco: Never    Tobacco comments:     1 pk every 2days , currently on patches to quit.    Vaping Use    Vaping status: Never Used   Substance and Sexual Activity    Alcohol use: No    Drug use: No    Sexual activity: Not on file     Current Outpatient Medications on File Prior to Visit   Medication Sig    acetaminophen (TYLENOL) 325 mg tablet Take 1-2 tablets Q4-6 hours PRN pain. Do not take more than 4 grams in 24 hours.    atorvastatin (LIPITOR) 80 mg tablet TAKE ONE TABLET BY MOUTH DAILY    clopidogrel (PLAVIX) 75 mg tablet TAKE ONE TABLET BY MOUTH DAILY    warfarin (COUMADIN) 3 mg tablet TAKE ONE TABLET BY MOUTH DAILY    diltiazem (CARDIZEM CD) 240 mg 24 hr capsule Take 1 capsule (240 mg total) by mouth daily (Patient not taking: Reported on 2024)    warfarin (COUMADIN) 4 mg tablet TAKE 1 TABLET BY MOUTH EVERY DAY (Patient not taking: Reported on 2024)    warfarin (COUMADIN) 5 mg tablet TAKE 1 TABLET BY MOUTH EVERY DAY (Patient not taking: Reported on 2024)    [DISCONTINUED] losartan (COZAAR) 25 mg tablet Take 25 mg by mouth daily    [DISCONTINUED] tamsulosin (FLOMAX) 0.4 mg Take 1 capsule (0.4 mg total) by mouth daily with dinner     No Known Allergies  Immunization History  "  Administered Date(s) Administered    COVID-19 MODERNA VACC 0.5 ML IM 03/17/2021, 04/14/2021, 12/01/2021    COVID-19 Moderna mRNA Vaccine 12 Yr+ 50 mcg/0.5 mL (Spikevax) 10/18/2023    INFLUENZA 10/18/2023    Influenza, high dose seasonal 0.7 mL 09/17/2020, 10/07/2021, 11/28/2022    Pneumococcal Conjugate 13-Valent 10/07/2021    Pneumococcal Polysaccharide PPV23 09/17/2020     Objective     /82 (BP Location: Left arm, Patient Position: Sitting, Cuff Size: Standard)   Pulse 74   Temp 97.8 °F (36.6 °C)   Ht 5' 7\" (1.702 m)   Wt 94.3 kg (208 lb)   SpO2 96%   BMI 32.58 kg/m²     Physical Exam  Vitals reviewed.   Constitutional:       General: He is not in acute distress.     Appearance: Normal appearance. He is not ill-appearing, toxic-appearing or diaphoretic.   Cardiovascular:      Rate and Rhythm: Normal rate and regular rhythm.      Pulses: Normal pulses.      Heart sounds: Normal heart sounds. No murmur heard.  Pulmonary:      Effort: Pulmonary effort is normal. No respiratory distress.      Breath sounds: Normal breath sounds.   Abdominal:      General: Abdomen is flat.   Musculoskeletal:         General: No swelling.      Comments: No evidence of chest wall injury   Skin:     General: Skin is warm and dry.      Capillary Refill: Capillary refill takes less than 2 seconds.      Coloration: Skin is not jaundiced.      Findings: Bruising present.      Comments: Significant ecchymosis and swelling around the left eye, intact extraocular movement.  Swelling over the left brow, nontender, no fissure noted   Neurological:      General: No focal deficit present.      Mental Status: He is alert.   Psychiatric:         Mood and Affect: Mood normal.       Shan Martinez M.D.  Family Medicine    Please excuse any \"sound-alike\" errors that may have ocurred during the process of dictation. Parts of this note have been dictated and there may be errors present in the transcription process. Thank you.    "

## 2024-08-29 ENCOUNTER — TELEPHONE (OUTPATIENT)
Age: 74
End: 2024-08-29

## 2024-09-10 DIAGNOSIS — I73.9 CLAUDICATION OF LEFT LOWER EXTREMITY (HCC): ICD-10-CM

## 2024-09-10 RX ORDER — CLOPIDOGREL BISULFATE 75 MG/1
75 TABLET ORAL DAILY
Qty: 90 TABLET | Refills: 1 | Status: SHIPPED | OUTPATIENT
Start: 2024-09-10

## 2024-09-11 ENCOUNTER — APPOINTMENT (OUTPATIENT)
Dept: LAB | Facility: MEDICAL CENTER | Age: 74
End: 2024-09-11
Payer: MEDICARE

## 2024-09-11 DIAGNOSIS — I73.9 PERIPHERAL ARTERY DISEASE (HCC): ICD-10-CM

## 2024-09-11 DIAGNOSIS — Z79.01 ANTICOAGULATED ON COUMADIN: ICD-10-CM

## 2024-09-11 DIAGNOSIS — Z79.01 ANTICOAGULATION GOAL OF INR 2 TO 3: ICD-10-CM

## 2024-09-11 DIAGNOSIS — Z51.81 ANTICOAGULATION GOAL OF INR 2 TO 3: ICD-10-CM

## 2024-09-11 LAB
INR PPP: 2.71 (ref 0.85–1.19)
PROTHROMBIN TIME: 28.6 SECONDS (ref 12.3–15)

## 2024-09-11 PROCEDURE — 85610 PROTHROMBIN TIME: CPT

## 2024-09-11 PROCEDURE — 36415 COLL VENOUS BLD VENIPUNCTURE: CPT

## 2024-10-10 ENCOUNTER — OFFICE VISIT (OUTPATIENT)
Dept: CARDIOLOGY CLINIC | Facility: MEDICAL CENTER | Age: 74
End: 2024-10-10
Payer: MEDICARE

## 2024-10-10 ENCOUNTER — APPOINTMENT (OUTPATIENT)
Dept: LAB | Facility: MEDICAL CENTER | Age: 74
End: 2024-10-10
Payer: MEDICARE

## 2024-10-10 VITALS
OXYGEN SATURATION: 97 % | HEART RATE: 69 BPM | DIASTOLIC BLOOD PRESSURE: 80 MMHG | SYSTOLIC BLOOD PRESSURE: 136 MMHG | BODY MASS INDEX: 32.33 KG/M2 | HEIGHT: 67 IN | WEIGHT: 206 LBS

## 2024-10-10 DIAGNOSIS — I74.3 EMBOLISM AND THROMBOSIS OF ARTERIES OF THE LOWER EXTREMITIES (HCC): ICD-10-CM

## 2024-10-10 DIAGNOSIS — Z79.01 ANTICOAGULATED ON COUMADIN: ICD-10-CM

## 2024-10-10 DIAGNOSIS — Z98.890 S/P FEMORAL-TIBIAL BYPASS: ICD-10-CM

## 2024-10-10 DIAGNOSIS — I73.9 PERIPHERAL ARTERY DISEASE (HCC): ICD-10-CM

## 2024-10-10 DIAGNOSIS — Z95.2 S/P TAVR (TRANSCATHETER AORTIC VALVE REPLACEMENT): ICD-10-CM

## 2024-10-10 DIAGNOSIS — Z79.01 ANTICOAGULATION GOAL OF INR 2 TO 3: ICD-10-CM

## 2024-10-10 DIAGNOSIS — Z51.81 ANTICOAGULATION GOAL OF INR 2 TO 3: ICD-10-CM

## 2024-10-10 DIAGNOSIS — I35.0 NONRHEUMATIC AORTIC VALVE STENOSIS: ICD-10-CM

## 2024-10-10 DIAGNOSIS — E78.5 DYSLIPIDEMIA: ICD-10-CM

## 2024-10-10 DIAGNOSIS — I10 ESSENTIAL HYPERTENSION: ICD-10-CM

## 2024-10-10 DIAGNOSIS — I44.7 LBBB (LEFT BUNDLE BRANCH BLOCK): ICD-10-CM

## 2024-10-10 DIAGNOSIS — I25.10 ASCVD (ARTERIOSCLEROTIC CARDIOVASCULAR DISEASE): ICD-10-CM

## 2024-10-10 DIAGNOSIS — I47.10 SVT (SUPRAVENTRICULAR TACHYCARDIA) (HCC): Primary | ICD-10-CM

## 2024-10-10 LAB
INR PPP: 1.95 (ref 0.85–1.19)
PROTHROMBIN TIME: 22.3 SECONDS (ref 12.3–15)

## 2024-10-10 PROCEDURE — 85610 PROTHROMBIN TIME: CPT

## 2024-10-10 PROCEDURE — 99215 OFFICE O/P EST HI 40 MIN: CPT | Performed by: INTERNAL MEDICINE

## 2024-10-10 PROCEDURE — 36415 COLL VENOUS BLD VENIPUNCTURE: CPT

## 2024-10-10 NOTE — ASSESSMENT & PLAN NOTE
Echo 8/2018 showed moderate AS.   Explained need to monitor for exertional symptoms.    Repeat echo to assess for progression in June 2021 revealed normal LV function, G1DD, moderate AR, severe AS with mean gradient of 39 mmHg and peak velocity of 4 m/s - now s/p TAVR and recovering well, 30 day echo stable.    He had post-op LBBB on EKG along with asymptomatic bradycardia and AIVR.    Now doing well and remains asymptomatic.    Advised to watch for syncope - no need for PPM implant after Zio patch in Oct 2021 revealed normal heart rate variation.    Repeat echo in Oct 2022 revealed stable valve with mean gradient of 8 mmHg and normal LV function.    Repeat echocardiogram in January 2024 revealed a normal LV size and function with grade 1 diastolic dysfunction, mild LVH, left atrial enlargement, stable TAVR bioprosthesis with a mean gradient of 10 mmHg, stable from prior values.  Repeat study next year

## 2024-10-10 NOTE — ASSESSMENT & PLAN NOTE
On Plavix, statin.  s/p RLE fem-PT bypass.    Following with Regi Roberto/Olga Lay/Dr. Petersen.   No further claudication symptoms with walking,  stable and improved.   Continue vascular follow up and maintenance medication with ASA and statin.    Remains asymptomatic and doing well.

## 2024-10-10 NOTE — ASSESSMENT & PLAN NOTE
episode seen after TAVR, which broke spontaneously, continued on cardizem.    No further episodes noted by symptoms.    Continued on Cardizem.    No changes required today.

## 2024-10-10 NOTE — PROGRESS NOTES
Cardiology Follow Up    Chapo Cerna  1950  [id number]  [facility] [place]  [address]  [address]  [facility] [id number] [phone]      Assessment & Plan  SVT (supraventricular tachycardia) (HCC)  episode seen after TAVR, which broke spontaneously, continued on cardizem.    No further episodes noted by symptoms.    Continued on Cardizem.    No changes required today.  Peripheral artery disease (HCC)  On Plavix, statin.  s/p RLE fem-PT bypass.    Following with Regi Roberto/Olga Lay/Dr. Petersen.   No further claudication symptoms with walking,  stable and improved.   Continue vascular follow up and maintenance medication with ASA and statin.    Remains asymptomatic and doing well.  Nonrheumatic aortic valve stenosis  Echo 8/2018 showed moderate AS.   Explained need to monitor for exertional symptoms.    Repeat echo to assess for progression in June 2021 revealed normal LV function, G1DD, moderate AR, severe AS with mean gradient of 39 mmHg and peak velocity of 4 m/s - now s/p TAVR and recovering well, 30 day echo stable.    He had post-op LBBB on EKG along with asymptomatic bradycardia and AIVR.    Now doing well and remains asymptomatic.    Advised to watch for syncope - no need for PPM implant after Zio patch in Oct 2021 revealed normal heart rate variation.    Repeat echo in Oct 2022 revealed stable valve with mean gradient of 8 mmHg and normal LV function.    Repeat echocardiogram in January 2024 revealed a normal LV size and function with grade 1 diastolic dysfunction, mild LVH, left atrial enlargement, stable TAVR bioprosthesis with a mean gradient of 10 mmHg, stable from prior values.  Repeat study next year  LBBB (left bundle branch block)  Seen on EKG postoperatively after TAVR  No longer seen on repeat EKG  Essential hypertension  In the past was taking Metoprolol, but stopped taking it after he stopped following with Dr. Pollard around 2012.    Started Amlodipine 5 mg daily 10/18 due to elevated BP, as well as HCTZ 25 mg daily.   PCP has changed diltiazem to lisinopril with elevated BP and seen to have increased PVCs on EKG.    He was on the lowest dose of diltiazem, so I would prefer for him to be on an AV obie blocking agent with a higher dose to help suppress ectopy and improve BP control rather than change to a completely different agent altogether.    Blood pressure relatively well-controlled today  No changes made today  ASCVD (arteriosclerotic cardiovascular disease)  Nonobstructive CAD by cath 5/11 at [facility].   On aspirin, statin.    Remains asymptomatic on current medical regiment.  No changes made today.  Embolism and thrombosis of arteries of the lower extremities (HCC)  Status post right fem-PT bypass  Continue medical management  S/P TAVR (transcatheter aortic valve replacement)  Stable and doing well, repeat echocardiogram annually  S/P RIGHT Femoral- PT bypass 2011  Continued follow-up with vascular surgery  Continue maintenance aspirin and statin  Dyslipidemia  Lipid panel 9/18 showed total cholesterol 121, , HDL 35, LDL 54.   On Atorvastatin 40.   Lipid panel 9/19 showed total cholesterol 131, , HDL 38, LDL 55.    Advised him to try taking some fish oil to help lower TG.    Repeat levels in April 2021 revealed LDL of 143 - which is very high - advised to increase atorvastatin to  80mg daily and repeat levels in Nov 2021 LDL reduced down to 53.    Repeat levels in February 2023 revealed an LDL of 58.    LDL remains well-controlled at 60 in April 2024.  Repeat levels next year for serial evaluation      Chief Complaint   Patient presents with    Follow-up     6 month f/up. No complaints.       Interval History: Patient feels well, without complaints.  No reported chest pain, shortness of breath, palpitations, lightheadedness, syncope, LE edema, orthopnea, PND, or significant weight changes.  Patient remains active  "without any increased fatigue out of the ordinary.        Blood pressure 136/80, pulse 69, height 5' 7\" (1.702 m), weight 93.4 kg (206 lb), SpO2 97%.    Review of Systems:  Review of Systems   Constitutional:  Negative for activity change, appetite change, fatigue and fever.   HENT:  Negative for nosebleeds and sore throat.    Eyes:  Negative for photophobia and visual disturbance.   Respiratory:  Negative for cough, chest tightness, shortness of breath and wheezing.    Cardiovascular:  Negative for chest pain, palpitations and leg swelling.   Gastrointestinal:  Negative for abdominal pain, diarrhea, nausea and vomiting.   Endocrine: Negative for polyuria.   Genitourinary:  Negative for dysuria, frequency and hematuria.   Musculoskeletal:  Negative for arthralgias, back pain and gait problem.   Skin:  Negative for pallor and rash.   Neurological:  Negative for dizziness, syncope, speech difficulty and light-headedness.   Hematological:  Does not bruise/bleed easily.   Psychiatric/Behavioral:  Negative for agitation, behavioral problems and confusion.        Physical Exam:  Physical Exam  Vitals reviewed.   Constitutional:       General: He is not in acute distress.     Appearance: Normal appearance. He is well-developed. He is not diaphoretic.   HENT:      Head: Normocephalic and atraumatic.      Nose: Nose normal.   Eyes:      General: No scleral icterus.     Extraocular Movements: Extraocular movements intact.      Pupils: Pupils are equal, round, and reactive to light.   Neck:      Vascular: No JVD.   Cardiovascular:      Rate and Rhythm: Normal rate and regular rhythm.      Heart sounds: S1 normal and S2 normal. Heart sounds not distant. Murmur heard.      Systolic murmur is present with a grade of 2/6.      No friction rub. No gallop. No S3 sounds.   Pulmonary:      Effort: Pulmonary effort is normal. No respiratory distress.      Breath sounds: Normal breath sounds. No wheezing or rales.   Abdominal:      " General: Bowel sounds are normal. There is no distension.      Palpations: Abdomen is soft.   Musculoskeletal:         General: No deformity.      Cervical back: Normal range of motion and neck supple.      Right lower leg: No edema.      Left lower leg: No edema.   Skin:     General: Skin is warm and dry.      Findings: No erythema.   Neurological:      Mental Status: He is alert and oriented to person, place, and time.      Cranial Nerves: No cranial nerve deficit.   Psychiatric:         Mood and Affect: Mood normal.         Behavior: Behavior normal.         Patient Active Problem List   Diagnosis    Peripheral artery disease ([facility])    Claudication of left lower extremity ([facility])    S/P RIGHT Femoral- PT bypass 2011    Dyslipidemia    ASCVD (arteriosclerotic cardiovascular disease)    Stage 3 chronic kidney disease    Nonrheumatic aortic valve stenosis    Essential hypertension    Elevated PSA    Embolism and thrombosis of arteries of the lower extremities ([facility])    Protein C deficiency ([facility])    Former heavy tobacco smoker    AAA (abdominal aortic aneurysm) without rupture ([facility])    S/P TAVR (transcatheter aortic valve replacement)    Thrombocytopenia ([facility])    Anticoagulation goal of INR 2 to 3    Anticoagulated on Coumadin    LBBB (left bundle branch block)    Leukocytosis    Hypocalcemia    Hypokalemia    Urinary retention    SVT (supraventricular tachycardia) ([facility])    FAMILIA (acute kidney injury) ([facility])    Elevated troponin    Other insomnia    Encounter for postoperative care    Irregular cardiac rhythm    Left thigh pain    Memory loss    Carotid stenosis, right    Moderate dementia with mood disturbance ([facility])    ACP (advance care planning)     Past Medical History:   Diagnosis Date    DVT (deep venous thrombosis) ([facility])     Protein C deficiency ([facility])     Pulmonary embolus ([facility])      Social History     Socioeconomic History    Marital status: /Civil Union     Spouse name: Not on file    Number of children: Not  on file    Years of education: Not on file    Highest education level: Not on file   Occupational History    Not on file   Tobacco Use    Smoking status: Former     Current packs/day: 0.00     Types: Cigarettes     Quit date: 2018     Years since quittin.0    Smokeless tobacco: Never    Tobacco comments:     1 pk every 2days , currently on patches to quit.    Vaping Use    Vaping status: Never Used   Substance and Sexual Activity    Alcohol use: No    Drug use: No    Sexual activity: Not on file   Other Topics Concern    Not on file   Social History Narrative    Not on file     Social Determinants of Health     Financial Resource Strain: Low Risk  (3/23/2023)    Overall Financial Resource Strain (CARDIA)     Difficulty of Paying Living Expenses: Not very hard   Food Insecurity: No Food Insecurity (2024)    Hunger Vital Sign     Worried About Running Out of Food in the Last Year: Never true     Ran Out of Food in the Last Year: Never true   Transportation Needs: No Transportation Needs (2024)    PRAPARE - Transportation     Lack of Transportation (Medical): No     Lack of Transportation (Non-Medical): No   Physical Activity: Not on file   Stress: Not on file   Social Connections: Not on file   Intimate Partner Violence: Not on file   Housing Stability: Low Risk  (2024)    Housing Stability Vital Sign     Unable to Pay for Housing in the Last Year: No     Number of Times Moved in the Last Year: 1     Homeless in the Last Year: No      Family History   Problem Relation Age of Onset    Cancer Mother 48    Heart attack Father 51    Hypertension Father     Prostate cancer Brother     Arrhythmia Neg Hx     Clotting disorder Neg Hx     Fainting Neg Hx     Heart disease Neg Hx     Anuerysm Neg Hx     Stroke Neg Hx      Past Surgical History:   Procedure Laterality Date    BACK SURGERY      BYPASS FEMORAL-TIBIAL Right     [place] ECHO TRANSESOPHAG R-T 2D W/PRB IMG ACQUISJ I&R N/A 2021    Procedure:  TRANSESOPHAGEAL ECHOCARDIOGRAM ([place]);  Surgeon: Vahid Benitez DO;  Location: [facility];  Service: Cardiac Surgery    MI REPLACE AORTIC VALVE OPENFEMORAL ARTERY APPROACH N/A 9/16/2021    Procedure: REPLACEMENT AORTIC VALVE TRANSCATHETER (TAVR) TRANSFEMORAL W/ 29 MM COLÓN BREEZY S3 ULTRA VALVE (ACCESS ON LEFT);  Surgeon: Vahid Benitez DO;  Location: [facility];  Service: Cardiac Surgery    VEIN LIGATION         Current Outpatient Medications:     acetaminophen (TYLENOL) 325 mg tablet, Take 1-2 tablets Q4-6 hours PRN pain. Do not take more than 4 grams in 24 hours., Disp: , Rfl: 0    atorvastatin (LIPITOR) 80 mg tablet, TAKE ONE TABLET BY MOUTH DAILY, Disp: 30 tablet, Rfl: 5    clopidogrel (PLAVIX) 75 mg tablet, TAKE ONE TABLET BY MOUTH DAILY, Disp: 90 tablet, Rfl: 1    diltiazem (CARDIZEM CD) 240 mg 24 hr capsule, Take 1 capsule (240 mg total) by mouth daily, Disp: 30 capsule, Rfl: 11    warfarin (COUMADIN) 3 mg tablet, TAKE ONE TABLET BY MOUTH DAILY, Disp: 30 tablet, Rfl: 5    warfarin (COUMADIN) 4 mg tablet, TAKE 1 TABLET BY MOUTH EVERY DAY (Patient not taking: Reported on 4/11/2024), Disp: 90 tablet, Rfl: 1    warfarin (COUMADIN) 5 mg tablet, TAKE 1 TABLET BY MOUTH EVERY DAY (Patient not taking: Reported on 4/11/2024), Disp: 30 tablet, Rfl: 3  No Known Allergies    Labs:  Appointment on 09/11/2024   Component Date Value    Protime 09/11/2024 28.6 (H)     INR 09/11/2024 2.71 (H)    Appointment on 08/13/2024   Component Date Value    Protime 08/13/2024 27.6 (H)     INR 08/13/2024 2.59 (H)    Appointment on 07/22/2024   Component Date Value    Protime 07/22/2024 29.3 (H)     INR 07/22/2024 2.84 (H)    Appointment on 07/10/2024   Component Date Value    Protime 07/10/2024 21.9 (H)     INR 07/10/2024 1.96 (H)     Vitamin B-12 07/10/2024 2,424 (H)    Appointment on 06/10/2024   Component Date Value    Protime 06/10/2024 28.7 (H)     INR 06/10/2024 2.78 (H)    Appointment on 05/08/2024   Component Date Value     Protime 05/08/2024 27.3 (H)     INR 05/08/2024 2.60 (H)    Appointment on 04/18/2024   Component Date Value    WBC 04/18/2024 10.37 (H)     RBC 04/18/2024 5.30     Hemoglobin 04/18/2024 16.3     Hematocrit 04/18/2024 50.5 (H)     MCV 04/18/2024 95     MCH 04/18/2024 30.8     MCHC 04/18/2024 32.3     RDW 04/18/2024 13.2     MPV 04/18/2024 9.2     Platelets 04/18/2024 203     nRBC 04/18/2024 0     Segmented % 04/18/2024 72     Immature Grans % 04/18/2024 0     Lymphocytes % 04/18/2024 16     Monocytes % 04/18/2024 10     Eosinophils Relative 04/18/2024 1     Basophils Relative 04/18/2024 1     Absolute Neutrophils 04/18/2024 7.51     Absolute Immature Grans 04/18/2024 0.03     Absolute Lymphocytes 04/18/2024 1.66     Absolute Monocytes 04/18/2024 0.98     Eosinophils Absolute 04/18/2024 0.13     Basophils Absolute 04/18/2024 0.06     Sodium 04/18/2024 141     Potassium 04/18/2024 4.1     Chloride 04/18/2024 104     CO2 04/18/2024 26     ANION GAP 04/18/2024 11     BUN 04/18/2024 16     Creatinine 04/18/2024 1.34 (H)     Glucose, Fasting 04/18/2024 91     Calcium 04/18/2024 9.0     AST 04/18/2024 27     ALT 04/18/2024 29     Alkaline Phosphatase 04/18/2024 152 (H)     Total Protein 04/18/2024 6.8     Albumin 04/18/2024 3.9     Total Bilirubin 04/18/2024 0.76     eGFR 04/18/2024 52     Cholesterol 04/18/2024 126     Triglycerides 04/18/2024 143     HDL, Direct 04/18/2024 37 (L)     LDL Calculated 04/18/2024 60     Non-HDL-Chol (CHOL-HDL) 04/18/2024 89      Lab Results   Component Value Date    TRIG 143 04/18/2024    HDL 37 (L) 04/18/2024     Imaging: No results found.

## 2024-10-10 NOTE — ASSESSMENT & PLAN NOTE
Lipid panel 9/18 showed total cholesterol 121, , HDL 35, LDL 54.   On Atorvastatin 40.   Lipid panel 9/19 showed total cholesterol 131, , HDL 38, LDL 55.    Advised him to try taking some fish oil to help lower TG.    Repeat levels in April 2021 revealed LDL of 143 - which is very high - advised to increase atorvastatin to  80mg daily and repeat levels in Nov 2021 LDL reduced down to 53.    Repeat levels in February 2023 revealed an LDL of 58.    LDL remains well-controlled at 60 in April 2024.  Repeat levels next year for serial evaluation

## 2024-10-10 NOTE — ASSESSMENT & PLAN NOTE
Nonobstructive CAD by cath 5/11 at [facility].   On aspirin, statin.    Remains asymptomatic on current medical regiment.  No changes made today.

## 2024-10-10 NOTE — ASSESSMENT & PLAN NOTE
In the past was taking Metoprolol, but stopped taking it after he stopped following with Dr. Pollard around 2012.   Started Amlodipine 5 mg daily 10/18 due to elevated BP, as well as HCTZ 25 mg daily.   PCP has changed diltiazem to lisinopril with elevated BP and seen to have increased PVCs on EKG.    He was on the lowest dose of diltiazem, so I would prefer for him to be on an AV obie blocking agent with a higher dose to help suppress ectopy and improve BP control rather than change to a completely different agent altogether.    Blood pressure relatively well-controlled today  No changes made today

## 2024-10-16 DIAGNOSIS — I73.9 CLAUDICATION OF LEFT LOWER EXTREMITY (HCC): ICD-10-CM

## 2024-10-17 ENCOUNTER — OFFICE VISIT (OUTPATIENT)
Dept: FAMILY MEDICINE CLINIC | Facility: CLINIC | Age: 74
End: 2024-10-17
Payer: MEDICARE

## 2024-10-17 VITALS
DIASTOLIC BLOOD PRESSURE: 82 MMHG | HEART RATE: 64 BPM | BODY MASS INDEX: 32.68 KG/M2 | SYSTOLIC BLOOD PRESSURE: 122 MMHG | OXYGEN SATURATION: 98 % | WEIGHT: 208.2 LBS | TEMPERATURE: 98.1 F | HEIGHT: 67 IN

## 2024-10-17 DIAGNOSIS — Z79.01 ANTICOAGULATED ON COUMADIN: ICD-10-CM

## 2024-10-17 DIAGNOSIS — I73.9 PERIPHERAL ARTERY DISEASE (HCC): ICD-10-CM

## 2024-10-17 DIAGNOSIS — Z23 ENCOUNTER FOR IMMUNIZATION: ICD-10-CM

## 2024-10-17 DIAGNOSIS — G30.1 MODERATE LATE ONSET ALZHEIMER'S DEMENTIA WITH MOOD DISTURBANCE (HCC): ICD-10-CM

## 2024-10-17 DIAGNOSIS — F02.B3 MODERATE LATE ONSET ALZHEIMER'S DEMENTIA WITH MOOD DISTURBANCE (HCC): ICD-10-CM

## 2024-10-17 DIAGNOSIS — N18.31 STAGE 3A CHRONIC KIDNEY DISEASE (HCC): Primary | ICD-10-CM

## 2024-10-17 PROBLEM — Z97.4 USES HEARING AID: Status: ACTIVE | Noted: 2024-10-17

## 2024-10-17 PROCEDURE — 99214 OFFICE O/P EST MOD 30 MIN: CPT | Performed by: FAMILY MEDICINE

## 2024-10-17 PROCEDURE — 90662 IIV NO PRSV INCREASED AG IM: CPT

## 2024-10-17 PROCEDURE — G2211 COMPLEX E/M VISIT ADD ON: HCPCS | Performed by: FAMILY MEDICINE

## 2024-10-17 PROCEDURE — G0008 ADMIN INFLUENZA VIRUS VAC: HCPCS

## 2024-10-17 RX ORDER — CLOPIDOGREL BISULFATE 75 MG/1
75 TABLET ORAL DAILY
Qty: 30 TABLET | Refills: 0 | Status: SHIPPED | OUTPATIENT
Start: 2024-10-17

## 2024-10-17 NOTE — ASSESSMENT & PLAN NOTE
Lab Results   Component Value Date    EGFR 52 04/18/2024    EGFR 48 09/11/2023    EGFR 44 10/25/2022    CREATININE 1.34 (H) 04/18/2024    CREATININE 1.42 (H) 09/11/2023    CREATININE 1.55 (H) 10/25/2022       Orders:    CBC and differential; Future    Comprehensive metabolic panel; Future

## 2024-10-17 NOTE — PROGRESS NOTES
Ambulatory Visit  Name: Chapo Cerna      : 1950      MRN: [id number]  Encounter Provider: Maryann Gaspar MD  Encounter Date: 10/17/2024   Encounter department: [facility]    Assessment & Plan  Stage 3a chronic kidney disease (HCC)  Lab Results   Component Value Date    EGFR 52 2024    EGFR 48 2023    EGFR 44 10/25/2022    CREATININE 1.34 (H) 2024    CREATININE 1.42 (H) 2023    CREATININE 1.55 (H) 10/25/2022       Orders:    CBC and differential; Future    Comprehensive metabolic panel; Future    Peripheral artery disease (HCC)    Orders:    CBC and differential; Future    Comprehensive metabolic panel; Future    Anticoagulated on Coumadin    Orders:    CBC and differential; Future    Comprehensive metabolic panel; Future    Moderate late onset Alzheimer's dementia with mood disturbance (HCC)    Orders:    CBC and differential; Future    Comprehensive metabolic panel; Future      Depression Screening and Follow-up Plan: Patient was screened for depression during today's encounter. They screened negative with a PHQ-2 score of 0.      History of Present Illness     Here for f/u. Wife notes at change of seasons, the INR tends to run low. She also notes he may forget a pill here and there, dementia, even she lays out his meds for him. Wife notes his dementia has not progressed. Already linked in with senior care appts, has end of January. Pt was dx'd with alzheimers and vascular dementia, no meds at this time, and remaining at home for now. Pt wife has dtr and ROBBIN for support. No bleeding issues with the warfarin.hasn't driven for 3 years, first noticed the dementia after that. Sees cardiology yearly.           Review of Systems   Constitutional:  Negative for fatigue and fever.   HENT:  Negative for congestion.    Eyes:  Negative for visual disturbance.   Respiratory:  Negative for chest tightness and shortness of breath.    Cardiovascular:  Negative for chest pain and  "palpitations.   Gastrointestinal:  Negative for abdominal pain.   Genitourinary:  Negative for difficulty urinating.   Musculoskeletal:  Negative for arthralgias.   Neurological:  Negative for headaches.   Hematological:  Does not bruise/bleed easily.           Objective     /82 (BP Location: Right arm, Patient Position: Sitting, Cuff Size: Large)   Pulse 64   Temp 98.1 °F (36.7 °C) (Temporal)   Ht 5' 7\" (1.702 m)   Wt 94.4 kg (208 lb 3.2 oz)   SpO2 98%   BMI 32.61 kg/m²     Physical Exam  Vitals reviewed.   Constitutional:       Appearance: Normal appearance.   Cardiovascular:      Rate and Rhythm: Normal rate and regular rhythm.      Heart sounds: Normal heart sounds.   Pulmonary:      Effort: Pulmonary effort is normal.      Breath sounds: Normal breath sounds.   Musculoskeletal:      Right lower leg: No edema.      Left lower leg: No edema.   Neurological:      General: No focal deficit present.      Mental Status: He is alert and oriented to person, place, and time.   Psychiatric:         Mood and Affect: Mood normal.         Behavior: Behavior normal.         Thought Content: Thought content normal.         Judgment: Judgment normal.         "

## 2024-10-17 NOTE — TELEPHONE ENCOUNTER
Patient needs updated blood work. Please place orders. A courtesy refill was provided.      Pt needs updated labs

## 2024-10-24 ENCOUNTER — CLINICAL SUPPORT (OUTPATIENT)
Dept: FAMILY MEDICINE CLINIC | Facility: CLINIC | Age: 74
End: 2024-10-24
Payer: MEDICARE

## 2024-10-24 ENCOUNTER — APPOINTMENT (OUTPATIENT)
Dept: LAB | Facility: MEDICAL CENTER | Age: 74
End: 2024-10-24
Payer: MEDICARE

## 2024-10-24 DIAGNOSIS — Z23 NEED FOR COVID-19 VACCINE: Primary | ICD-10-CM

## 2024-10-24 DIAGNOSIS — N18.31 STAGE 3A CHRONIC KIDNEY DISEASE (HCC): ICD-10-CM

## 2024-10-24 DIAGNOSIS — I73.9 PERIPHERAL ARTERY DISEASE (HCC): ICD-10-CM

## 2024-10-24 DIAGNOSIS — Z51.81 ANTICOAGULATION GOAL OF INR 2 TO 3: ICD-10-CM

## 2024-10-24 DIAGNOSIS — Z79.01 ANTICOAGULATION GOAL OF INR 2 TO 3: ICD-10-CM

## 2024-10-24 DIAGNOSIS — F02.B3 MODERATE LATE ONSET ALZHEIMER'S DEMENTIA WITH MOOD DISTURBANCE (HCC): ICD-10-CM

## 2024-10-24 DIAGNOSIS — G30.1 MODERATE LATE ONSET ALZHEIMER'S DEMENTIA WITH MOOD DISTURBANCE (HCC): ICD-10-CM

## 2024-10-24 DIAGNOSIS — Z79.01 ANTICOAGULATED ON COUMADIN: ICD-10-CM

## 2024-10-24 LAB
ALBUMIN SERPL BCG-MCNC: 3.9 G/DL (ref 3.5–5)
ALP SERPL-CCNC: 170 U/L (ref 34–104)
ALT SERPL W P-5'-P-CCNC: 25 U/L (ref 7–52)
ANION GAP SERPL CALCULATED.3IONS-SCNC: 6 MMOL/L (ref 4–13)
AST SERPL W P-5'-P-CCNC: 21 U/L (ref 13–39)
BASOPHILS # BLD AUTO: 0.06 THOUSANDS/ΜL (ref 0–0.1)
BASOPHILS NFR BLD AUTO: 1 % (ref 0–1)
BILIRUB SERPL-MCNC: 0.62 MG/DL (ref 0.2–1)
BUN SERPL-MCNC: 14 MG/DL (ref 5–25)
CALCIUM SERPL-MCNC: 9.2 MG/DL (ref 8.4–10.2)
CHLORIDE SERPL-SCNC: 102 MMOL/L (ref 96–108)
CO2 SERPL-SCNC: 33 MMOL/L (ref 21–32)
CREAT SERPL-MCNC: 1.32 MG/DL (ref 0.6–1.3)
EOSINOPHIL # BLD AUTO: 0.17 THOUSAND/ΜL (ref 0–0.61)
EOSINOPHIL NFR BLD AUTO: 1 % (ref 0–6)
ERYTHROCYTE [DISTWIDTH] IN BLOOD BY AUTOMATED COUNT: 13 % (ref 11.6–15.1)
GFR SERPL CREATININE-BSD FRML MDRD: 53 ML/MIN/1.73SQ M
GLUCOSE SERPL-MCNC: 94 MG/DL (ref 65–140)
HCT VFR BLD AUTO: 47.5 % (ref 36.5–49.3)
HGB BLD-MCNC: 15.4 G/DL (ref 12–17)
IMM GRANULOCYTES # BLD AUTO: 0.06 THOUSAND/UL (ref 0–0.2)
IMM GRANULOCYTES NFR BLD AUTO: 1 % (ref 0–2)
INR PPP: 2.52 (ref 0.85–1.19)
LYMPHOCYTES # BLD AUTO: 1.63 THOUSANDS/ΜL (ref 0.6–4.47)
LYMPHOCYTES NFR BLD AUTO: 13 % (ref 14–44)
MCH RBC QN AUTO: 30.4 PG (ref 26.8–34.3)
MCHC RBC AUTO-ENTMCNC: 32.4 G/DL (ref 31.4–37.4)
MCV RBC AUTO: 94 FL (ref 82–98)
MONOCYTES # BLD AUTO: 1.11 THOUSAND/ΜL (ref 0.17–1.22)
MONOCYTES NFR BLD AUTO: 9 % (ref 4–12)
NEUTROPHILS # BLD AUTO: 9.57 THOUSANDS/ΜL (ref 1.85–7.62)
NEUTS SEG NFR BLD AUTO: 75 % (ref 43–75)
NRBC BLD AUTO-RTO: 0 /100 WBCS
PLATELET # BLD AUTO: 225 THOUSANDS/UL (ref 149–390)
PMV BLD AUTO: 9.6 FL (ref 8.9–12.7)
POTASSIUM SERPL-SCNC: 4.1 MMOL/L (ref 3.5–5.3)
PROT SERPL-MCNC: 6.6 G/DL (ref 6.4–8.4)
PROTHROMBIN TIME: 27.1 SECONDS (ref 12.3–15)
RBC # BLD AUTO: 5.07 MILLION/UL (ref 3.88–5.62)
SODIUM SERPL-SCNC: 141 MMOL/L (ref 135–147)
WBC # BLD AUTO: 12.6 THOUSAND/UL (ref 4.31–10.16)

## 2024-10-24 PROCEDURE — 85025 COMPLETE CBC W/AUTO DIFF WBC: CPT

## 2024-10-24 PROCEDURE — 90480 ADMN SARSCOV2 VAC 1/ONLY CMP: CPT

## 2024-10-24 PROCEDURE — 36415 COLL VENOUS BLD VENIPUNCTURE: CPT

## 2024-10-24 PROCEDURE — 80053 COMPREHEN METABOLIC PANEL: CPT

## 2024-10-24 PROCEDURE — 85610 PROTHROMBIN TIME: CPT

## 2024-10-24 PROCEDURE — 91320 SARSCV2 VAC 30MCG TRS-SUC IM: CPT

## 2024-11-25 ENCOUNTER — APPOINTMENT (OUTPATIENT)
Dept: LAB | Facility: MEDICAL CENTER | Age: 74
End: 2024-11-25
Payer: MEDICARE

## 2024-11-25 DIAGNOSIS — Z79.01 ANTICOAGULATION GOAL OF INR 2 TO 3: ICD-10-CM

## 2024-11-25 DIAGNOSIS — Z79.01 ANTICOAGULATED ON COUMADIN: ICD-10-CM

## 2024-11-25 DIAGNOSIS — Z51.81 ANTICOAGULATION GOAL OF INR 2 TO 3: ICD-10-CM

## 2024-11-25 DIAGNOSIS — I73.9 PERIPHERAL ARTERY DISEASE (HCC): ICD-10-CM

## 2024-11-25 LAB
INR PPP: 2.44 (ref 0.85–1.19)
PROTHROMBIN TIME: 26.4 SECONDS (ref 12.3–15)

## 2024-11-25 PROCEDURE — 36415 COLL VENOUS BLD VENIPUNCTURE: CPT

## 2024-11-25 PROCEDURE — 85610 PROTHROMBIN TIME: CPT

## 2024-11-26 ENCOUNTER — RESULTS FOLLOW-UP (OUTPATIENT)
Dept: FAMILY MEDICINE CLINIC | Facility: CLINIC | Age: 74
End: 2024-11-26

## 2024-12-03 ENCOUNTER — HOSPITAL ENCOUNTER (OUTPATIENT)
Dept: NON INVASIVE DIAGNOSTICS | Facility: CLINIC | Age: 74
Discharge: HOME | End: 2024-12-03
Payer: MEDICARE

## 2024-12-03 DIAGNOSIS — I73.9 PERIPHERAL ARTERY DISEASE (HCC): ICD-10-CM

## 2024-12-03 PROCEDURE — 93925 LOWER EXTREMITY STUDY: CPT

## 2024-12-03 PROCEDURE — 93925 LOWER EXTREMITY STUDY: CPT | Performed by: SURGERY

## 2024-12-03 PROCEDURE — 93923 UPR/LXTR ART STDY 3+ LVLS: CPT

## 2024-12-03 PROCEDURE — 93922 UPR/L XTREMITY ART 2 LEVELS: CPT | Performed by: SURGERY

## 2024-12-04 ENCOUNTER — TRANSCRIBE ORDERS (OUTPATIENT)
Dept: VASCULAR SURGERY | Facility: CLINIC | Age: 74
End: 2024-12-04

## 2024-12-04 DIAGNOSIS — I73.9 PERIPHERAL ARTERY DISEASE (HCC): Primary | ICD-10-CM

## 2024-12-09 DIAGNOSIS — I10 ESSENTIAL HYPERTENSION: Primary | ICD-10-CM

## 2024-12-11 RX ORDER — CLONIDINE HYDROCHLORIDE 0.1 MG/1
0.1 TABLET ORAL EVERY 8 HOURS
Qty: 270 TABLET | Refills: 0 | Status: SHIPPED | OUTPATIENT
Start: 2024-12-11

## 2025-01-02 DIAGNOSIS — I73.9 INTERMITTENT CLAUDICATION (HCC): ICD-10-CM

## 2025-01-02 DIAGNOSIS — Z79.01 ANTICOAGULATION GOAL OF INR 2 TO 3: ICD-10-CM

## 2025-01-02 DIAGNOSIS — Z51.81 ANTICOAGULATION GOAL OF INR 2 TO 3: ICD-10-CM

## 2025-01-02 DIAGNOSIS — I73.9 PERIPHERAL ARTERY DISEASE (HCC): ICD-10-CM

## 2025-01-03 ENCOUNTER — APPOINTMENT (OUTPATIENT)
Dept: LAB | Facility: MEDICAL CENTER | Age: 75
End: 2025-01-03
Payer: MEDICARE

## 2025-01-03 DIAGNOSIS — Z79.01 ANTICOAGULATED ON COUMADIN: ICD-10-CM

## 2025-01-03 DIAGNOSIS — Z51.81 ANTICOAGULATION GOAL OF INR 2 TO 3: ICD-10-CM

## 2025-01-03 DIAGNOSIS — I73.9 PERIPHERAL ARTERY DISEASE (HCC): ICD-10-CM

## 2025-01-03 DIAGNOSIS — Z79.01 ANTICOAGULATION GOAL OF INR 2 TO 3: ICD-10-CM

## 2025-01-03 LAB
INR PPP: 2.15 (ref 0.85–1.19)
PROTHROMBIN TIME: 24 SECONDS (ref 12.3–15)

## 2025-01-03 PROCEDURE — 36415 COLL VENOUS BLD VENIPUNCTURE: CPT

## 2025-01-03 PROCEDURE — 85610 PROTHROMBIN TIME: CPT

## 2025-01-03 RX ORDER — ATORVASTATIN CALCIUM 80 MG/1
80 TABLET, FILM COATED ORAL DAILY
Qty: 30 TABLET | Refills: 5 | Status: SHIPPED | OUTPATIENT
Start: 2025-01-03

## 2025-01-04 RX ORDER — WARFARIN SODIUM 3 MG/1
3 TABLET ORAL DAILY
Qty: 30 TABLET | Refills: 5 | Status: SHIPPED | OUTPATIENT
Start: 2025-01-04

## 2025-01-06 DIAGNOSIS — I47.10 SVT (SUPRAVENTRICULAR TACHYCARDIA) (HCC): ICD-10-CM

## 2025-01-06 DIAGNOSIS — I73.9 CLAUDICATION OF LEFT LOWER EXTREMITY (HCC): ICD-10-CM

## 2025-01-07 RX ORDER — CLOPIDOGREL BISULFATE 75 MG/1
75 TABLET ORAL DAILY
Qty: 30 TABLET | Refills: 5 | Status: SHIPPED | OUTPATIENT
Start: 2025-01-07

## 2025-01-07 RX ORDER — DILTIAZEM HYDROCHLORIDE 240 MG/1
240 CAPSULE, COATED, EXTENDED RELEASE ORAL DAILY
Qty: 30 CAPSULE | Refills: 5 | Status: SHIPPED | OUTPATIENT
Start: 2025-01-07

## 2025-02-04 ENCOUNTER — APPOINTMENT (OUTPATIENT)
Dept: LAB | Facility: MEDICAL CENTER | Age: 75
End: 2025-02-04
Payer: MEDICARE

## 2025-02-04 DIAGNOSIS — Z79.01 ANTICOAGULATED ON COUMADIN: ICD-10-CM

## 2025-02-04 DIAGNOSIS — Z51.81 ANTICOAGULATION GOAL OF INR 2 TO 3: ICD-10-CM

## 2025-02-04 DIAGNOSIS — I73.9 PERIPHERAL ARTERY DISEASE (HCC): ICD-10-CM

## 2025-02-04 DIAGNOSIS — Z79.01 ANTICOAGULATION GOAL OF INR 2 TO 3: ICD-10-CM

## 2025-02-04 PROCEDURE — 36415 COLL VENOUS BLD VENIPUNCTURE: CPT

## 2025-02-04 PROCEDURE — 85610 PROTHROMBIN TIME: CPT

## 2025-02-05 LAB
INR PPP: 2.44 (ref 0.85–1.19)
PROTHROMBIN TIME: 26.4 SECONDS (ref 12.3–15)

## 2025-02-08 NOTE — TELEPHONE ENCOUNTER
Patient's wife call regarding INR results. I read doctor's note, verbatim.  Patient understood and had no further questions.

## 2025-03-10 ENCOUNTER — APPOINTMENT (OUTPATIENT)
Dept: LAB | Facility: MEDICAL CENTER | Age: 75
End: 2025-03-10
Payer: MEDICARE

## 2025-03-10 DIAGNOSIS — Z51.81 ANTICOAGULATION GOAL OF INR 2 TO 3: ICD-10-CM

## 2025-03-10 DIAGNOSIS — I73.9 PERIPHERAL ARTERY DISEASE (HCC): ICD-10-CM

## 2025-03-10 DIAGNOSIS — Z79.01 ANTICOAGULATION GOAL OF INR 2 TO 3: ICD-10-CM

## 2025-03-10 DIAGNOSIS — Z79.01 ANTICOAGULATED ON COUMADIN: ICD-10-CM

## 2025-03-10 LAB
INR PPP: 2.46 (ref 0.85–1.19)
PROTHROMBIN TIME: 26.5 SECONDS (ref 12.3–15)

## 2025-03-10 PROCEDURE — 36415 COLL VENOUS BLD VENIPUNCTURE: CPT

## 2025-03-10 PROCEDURE — 85610 PROTHROMBIN TIME: CPT

## 2025-04-17 ENCOUNTER — APPOINTMENT (OUTPATIENT)
Dept: LAB | Facility: MEDICAL CENTER | Age: 75
End: 2025-04-17
Attending: FAMILY MEDICINE
Payer: MEDICARE

## 2025-04-17 ENCOUNTER — OFFICE VISIT (OUTPATIENT)
Dept: FAMILY MEDICINE CLINIC | Facility: CLINIC | Age: 75
End: 2025-04-17
Payer: MEDICARE

## 2025-04-17 VITALS
HEIGHT: 67 IN | TEMPERATURE: 98 F | SYSTOLIC BLOOD PRESSURE: 120 MMHG | OXYGEN SATURATION: 97 % | WEIGHT: 213.6 LBS | DIASTOLIC BLOOD PRESSURE: 84 MMHG | HEART RATE: 63 BPM | BODY MASS INDEX: 33.53 KG/M2

## 2025-04-17 DIAGNOSIS — Z79.01 ANTICOAGULATION GOAL OF INR 2 TO 3: Primary | ICD-10-CM

## 2025-04-17 DIAGNOSIS — G30.1 MODERATE LATE ONSET ALZHEIMER'S DEMENTIA WITH MOOD DISTURBANCE (HCC): ICD-10-CM

## 2025-04-17 DIAGNOSIS — D68.59 PROTEIN C DEFICIENCY (HCC): ICD-10-CM

## 2025-04-17 DIAGNOSIS — I74.3 EMBOLISM AND THROMBOSIS OF ARTERIES OF THE LOWER EXTREMITIES (HCC): ICD-10-CM

## 2025-04-17 DIAGNOSIS — Z79.01 ANTICOAGULATED ON COUMADIN: ICD-10-CM

## 2025-04-17 DIAGNOSIS — Z51.81 ANTICOAGULATION GOAL OF INR 2 TO 3: Primary | ICD-10-CM

## 2025-04-17 DIAGNOSIS — F02.B3 MODERATE LATE ONSET ALZHEIMER'S DEMENTIA WITH MOOD DISTURBANCE (HCC): ICD-10-CM

## 2025-04-17 DIAGNOSIS — N18.31 STAGE 3A CHRONIC KIDNEY DISEASE (HCC): ICD-10-CM

## 2025-04-17 DIAGNOSIS — Z79.01 ANTICOAGULATION GOAL OF INR 2 TO 3: ICD-10-CM

## 2025-04-17 DIAGNOSIS — Z51.81 ANTICOAGULATION GOAL OF INR 2 TO 3: ICD-10-CM

## 2025-04-17 DIAGNOSIS — Z00.00 MEDICARE ANNUAL WELLNESS VISIT, SUBSEQUENT: ICD-10-CM

## 2025-04-17 PROCEDURE — G0439 PPPS, SUBSEQ VISIT: HCPCS | Performed by: FAMILY MEDICINE

## 2025-04-17 PROCEDURE — 99213 OFFICE O/P EST LOW 20 MIN: CPT | Performed by: FAMILY MEDICINE

## 2025-04-17 PROCEDURE — G2211 COMPLEX E/M VISIT ADD ON: HCPCS | Performed by: FAMILY MEDICINE

## 2025-04-17 PROCEDURE — 36415 COLL VENOUS BLD VENIPUNCTURE: CPT

## 2025-04-17 PROCEDURE — 85610 PROTHROMBIN TIME: CPT

## 2025-04-17 NOTE — PROGRESS NOTES
Name: Chapo Cerna      : 1950      MRN: [id number]  Encounter Provider: Maryann Gaspar MD  Encounter Date: 2025   Encounter department: [facility]  :  Assessment & Plan  Anticoagulation goal of INR 2 to 3    Orders:    Protime-INR; Standing    Anticoagulated on Coumadin    Orders:    Protime-INR; Standing    Protein C deficiency (HCC)    Orders:    Protime-INR; Standing    Embolism and thrombosis of arteries of the lower extremities (HCC)    Orders:    Protime-INR; Standing    Moderate late onset Alzheimer's dementia with mood disturbance (HCC)         Stage 3a chronic kidney disease (HCC)  Lab Results   Component Value Date    EGFR 53 10/24/2024    EGFR 52 2024    EGFR 48 2023    CREATININE 1.32 (H) 10/24/2024    CREATININE 1.34 (H) 2024    CREATININE 1.42 (H) 2023            Medicare annual wellness visit, subsequent           Depression Screening and Follow-up Plan: Patient was screened for depression during today's encounter. They screened negative with a PHQ-2 score of 0.        Preventive health issues were discussed with patient, and age appropriate screening tests were ordered as noted in patient's After Visit Summary. Personalized health advice and appropriate referrals for health education or preventive services given if needed, as noted in patient's After Visit Summary.    History of Present Illness     Here for f/u, recently dx'd with alzheimers. Sleeps for pockets of time, occ agitated at night, so far wife does not have to worry about him wandering or leaving the house. he has upcoming appt with senior [facility] next month. No bleeding issues with plavix or warfarin. Last MOCA  in July.        Patient Care Team:  Maryann Gaspar MD as PCP - General (Family Medicine)  Grazyna Celaya MD (Cardiology)  Deepak Amador DPM (Podiatry)  Ron Lehman MD (Urology)  Kevin Petersen MD (Vascular Surgery)    Review of Systems   Constitutional:   Negative for fatigue and fever.   HENT:  Negative for congestion.    Eyes:  Negative for visual disturbance.   Respiratory:  Negative for chest tightness and shortness of breath.    Cardiovascular:  Negative for chest pain and palpitations.   Gastrointestinal:  Negative for abdominal pain.   Genitourinary:  Negative for difficulty urinating.   Musculoskeletal:  Negative for arthralgias.   Neurological:  Negative for headaches.   Hematological:  Does not bruise/bleed easily.     Medical History Reviewed by provider this encounter:  Tobacco  Allergies  Meds  Problems  Med Hx  Surg Hx  Fam Hx       Annual Wellness Visit Questionnaire   Chapo is here for his Subsequent Wellness visit. Last Medicare Wellness visit information reviewed, patient interviewed and updates made to the record today.      Health Risk Assessment:   Patient rates overall health as good. Patient feels that their physical health rating is same. Patient is satisfied with their life. Eyesight was rated as same. Hearing was rated as same. Patient feels that their emotional and mental health rating is much worse. Patients states they are never, rarely angry. Patient states they are never, rarely unusually tired/fatigued. Pain experienced in the last 7 days has been none. Patient states that he has experienced no weight loss or gain in last 6 months.     Depression Screening:   PHQ-2 Score: 0      Fall Risk Screening:   In the past year, patient has experienced: no history of falling in past year      Home Safety:  Patient does not have trouble with stairs inside or outside of their home. Patient has working smoke alarms and has working carbon monoxide detector. Home safety hazards include: none.     Nutrition:   Current diet is Regular.     Medications:   Patient is currently taking over-the-counter supplements. OTC medications include: see medication list. Patient is able to manage medications.     Activities of Daily Living (ADLs)/Instrumental  Activities of Daily Living (IADLs):   Walk and transfer into and out of bed and chair?: Yes  Dress and groom yourself?: Yes    Bathe or shower yourself?: Yes    Feed yourself? Yes  Do your laundry/housekeeping?: No  Manage your money, pay your bills and track your expenses?: No  Make your own meals?: No    Do your own shopping?: No    Previous Hospitalizations:   Any hospitalizations or ED visits within the last 12 months?: No      Advance Care Planning:   Living will: Yes    Advanced directive: Yes      Cognitive Screening:   Provider or family/friend/caregiver concerned regarding cognition?: Yes  [place] Cognitive Assessment (MoCA) Score: 5  Interpretation: MoCA Score < 25: abnormal/possible cognitive impairment    Preventive Screenings      Cardiovascular Screening:    General: Screening Current      Diabetes Screening:     General: Screening Current      Colorectal Cancer Screening:     General: Screening Current      Prostate Cancer Screening:    General: Patient Declines and Screening Not Indicated      Osteoporosis Screening:    General: Screening Not Indicated      Abdominal Aortic Aneurysm (AAA) Screening:    Risk factors include: age between 65-76 yo and tobacco use        General: Screening Not Indicated and History AAA      Lung Cancer Screening:     General: Screening Not Indicated      Hepatitis C Screening:    General: Screening Current    Immunizations:  - Immunizations due: Zoster (Shingrix)    Screening, Brief Intervention, and Referral to Treatment (SBIRT)     Screening      Single Item Drug Screening:  How often have you used an illegal drug (including marijuana) or a prescription medication for non-medical reasons in the past year? never    Single Item Drug Screen Score: 0  Interpretation: Negative screen for possible drug use disorder    Social Drivers of Health     Financial Resource Strain: Low Risk  (3/23/2023)    Overall Financial Resource Strain (CARDIA)     Difficulty of Paying Living  "Expenses: Not very hard   Food Insecurity: No Food Insecurity (4/17/2025)    Hunger Vital Sign     Worried About Running Out of Food in the Last Year: Never true     Ran Out of Food in the Last Year: Never true   Transportation Needs: No Transportation Needs (4/17/2025)    PRAPARE - Transportation     Lack of Transportation (Medical): No     Lack of Transportation (Non-Medical): No   Housing Stability: Low Risk  (4/17/2025)    Housing Stability Vital Sign     Unable to Pay for Housing in the Last Year: No     Number of Times Moved in the Last Year: 0     Homeless in the Last Year: No   Utilities: Not At Risk (4/17/2025)    [facility] Utilities     Threatened with loss of utilities: No     No results found.    Objective   /84 (BP Location: Left arm, Patient Position: Sitting, Cuff Size: Large)   Pulse 63   Temp 98 °F (36.7 °C) (Temporal)   Ht 5' 7\" (1.702 m)   Wt 96.9 kg (213 lb 9.6 oz)   SpO2 97%   BMI 33.45 kg/m²     Physical Exam  Vitals reviewed.   Constitutional:       Appearance: Normal appearance.   Cardiovascular:      Rate and Rhythm: Normal rate and regular rhythm.      Heart sounds: Normal heart sounds.   Pulmonary:      Effort: Pulmonary effort is normal.      Breath sounds: Normal breath sounds.   Musculoskeletal:      Right lower leg: No edema.      Left lower leg: No edema.   Neurological:      General: No focal deficit present.      Mental Status: He is alert and oriented to person, place, and time.   Psychiatric:         Mood and Affect: Mood normal.         Behavior: Behavior normal.         Thought Content: Thought content normal.         Judgment: Judgment normal.         "

## 2025-04-17 NOTE — ASSESSMENT & PLAN NOTE
Lab Results   Component Value Date    EGFR 53 10/24/2024    EGFR 52 04/18/2024    EGFR 48 09/11/2023    CREATININE 1.32 (H) 10/24/2024    CREATININE 1.34 (H) 04/18/2024    CREATININE 1.42 (H) 09/11/2023

## 2025-04-18 ENCOUNTER — TELEPHONE (OUTPATIENT)
Age: 75
End: 2025-04-18

## 2025-04-18 LAB
INR PPP: 2.28 (ref 0.85–1.19)
PROTHROMBIN TIME: 25.1 SECONDS (ref 12.3–15)

## 2025-04-18 NOTE — TELEPHONE ENCOUNTER
Spoke to pt's wife.  Per Dr Martinez, PT/INR looks good, continue same dose and recheck in one month.

## 2025-04-18 NOTE — TELEPHONE ENCOUNTER
Patient's wife called to review results of the patient's INR drawn yesterday as they are unable to access MyChart.  Please call back to review results and recommendations.  Thank you!

## 2025-04-19 ENCOUNTER — RESULTS FOLLOW-UP (OUTPATIENT)
Dept: FAMILY MEDICINE CLINIC | Facility: CLINIC | Age: 75
End: 2025-04-19

## 2025-04-23 DIAGNOSIS — I73.9 CLAUDICATION OF LEFT LOWER EXTREMITY (HCC): ICD-10-CM

## 2025-04-23 RX ORDER — CLOPIDOGREL BISULFATE 75 MG/1
75 TABLET ORAL DAILY
Qty: 30 TABLET | Refills: 5 | Status: SHIPPED | OUTPATIENT
Start: 2025-04-23

## 2025-05-08 DIAGNOSIS — Z51.81 ANTICOAGULATION GOAL OF INR 2 TO 3: ICD-10-CM

## 2025-05-08 DIAGNOSIS — I73.9 PERIPHERAL ARTERY DISEASE (HCC): ICD-10-CM

## 2025-05-08 DIAGNOSIS — Z79.01 ANTICOAGULATION GOAL OF INR 2 TO 3: ICD-10-CM

## 2025-05-08 DIAGNOSIS — I73.9 INTERMITTENT CLAUDICATION (HCC): ICD-10-CM

## 2025-05-09 DIAGNOSIS — D69.6 THROMBOCYTOPENIA (HCC): ICD-10-CM

## 2025-05-09 DIAGNOSIS — I10 ESSENTIAL HYPERTENSION: Primary | ICD-10-CM

## 2025-05-09 DIAGNOSIS — E78.5 DYSLIPIDEMIA: ICD-10-CM

## 2025-05-09 DIAGNOSIS — N18.30 STAGE 3 CHRONIC KIDNEY DISEASE, UNSPECIFIED WHETHER STAGE 3A OR 3B CKD (HCC): ICD-10-CM

## 2025-05-09 PROBLEM — N17.9 AKI (ACUTE KIDNEY INJURY) (HCC): Status: RESOLVED | Noted: 2021-09-25 | Resolved: 2025-05-09

## 2025-05-09 RX ORDER — ATORVASTATIN CALCIUM 80 MG/1
80 TABLET, FILM COATED ORAL DAILY
Qty: 30 TABLET | Refills: 0 | Status: SHIPPED | OUTPATIENT
Start: 2025-05-09

## 2025-05-09 RX ORDER — WARFARIN SODIUM 3 MG/1
3 TABLET ORAL DAILY
Qty: 30 TABLET | Refills: 5 | Status: SHIPPED | OUTPATIENT
Start: 2025-05-09

## 2025-05-15 ENCOUNTER — APPOINTMENT (OUTPATIENT)
Dept: LAB | Facility: MEDICAL CENTER | Age: 75
End: 2025-05-15
Attending: FAMILY MEDICINE
Payer: MEDICARE

## 2025-05-15 DIAGNOSIS — D68.59 PROTEIN C DEFICIENCY (HCC): ICD-10-CM

## 2025-05-15 DIAGNOSIS — I74.3 EMBOLISM AND THROMBOSIS OF ARTERIES OF THE LOWER EXTREMITIES (HCC): ICD-10-CM

## 2025-05-15 DIAGNOSIS — Z79.01 ANTICOAGULATED ON COUMADIN: ICD-10-CM

## 2025-05-15 DIAGNOSIS — Z51.81 ANTICOAGULATION GOAL OF INR 2 TO 3: ICD-10-CM

## 2025-05-15 DIAGNOSIS — Z79.01 ANTICOAGULATION GOAL OF INR 2 TO 3: ICD-10-CM

## 2025-05-15 LAB
INR PPP: 2.48 (ref 0.85–1.19)
PROTHROMBIN TIME: 26.7 SECONDS (ref 12.3–15)

## 2025-05-15 PROCEDURE — 85610 PROTHROMBIN TIME: CPT

## 2025-05-15 PROCEDURE — 36415 COLL VENOUS BLD VENIPUNCTURE: CPT

## 2025-05-16 ENCOUNTER — APPOINTMENT (OUTPATIENT)
Dept: LAB | Facility: MEDICAL CENTER | Age: 75
End: 2025-05-16
Attending: PHYSICIAN ASSISTANT
Payer: MEDICARE

## 2025-05-16 LAB
ALBUMIN SERPL BCG-MCNC: 4 G/DL (ref 3.5–5)
ALP SERPL-CCNC: 151 U/L (ref 34–104)
ALT SERPL W P-5'-P-CCNC: 25 U/L (ref 7–52)
ANION GAP SERPL CALCULATED.3IONS-SCNC: 10 MMOL/L (ref 4–13)
AST SERPL W P-5'-P-CCNC: 23 U/L (ref 13–39)
BASOPHILS # BLD AUTO: 0.05 THOUSANDS/ÂΜL (ref 0–0.1)
BASOPHILS NFR BLD AUTO: 0 % (ref 0–1)
BILIRUB SERPL-MCNC: 0.74 MG/DL (ref 0.2–1)
BUN SERPL-MCNC: 21 MG/DL (ref 5–25)
CALCIUM SERPL-MCNC: 9 MG/DL (ref 8.4–10.2)
CHLORIDE SERPL-SCNC: 103 MMOL/L (ref 96–108)
CHOLEST SERPL-MCNC: 134 MG/DL (ref ?–200)
CO2 SERPL-SCNC: 28 MMOL/L (ref 21–32)
CREAT SERPL-MCNC: 1.49 MG/DL (ref 0.6–1.3)
EOSINOPHIL # BLD AUTO: 0.3 THOUSAND/ÂΜL (ref 0–0.61)
EOSINOPHIL NFR BLD AUTO: 3 % (ref 0–6)
ERYTHROCYTE [DISTWIDTH] IN BLOOD BY AUTOMATED COUNT: 13.1 % (ref 11.6–15.1)
GFR SERPL CREATININE-BSD FRML MDRD: 45 ML/MIN/1.73SQ M
GLUCOSE P FAST SERPL-MCNC: 114 MG/DL (ref 65–99)
HCT VFR BLD AUTO: 48.5 % (ref 36.5–49.3)
HDLC SERPL-MCNC: 42 MG/DL
HGB BLD-MCNC: 15.6 G/DL (ref 12–17)
IMM GRANULOCYTES # BLD AUTO: 0.03 THOUSAND/UL (ref 0–0.2)
IMM GRANULOCYTES NFR BLD AUTO: 0 % (ref 0–2)
LDLC SERPL CALC-MCNC: 60 MG/DL (ref 0–100)
LYMPHOCYTES # BLD AUTO: 1.85 THOUSANDS/ÂΜL (ref 0.6–4.47)
LYMPHOCYTES NFR BLD AUTO: 16 % (ref 14–44)
MCH RBC QN AUTO: 30.7 PG (ref 26.8–34.3)
MCHC RBC AUTO-ENTMCNC: 32.2 G/DL (ref 31.4–37.4)
MCV RBC AUTO: 96 FL (ref 82–98)
MONOCYTES # BLD AUTO: 1.04 THOUSAND/ÂΜL (ref 0.17–1.22)
MONOCYTES NFR BLD AUTO: 9 % (ref 4–12)
NEUTROPHILS # BLD AUTO: 8.23 THOUSANDS/ÂΜL (ref 1.85–7.62)
NEUTS SEG NFR BLD AUTO: 72 % (ref 43–75)
NONHDLC SERPL-MCNC: 92 MG/DL
NRBC BLD AUTO-RTO: 0 /100 WBCS
PLATELET # BLD AUTO: 197 THOUSANDS/UL (ref 149–390)
PMV BLD AUTO: 9.8 FL (ref 8.9–12.7)
POTASSIUM SERPL-SCNC: 3.9 MMOL/L (ref 3.5–5.3)
PROT SERPL-MCNC: 6.7 G/DL (ref 6.4–8.4)
RBC # BLD AUTO: 5.08 MILLION/UL (ref 3.88–5.62)
SODIUM SERPL-SCNC: 141 MMOL/L (ref 135–147)
TRIGL SERPL-MCNC: 162 MG/DL (ref ?–150)
WBC # BLD AUTO: 11.5 THOUSAND/UL (ref 4.31–10.16)

## 2025-05-16 PROCEDURE — 80061 LIPID PANEL: CPT | Performed by: PHYSICIAN ASSISTANT

## 2025-05-16 PROCEDURE — 85025 COMPLETE CBC W/AUTO DIFF WBC: CPT | Performed by: PHYSICIAN ASSISTANT

## 2025-05-16 PROCEDURE — 36415 COLL VENOUS BLD VENIPUNCTURE: CPT | Performed by: PHYSICIAN ASSISTANT

## 2025-05-16 PROCEDURE — 80053 COMPREHEN METABOLIC PANEL: CPT | Performed by: PHYSICIAN ASSISTANT

## 2025-05-18 ENCOUNTER — RESULTS FOLLOW-UP (OUTPATIENT)
Dept: FAMILY MEDICINE CLINIC | Facility: CLINIC | Age: 75
End: 2025-05-18

## 2025-05-19 ENCOUNTER — OFFICE VISIT (OUTPATIENT)
Age: 75
End: 2025-05-19
Payer: MEDICARE

## 2025-05-19 VITALS
TEMPERATURE: 98.2 F | HEIGHT: 67 IN | SYSTOLIC BLOOD PRESSURE: 126 MMHG | OXYGEN SATURATION: 97 % | HEART RATE: 57 BPM | DIASTOLIC BLOOD PRESSURE: 72 MMHG | WEIGHT: 213 LBS | BODY MASS INDEX: 33.43 KG/M2

## 2025-05-19 DIAGNOSIS — G47.09 OTHER INSOMNIA: ICD-10-CM

## 2025-05-19 DIAGNOSIS — R41.3 MEMORY CHANGE: ICD-10-CM

## 2025-05-19 DIAGNOSIS — Z91.81 HISTORY OF FALL: ICD-10-CM

## 2025-05-19 DIAGNOSIS — E53.8 B12 DEFICIENCY: ICD-10-CM

## 2025-05-19 DIAGNOSIS — Z97.4 USES HEARING AID: ICD-10-CM

## 2025-05-19 DIAGNOSIS — F02.B3 MODERATE LATE ONSET ALZHEIMER'S DEMENTIA WITH MOOD DISTURBANCE (HCC): Primary | ICD-10-CM

## 2025-05-19 DIAGNOSIS — G30.1 MODERATE LATE ONSET ALZHEIMER'S DEMENTIA WITH MOOD DISTURBANCE (HCC): Primary | ICD-10-CM

## 2025-05-19 PROCEDURE — 99214 OFFICE O/P EST MOD 30 MIN: CPT | Performed by: NURSE PRACTITIONER

## 2025-05-19 PROCEDURE — G2211 COMPLEX E/M VISIT ADD ON: HCPCS | Performed by: NURSE PRACTITIONER

## 2025-05-19 RX ORDER — FOLIC ACID 0.4 MG
400 TABLET ORAL DAILY
COMMUNITY

## 2025-05-19 RX ORDER — MULTIVITAMIN WITH IRON
500 TABLET ORAL DAILY
COMMUNITY

## 2025-05-19 NOTE — PROGRESS NOTES
"  ASSESSMENT AND PLAN:  1. Moderate late onset Alzheimer's dementia with mood disturbance (HCC)  Assessment & Plan:  Staging: Pt's memory most consistent with Moderate-Severe Stage Dementia, FAST 6e  Cognition update: more word finding issues and more disorientation/confusion  MOCA:  previously <10, did not repeat  Memory medications: none  No recent imaging with increased word finding issue- family would like to check imaging to see if there is vascular component.   Family feels pt would need to be sedated for MRI.  Will also recheck b12/tsh  Weight change in 6 mo:  up 4 pounds  Decision-making capacity: Does not have capacity for complex medical or financial decisions  Pt is assisted adl/dependent iadls.  Lives with wife.    Level of care:  24/7 cares- for assist with adls and safety  Caregiver Review: wife is ok with 24/7 caregiver, but is future planning.  Interested in [facility] and has some questions- will ask SW to reach out.  Safety:  Medication Review: seems appropriate for current conditions..  Beer's list meds:  none.  Wife assists with medication administration.  Financial/Scam safety:  no concerns at present.  Home/wandering:  pt paces in the home, no outside wandering.  We did discuss safety concern about leaving pt in car by himself while shopping.  Would recommend ID/gps bracelet/necklace \"just in case\"  Driving: Not driving.  Recommend putting car keys away - could leave out old keys that do not work in car for pt to have.  Fall Risk: Medium fall risk.  Last fall August 2024  Continue to stay active.  Current activity level:  decreased.  Does not participate in social, cognitive, physical activity.  Consider day center for increased activity.  Will ask SW to give activity list to see if there is anything pt might like with that.  Monitor for changes in mood, sleep, pain control.  Notify provider if any concerns  Optimize all acute and chronic conditions.  Continue to follow-up with PCP and " specialist as directed for management  Ensure adequate hydration, good nutrition  ACP Review: not on chart  Recommended next follow up:  6 months    Orders:  -     CT head without contrast; Future; Expected date: 05/19/2025  2. Other insomnia  Assessment & Plan:  Pt's wife reports best sleep is 7-10pm, naps during the day, up all night.  Recommend switching from caffeinated coffee to decaf.  Consider decreasing sugar added  Keep active during the day, encourage walks, find things that pt might enjoy doing around the house  Encourage good sleep hygiene techniques:  decreased noise distractions at night, quiet/calm environment, limit day time sleep.    Could consider trial of melatonin 3mg at hs to see if this helps promote rest.  Avoid over the counter sleep aides that contain diphenhydramine - they can cause confusion in some patients.  Avoid prescription sleep aides that are from then benzodiazapine class as they can increase cognitive decline over time and increase fall risk.  Do not stop abruptly, but discuss with pcp.  Ensure that pt does not wander out of home at night- consider door alarms.  Ensure that pt's caregiver is getting enough rest if she is awake all night monitoring patient.  3. Uses hearing aid  Assessment & Plan:  Pt won't wear HAs anymore  Speak clearly and toward pt when communicating.  Decrease outside noise distractions  Not wearing HAs may make it more difficult for pt to process info, which may lead to more confusion.  4. History of fall  Assessment & Plan:  Last fall in August  Family reports some imbalance but declines PT at this time (dont feel pt will do)  Encourage physical activity as tolerated  Fall precautions  5. B12 deficiency  -     Vitamin B12; Future; Expected date: Collect anytime  6. Memory change  -     CT head without contrast; Future; Expected date: 05/19/2025  -     TSH, 3rd generation; Future; Expected date: Collect anytime      HPI:    We had the pleasure of evaluating  Chapo Cerna who is a 74 y.o. male in Geriatric follow up today, along with his wife and daughter to provide further history.  Patient has medical history including:  dementia, [place],  Pt lives with his wife    COGNITION UPDATE:  Per patient and [facility] (d/t dementia):  Had a fall last August - no fracture.  He tripped on the curve.  [place] retention span.  Pacing around the house, touching things.  He hasn't gone anywhere.  She watches when he takes garbage out.  SIts with blankets (four) all the time.  He no longer can make coffee.  Wont drink any beverage but caffienated coffee - 15 cups a day.  Can't follow direction, doesn't know left and right.  Doesn't really do anything but watch tv and paces.  Doesn't want to help with yard work.   Wife is main caregiver, dgt and her sister are supportive.  He follows her all day.  She doesn't want care in the home.  He goes with her on errands, but he will only sit in the car.  He mixes up tv and phone.  Less convo- can't get words out.  He points more.  He can let her know if he has pain.  If she talks to him, he gets easily distracted.      Orientation questions  Name: Chapo Cerna  : 52  Age: doesn't know  Orientation: doesn't know  Care partner's name and relationship:  Marisela and Shashank.  ID:  2/3 - has trouble with words  Recall:  n/a    FUNCTIONAL STATUS:  BADLs  Does patient require assistance with:  Bathing: Yes - getting a little confused with showering.    Dressing: Yes - she picks out and directs to dress  Transferring: No  Continence: Yes - hasn't switched to pads yet  Toileting: No  Feeding: No    IADLs  Does patient require assistance with:  Telephone: Yes  Shopping: Yes  Food Preparation: Yes - no longer allowed in kitchen at night  Housekeeping: Yes  Laundry: Yes  Transportation: Yes  Medications: Yes  Finances: Yes    MOOD:  Changes in mood or personality: Yes - improved  Seems anxious: No  Seems depressed: No  With suicidal ideation:   Chronic  Pain:  No    NEUROPSYCH SYMPTOMS:  Is patient less interested in his or her usual activities or in the activities and plans of others? Yes  Does patient get angry or hostile?  Resist care from others? Yes - anger issues are better  Does patient act impatient and cranky? Does mood frequently change for no apparent reason? No  Does patient have trouble sleeping at night? Yes - sleeps 7-10pm.  Sleeping on and off during the day.    Does patient see or hear things that no one else can see or hear? Yes  Does patient act suspicious without good reason (example: believes that others are stealing from him or her, or planning to harm him or her in some way)? No - he does check and lock the doors when it starts to get dark    SAFETY:  Hearing issue: Yes - won't wear HAs anymore  Vision issue: No  Any gait or balance disorder: No - trouble getting up, but ref PT  Uses: No Assistive Devices  Any falls in the last year: Yes.  Documented 8/2024  Weight loss:  up 4 pounds  Any history of wandering: No  Can patient be home alone:  Yes - for about one hour  Does patient drive: No  POA papers completed:     Allergies[1]    Medications:    Medications Ordered Prior to Encounter[2]    History:  Past Medical History:   Diagnosis Date    Arthritis     Dementia (HCC)     Depression     DVT (deep venous thrombosis) (HCC)     Heart murmur     HL (hearing loss)     Hypertension     Memory loss     Protein C deficiency (HCC)     Pulmonary embolus (HCC)      Past Surgical History:   Procedure Laterality Date    BACK SURGERY      BYPASS FEMORAL-TIBIAL Right 2011    CARDIAC VALVE REPLACEMENT      [place] ECHO TRANSESOPHAG R-T 2D W/PRB IMG ACQUISDONNELL I&R N/A 09/16/2021    Procedure: TRANSESOPHAGEAL ECHOCARDIOGRAM (RADHA);  Surgeon: Vahid Benitez DO;  [facility];  Service: Cardiac Surgery    [place] REPLACE AORTIC VALVE OPENFEMORAL ARTERY APPROACH N/A 09/16/2021    Procedure: REPLACEMENT AORTIC VALVE TRANSCATHETER (TAVR) TRANSFEMORAL W/ 29 MM  COLÓN BREEZY S3 ULTRA VALVE (ACCESS ON LEFT);  Surgeon: Vahid Benitez DO;  [facility];  Service: Cardiac Surgery    SPINE SURGERY      Back Surgery    VEIN LIGATION       Family History   Problem Relation Age of Onset    Cancer Mother 48    Early death Mother         48 years old cancer    Heart attack Father 51    Hypertension Father     Early death Father         50 years old Heart attack    Prostate cancer Brother     Arrhythmia Neg Hx     Clotting disorder Neg Hx     Fainting Neg Hx     Heart disease Neg Hx     Anuerysm Neg Hx     Stroke Neg Hx      Social History     Socioeconomic History    Marital status: /Civil Union     Spouse name: Not on file    Number of children: Not on file    Years of education: Not on file    Highest education level: Not on file   Occupational History    Not on file   Tobacco Use    Smoking status: Former     Current packs/day: 0.00     Types: Cigarettes     Quit date: 2018     Years since quittin.6     Passive exposure: Past    Smokeless tobacco: Never    Tobacco comments:     1 pk every 2days , currently on patches to quit.    Vaping Use    Vaping status: Never Used   Substance and Sexual Activity    Alcohol use: No    Drug use: No    Sexual activity: Not on file   Other Topics Concern    Not on file   Social History Narrative    Not on file     Social Drivers of Health     Financial Resource Strain: Low Risk  (3/23/2023)    Overall Financial Resource Strain (CARDIA)     Difficulty of Paying Living Expenses: Not very hard   Food Insecurity: No Food Insecurity (2025)    Nursing - Inadequate Food Risk Classification     Worried About Running Out of Food in the Last Year: Never true     Ran Out of Food in the Last Year: Never true     Ran Out of Food in the Last Year: Not on file   Transportation Needs: No Transportation Needs (2025)    PRAPARE - Transportation     Lack of Transportation (Medical): No     Lack of Transportation (Non-Medical):  "No   Physical Activity: Not on file   Stress: Not on file   Social Connections: Not on file   Intimate Partner Violence: Not on file   Housing Stability: Low Risk  (4/17/2025)    Housing Stability Vital Sign     Unable to Pay for Housing in the Last Year: No     Number of Times Moved in the Last Year: 0     Homeless in the Last Year: No     Past Surgical History:   Procedure Laterality Date    BACK SURGERY      BYPASS FEMORAL-TIBIAL Right 2011    CARDIAC VALVE REPLACEMENT      [place] ECHO TRANSESOPHAG R-T 2D W/PRB IMG ACQUISJ I&R N/A 09/16/2021    Procedure: TRANSESOPHAGEAL ECHOCARDIOGRAM (RADHA);  Surgeon: Vahid Benitez DO;  [facility];  Service: Cardiac Surgery    [place] REPLACE AORTIC VALVE OPENFEMORAL ARTERY APPROACH N/A 09/16/2021    Procedure: REPLACEMENT AORTIC VALVE TRANSCATHETER (TAVR) TRANSFEMORAL W/ 29 MM COLÓN BREEZY S3 ULTRA VALVE (ACCESS ON LEFT);  Surgeon: Vahid Benitez DO;  [facility];  Service: Cardiac Surgery    SPINE SURGERY      Back Surgery    VEIN LIGATION         Review of Systems:    Review of Systems   Unable to perform ROS: Dementia   Constitutional:  Positive for activity change. Negative for appetite change.   HENT:  Positive for hearing loss.    Respiratory:  Negative for shortness of breath.    Cardiovascular:  Negative for chest pain.   Gastrointestinal:  Negative for abdominal pain.   Musculoskeletal:  Negative for arthralgias.        OBJECTIVE:  Vitals:    05/19/25 1013   BP: 126/72   BP Location: Left arm   Patient Position: Sitting   Cuff Size: Standard   Pulse: 57   Temp: 98.2 °F (36.8 °C)   TempSrc: Temporal   SpO2: 97%   Weight: 96.6 kg (213 lb)   Height: 5' 7\" (1.702 m)     Body mass index is 33.36 kg/m².  Physical Exam  Vitals and nursing note reviewed.   Constitutional:       General: He is not in acute distress.     Appearance: Normal appearance. He is well-developed. He is not diaphoretic.   HENT:      Head: Normocephalic.     Cardiovascular:      Rate " "and Rhythm: Normal rate.      Heart sounds: No murmur heard.     No friction rub. No gallop.   Pulmonary:      Effort: Pulmonary effort is normal. No respiratory distress.      Breath sounds: Normal breath sounds. No wheezing or rales.   Abdominal:      General: Bowel sounds are normal. There is no distension.      Palpations: Abdomen is soft.      Tenderness: There is no abdominal tenderness.     Musculoskeletal:         General: Normal range of motion.      Cervical back: Normal range of motion.     Skin:     General: Skin is warm and dry.     Neurological:      General: No focal deficit present.      Mental Status: He is alert. Mental status is at baseline.      Comments: Oriented to person, not tps  Pleasant, cooperative, forgetful   Psychiatric:         Mood and Affect: Mood normal.         Behavior: Behavior normal.             Labs & Imaging:  Lab Results   Component Value Date    WBC 11.50 (H) 05/16/2025    HGB 15.6 05/16/2025    HCT 48.5 05/16/2025    MCV 96 05/16/2025     05/16/2025     Lab Results   Component Value Date    SODIUM 141 05/16/2025    K 3.9 05/16/2025     05/16/2025    CO2 28 05/16/2025    AGAP 10 05/16/2025    BUN 21 05/16/2025    CREATININE 1.49 (H) 05/16/2025    GLUC 94 10/24/2024    GLUF 114 (H) 05/16/2025    CALCIUM 9.0 05/16/2025    AST 23 05/16/2025    ALT 25 05/16/2025    ALKPHOS 151 (H) 05/16/2025    TP 6.7 05/16/2025    TBILI 0.74 05/16/2025    EGFR 45 05/16/2025     No results found for: \"HGBA1C\"  Lab Results   Component Value Date    CHOLESTEROL 134 05/16/2025    CHOLESTEROL 126 04/18/2024    CHOLESTEROL 136 02/01/2023     Lab Results   Component Value Date    HDL 42 05/16/2025    HDL 37 (L) 04/18/2024    HDL 52 02/01/2023     Lab Results   Component Value Date    TRIG 162 (H) 05/16/2025    TRIG 143 04/18/2024    TRIG 128 02/01/2023     Lab Results   Component Value Date    NONHDLC 92 05/16/2025    NONHDLC 89 04/18/2024    NONHDLC 84 02/01/2023     Lab Results " "  Component Value Date    LDLCALC 60 05/16/2025    LDLCALC 60 04/18/2024     Lab Results   Component Value Date    HYUIARFO11 2,424 (H) 07/10/2024     Lab Results   Component Value Date    OIF5YHRBROSD 1.125 07/06/2023     No results found for: \"SYPHILISAB\"  No results found for: \"RPR\"      No results found for: \"GTFX97NXTEXP\", \"[id number]\"   Results for orders placed during the hospital encounter of 09/25/21    TRAUMA - CT head wo contrast    Narrative  CT BRAIN - WITHOUT CONTRAST    INDICATION:   TRAUMA.    COMPARISON:  None.    TECHNIQUE:  CT examination of the brain was performed.  In addition to axial images, sagittal and coronal 2D reformatted images were created and submitted for interpretation.    Radiation dose length product (DLP) for this visit:  1047 mGy-cm .  This examination, like all CT scans performed in the [facility], was performed utilizing techniques to minimize radiation dose exposure, including the use of iterative  reconstruction and automated exposure control.    IMAGE QUALITY:  Diagnostic.    FINDINGS:    PARENCHYMA:  No intracranial mass, mass effect or midline shift. No CT signs of acute infarction.  No acute parenchymal hemorrhage.    VENTRICLES AND EXTRA-AXIAL SPACES:  Normal for the patient's age.    VISUALIZED ORBITS AND PARANASAL SINUSES:  Unremarkable.    CALVARIUM AND EXTRACRANIAL SOFT TISSUES:  Normal.    Impression  No acute intracranial abnormality.         [1] No Known Allergies  [2]   Current Outpatient Medications on File Prior to Visit   Medication Sig Dispense Refill    acetaminophen (TYLENOL) 325 mg tablet Take 1-2 tablets Q4-6 hours PRN pain. Do not take more than 4 grams in 24 hours. (Patient taking differently: if needed Take 1-2 tablets Q4-6 hours PRN pain. Do not take more than 4 grams in 24 hours.)  0    atorvastatin (LIPITOR) 80 mg tablet TAKE ONE TABLET BY MOUTH DAILY 30 tablet 0    Cholecalciferol (VITAMIN D3) 1,000 units tablet Take 1,000 Units " by mouth daily      clopidogrel (PLAVIX) 75 mg tablet TAKE ONE TABLET BY MOUTH DAILY 30 tablet 5    diltiazem (CARDIZEM CD) 240 mg 24 hr capsule TAKE ONE CAPSULE BY MOUTH DAILY 30 capsule 5    folic acid (FOLVITE) 400 mcg tablet Take 400 mcg by mouth daily      vitamin B-12 (VITAMIN B-12) 500 mcg tablet Take 500 mcg by mouth daily      warfarin (COUMADIN) 3 mg tablet TAKE ONE TABLET BY MOUTH DAILY 30 tablet 5    warfarin (COUMADIN) 4 mg tablet TAKE 1 TABLET BY MOUTH EVERY DAY (Patient not taking: Reported on 4/11/2024) 90 tablet 1    warfarin (COUMADIN) 5 mg tablet TAKE 1 TABLET BY MOUTH EVERY DAY (Patient not taking: Reported on 4/11/2024) 30 tablet 3    [DISCONTINUED] cloNIDine (CATAPRES) 0.1 mg tablet TAKE ONE TABLET BY MOUTH EVERY EIGHT HOURS (Patient not taking: Reported on 5/19/2025) 270 tablet 0     No current facility-administered medications on file prior to visit.

## 2025-05-19 NOTE — ASSESSMENT & PLAN NOTE
"Staging: Pt's memory most consistent with Moderate-Severe Stage Dementia, FAST 6e  Cognition update: more word finding issues and more disorientation/confusion  MOCA:  previously <10, did not repeat  Memory medications: none  No recent imaging with increased word finding issue- family would like to check imaging to see if there is vascular component.   Family feels pt would need to be sedated for MRI.  Will also recheck b12/tsh  Weight change in 6 mo:  up 4 pounds  Decision-making capacity: Does not have capacity for complex medical or financial decisions  Pt is assisted adl/dependent iadls.  Lives with wife.    Level of care:  24/7 cares- for assist with adls and safety  Caregiver Review: wife is ok with 24/7 caregiver, but is future planning.  [facility] and has [facility] questions- will ask SW to reach out.  Safety:  Medication Review: seems appropriate for current conditions..  Beer's list meds:  none.  Wife assists with medication administration.  Financial/Scam safety:  no concerns at present.  Home/wandering:  pt paces in the home, no outside wandering.  We did discuss safety concern about leaving pt in car by himself while shopping.  Would recommend ID/gps bracelet/necklace \"just in case\"  Driving: Not driving.  Recommend putting car keys away - could leave out old keys that do not work in car for pt to have.  Fall Risk: Medium fall risk.  Last fall August 2024  Continue to stay active.  Current activity level:  decreased.  Does not participate in social, cognitive, physical activity.  Consider day center for increased activity.  Will ask SW to give activity list to see if there is anything pt might like with that.  Monitor for changes in mood, sleep, pain control.  Notify provider if any concerns  Optimize all acute and chronic conditions.  Continue to follow-up with PCP and specialist as directed for management  Ensure adequate hydration, good nutrition  ACP Review: not on chart  Recommended next " follow up:  6 months

## 2025-05-19 NOTE — ASSESSMENT & PLAN NOTE
Pt's wife reports best sleep is 7-10pm, naps during the day, up all night.  Recommend switching from caffeinated coffee to decaf.  Consider decreasing sugar added  Keep active during the day, encourage walks, find things that pt might enjoy doing around the house  Encourage good sleep hygiene techniques:  decreased noise distractions at night, quiet/calm environment, limit day time sleep.    Could consider trial of melatonin 3mg at hs to see if this helps promote rest.  Avoid over the counter sleep aides that contain diphenhydramine - they can cause confusion in some patients.  Avoid prescription sleep aides that are from then benzodiazapine class as they can increase cognitive decline over time and increase fall risk.  Do not stop abruptly, but discuss with pcp.  Ensure that pt does not wander out of home at night- consider door alarms.  Ensure that pt's caregiver is getting enough rest if she is awake all night monitoring patient.

## 2025-05-19 NOTE — ASSESSMENT & PLAN NOTE
Last fall in August  Family reports some imbalance but declines PT at this time (dont feel pt will do)  Encourage physical activity as tolerated  Fall precautions

## 2025-05-19 NOTE — ASSESSMENT & PLAN NOTE
Pt won't wear HAs anymore  Speak clearly and toward pt when communicating.  Decrease outside noise distractions  Not wearing HAs may make it more difficult for pt to process info, which may lead to more confusion.

## 2025-05-20 ENCOUNTER — RESULTS FOLLOW-UP (OUTPATIENT)
Dept: FAMILY MEDICINE CLINIC | Facility: CLINIC | Age: 75
End: 2025-05-20

## 2025-05-20 ENCOUNTER — TELEPHONE (OUTPATIENT)
Age: 75
End: 2025-05-20

## 2025-05-20 DIAGNOSIS — R73.01 ELEVATED FASTING GLUCOSE: Primary | ICD-10-CM

## 2025-05-20 NOTE — TELEPHONE ENCOUNTER
MSW outreached patient's spouse, Marisela, to follow up on resources requested.  Call placed with success.  During discussion, Marisela reviewed that patient is doing well at home currently and Marisela is patient's primary caregiver.  However, Marisela has looked into options for future planning and same includes possible future placement at [facility], if needed.  Marisela informed that she already has an application and is just trying to be prepared for what is needed.  MSW expressed understanding of same and encouraged she outreach facility for any additional questions or needs and expressed understanding of same.  Through additional discussion Marisela denied need for any other additional resources from MSW at this time.  MSW expressed understanding and encoaurged outreach should any further needs or questions come about.  MSW will remain available as needed.

## 2025-05-22 DIAGNOSIS — I47.10 SVT (SUPRAVENTRICULAR TACHYCARDIA) (HCC): ICD-10-CM

## 2025-05-23 RX ORDER — DILTIAZEM HYDROCHLORIDE 240 MG/1
240 CAPSULE, COATED, EXTENDED RELEASE ORAL DAILY
Qty: 30 CAPSULE | Refills: 5 | Status: SHIPPED | OUTPATIENT
Start: 2025-05-23

## 2025-05-27 ENCOUNTER — HOSPITAL ENCOUNTER (OUTPATIENT)
Dept: NON INVASIVE DIAGNOSTICS | Facility: CLINIC | Age: 75
Discharge: HOME | End: 2025-05-27
Payer: MEDICARE

## 2025-05-27 DIAGNOSIS — I71.43 INFRARENAL ABDOMINAL AORTIC ANEURYSM (AAA) WITHOUT RUPTURE (HCC): ICD-10-CM

## 2025-05-27 DIAGNOSIS — I73.9 PERIPHERAL ARTERY DISEASE (HCC): ICD-10-CM

## 2025-05-27 PROCEDURE — 93978 VASCULAR STUDY: CPT

## 2025-05-27 PROCEDURE — 93978 VASCULAR STUDY: CPT | Performed by: SURGERY

## 2025-05-28 ENCOUNTER — HOSPITAL ENCOUNTER (OUTPATIENT)
Dept: NON INVASIVE DIAGNOSTICS | Facility: CLINIC | Age: 75
Discharge: HOME | End: 2025-05-28
Payer: MEDICARE

## 2025-05-28 DIAGNOSIS — I65.21 CAROTID STENOSIS, RIGHT: ICD-10-CM

## 2025-05-28 PROCEDURE — 93880 EXTRACRANIAL BILAT STUDY: CPT

## 2025-05-30 ENCOUNTER — OFFICE VISIT (OUTPATIENT)
Dept: CARDIOLOGY CLINIC | Facility: MEDICAL CENTER | Age: 75
End: 2025-05-30
Payer: MEDICARE

## 2025-05-30 VITALS
WEIGHT: 213 LBS | SYSTOLIC BLOOD PRESSURE: 134 MMHG | HEIGHT: 67 IN | BODY MASS INDEX: 33.43 KG/M2 | DIASTOLIC BLOOD PRESSURE: 72 MMHG | HEART RATE: 66 BPM | OXYGEN SATURATION: 95 %

## 2025-05-30 DIAGNOSIS — E78.5 DYSLIPIDEMIA: ICD-10-CM

## 2025-05-30 DIAGNOSIS — I73.9 PERIPHERAL ARTERY DISEASE (HCC): ICD-10-CM

## 2025-05-30 DIAGNOSIS — I47.10 SVT (SUPRAVENTRICULAR TACHYCARDIA) (HCC): ICD-10-CM

## 2025-05-30 DIAGNOSIS — I44.7 LBBB (LEFT BUNDLE BRANCH BLOCK): ICD-10-CM

## 2025-05-30 DIAGNOSIS — I10 ESSENTIAL HYPERTENSION: ICD-10-CM

## 2025-05-30 DIAGNOSIS — Z95.2 S/P TAVR (TRANSCATHETER AORTIC VALVE REPLACEMENT): ICD-10-CM

## 2025-05-30 DIAGNOSIS — I25.10 ASCVD (ARTERIOSCLEROTIC CARDIOVASCULAR DISEASE): ICD-10-CM

## 2025-05-30 DIAGNOSIS — I35.0 NONRHEUMATIC AORTIC VALVE STENOSIS: Primary | ICD-10-CM

## 2025-05-30 DIAGNOSIS — Z98.890 S/P FEMORAL-TIBIAL BYPASS: ICD-10-CM

## 2025-05-30 PROCEDURE — 93000 ELECTROCARDIOGRAM COMPLETE: CPT | Performed by: INTERNAL MEDICINE

## 2025-05-30 PROCEDURE — 93880 EXTRACRANIAL BILAT STUDY: CPT | Performed by: STUDENT IN AN ORGANIZED HEALTH CARE EDUCATION/TRAINING PROGRAM

## 2025-05-30 PROCEDURE — 99214 OFFICE O/P EST MOD 30 MIN: CPT | Performed by: INTERNAL MEDICINE

## 2025-05-30 NOTE — PROGRESS NOTES
Cardiology Follow Up    Chapo Cerna  1950  [id number]  [facility] [place]  [address]  [address]  [facility] [address] [phone]      Assessment & Plan  Nonrheumatic aortic valve stenosis  Echo 8/2018 showed moderate AS.    Repeat echo to assess for progression in June 2021 revealed normal LV function, G1DD, moderate AR, severe AS with mean gradient of 39 mmHg and peak velocity of 4 m/s - now s/p TAVR and recovering well, 30 day echo stable.    He had post-op LBBB on EKG along with asymptomatic bradycardia and AIVR.    Advised to watch for syncope - no need for PPM implant after Zio patch in Oct 2021 revealed normal heart rate variation  Status post TAVR.    Repeat echo in Oct 2022 revealed stable valve with mean gradient of 8 mmHg and normal LV function.    Repeat echocardiogram in January 2024 revealed a normal LV size and function with grade 1 diastolic dysfunction, mild LVH, left atrial enlargement, stable TAVR bioprosthesis with a mean gradient of 10 mmHg, stable from prior values.  Repeat study now  SVT (supraventricular tachycardia) (HCC)  episode seen after TAVR, which broke spontaneously, continued on cardizem.    No further episodes noted by symptoms.    Continued on Cardizem.    No changes required today.  Peripheral artery disease (HCC)  On Plavix, statin.  s/p RLE fem-PT bypass.    Following with Regi Roberto/Olga Lay/Dr. Petersen.   No further claudication symptoms with walking,  stable and improved.   Continue vascular follow up and maintenance medication with ASA and statin.    Remains asymptomatic and doing well.  LBBB (left bundle branch block)  Seen on EKG postoperatively after TAVR  No longer seen on EKG today  Essential hypertension  In the past was taking Metoprolol, but stopped taking it after he stopped following with Dr. Pollard around 2012.   Started Amlodipine 5 mg daily 10/18 due to elevated BP, as well as HCTZ 25 mg daily.   PCP  "has changed diltiazem to lisinopril with elevated BP and seen to have increased PVCs on EKG.    He was on the lowest dose of diltiazem, so I would prefer for him to be on an AV obie blocking agent with a higher dose to help suppress ectopy and improve BP control rather than change to a completely different agent altogether.    Blood pressure relatively well-controlled today  No changes required today  ASCVD (arteriosclerotic cardiovascular disease)  Nonobstructive CAD by cath 5/11 at [facility].   On aspirin, statin.    Remains asymptomatic on current medical regiment.  No changes required today.  S/P TAVR (transcatheter aortic valve replacement)  Stable and doing well, repeat echocardiogram annually -due now  S/P RIGHT Femoral- PT bypass 2011  Continued follow-up with vascular surgery  Continue maintenance aspirin and statin  Dyslipidemia  Lipid panel 9/18 showed total cholesterol 121, , HDL 35, LDL 54.   On Atorvastatin 40.   Lipid panel 9/19 showed total cholesterol 131, , HDL 38, LDL 55.    Advised him to try taking some fish oil to help lower TG.    Repeat levels in April 2021 revealed LDL of 143 - which is very high - advised to increase atorvastatin to  80mg daily and repeat levels in Nov 2021 LDL reduced down to 53.    Repeat levels in February 2023 revealed an LDL of 58.    LDL remains well-controlled at 60 in April 2024.  Repeat in May 2025 revealed an LDL 60      Chief Complaint   Patient presents with    Follow-up     6 mo f/u; no cardiac complaints.        Interval History:  Patient feels well, without complaints.  No reported chest pain, shortness of breath, palpitations, lightheadedness, syncope, LE edema, orthopnea, PND, or significant weight changes.  Patient remains active without any increased fatigue out of the ordinary.        Blood pressure 134/72, pulse 66, height 5' 7\" (1.702 m), weight 96.6 kg (213 lb), SpO2 95%.    EKG:  Sinus rhythm with premature atrial complexes with " aberrant conduction  Left axis deviation  Moderate voltage criteria for LVH, may be normal variant  Abnormal ECG    Review of Systems:  Review of Systems   Constitutional:  Negative for activity change, appetite change, fatigue and fever.   HENT:  Negative for nosebleeds and sore throat.    Eyes:  Negative for photophobia and visual disturbance.   Respiratory:  Negative for cough, chest tightness, shortness of breath and wheezing.    Cardiovascular:  Negative for chest pain, palpitations and leg swelling.   Gastrointestinal:  Negative for abdominal pain, diarrhea, nausea and vomiting.   Endocrine: Negative for polyuria.   Genitourinary:  Negative for dysuria, frequency and hematuria.   Musculoskeletal:  Negative for arthralgias, back pain and gait problem.   Skin:  Negative for pallor and rash.   Neurological:  Negative for dizziness, syncope, speech difficulty and light-headedness.   Hematological:  Does not bruise/bleed easily.   Psychiatric/Behavioral:  Negative for agitation, behavioral problems and confusion.        Physical Exam:  Physical Exam  Vitals reviewed.   Constitutional:       General: He is not in acute distress.     Appearance: Normal appearance. He is well-developed. He is not diaphoretic.   HENT:      Head: Normocephalic and atraumatic.      Nose: Nose normal.     Eyes:      General: No scleral icterus.     Extraocular Movements: Extraocular movements intact.      Pupils: Pupils are equal, round, and reactive to light.     Neck:      Vascular: No JVD.     Cardiovascular:      Rate and Rhythm: Normal rate and regular rhythm.      Heart sounds: S1 normal and S2 normal. Heart sounds not distant. Murmur heard.      Systolic murmur is present with a grade of 2/6.      No friction rub. No gallop. No S3 sounds.   Pulmonary:      Effort: Pulmonary effort is normal. No respiratory distress.      Breath sounds: Normal breath sounds. No wheezing or rales.   Abdominal:      General: Bowel sounds are normal.  There is no distension.      Palpations: Abdomen is soft.     Musculoskeletal:         General: No deformity.      Cervical back: Normal range of motion and neck supple.      Right lower leg: No edema.      Left lower leg: No edema.     Skin:     General: Skin is warm and dry.      Findings: No erythema.     Neurological:      Mental Status: He is alert and oriented to person, place, and time.      Cranial Nerves: No cranial nerve deficit.     Psychiatric:         Mood and Affect: Mood normal.         Behavior: Behavior normal.         Patient Active Problem List   Diagnosis    Peripheral artery disease (HCC)    Claudication of left lower extremity (HCC)    S/P RIGHT Femoral- PT bypass 2011    Dyslipidemia    ASCVD (arteriosclerotic cardiovascular disease)    Stage 3 chronic kidney disease    Nonrheumatic aortic valve stenosis    Essential hypertension    Elevated PSA    Embolism and thrombosis of arteries of the lower extremities ([facility])    Protein C deficiency ([facility])    Former heavy tobacco smoker    AAA (abdominal aortic aneurysm) without rupture ([facility])    S/P TAVR (transcatheter aortic valve replacement)    Thrombocytopenia ([facility])    Anticoagulation goal of INR 2 to 3    Anticoagulated on Coumadin    LBBB (left bundle branch block)    Leukocytosis    Hypocalcemia    Hypokalemia    Urinary retention    SVT (supraventricular tachycardia) ([facility])    Elevated troponin    Other insomnia    Encounter for postoperative care    Irregular cardiac rhythm    Left thigh pain    Memory loss    Carotid stenosis, right    Moderate dementia with mood disturbance ([facility])    ACP (advance care planning)    Uses hearing aid    History of fall     Past Medical History:   Diagnosis Date    Arthritis     Dementia ([facility])     Depression     DVT (deep venous thrombosis) ([facility])     Heart murmur     HL (hearing loss)     Hypertension     Memory loss     Protein C deficiency ([facility])     Pulmonary embolus ([facility])      Social History     Socioeconomic  History    Marital status: /Civil Union     Spouse name: Not on file    Number of children: Not on file    Years of education: Not on file    Highest education level: Not on file   Occupational History    Not on file   Tobacco Use    Smoking status: Former     Current packs/day: 0.00     Types: Cigarettes     Quit date: 2018     Years since quittin.6     Passive exposure: Past    Smokeless tobacco: Never    Tobacco comments:     1 pk every 2days , currently on patches to quit.    Vaping Use    Vaping status: Never Used   Substance and Sexual Activity    Alcohol use: No    Drug use: No    Sexual activity: Not on file   Other Topics Concern    Not on file   Social History Narrative    Not on file     Social Drivers of Health     Financial Resource Strain: Low Risk  (3/23/2023)    Overall Financial Resource Strain (CARDIA)     Difficulty of Paying Living Expenses: Not very hard   Food Insecurity: No Food Insecurity (2025)    Nursing - Inadequate Food Risk Classification     Worried About Running Out of Food in the Last Year: Never true     Ran Out of Food in the Last Year: Never true     Ran Out of Food in the Last Year: Not on file   Transportation Needs: No Transportation Needs (2025)    PRAPARE - Transportation     Lack of Transportation (Medical): No     Lack of Transportation (Non-Medical): No   Physical Activity: Not on file   Stress: Not on file   Social Connections: Not on file   Intimate Partner Violence: Not on file   Housing Stability: Low Risk  (2025)    Housing Stability Vital Sign     Unable to Pay for Housing in the Last Year: No     Number of Times Moved in the Last Year: 0     Homeless in the Last Year: No      Family History   Problem Relation Name Age of Onset    Cancer Mother Juan 48    Early death Mother Jaun         48 years old cancer    Heart attack Father Nehemiah 51    Hypertension Father Cerna     Early death Father Cerna         50 years old Heart attack     Prostate cancer Brother      Arrhythmia Neg Hx      Clotting disorder Neg Hx      Fainting Neg Hx      Heart disease Neg Hx      Anuerysm Neg Hx      Stroke Neg Hx       Past Surgical History:   Procedure Laterality Date    BACK SURGERY      BYPASS FEMORAL-TIBIAL Right 2011    CARDIAC VALVE REPLACEMENT      NE ECHO TRANSESOPHAG R-T 2D W/PRB IMG BARBARA I&R N/A 09/16/2021    Procedure: TRANSESOPHAGEAL ECHOCARDIOGRAM ([facility]);  Surgeon: Vahid Benitez DO;  Location: [facility];  Service: Cardiac Surgery    NE REPLACE AORTIC VALVE OPENFEMORAL ARTERY APPROACH N/A 09/16/2021    Procedure: REPLACEMENT AORTIC VALVE TRANSCATHETER (TAVR) TRANSFEMORAL W/ 29 MM COLÓN BREEZY S3 ULTRA VALVE (ACCESS ON LEFT);  Surgeon: Vahid Benitez DO;  Location: [facility];  Service: Cardiac Surgery    SPINE SURGERY      Back Surgery    VEIN LIGATION         Current Outpatient Medications:     acetaminophen (TYLENOL) 325 mg tablet, Take 1-2 tablets Q4-6 hours PRN pain. Do not take more than 4 grams in 24 hours., Disp: , Rfl: 0    atorvastatin (LIPITOR) 80 mg tablet, TAKE ONE TABLET BY MOUTH DAILY, Disp: 30 tablet, Rfl: 0    Cholecalciferol (VITAMIN D3) 1,000 units tablet, Take 1,000 Units by mouth daily, Disp: , Rfl:     clopidogrel (PLAVIX) 75 mg tablet, TAKE ONE TABLET BY MOUTH DAILY, Disp: 30 tablet, Rfl: 5    diltiazem (CARDIZEM CD) 240 mg 24 hr capsule, TAKE ONE CAPSULE BY MOUTH DAILY, Disp: 30 capsule, Rfl: 5    folic acid (FOLVITE) 400 mcg tablet, Take 400 mcg by mouth daily, Disp: , Rfl:     vitamin B-12 (VITAMIN B-12) 500 mcg tablet, Take 500 mcg by mouth daily, Disp: , Rfl:     warfarin (COUMADIN) 3 mg tablet, TAKE ONE TABLET BY MOUTH DAILY, Disp: 30 tablet, Rfl: 5    warfarin (COUMADIN) 4 mg tablet, TAKE 1 TABLET BY MOUTH EVERY DAY (Patient not taking: Reported on 4/11/2024), Disp: 90 tablet, Rfl: 1    warfarin (COUMADIN) 5 mg tablet, TAKE 1 TABLET BY MOUTH EVERY DAY (Patient not taking: Reported on 4/11/2024), Disp: 30  tablet, Rfl: 3  No Known Allergies    Labs:  Appointment on 05/15/2025   Component Date Value    Protime 05/15/2025 26.7 (H)     INR 05/15/2025 2.48 (H)    Orders Only on 05/09/2025   Component Date Value    Sodium 05/16/2025 141     Potassium 05/16/2025 3.9     Chloride 05/16/2025 103     CO2 05/16/2025 28     ANION GAP 05/16/2025 10     BUN 05/16/2025 21     Creatinine 05/16/2025 1.49 (H)     Glucose, Fasting 05/16/2025 114 (H)     Calcium 05/16/2025 9.0     AST 05/16/2025 23     ALT 05/16/2025 25     Alkaline Phosphatase 05/16/2025 151 (H)     Total Protein 05/16/2025 6.7     Albumin 05/16/2025 4.0     Total Bilirubin 05/16/2025 0.74     eGFR 05/16/2025 45     WBC 05/16/2025 11.50 (H)     RBC 05/16/2025 5.08     Hemoglobin 05/16/2025 15.6     Hematocrit 05/16/2025 48.5     MCV 05/16/2025 96     MCH 05/16/2025 30.7     MCHC 05/16/2025 32.2     RDW 05/16/2025 13.1     MPV 05/16/2025 9.8     Platelets 05/16/2025 197     nRBC 05/16/2025 0     Segmented % 05/16/2025 72     Immature Grans % 05/16/2025 0     Lymphocytes % 05/16/2025 16     Monocytes % 05/16/2025 9     Eosinophils Relative 05/16/2025 3     Basophils Relative 05/16/2025 0     Absolute Neutrophils 05/16/2025 8.23 (H)     Absolute Immature Grans 05/16/2025 0.03     Absolute Lymphocytes 05/16/2025 1.85     Absolute Monocytes 05/16/2025 1.04     Eosinophils Absolute 05/16/2025 0.30     Basophils Absolute 05/16/2025 0.05     Cholesterol 05/16/2025 134     Triglycerides 05/16/2025 162 (H)     HDL, Direct 05/16/2025 42     LDL Calculated 05/16/2025 60     Non-HDL-Chol (CHOL-HDL) 05/16/2025 92    Appointment on 04/17/2025   Component Date Value    Protime 04/17/2025 25.1 (H)     INR 04/17/2025 2.28 (H)    Appointment on 03/10/2025   Component Date Value    Protime 03/10/2025 26.5 (H)     INR 03/10/2025 2.46 (H)    Appointment on 02/04/2025   Component Date Value    Protime 02/04/2025 26.4 (H)     INR 02/04/2025 2.44 (H)    Appointment on 01/03/2025   Component  Date Value    Protime 01/03/2025 24.0 (H)     INR 01/03/2025 2.15 (H)      Lab Results   Component Value Date    TRIG 162 (H) 05/16/2025    HDL 42 05/16/2025     Imaging: No results found.

## 2025-05-30 NOTE — ASSESSMENT & PLAN NOTE
Echo 8/2018 showed moderate AS.    Repeat echo to assess for progression in June 2021 revealed normal LV function, G1DD, moderate AR, severe AS with mean gradient of 39 mmHg and peak velocity of 4 m/s - now s/p TAVR and recovering well, 30 day echo stable.    He had post-op LBBB on EKG along with asymptomatic bradycardia and AIVR.    Advised to watch for syncope - no need for PPM implant after Zio patch in Oct 2021 revealed normal heart rate variation  Status post TAVR.    Repeat echo in Oct 2022 revealed stable valve with mean gradient of 8 mmHg and normal LV function.    Repeat echocardiogram in January 2024 revealed a normal LV size and function with grade 1 diastolic dysfunction, mild LVH, left atrial enlargement, stable TAVR bioprosthesis with a mean gradient of 10 mmHg, stable from prior values.  Repeat study now

## 2025-05-30 NOTE — ASSESSMENT & PLAN NOTE
Nonobstructive CAD by cath 5/11 at [facility].   On aspirin, statin.    Remains asymptomatic on current medical regiment.  No changes required today.

## 2025-05-30 NOTE — ASSESSMENT & PLAN NOTE
In the past was taking Metoprolol, but stopped taking it after he stopped following with Dr. Pollard around 2012.   Started Amlodipine 5 mg daily 10/18 due to elevated BP, as well as HCTZ 25 mg daily.   PCP has changed diltiazem to lisinopril with elevated BP and seen to have increased PVCs on EKG.    He was on the lowest dose of diltiazem, so I would prefer for him to be on an AV obie blocking agent with a higher dose to help suppress ectopy and improve BP control rather than change to a completely different agent altogether.    Blood pressure relatively well-controlled today  No changes required today

## 2025-05-30 NOTE — ASSESSMENT & PLAN NOTE
Lipid panel 9/18 showed total cholesterol 121, , HDL 35, LDL 54.   On Atorvastatin 40.   Lipid panel 9/19 showed total cholesterol 131, , HDL 38, LDL 55.    Advised him to try taking some fish oil to help lower TG.    Repeat levels in April 2021 revealed LDL of 143 - which is very high - advised to increase atorvastatin to  80mg daily and repeat levels in Nov 2021 LDL reduced down to 53.    Repeat levels in February 2023 revealed an LDL of 58.    LDL remains well-controlled at 60 in April 2024.  Repeat in May 2025 revealed an LDL 60

## 2025-06-02 ENCOUNTER — RESULTS FOLLOW-UP (OUTPATIENT)
Dept: VASCULAR SURGERY | Facility: CLINIC | Age: 75
End: 2025-06-02

## 2025-06-04 ENCOUNTER — HOSPITAL ENCOUNTER (OUTPATIENT)
Dept: RADIOLOGY | Facility: MEDICAL CENTER | Age: 75
Discharge: HOME | End: 2025-06-04
Attending: NURSE PRACTITIONER
Payer: MEDICARE

## 2025-06-04 ENCOUNTER — APPOINTMENT (OUTPATIENT)
Dept: LAB | Facility: MEDICAL CENTER | Age: 75
End: 2025-06-04
Attending: PHYSICIAN ASSISTANT
Payer: MEDICARE

## 2025-06-04 DIAGNOSIS — R41.3 MEMORY CHANGE: ICD-10-CM

## 2025-06-04 DIAGNOSIS — E53.8 B12 DEFICIENCY: ICD-10-CM

## 2025-06-04 DIAGNOSIS — G30.1 MODERATE LATE ONSET ALZHEIMER'S DEMENTIA WITH MOOD DISTURBANCE (HCC): ICD-10-CM

## 2025-06-04 DIAGNOSIS — F02.B3 MODERATE LATE ONSET ALZHEIMER'S DEMENTIA WITH MOOD DISTURBANCE (HCC): ICD-10-CM

## 2025-06-04 LAB
EST. AVERAGE GLUCOSE BLD GHB EST-MCNC: 111 MG/DL
HBA1C MFR BLD: 5.5 %
TSH SERPL DL<=0.05 MIU/L-ACNC: 0.97 UIU/ML (ref 0.45–4.5)
VIT B12 SERPL-MCNC: 899 PG/ML (ref 180–914)

## 2025-06-04 PROCEDURE — 84443 ASSAY THYROID STIM HORMONE: CPT

## 2025-06-04 PROCEDURE — 70450 CT HEAD/BRAIN W/O DYE: CPT

## 2025-06-04 PROCEDURE — 82607 VITAMIN B-12: CPT

## 2025-06-04 PROCEDURE — 83036 HEMOGLOBIN GLYCOSYLATED A1C: CPT | Performed by: PHYSICIAN ASSISTANT

## 2025-06-04 PROCEDURE — 36415 COLL VENOUS BLD VENIPUNCTURE: CPT

## 2025-06-05 ENCOUNTER — RESULTS FOLLOW-UP (OUTPATIENT)
Age: 75
End: 2025-06-05

## 2025-06-05 NOTE — TELEPHONE ENCOUNTER
Called patient's spouse and inform her that as per Marisela she want me to inform you that his blood work results vit B12 and Thyroid levels are in normal range.

## 2025-06-05 NOTE — TELEPHONE ENCOUNTER
----- Message from FILIPPO Wood sent at 6/5/2025  7:28 AM EDT -----  Please let pt know that b12/thyroid are wnl  ----- Message -----  From: Lab, Background User  Sent: 6/4/2025  11:11 PM EDT  To: FILIPPO Foster

## 2025-06-06 ENCOUNTER — HOSPITAL ENCOUNTER (OUTPATIENT)
Dept: NON INVASIVE DIAGNOSTICS | Facility: CLINIC | Age: 75
Discharge: HOME | End: 2025-06-06
Payer: MEDICARE

## 2025-06-06 DIAGNOSIS — I73.9 PERIPHERAL ARTERY DISEASE (HCC): ICD-10-CM

## 2025-06-06 PROCEDURE — 93922 UPR/L XTREMITY ART 2 LEVELS: CPT | Performed by: SURGERY

## 2025-06-06 PROCEDURE — 93925 LOWER EXTREMITY STUDY: CPT

## 2025-06-06 PROCEDURE — 93925 LOWER EXTREMITY STUDY: CPT | Performed by: SURGERY

## 2025-06-06 PROCEDURE — 93923 UPR/LXTR ART STDY 3+ LVLS: CPT

## 2025-06-09 ENCOUNTER — TELEPHONE (OUTPATIENT)
Age: 75
End: 2025-06-09

## 2025-06-09 NOTE — TELEPHONE ENCOUNTER
Marisela, wife of pt, called in inquiring if pt's results for CT head without contrast came in?    Reviewed chart, but did not see any results note to relay.     Wife is requesting for Marisela to please review his results & to call her back to go over them with her?    Please advise & call Marisela back with update. Thank you!    Marisela: [phone]

## 2025-06-11 ENCOUNTER — OFFICE VISIT (OUTPATIENT)
Dept: VASCULAR SURGERY | Facility: CLINIC | Age: 75
End: 2025-06-11
Payer: MEDICARE

## 2025-06-11 ENCOUNTER — TELEPHONE (OUTPATIENT)
Dept: VASCULAR SURGERY | Facility: CLINIC | Age: 75
End: 2025-06-11

## 2025-06-11 VITALS
OXYGEN SATURATION: 97 % | HEART RATE: 65 BPM | DIASTOLIC BLOOD PRESSURE: 90 MMHG | BODY MASS INDEX: 33.27 KG/M2 | SYSTOLIC BLOOD PRESSURE: 144 MMHG | WEIGHT: 212 LBS | RESPIRATION RATE: 16 BRPM | HEIGHT: 67 IN

## 2025-06-11 DIAGNOSIS — I65.21 RIGHT CAROTID ARTERY OCCLUSION: ICD-10-CM

## 2025-06-11 DIAGNOSIS — I65.23 BILATERAL CAROTID ARTERY STENOSIS: ICD-10-CM

## 2025-06-11 DIAGNOSIS — I73.9 PERIPHERAL ARTERY DISEASE (HCC): Primary | ICD-10-CM

## 2025-06-11 DIAGNOSIS — I65.21 CAROTID STENOSIS, RIGHT: ICD-10-CM

## 2025-06-11 DIAGNOSIS — I71.43 INFRARENAL ABDOMINAL AORTIC ANEURYSM (AAA) WITHOUT RUPTURE (HCC): ICD-10-CM

## 2025-06-11 PROCEDURE — 99213 OFFICE O/P EST LOW 20 MIN: CPT | Performed by: NURSE PRACTITIONER

## 2025-06-11 NOTE — PROGRESS NOTES
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    -Presents without complaints.  Patient is slow to answer, nods or shakes head.  Most of information obtained from patient's wife. ([facility] with [facility], dementia)  -Denies abdominal or back pain  -Asymptomatic from aneurysm and mesenteric standpoint  -Blood pressure slightly elevated in office today.    -AOIL 25- 0.5cm AAA increase infrarenal fusiform abdominal aortic aneurysm measuring 5.0 cm. (Prior: 4.5 cm)    The common iliac arteries are patent and normal in caliber bilaterally.    Elevated velocities suggest > 70% stenosis of the celiac axis.   The superior mesenteric artery is patent.    The right proximal renal artery is patent. > 60% stenosis at the left proximal renal artery.       Plan:  - AOIL in 6 months w/ return OV VS  - Continue Plavix  - Continue coumadin  - Continue statin   - Blood pressure control  - Educate on signs and symptoms regarding AAA rupture, symptoms and when to seek medical attention.      Orders:    VAS abdominal aorta/iliac duplex; Future    Carotid stenosis, right  -known R ICA occlusion and mild LICA stenosis  -Denies TIA/ stroke symptoms  -CV duplex  without significant change  -Stable in comparison to prior duplex  Known R ICA occlusion  <50% LICA stenosis    Plan:  -carotid duplex in 1 year  -Continue antiplatelet and statin therapy  - Educated on TIA and strokelike symptoms when to  "seek medical attention         Bilateral carotid artery stenosis  See above   Orders:    VAS carotid complete study; Future    Right carotid artery occlusion  Known R ICA occlusion since '21  Orders:    VAS carotid complete study; Future    Peripheral artery disease (HCC)  - Asymptomatic.  Denies lower extremity pain, claudication, ischemic rest pain or wounds.  -Palpable bypass pulse   - Doppler distal signals bilaterally.  -Patient wife reports patient paces around house, no long extended distance walking.  -Maintained on Plavix, statin    LEAD 6/6/2025-no significant change or progression of disease  Right: Common femoral to PT bypass graft patent.  MARLENY 1.1 3/225/179  Left: 50-75% stenosis of profunda and > 75% stenosis of distal SFA.  Known occlusion of proximal pop with reconstitution distally.  MARLENY 0.72/128/78    Plan:  - LEAD in 1 year  -Continue Plavix, statin  -Continue Coumadin (long-term anticoagulation)  Orders:    VAS ARTERIAL DUPLEX- LOWER LIMB BILATERAL; Future        History of Present Illness   Cc: \"To review multiple studies he had done recently, (AOIL 5/27/25, CV 5/28/25, and LOYD 6/9/25). He denies any TIA or stroke like symptoms, his wife is with him and states that he has dementia and Alzheimer's and speaks very little and has difficulty putting his words together. He denies any change in appetite, pain after eating, nausea or vomiting. His wife states that he does a lot of pacing within the home but he does not do a lot of walking outside/in public. \"    MAREK Cerna is a 75 y.o. male who presents to review duplex imaging and [facility] visit.    Patient accompanied by wife at office visit today who reports most of medical history    Presents with complaints. Denies LE claudication, rest pain, or wounds. No TIA/ stroke like symptoms.  Known R ICA occlusion.   Known AAA with growth on imaging.  Patient is asymptomatic.    Discussed signs and symptoms to seek medical attention  Encourage blood " "pressure control  Maintain on OMT Plavix, statin, long-term Coumadin.    History obtained from: patient and wife    Review of Systems   Constitutional:  Negative for appetite change and fatigue.   Eyes:  Negative for visual disturbance.   Respiratory:  Negative for shortness of breath.    Gastrointestinal:  Negative for abdominal pain and nausea.   Musculoskeletal:  Negative for back pain, gait problem and neck pain.   Skin:  Negative for color change and wound.   Neurological:  Positive for speech difficulty. Negative for dizziness, facial asymmetry, weakness, numbness and headaches.   Psychiatric/Behavioral:  Positive for confusion. Negative for sleep disturbance.      Medical History Reviewed by provider this encounter:  Tobacco  Allergies  Meds  Problems  Med Hx  Surg Hx  Fam Hx     .  Past Medical History   Past Medical History[1]  Past Surgical History[2]  Family History[3]   reports that he quit smoking about 6 years ago. His smoking use included cigarettes. He has been exposed to tobacco smoke. He has never used smokeless tobacco. He reports that he does not drink alcohol and does not use drugs.  Current Outpatient Medications   Medication Instructions    acetaminophen (TYLENOL) 325 mg tablet Take 1-2 tablets Q4-6 hours PRN pain. Do not take more than 4 grams in 24 hours.    atorvastatin (LIPITOR) 80 mg, Oral, Daily    Cholecalciferol (VITAMIN D3) 1,000 Units, Daily    clopidogrel (PLAVIX) 75 mg, Oral, Daily    diltiazem (CARDIZEM CD) 240 mg, Oral, Daily    folic acid (FOLVITE) 400 mcg, Daily    vitamin B-12 (VITAMIN B-12) 500 mcg, Daily    warfarin (COUMADIN) 5 mg tablet TAKE 1 TABLET BY MOUTH EVERY DAY    warfarin (COUMADIN) 4 mg, Oral, Daily    warfarin (COUMADIN) 3 mg, Oral, Daily   Allergies[4]   Medications Ordered Prior to Encounter[5]   Social History[6]     Objective   /90 (BP Location: Right arm, Patient Position: Sitting, Cuff Size: Standard)   Pulse 65   Resp 16   Ht 5' 7\" (1.702 " m)   Wt 96.2 kg (212 lb)   SpO2 97%   BMI 33.20 kg/m²      Physical Exam  Vitals reviewed.   Constitutional:       General: He is not in acute distress.     Appearance: He is obese.   HENT:      Head: Normocephalic.     Cardiovascular:      Rate and Rhythm: Normal rate.      Pulses:           Dorsalis pedis pulses are detected w/ Doppler on the right side and detected w/ Doppler on the left side.        Posterior tibial pulses are detected w/ Doppler on the right side and detected w/ Doppler on the left side.   Pulmonary:      Effort: Pulmonary effort is normal. No respiratory distress.     Musculoskeletal:      Cervical back: Normal range of motion.     Skin:     General: Skin is warm.      Comments: No open wonds/ or tissue loss     Neurological:      Mental Status: He is alert and oriented to person, place, and time.      Sensory: No sensory deficit.      Motor: No weakness.     Psychiatric:         Behavior: Behavior normal.         Administrative Statements   Discussed:  Diagnostic results, Instructions for management, Patient and family education, Importance of tx compliance, Risk factor reductions, Impressions, Documenting in the medical record, Reviewing/placing orders in the medical record (including tests, medications, and/or procedures), and Obtaining or reviewing history  .       [1]   Past Medical History:  Diagnosis Date    Arthritis     Dementia (HCC)     Depression     DVT (deep venous thrombosis) (HCC)     Heart murmur     HL (hearing loss)     Hypertension     Memory loss     Protein C deficiency (HCC)     Pulmonary embolus (HCC)    [2]   Past Surgical History:  Procedure Laterality Date    BACK SURGERY      BYPASS FEMORAL-TIBIAL Right 2011    CARDIAC VALVE REPLACEMENT      KY ECHO TRANSESOPHAG R-T 2D W/PRB IMG ACQUISJ I&R N/A 09/16/2021    Procedure: TRANSESOPHAGEAL ECHOCARDIOGRAM (RADHA);  Surgeon: Vahid Benitez DO;  Location: [facility];  Service: Cardiac Surgery    KY REPLACE AORTIC VALVE  OPENFEMORAL ARTERY APPROACH N/A 09/16/2021    Procedure: REPLACEMENT AORTIC VALVE TRANSCATHETER (TAVR) TRANSFEMORAL W/ 29 MM COLÓN BREEZY S3 ULTRA VALVE (ACCESS ON LEFT);  Surgeon: Vahid Benitez DO;  Location: [facility];  Service: Cardiac Surgery    SPINE SURGERY      Back Surgery    VEIN LIGATION     [3]   Family History  Problem Relation Name Age of Onset    Cancer Mother Juan 48    Early death Mother Juan         48 years old cancer    Heart attack Father Nehemiah 51    Hypertension Father Cerna     Early death Father Nehemiah         50 years old Heart attack    Prostate cancer Brother      Arrhythmia Neg Hx      Clotting disorder Neg Hx      Fainting Neg Hx      Heart disease Neg Hx      Anuerysm Neg Hx      Stroke Neg Hx     [4] No Known Allergies  [5]   Current Outpatient Medications on File Prior to Visit   Medication Sig Dispense Refill    acetaminophen (TYLENOL) 325 mg tablet Take 1-2 tablets Q4-6 hours PRN pain. Do not take more than 4 grams in 24 hours.  0    atorvastatin (LIPITOR) 80 mg tablet TAKE ONE TABLET BY MOUTH DAILY 30 tablet 0    Cholecalciferol (VITAMIN D3) 1,000 units tablet Take 1,000 Units by mouth daily      clopidogrel (PLAVIX) 75 mg tablet TAKE ONE TABLET BY MOUTH DAILY 30 tablet 5    diltiazem (CARDIZEM CD) 240 mg 24 hr capsule TAKE ONE CAPSULE BY MOUTH DAILY 30 capsule 5    folic acid (FOLVITE) 400 mcg tablet Take 400 mcg by mouth daily      vitamin B-12 (VITAMIN B-12) 500 mcg tablet Take 500 mcg by mouth daily      warfarin (COUMADIN) 3 mg tablet TAKE ONE TABLET BY MOUTH DAILY 30 tablet 5    warfarin (COUMADIN) 4 mg tablet TAKE 1 TABLET BY MOUTH EVERY DAY (Patient not taking: Reported on 4/11/2024) 90 tablet 1    warfarin (COUMADIN) 5 mg tablet TAKE 1 TABLET BY MOUTH EVERY DAY (Patient not taking: Reported on 4/11/2024) 30 tablet 3     No current facility-administered medications on file prior to visit.   [6]   Social History  Tobacco Use    Smoking status: Former     Current  packs/day: 0.00     Types: Cigarettes     Quit date: 2018     Years since quittin.7     Passive exposure: Past    Smokeless tobacco: Never    Tobacco comments:     1 pk every 2days , currently on patches to quit.    Vaping Use    Vaping status: Never Used   Substance and Sexual Activity    Alcohol use: No    Drug use: No

## 2025-06-11 NOTE — ASSESSMENT & PLAN NOTE
- Asymptomatic.  Denies lower extremity pain, claudication, ischemic rest pain or wounds.  -Palpable bypass pulse   - Doppler distal signals bilaterally.  -Patient wife reports patient paces around house, no long extended distance walking.  -Maintained on Plavix, statin    LEAD 6/6/2025-no significant change or progression of disease  Right: Common femoral to PT bypass graft patent.  MARLENY 1.1 3/225/179  Left: 50-75% stenosis of profunda and > 75% stenosis of distal SFA.  Known occlusion of proximal pop with reconstitution distally.  MARLENY 0.72/128/78    Plan:  - LEAD in 1 year  -Continue Plavix, statin  -Continue Coumadin (long-term anticoagulation)  Orders:    VAS ARTERIAL DUPLEX- LOWER LIMB BILATERAL; Future

## 2025-06-11 NOTE — ASSESSMENT & PLAN NOTE
75-year-old male with HTN, reported dementia/Alzheimers, CAD, AS s/p TAVR '21, protein C deficiency, DVT/PE '99 on long-term warfarin, AIOD/PAD s/p R femoral to distal PT bypass with L GSV '11, mesenteric stenosis, right carotid occlusion and mild left ICA stenosis, infrarenal AAA presents to review imaging / [facility].    -Presents without complaints.  Patient is slow to answer, nods or shakes head.  Most of information obtained from patient's wife. (Follow[facility] with [facility], dementia)  -Denies abdominal or back pain  -Asymptomatic from aneurysm and mesenteric standpoint  -Blood pressure slightly elevated in office today.    -AOIL 5/27/25- 0.5cm AAA increase infrarenal fusiform abdominal aortic aneurysm measuring 5.0 cm. (Prior: 4.5 cm)    The common iliac arteries are patent and normal in caliber bilaterally.    Elevated velocities suggest > 70% stenosis of the celiac axis.   The superior mesenteric artery is patent.    The right proximal renal artery is patent. > 60% stenosis at the left proximal renal artery.       Plan:  - AOIL in 6 months w/ return OV VS  - Continue Plavix  - Continue coumadin  - Continue statin   - Blood pressure control  - Educate on signs and symptoms regarding AAA rupture, symptoms and when to seek medical attention.      Orders:    VAS abdominal aorta/iliac duplex; Future

## 2025-06-11 NOTE — ASSESSMENT & PLAN NOTE
-known R ICA occlusion and mild LICA stenosis  -Denies TIA/ stroke symptoms  -CV duplex 5/28 without significant change  -Stable in comparison to prior duplex  Known R ICA occlusion  <50% LICA stenosis    Plan:  -carotid duplex in 1 year  -Continue antiplatelet and statin therapy  - Educated on TIA and strokelike symptoms when to seek medical attention

## 2025-06-11 NOTE — PATIENT INSTRUCTIONS
Abdominal iliac duplex in 6 months with return office visit with vascular surgeon to review    Carotid duplex and lower extremity arterial duplex in 1 year    Continue daily Plavix, statin  Continue Coumadin as prescribed  Encourage daily dedicated walking    Blood pressure control    Call 911 with any new, different significant abdominal or back pain

## 2025-06-12 ENCOUNTER — RESULTS FOLLOW-UP (OUTPATIENT)
Dept: VASCULAR SURGERY | Facility: CLINIC | Age: 75
End: 2025-06-12

## 2025-06-23 ENCOUNTER — APPOINTMENT (OUTPATIENT)
Dept: LAB | Facility: MEDICAL CENTER | Age: 75
End: 2025-06-23
Attending: FAMILY MEDICINE
Payer: MEDICARE

## 2025-06-23 DIAGNOSIS — Z51.81 ANTICOAGULATION GOAL OF INR 2 TO 3: ICD-10-CM

## 2025-06-23 DIAGNOSIS — Z79.01 ANTICOAGULATED ON COUMADIN: ICD-10-CM

## 2025-06-23 DIAGNOSIS — I74.3 EMBOLISM AND THROMBOSIS OF ARTERIES OF THE LOWER EXTREMITIES (HCC): ICD-10-CM

## 2025-06-23 DIAGNOSIS — Z79.01 ANTICOAGULATION GOAL OF INR 2 TO 3: ICD-10-CM

## 2025-06-23 DIAGNOSIS — D68.59 PROTEIN C DEFICIENCY (HCC): ICD-10-CM

## 2025-06-23 LAB
INR PPP: 2.13 (ref 0.85–1.19)
PROTHROMBIN TIME: 23.9 SECONDS (ref 12.3–15)

## 2025-06-23 PROCEDURE — 85610 PROTHROMBIN TIME: CPT

## 2025-06-23 PROCEDURE — 36415 COLL VENOUS BLD VENIPUNCTURE: CPT

## 2025-07-07 DIAGNOSIS — I73.9 PERIPHERAL ARTERY DISEASE (HCC): ICD-10-CM

## 2025-07-07 DIAGNOSIS — I73.9 INTERMITTENT CLAUDICATION (HCC): ICD-10-CM

## 2025-07-09 RX ORDER — ATORVASTATIN CALCIUM 80 MG/1
80 TABLET, FILM COATED ORAL DAILY
Qty: 30 TABLET | Refills: 5 | Status: SHIPPED | OUTPATIENT
Start: 2025-07-09

## 2025-07-09 RX ORDER — ATORVASTATIN CALCIUM 80 MG/1
80 TABLET, FILM COATED ORAL DAILY
Qty: 30 TABLET | Refills: 0 | OUTPATIENT
Start: 2025-07-09

## 2025-07-23 ENCOUNTER — ANTICOAG VISIT (OUTPATIENT)
Dept: FAMILY MEDICINE CLINIC | Facility: CLINIC | Age: 75
End: 2025-07-23

## 2025-07-23 DIAGNOSIS — I73.9 PERIPHERAL ARTERY DISEASE (HCC): Primary | ICD-10-CM

## 2025-07-23 DIAGNOSIS — I35.0 NONRHEUMATIC AORTIC VALVE STENOSIS: ICD-10-CM

## 2025-07-25 ENCOUNTER — APPOINTMENT (OUTPATIENT)
Dept: LAB | Facility: MEDICAL CENTER | Age: 75
End: 2025-07-25
Attending: FAMILY MEDICINE
Payer: MEDICARE

## 2025-07-25 DIAGNOSIS — Z51.81 ANTICOAGULATION GOAL OF INR 2 TO 3: ICD-10-CM

## 2025-07-25 DIAGNOSIS — Z79.01 ANTICOAGULATED ON COUMADIN: ICD-10-CM

## 2025-07-25 DIAGNOSIS — Z79.01 ANTICOAGULATION GOAL OF INR 2 TO 3: ICD-10-CM

## 2025-07-25 DIAGNOSIS — D68.59 PROTEIN C DEFICIENCY (HCC): ICD-10-CM

## 2025-07-25 DIAGNOSIS — I74.3 EMBOLISM AND THROMBOSIS OF ARTERIES OF THE LOWER EXTREMITIES (HCC): ICD-10-CM

## 2025-07-25 LAB
INR PPP: 2.38 (ref 0.85–1.19)
PROTHROMBIN TIME: 25.9 SECONDS (ref 12.3–15)

## 2025-07-25 PROCEDURE — 85610 PROTHROMBIN TIME: CPT

## 2025-07-25 PROCEDURE — 36415 COLL VENOUS BLD VENIPUNCTURE: CPT

## 2025-07-31 LAB
INR PPP: 2.38 (ref 0.85–1.19)
SL AMB POCT INR: 2.38 (ref 2–3)

## (undated) DEVICE — Device

## (undated) DEVICE — CARDIOVASCULAR SPLIT DRAPE: Brand: CONVERTORS

## (undated) DEVICE — INTENDED FOR TISSUE SEPARATION, AND OTHER PROCEDURES THAT REQUIRE A SHARP SURGICAL BLADE TO PUNCTURE OR CUT.: Brand: BARD-PARKER ® CARBON RIB-BACK BLADES

## (undated) DEVICE — THERMOFLECT BLANKET, L, 25EA                               TS THERMOFLECT BLANKET, 48" X 84", SILVER, 5/BG, 5 BG/CS NW: Brand: THERMOFLECT

## (undated) DEVICE — DRESSING ALLEVYN LIFE SACRAL 6.75 X 6.5 IN

## (undated) DEVICE — X-RAY DETECTABLE SPONGES,16 PLY: Brand: VISTEC

## (undated) DEVICE — CARDIO PERI-GROIN: Brand: CONVERTORS

## (undated) DEVICE — BETHLEHEM MAJOR GENERAL PACK: Brand: CARDINAL HEALTH

## (undated) DEVICE — ADHESIVE SKIN HIGH VISCOSITY EXOFIN 1ML

## (undated) DEVICE — PAD GROUNDING ADULT

## (undated) DEVICE — SET DILATOR HYDRO .035 20MM & 22MM X 45CM

## (undated) DEVICE — SUT SILK 0 CT-1 30 IN 424H

## (undated) DEVICE — GAUZE SPONGES,USP TYPE VII GAUZE, 12 PLY: Brand: CURITY

## (undated) DEVICE — MICROPUNCTURE INTRODUCER SET SILHOUETTE TRANSITIONLESS PUSH-PLUS DESIGN - STIFFENED CANNULA WITH NITINOL WIRE GUIDE: Brand: MICROPUNCTURE

## (undated) DEVICE — GLOVE SRG BIOGEL ECLIPSE 7

## (undated) DEVICE — GLOVE INDICATOR PI UNDERGLOVE SZ 7 BLUE

## (undated) DEVICE — DEFIB ADULT ELECTRODE CARDINAL

## (undated) DEVICE — 3000CC GUARDIAN II: Brand: GUARDIAN

## (undated) DEVICE — TRAY FOLEY 16FR SURESTEP TEMP SENS URIMETER STAT LOK

## (undated) DEVICE — 3M™ TEGADERM™ TRANSPARENT FILM DRESSING FRAME STYLE, 1626W, 4 IN X 4-3/4 IN (10 CM X 12 CM), 50/CT 4CT/CASE: Brand: 3M™ TEGADERM™

## (undated) DEVICE — HEAVY DUTY TABLE COVER: Brand: CONVERTORS